# Patient Record
Sex: MALE | Race: BLACK OR AFRICAN AMERICAN | Employment: OTHER
[De-identification: names, ages, dates, MRNs, and addresses within clinical notes are randomized per-mention and may not be internally consistent; named-entity substitution may affect disease eponyms.]

---

## 2024-10-03 ENCOUNTER — APPOINTMENT (OUTPATIENT)
Facility: HOSPITAL | Age: 84
End: 2024-10-03
Attending: INTERNAL MEDICINE
Payer: OTHER GOVERNMENT

## 2024-10-03 ENCOUNTER — HOSPITAL ENCOUNTER (INPATIENT)
Facility: HOSPITAL | Age: 84
LOS: 23 days | Discharge: NURSING, REHAB OR LONG-TERM CARE | End: 2024-10-26
Attending: INTERNAL MEDICINE | Admitting: INTERNAL MEDICINE
Payer: OTHER GOVERNMENT

## 2024-10-03 DIAGNOSIS — R60.0 EDEMA OF RIGHT UPPER ARM: ICD-10-CM

## 2024-10-03 DIAGNOSIS — R06.02 SHORTNESS OF BREATH: Primary | ICD-10-CM

## 2024-10-03 PROBLEM — E11.9 DM (DIABETES MELLITUS), TYPE 2 (HCC): Status: ACTIVE | Noted: 2024-10-03

## 2024-10-03 PROBLEM — R04.2 HEMOPTYSIS: Status: ACTIVE | Noted: 2024-10-03

## 2024-10-03 PROBLEM — J18.9 COMMUNITY ACQUIRED PNEUMONIA: Status: ACTIVE | Noted: 2024-10-03

## 2024-10-03 PROBLEM — Z74.09 DECREASED MOBILITY AND ENDURANCE: Status: ACTIVE | Noted: 2024-10-03

## 2024-10-03 PROBLEM — N17.9 AKI (ACUTE KIDNEY INJURY) (HCC): Status: ACTIVE | Noted: 2024-10-03

## 2024-10-03 PROBLEM — J96.01 ACUTE HYPOXIC RESPIRATORY FAILURE: Status: ACTIVE | Noted: 2024-10-03

## 2024-10-03 PROBLEM — I82.413 ACUTE DEEP VEIN THROMBOSIS (DVT) OF FEMORAL VEIN OF BOTH LOWER EXTREMITIES (HCC): Status: ACTIVE | Noted: 2024-10-03

## 2024-10-03 LAB
GLUCOSE BLD STRIP.AUTO-MCNC: 335 MG/DL (ref 65–100)
GLUCOSE BLD STRIP.AUTO-MCNC: 366 MG/DL (ref 65–100)
MRSA DNA SPEC QL NAA+PROBE: NOT DETECTED
PERFORMED BY:: ABNORMAL
PERFORMED BY:: ABNORMAL

## 2024-10-03 PROCEDURE — 0202U NFCT DS 22 TRGT SARS-COV-2: CPT

## 2024-10-03 PROCEDURE — 2000000000 HC ICU R&B

## 2024-10-03 PROCEDURE — 94761 N-INVAS EAR/PLS OXIMETRY MLT: CPT

## 2024-10-03 PROCEDURE — 5A0945A ASSISTANCE WITH RESPIRATORY VENTILATION, 24-96 CONSECUTIVE HOURS, HIGH NASAL FLOW/VELOCITY: ICD-10-PCS | Performed by: INTERNAL MEDICINE

## 2024-10-03 PROCEDURE — 6370000000 HC RX 637 (ALT 250 FOR IP): Performed by: INTERNAL MEDICINE

## 2024-10-03 PROCEDURE — 2700000000 HC OXYGEN THERAPY PER DAY

## 2024-10-03 PROCEDURE — 2580000003 HC RX 258: Performed by: INTERNAL MEDICINE

## 2024-10-03 PROCEDURE — 6360000002 HC RX W HCPCS: Performed by: INTERNAL MEDICINE

## 2024-10-03 PROCEDURE — 82962 GLUCOSE BLOOD TEST: CPT

## 2024-10-03 PROCEDURE — 71045 X-RAY EXAM CHEST 1 VIEW: CPT

## 2024-10-03 PROCEDURE — 71250 CT THORAX DX C-: CPT

## 2024-10-03 PROCEDURE — 87641 MR-STAPH DNA AMP PROBE: CPT

## 2024-10-03 RX ORDER — SODIUM CHLORIDE 0.9 % (FLUSH) 0.9 %
5-40 SYRINGE (ML) INJECTION PRN
Status: DISCONTINUED | OUTPATIENT
Start: 2024-10-03 | End: 2024-10-26 | Stop reason: HOSPADM

## 2024-10-03 RX ORDER — DEXTROSE MONOHYDRATE 100 MG/ML
INJECTION, SOLUTION INTRAVENOUS CONTINUOUS PRN
Status: DISCONTINUED | OUTPATIENT
Start: 2024-10-03 | End: 2024-10-26 | Stop reason: HOSPADM

## 2024-10-03 RX ORDER — BISACODYL 10 MG
10 SUPPOSITORY, RECTAL RECTAL DAILY PRN
Status: DISCONTINUED | OUTPATIENT
Start: 2024-10-03 | End: 2024-10-26 | Stop reason: HOSPADM

## 2024-10-03 RX ORDER — CARVEDILOL 12.5 MG/1
12.5 TABLET ORAL 2 TIMES DAILY WITH MEALS
Status: DISCONTINUED | OUTPATIENT
Start: 2024-10-03 | End: 2024-10-04

## 2024-10-03 RX ORDER — GAUZE BANDAGE 2" X 2"
100 BANDAGE TOPICAL DAILY
Status: DISCONTINUED | OUTPATIENT
Start: 2024-10-03 | End: 2024-10-26 | Stop reason: HOSPADM

## 2024-10-03 RX ORDER — GLUCAGON 1 MG/ML
1 KIT INJECTION PRN
Status: DISCONTINUED | OUTPATIENT
Start: 2024-10-03 | End: 2024-10-26 | Stop reason: HOSPADM

## 2024-10-03 RX ORDER — INSULIN LISPRO 100 [IU]/ML
0-4 INJECTION, SOLUTION INTRAVENOUS; SUBCUTANEOUS NIGHTLY
Status: DISCONTINUED | OUTPATIENT
Start: 2024-10-03 | End: 2024-10-15

## 2024-10-03 RX ORDER — POTASSIUM CHLORIDE 29.8 MG/ML
20 INJECTION INTRAVENOUS PRN
Status: DISCONTINUED | OUTPATIENT
Start: 2024-10-03 | End: 2024-10-03

## 2024-10-03 RX ORDER — MULTIVITAMIN WITH IRON
1 TABLET ORAL DAILY
Status: DISCONTINUED | OUTPATIENT
Start: 2024-10-04 | End: 2024-10-26 | Stop reason: HOSPADM

## 2024-10-03 RX ORDER — LIDOCAINE HYDROCHLORIDE 20 MG/ML
JELLY TOPICAL PRN
Status: DISCONTINUED | OUTPATIENT
Start: 2024-10-03 | End: 2024-10-26 | Stop reason: HOSPADM

## 2024-10-03 RX ORDER — SENNA AND DOCUSATE SODIUM 50; 8.6 MG/1; MG/1
1 TABLET, FILM COATED ORAL 2 TIMES DAILY
Status: DISCONTINUED | OUTPATIENT
Start: 2024-10-03 | End: 2024-10-15

## 2024-10-03 RX ORDER — TORSEMIDE 10 MG/1
20 TABLET ORAL DAILY
Status: DISCONTINUED | OUTPATIENT
Start: 2024-10-03 | End: 2024-10-05

## 2024-10-03 RX ORDER — TAMSULOSIN HYDROCHLORIDE 0.4 MG/1
0.4 CAPSULE ORAL DAILY
Status: DISCONTINUED | OUTPATIENT
Start: 2024-10-04 | End: 2024-10-26 | Stop reason: HOSPADM

## 2024-10-03 RX ORDER — AZITHROMYCIN 500 MG/1
250 TABLET, FILM COATED ORAL ONCE
Status: COMPLETED | OUTPATIENT
Start: 2024-10-04 | End: 2024-10-04

## 2024-10-03 RX ORDER — INSULIN LISPRO 100 [IU]/ML
0-4 INJECTION, SOLUTION INTRAVENOUS; SUBCUTANEOUS
Status: DISCONTINUED | OUTPATIENT
Start: 2024-10-03 | End: 2024-10-15

## 2024-10-03 RX ORDER — ONDANSETRON 2 MG/ML
4 INJECTION INTRAMUSCULAR; INTRAVENOUS EVERY 6 HOURS PRN
Status: DISCONTINUED | OUTPATIENT
Start: 2024-10-03 | End: 2024-10-26 | Stop reason: HOSPADM

## 2024-10-03 RX ORDER — ACETAMINOPHEN 325 MG/1
650 TABLET ORAL EVERY 6 HOURS PRN
Status: DISCONTINUED | OUTPATIENT
Start: 2024-10-03 | End: 2024-10-26 | Stop reason: HOSPADM

## 2024-10-03 RX ORDER — ACETAMINOPHEN 650 MG/1
650 SUPPOSITORY RECTAL EVERY 6 HOURS PRN
Status: DISCONTINUED | OUTPATIENT
Start: 2024-10-03 | End: 2024-10-26 | Stop reason: HOSPADM

## 2024-10-03 RX ORDER — FINASTERIDE 5 MG/1
5 TABLET, FILM COATED ORAL DAILY
Status: DISCONTINUED | OUTPATIENT
Start: 2024-10-04 | End: 2024-10-26 | Stop reason: HOSPADM

## 2024-10-03 RX ORDER — ALLOPURINOL 100 MG/1
100 TABLET ORAL DAILY
Status: DISCONTINUED | OUTPATIENT
Start: 2024-10-04 | End: 2024-10-26 | Stop reason: HOSPADM

## 2024-10-03 RX ORDER — SODIUM BICARBONATE 650 MG/1
650 TABLET ORAL 2 TIMES DAILY
Status: DISCONTINUED | OUTPATIENT
Start: 2024-10-03 | End: 2024-10-26 | Stop reason: HOSPADM

## 2024-10-03 RX ORDER — SODIUM CHLORIDE 0.9 % (FLUSH) 0.9 %
5-40 SYRINGE (ML) INJECTION EVERY 12 HOURS SCHEDULED
Status: DISCONTINUED | OUTPATIENT
Start: 2024-10-03 | End: 2024-10-26 | Stop reason: HOSPADM

## 2024-10-03 RX ORDER — SODIUM CHLORIDE 9 MG/ML
INJECTION, SOLUTION INTRAVENOUS PRN
Status: DISCONTINUED | OUTPATIENT
Start: 2024-10-03 | End: 2024-10-26 | Stop reason: HOSPADM

## 2024-10-03 RX ORDER — ONDANSETRON 4 MG/1
4 TABLET, ORALLY DISINTEGRATING ORAL EVERY 8 HOURS PRN
Status: DISCONTINUED | OUTPATIENT
Start: 2024-10-03 | End: 2024-10-26 | Stop reason: HOSPADM

## 2024-10-03 RX ORDER — MAGNESIUM SULFATE IN WATER 40 MG/ML
2000 INJECTION, SOLUTION INTRAVENOUS PRN
Status: DISCONTINUED | OUTPATIENT
Start: 2024-10-03 | End: 2024-10-03

## 2024-10-03 RX ORDER — POTASSIUM CHLORIDE 7.45 MG/ML
10 INJECTION INTRAVENOUS PRN
Status: DISCONTINUED | OUTPATIENT
Start: 2024-10-03 | End: 2024-10-03

## 2024-10-03 RX ADMIN — INSULIN LISPRO 4 UNITS: 100 INJECTION, SOLUTION INTRAVENOUS; SUBCUTANEOUS at 20:51

## 2024-10-03 RX ADMIN — THIAMINE HCL TAB 100 MG 100 MG: 100 TAB at 20:50

## 2024-10-03 RX ADMIN — SENNOSIDES AND DOCUSATE SODIUM 1 TABLET: 50; 8.6 TABLET ORAL at 20:50

## 2024-10-03 RX ADMIN — SODIUM CHLORIDE, PRESERVATIVE FREE 10 ML: 5 INJECTION INTRAVENOUS at 20:52

## 2024-10-03 RX ADMIN — SODIUM BICARBONATE 650 MG: 650 TABLET ORAL at 20:50

## 2024-10-03 RX ADMIN — ONDANSETRON 4 MG: 2 INJECTION INTRAMUSCULAR; INTRAVENOUS at 22:38

## 2024-10-03 RX ADMIN — CARVEDILOL 12.5 MG: 12.5 TABLET, FILM COATED ORAL at 20:50

## 2024-10-03 NOTE — H&P
CRITICAL CARE NOTE      Name: Michael Fung   : 1940   MRN: [id number]   Date: 10/3/2024      REASON FOR ICU ADMISSION: Acute hypoxemic respiratory failure    BRIEF PATIENT SUMMARY AND HOSPITAL COURSE   Michael Fung is a 83-year-old male presented to outside hospital on  for cough and shortness of breath found to be hypoxic and have x-ray concerning for community-acquired pneumonia in the left mid and lower lung fields.  Initiated on community-acquired pneumonia therapy with ceftriaxone and azithromycin.  Patient also noted to have elevated creatinine of 4.5 which is his baseline patient is diuretic dependent.  Became oliguric during the course of the hospitalization despite Lasix 80 mg.  Lower extremity ultrasound obtained for elevated D-dimer.  Showed bilateral DVTs in the right popliteal and 2 in the femoral and popliteal on the left.  Had been initiated on a heparin drip 10/ evening patient on supratherapeutic heparin developed hemoptysis.  Substantial enough to stop heparin drip but unable to quantify.  Patient on mid flow oxygen with low sats.  Transferred to our ICU on BiPAP transitioned to high flow nasal cannula 50 L, 40% with sats of 97%.  Had a low prop VQ scan on 10/1 for renal failure with potential need for dialysis creatinine increased to 6 and oliguric.  Bladder scan negative no Muñoz catheter or straight cath inserted, worsening hypoxemia from [facility].  Most of history obtained from patient's daughter and in conversation with transferring facility much of chart did not make it over with transfer.    COMPREHENSIVE ASSESSMENT & PLAN       1. Neuro-slightly slow to respond but able to indicate who his family was.  2. CV-history of hypertension currently hemodynamically stable.  3. Pulm-hypoxic respiratory failure patient had been on ceftriaxone and azithromycin.  Chest x-ray here reveals no consolidation.  Will check urine Legionella, chlamydia.  DC

## 2024-10-04 ENCOUNTER — APPOINTMENT (OUTPATIENT)
Facility: HOSPITAL | Age: 84
End: 2024-10-04
Attending: INTERNAL MEDICINE
Payer: OTHER GOVERNMENT

## 2024-10-04 PROBLEM — K56.7 ILEUS (HCC): Status: ACTIVE | Noted: 2024-10-04

## 2024-10-04 PROBLEM — K92.2 ACUTE UPPER GI BLEED: Status: ACTIVE | Noted: 2024-10-04

## 2024-10-04 LAB
ALBUMIN SERPL-MCNC: 1.6 G/DL (ref 3.5–5)
ALBUMIN/GLOB SERPL: 0.3 (ref 1.1–2.2)
ALP SERPL-CCNC: 55 U/L (ref 45–117)
ALT SERPL-CCNC: 14 U/L (ref 12–78)
ANION GAP SERPL CALC-SCNC: 14 MMOL/L (ref 2–12)
AST SERPL W P-5'-P-CCNC: 19 U/L (ref 15–37)
B PERT DNA SPEC QL NAA+PROBE: NOT DETECTED
BASOPHILS # BLD: 0 K/UL (ref 0–0.1)
BASOPHILS NFR BLD: 0 % (ref 0–1)
BILIRUB SERPL-MCNC: 0.2 MG/DL (ref 0.2–1)
BORDETELLA PARAPERTUSSIS BY PCR: NOT DETECTED
BUN SERPL-MCNC: 128 MG/DL (ref 6–20)
BUN/CREAT SERPL: 16 (ref 12–20)
C PNEUM DNA SPEC QL NAA+PROBE: NOT DETECTED
CA-I BLD-MCNC: 8.6 MG/DL (ref 8.5–10.1)
CHLORIDE SERPL-SCNC: 102 MMOL/L (ref 97–108)
CO2 SERPL-SCNC: 16 MMOL/L (ref 21–32)
CREAT SERPL-MCNC: 7.96 MG/DL (ref 0.7–1.3)
DIFFERENTIAL METHOD BLD: ABNORMAL
EKG ATRIAL RATE: 83 BPM
EKG DIAGNOSIS: NORMAL
EKG P AXIS: 56 DEGREES
EKG P-R INTERVAL: 270 MS
EKG Q-T INTERVAL: 370 MS
EKG QRS DURATION: 84 MS
EKG QTC CALCULATION (BAZETT): 418 MS
EKG R AXIS: -23 DEGREES
EKG T AXIS: 32 DEGREES
EKG VENTRICULAR RATE: 77 BPM
EOSINOPHIL # BLD: 0.1 K/UL (ref 0–0.4)
EOSINOPHIL NFR BLD: 1 % (ref 0–7)
ERYTHROCYTE [DISTWIDTH] IN BLOOD BY AUTOMATED COUNT: 13.5 % (ref 11.5–14.5)
EST. AVERAGE GLUCOSE BLD GHB EST-MCNC: 111 MG/DL
FLUAV SUBTYP SPEC NAA+PROBE: NOT DETECTED
FLUBV RNA SPEC QL NAA+PROBE: NOT DETECTED
GLOBULIN SER CALC-MCNC: 4.8 G/DL (ref 2–4)
GLUCOSE BLD STRIP.AUTO-MCNC: 163 MG/DL (ref 65–100)
GLUCOSE BLD STRIP.AUTO-MCNC: 169 MG/DL (ref 65–100)
GLUCOSE BLD STRIP.AUTO-MCNC: 200 MG/DL (ref 65–100)
GLUCOSE BLD STRIP.AUTO-MCNC: 226 MG/DL (ref 65–100)
GLUCOSE BLD STRIP.AUTO-MCNC: 230 MG/DL (ref 65–100)
GLUCOSE SERPL-MCNC: 236 MG/DL (ref 65–100)
HADV DNA SPEC QL NAA+PROBE: NOT DETECTED
HBA1C MFR BLD: 5.5 % (ref 4–5.6)
HCOV 229E RNA SPEC QL NAA+PROBE: NOT DETECTED
HCOV HKU1 RNA SPEC QL NAA+PROBE: NOT DETECTED
HCOV NL63 RNA SPEC QL NAA+PROBE: NOT DETECTED
HCOV OC43 RNA SPEC QL NAA+PROBE: NOT DETECTED
HCT VFR BLD AUTO: 20.8 % (ref 36.6–50.3)
HGB BLD-MCNC: 7.2 G/DL (ref 12.1–17)
HMPV RNA SPEC QL NAA+PROBE: NOT DETECTED
HPIV1 RNA SPEC QL NAA+PROBE: NOT DETECTED
HPIV2 RNA SPEC QL NAA+PROBE: NOT DETECTED
HPIV3 RNA SPEC QL NAA+PROBE: NOT DETECTED
HPIV4 RNA SPEC QL NAA+PROBE: NOT DETECTED
IMM GRANULOCYTES # BLD AUTO: 0 K/UL
IMM GRANULOCYTES NFR BLD AUTO: 0 %
LYMPHOCYTES # BLD: 0.4 K/UL (ref 0.8–3.5)
LYMPHOCYTES NFR BLD: 4 % (ref 12–49)
M PNEUMO DNA SPEC QL NAA+PROBE: NOT DETECTED
MCH RBC QN AUTO: 30.8 PG (ref 26–34)
MCHC RBC AUTO-ENTMCNC: 34.6 G/DL (ref 30–36.5)
MCV RBC AUTO: 88.9 FL (ref 80–99)
MONOCYTES # BLD: 0.8 K/UL (ref 0–1)
MONOCYTES NFR BLD: 7 % (ref 5–13)
NEUTS BAND NFR BLD MANUAL: 2 % (ref 0–6)
NEUTS SEG # BLD: 9.8 K/UL (ref 1.8–8)
NEUTS SEG NFR BLD: 86 % (ref 32–75)
NRBC # BLD: 0 K/UL (ref 0–0.01)
NRBC BLD-RTO: 0 PER 100 WBC
PERFORMED BY:: ABNORMAL
PLATELET # BLD AUTO: 367 K/UL (ref 150–400)
PMV BLD AUTO: 10.4 FL (ref 8.9–12.9)
POTASSIUM SERPL-SCNC: 4.5 MMOL/L (ref 3.5–5.1)
PROT SERPL-MCNC: 6.4 G/DL (ref 6.4–8.2)
RBC # BLD AUTO: 2.34 M/UL (ref 4.1–5.7)
RBC MORPH BLD: ABNORMAL
RSV RNA SPEC QL NAA+PROBE: NOT DETECTED
RV+EV RNA SPEC QL NAA+PROBE: NOT DETECTED
SARS-COV-2 RNA RESP QL NAA+PROBE: NOT DETECTED
SARS-COV-2 RNA RESP QL NAA+PROBE: NOT DETECTED
SODIUM SERPL-SCNC: 132 MMOL/L (ref 136–145)
SPECIMEN SOURCE: NORMAL
WBC # BLD AUTO: 11.1 K/UL (ref 4.1–11.1)

## 2024-10-04 PROCEDURE — 94761 N-INVAS EAR/PLS OXIMETRY MLT: CPT

## 2024-10-04 PROCEDURE — C1752 CATH,HEMODIALYSIS,SHORT-TERM: HCPCS

## 2024-10-04 PROCEDURE — 2580000003 HC RX 258: Performed by: INTERNAL MEDICINE

## 2024-10-04 PROCEDURE — 86923 COMPATIBILITY TEST ELECTRIC: CPT

## 2024-10-04 PROCEDURE — 6370000000 HC RX 637 (ALT 250 FOR IP): Performed by: INTERNAL MEDICINE

## 2024-10-04 PROCEDURE — 87340 HEPATITIS B SURFACE AG IA: CPT

## 2024-10-04 PROCEDURE — 87635 SARS-COV-2 COVID-19 AMP PRB: CPT

## 2024-10-04 PROCEDURE — 82962 GLUCOSE BLOOD TEST: CPT

## 2024-10-04 PROCEDURE — 6360000002 HC RX W HCPCS: Performed by: INTERNAL MEDICINE

## 2024-10-04 PROCEDURE — 2709999900 HC NON-CHARGEABLE SUPPLY

## 2024-10-04 PROCEDURE — 5A1D70Z PERFORMANCE OF URINARY FILTRATION, INTERMITTENT, LESS THAN 6 HOURS PER DAY: ICD-10-PCS | Performed by: INTERNAL MEDICINE

## 2024-10-04 PROCEDURE — 71045 X-RAY EXAM CHEST 1 VIEW: CPT

## 2024-10-04 PROCEDURE — 86704 HEP B CORE ANTIBODY TOTAL: CPT

## 2024-10-04 PROCEDURE — 93005 ELECTROCARDIOGRAM TRACING: CPT | Performed by: INTERNAL MEDICINE

## 2024-10-04 PROCEDURE — 86803 HEPATITIS C AB TEST: CPT

## 2024-10-04 PROCEDURE — C1880 VENA CAVA FILTER: HCPCS

## 2024-10-04 PROCEDURE — 6360000004 HC RX CONTRAST MEDICATION: Performed by: STUDENT IN AN ORGANIZED HEALTH CARE EDUCATION/TRAINING PROGRAM

## 2024-10-04 PROCEDURE — 80053 COMPREHEN METABOLIC PANEL: CPT

## 2024-10-04 PROCEDURE — 2000000000 HC ICU R&B

## 2024-10-04 PROCEDURE — 2700000000 HC OXYGEN THERAPY PER DAY

## 2024-10-04 PROCEDURE — 86900 BLOOD TYPING SEROLOGIC ABO: CPT

## 2024-10-04 PROCEDURE — 36415 COLL VENOUS BLD VENIPUNCTURE: CPT

## 2024-10-04 PROCEDURE — C1894 INTRO/SHEATH, NON-LASER: HCPCS

## 2024-10-04 PROCEDURE — 86850 RBC ANTIBODY SCREEN: CPT

## 2024-10-04 PROCEDURE — C1769 GUIDE WIRE: HCPCS

## 2024-10-04 PROCEDURE — 90935 HEMODIALYSIS ONE EVALUATION: CPT

## 2024-10-04 PROCEDURE — 83036 HEMOGLOBIN GLYCOSYLATED A1C: CPT

## 2024-10-04 PROCEDURE — 76937 US GUIDE VASCULAR ACCESS: CPT

## 2024-10-04 PROCEDURE — 85025 COMPLETE CBC W/AUTO DIFF WBC: CPT

## 2024-10-04 PROCEDURE — 99254 IP/OBS CNSLTJ NEW/EST MOD 60: CPT | Performed by: UROLOGY

## 2024-10-04 PROCEDURE — 51798 US URINE CAPACITY MEASURE: CPT

## 2024-10-04 PROCEDURE — 86901 BLOOD TYPING SEROLOGIC RH(D): CPT

## 2024-10-04 RX ORDER — PEDIATRIC MULTIVIT 61/D3/VIT K 1500-800
1 CAPSULE ORAL DAILY
COMMUNITY

## 2024-10-04 RX ORDER — GAUZE BANDAGE 2" X 2"
100 BANDAGE TOPICAL DAILY
COMMUNITY

## 2024-10-04 RX ORDER — GLIPIZIDE 5 MG/1
0.5 TABLET, FILM COATED, EXTENDED RELEASE ORAL DAILY
COMMUNITY

## 2024-10-04 RX ORDER — ALLOPURINOL 100 MG/1
100 TABLET ORAL DAILY
COMMUNITY

## 2024-10-04 RX ORDER — HEPARIN SODIUM 1000 [USP'U]/ML
2500 INJECTION, SOLUTION INTRAVENOUS; SUBCUTANEOUS PRN
Status: DISCONTINUED | OUTPATIENT
Start: 2024-10-04 | End: 2024-10-19

## 2024-10-04 RX ORDER — TAMSULOSIN HYDROCHLORIDE 0.4 MG/1
0.8 CAPSULE ORAL DAILY
COMMUNITY

## 2024-10-04 RX ORDER — CARVEDILOL 12.5 MG/1
0.5 TABLET ORAL 2 TIMES DAILY WITH MEALS
COMMUNITY

## 2024-10-04 RX ORDER — SODIUM BICARBONATE 650 MG/1
2 TABLET ORAL 2 TIMES DAILY
COMMUNITY

## 2024-10-04 RX ORDER — NIFEDIPINE 90 MG/1
90 TABLET, FILM COATED, EXTENDED RELEASE ORAL
COMMUNITY

## 2024-10-04 RX ORDER — TORSEMIDE 5 MG/1
10 TABLET ORAL DAILY PRN
COMMUNITY

## 2024-10-04 RX ORDER — FINASTERIDE 5 MG/1
5 TABLET, FILM COATED ORAL DAILY
COMMUNITY

## 2024-10-04 RX ORDER — IOPAMIDOL 755 MG/ML
20 INJECTION, SOLUTION INTRAVASCULAR
Status: COMPLETED | OUTPATIENT
Start: 2024-10-04 | End: 2024-10-04

## 2024-10-04 RX ADMIN — SODIUM CHLORIDE, PRESERVATIVE FREE 80 MG: 5 INJECTION INTRAVENOUS at 11:18

## 2024-10-04 RX ADMIN — PANTOPRAZOLE SODIUM 8 MG/HR: 40 INJECTION, POWDER, FOR SOLUTION INTRAVENOUS at 20:44

## 2024-10-04 RX ADMIN — IOPAMIDOL 20 ML: 755 INJECTION, SOLUTION INTRAVENOUS at 15:11

## 2024-10-04 RX ADMIN — CARVEDILOL 12.5 MG: 12.5 TABLET, FILM COATED ORAL at 08:57

## 2024-10-04 RX ADMIN — DESMOPRESSIN ACETATE 23 MCG: 4 INJECTION, SOLUTION INTRAVENOUS; SUBCUTANEOUS at 12:44

## 2024-10-04 RX ADMIN — FINASTERIDE 5 MG: 5 TABLET, FILM COATED ORAL at 08:56

## 2024-10-04 RX ADMIN — SODIUM BICARBONATE 650 MG: 650 TABLET ORAL at 21:47

## 2024-10-04 RX ADMIN — HEPARIN SODIUM 2500 UNITS: 1000 INJECTION INTRAVENOUS; SUBCUTANEOUS at 17:07

## 2024-10-04 RX ADMIN — CEFTRIAXONE SODIUM 1000 MG: 1 INJECTION, POWDER, FOR SOLUTION INTRAMUSCULAR; INTRAVENOUS at 08:57

## 2024-10-04 RX ADMIN — TORSEMIDE 20 MG: 10 TABLET ORAL at 08:57

## 2024-10-04 RX ADMIN — SODIUM BICARBONATE 650 MG: 650 TABLET ORAL at 08:56

## 2024-10-04 RX ADMIN — INSULIN LISPRO 1 UNITS: 100 INJECTION, SOLUTION INTRAVENOUS; SUBCUTANEOUS at 08:56

## 2024-10-04 RX ADMIN — SODIUM CHLORIDE, PRESERVATIVE FREE 10 ML: 5 INJECTION INTRAVENOUS at 08:57

## 2024-10-04 RX ADMIN — AZITHROMYCIN DIHYDRATE 250 MG: 500 TABLET ORAL at 18:49

## 2024-10-04 RX ADMIN — TAMSULOSIN HYDROCHLORIDE 0.4 MG: 0.4 CAPSULE ORAL at 08:56

## 2024-10-04 RX ADMIN — THIAMINE HCL TAB 100 MG 100 MG: 100 TAB at 08:56

## 2024-10-04 RX ADMIN — SODIUM CHLORIDE, PRESERVATIVE FREE 10 ML: 5 INJECTION INTRAVENOUS at 21:47

## 2024-10-04 RX ADMIN — ALLOPURINOL 100 MG: 100 TABLET ORAL at 08:56

## 2024-10-04 RX ADMIN — THERA TABS 1 TABLET: TAB at 08:56

## 2024-10-04 RX ADMIN — PANTOPRAZOLE SODIUM 8 MG/HR: 40 INJECTION, POWDER, FOR SOLUTION INTRAVENOUS at 11:22

## 2024-10-04 RX ADMIN — SENNOSIDES AND DOCUSATE SODIUM 1 TABLET: 50; 8.6 TABLET ORAL at 21:47

## 2024-10-04 RX ADMIN — INSULIN LISPRO 1 UNITS: 100 INJECTION, SOLUTION INTRAVENOUS; SUBCUTANEOUS at 12:49

## 2024-10-04 NOTE — OR NURSING
Patient to IR for IVC filter & temp  Patient identified, telephone consent obtained from spouse  Site prepped, procedure performed without complication  Patient tolerated procedure  Double lumen temporary HD catheter placed, using Right IJV access  V/s stable throughout  Patient transported back to ICU with ICU RN  Report given to primary RNAnnabel

## 2024-10-04 NOTE — PROGRESS NOTES
Primary RN notified of critical result ARACELIS/IFA positive from [facility] lab. Positive result to be faxed to ICU.

## 2024-10-04 NOTE — CONSULTS
UROLOGY CONSULT    Sukumar Jacinto MD  [phone] Office    Patient: Michael Fung MRN: [id number]  SSN: [id number]    YOB: 1940  Age: 83 y.o.  Sex: male          Date of Encounter:  October 4, 2024  ADMITTED: 10/3/2024  for Acute hypoxic respiratory failure [J96.01]  Chief Complaint:  SOB  Reason for consult: retention         History of Present Illness:  Patient is a 83 y.o. male admitted 10/3/2024 to the hospital for Acute hypoxic respiratory failure [J96.01].       Mr. Fung was hospitalized in [place] prior to his transfer here yesterday. He presented there on 9/30/24 with fever and chest pain, admitted to the medical service with pneumonia with hypoxia .      Patient with Urologic PMH significant for prostate surgery (not due to cancer, sounds like a TURP ) and enlargement on finasteride and tamsulosin baseline, but no retention.  Routinely sees Dr. Martin, [facility] in [place].     He denies urinary difficulty.  He thinks his urine flow was okay prior to the hospital.    CATHY on CKD with plans for dialysis if family is in agreement.  He has tried to avoid this in the past per his wife.  They will await arrival of daughter to discuss.  Creatinine baseline reportedly 4.5, now 7.96.    CT does not show bladder distension/hydronephrosis.  He has voided ~50 cc since admission with bladder scan volumes ~100 cc.    Unable to place a pham at bedside with subsequent blood per the urethra.  Started on anticoagulation, found to have bilateral DVTs in the right popliteal and the left femoral and popliteal .      Past Medical History:  No Known Allergies   Current Facility-Administered Medications   Medication Dose Route Frequency    cefTRIAXone (ROCEPHIN) 1,000 mg in sterile water 10 mL IV syringe  1,000 mg IntraVENous Q24H    sodium chloride flush 0.9 % injection 5-40 mL  5-40 mL IntraVENous 2 times per day    sodium chloride flush 0.9 % injection 5-40 mL  5-40 mL IntraVENous PRN    0.9 %  (diabetes mellitus), type 2 (HCC)    Decreased mobility and endurance        Assessment/Plan:  CATHY ON CKD:  Proceeding to dialysis per nephrology    URINARY RETENTION: - more oliguria.  Not retaining significant volumes.  Probable urethral stricture or false passage.  Given anticoagulation, I think the risk of urethral dilation outweighs the benefit at this time.  Observe for bladder distention    DVT:  On anticoagulation    DM: Hyperglycemia on insulin    PNEUMONIA: on ceftriaxone     ANEMIA:  suspect CKD.  No significant urinary bleeding    [x]       High complexity decision making was performed in this patient at high risk for decompensation with multiple organ involvement.      I personally had a face to face encounter with the patient and performed the history, physical, assessment and plan.   Sukumar Jacinto MD      Signed By: LAWANDA Olvera - OSMIN  - October 4, 2024    Please note that portions of this note was potentially completed with Dragon dictation, the computer voice recognition software.  Quite often unanticipated grammatical, syntax, homophones, and other interpretive errors are inadvertently transcribed by the computer software.  Please disregard these errors.  Please excuse any errors that have escaped final proofreading.  Thank you.

## 2024-10-04 NOTE — CARE COORDINATION
10/04/24 1321   Service Assessment   Patient Orientation Unable to Assess   Cognition Other (see comment)  (Pt sleeping peacfully.)   History Provided By Spouse;Child/Family   Primary Caregiver Spouse   Accompanied By/Relationship Spouse, daugthers   Support Systems Spouse/Significant Other;Children;Family Members   Patient's Healthcare Decision Maker is: Legal Next of Kin   PCP Verified by CM Yes   Last Visit to PCP Within last 3 months   Prior Functional Level Independent in ADLs/IADLs   Current Functional Level Independent in ADLs/IADLs   Can patient return to prior living arrangement Unknown at present   Ability to make needs known: Fair   Family able to assist with home care needs: Yes   Would you like for me to discuss the discharge plan with any other family members/significant others, and if so, who? Yes  (Spouse/ daughters)     Advance Care Planning     General Advance Care Planning (ACP) Conversation    Date of Conversation: 10/4/2024  Conducted with: Patient with Decision Making Capacity and Legal next of kin  Other persons present: Spouse    Daughter      Healthcare Decision Maker:   Primary Decision Maker: Melly Fung - Spouse - [phone]     Today we documented Decision Maker(s) consistent with Legal Next of Kin hierarchy.    Length of Voluntary ACP Conversation in minutes:  <16 minutes (Non-Billable)    CARINE Olivera      CM met w/ family at bedside to discuss dc planning, pt resting comfortably. Pt lives w/ spouse, charted above. Family reports pt has been indep in ADLs prior to admission, using a walker/cane as needed, no other DME. No hh/rehab hx. Pt is seen at [facility] in [place], uses [facility] in [facility] for Rx. Cm team to follow for dispo.

## 2024-10-04 NOTE — CONSULTS
[facility] Renal Consult Note      NAME:  Michael Fung   :   1940   MRN:  [id number]     Requesting Physician Alex Maya MD   Reason for Consult:  Acute on chronic renal failure     PCP:  No primary care provider on file.     Date/Time:  10/4/2024 12:01 PM          Subjective:     CHIEF COMPLAINT: Pneumonia    HISTORY OF PRESENT ILLNESS:     Mr. Fung is a 83 y.o.   male who is admitted to the medical service with pneumonia with hypoxia.  We are asked to evaluate for worsening kidney function.    Patient history of chronic kidney disease looks like was stage V [facility].  Per patient's family, they had talked him about dialysis but he was not necessarily interested in chronic dialysis.  Nonetheless, admitted with pneumonia to another hospital and subsequently transferred to here.  Also shown to have bilateral DVTs in the right popliteal and the left femoral and popliteal.  Serum creatinine started to rise.  Decreased urine output and worsening hypoxia caused transferred to this hospital.  On presentation his serum creatinine is 7.9.  There have been no hypotensive.  He has a fairly large NG tube output but minimal urine output.  Wife and 2 daughters at the bedside  No past medical history on file.     No past surgical history on file.    Social History     Tobacco Use    Smoking status: Never     Passive exposure: Never    Smokeless tobacco: Never   Substance Use Topics    Alcohol use: Defer        No family history on file.     No Known Allergies     Prior to Admission medications    Not on File         Current Facility-Administered Medications:     cefTRIAXone (ROCEPHIN) 1,000 mg in sterile water 10 mL IV syringe, 1,000 mg, IntraVENous, Q24H, Willard Harden MD, 1,000 mg at 10/04/24 0857    pantoprazole (PROTONIX) 40 mg in sodium chloride 0.9 % 50 mL (mini-bag) infusion, 8 mg/hr, IntraVENous, Continuous, Willard Harden MD,  (PROSCAR) tablet 5 mg, 5 mg, Oral, Daily, Willard Harden MD, 5 mg at 10/04/24 0856    tamsulosin (FLOMAX) capsule 0.4 mg, 0.4 mg, Oral, Daily, Willard Harden MD, 0.4 mg at 10/04/24 0856    azithromycin (ZITHROMAX) tablet 250 mg, 250 mg, Oral, Once, Willard Harden MD    thiamine mononitrate tablet 100 mg, 100 mg, Oral, Daily, Willard Harden MD, 100 mg at 10/04/24 0856    sodium bicarbonate tablet 650 mg, 650 mg, Oral, BID, Willard Harden MD, 650 mg at 10/04/24 0856    multivitamin 1 tablet, 1 tablet, Oral, Daily, Willard Harden MD, 1 tablet at 10/04/24 0856    lidocaine (XYLOCAINE) 2 % uro-jet, , Topical, PRN, Willard Harden MD      Review of Systems:  Review of systems not obtained due to patient factors.  Patient is lethargic but arousable.  Does not have any specific complaints.  However, mental status may be somewhat questionable.       Objective:      VITALS:    Vital signs reviewed; most recent are:    Vitals:    10/04/24 1143   BP:    Pulse: 74   Resp:    Temp:    SpO2:      SpO2 Readings from Last 6 Encounters:   10/04/24 97%          Intake/Output Summary (Last 24 hours) at 10/4/2024 1201  Last data filed at 10/4/2024 1100  Gross per 24 hour   Intake 210 ml   Output 1800 ml   Net -1590 ml            Exam:   Physical Exam:General appearance: appears stated age, fatigued, no distress, slowed mentation, and lethargic on high flow nasal oxygen  Head: Normocephalic, without obvious abnormality, atraumatic  Neck: no adenopathy, no JVD, and supple, symmetrical, trachea midline  Lungs:  Poor inspiratory effort, basilar crackles, no wheeze  Heart: regular rate and rhythm and no S3 or S4  Abdomen: soft, non-tender; bowel sounds normal; no masses,  no organomegaly male pure wick  Extremities:  Trace pretibial edema  Neurologic: Lethargic but arousable.  No motor defects noted     LABS:  Recent Labs     10/04/24  0350   *   K 4.5      CO2 16*   *   CREATININE 7.96*   CALCIUM 8.6

## 2024-10-04 NOTE — OR NURSING
Patient to IR for IVC filter & temp  Patient identified, telephone consent obtained from spouse  Site prepped, procedure performed without complication  Patient tolerated procedure  V/s stable throughout  Patient transported back to ICU with ICU RN  Report given to primary RNAnnabel

## 2024-10-04 NOTE — CONSULTS
RENAL    D/W Dr. Harden, patient will need dialysis based on current clinical course. She will arrange for dialysis access. Will proceed with HD after cath placed.  Full note to follow.    MD Lior

## 2024-10-04 NOTE — PROGRESS NOTES
CRITICAL CARE NOTE      Name: Michael Fung   : 1940   MRN: [id number]   Date: 10/4/2024      REASON FOR ICU ADMISSION: Acute hypoxemic respiratory failure    BRIEF PATIENT SUMMARY AND HOSPITAL COURSE   Michael Fung is a 83-year-old male presented to outside hospital on  for cough and shortness of breath found to be hypoxic and have x-ray concerning for community-acquired pneumonia in the left mid and lower lung fields.  Initiated on community-acquired pneumonia therapy with ceftriaxone and azithromycin.  Patient also noted to have elevated creatinine of 4.5 which is his baseline patient is diuretic dependent.  Became oliguric during the course of the hospitalization despite Lasix 80 mg.  Lower extremity ultrasound obtained for elevated D-dimer.  Showed bilateral DVTs in the right popliteal and 2 in the femoral and popliteal on the left.  Had been initiated on a heparin drip 10/ evening patient on supratherapeutic heparin developed hemoptysis.  Substantial enough to stop heparin drip but unable to quantify.  Patient on mid flow oxygen with low sats.  Transferred to our ICU on BiPAP transitioned to high flow nasal cannula 50 L, 40% with sats of 97%.  Had a low prop VQ scan on 10/1 for renal failure with potential need for dialysis creatinine increased to 6 and oliguric.  Bladder scan negative no Muñoz catheter or straight cath inserted, worsening hypoxemia from [facility].  Most of history obtained from patient's daughter and in conversation with transferring facility much of chart did not make it over with transfer.     10/4 overnight CT showed substantial left lower lobe consolidation, CT of abdomen with small amount of urine in the bladder bladder.  However stomach grossly distended SBO versus ileus.  NG tube placed with over 800 mL of coffee-ground material.  Patient initiated on Protonix drip.  Creatinine continues to worsen patient's baseline creatinine   Coronavirus NL63 by PCR Not detected     Coronavirus OC43 by PCR Not detected     SARS-CoV-2, PCR Not detected     Human Metapneumovirus by PCR Not detected     Rhinovirus Enterovirus PCR Not detected     Influenza A by PCR Not detected     Influenza B PCR Not detected     Parainfluenza 1 PCR Not detected     Parainfluenza 2 PCR Not detected     Parainfluenza 3 PCR Not detected     Parainfluenza 4 PCR Not detected     Respiratory Syncytial Virus by PCR Not detected     Bordetella parapertussis by PCR Not detected     Bordetella pertussis by PCR Not detected     Chlamydophila Pneumonia PCR Not detected     Mycoplasma pneumo by PCR Not detected             CRITICAL CARE DOCUMENTATION  I had a face to face encounter with the patient, reviewed and interpreted patient data including clinical events, labs, images, vital signs, I/O's, and examined patient.  I have discussed the case and the plan and management of the patient's care with the consulting services, the bedside nurses and the respiratory therapist.      NOTE OF PERSONAL INVOLVEMENT IN CARE   This patient has a high probability of imminent, clinically significant deterioration, which requires the highest level of preparedness to intervene urgently. I participated in the decision-making and personally managed or directed the management of the following life and organ supporting interventions that required my frequent assessment to treat or prevent imminent deterioration.    I personally spent 75 minutes of critical care time.  This is time spent at this critically ill patient's bedside actively involved in patient care as well as the coordination of care.  This does not include any procedural time which has been billed separately.    Willard Harden MD   Critical Care Medicine  [facility]

## 2024-10-05 PROBLEM — N36.8 ACUTE URETHRAL OBSTRUCTION: Status: ACTIVE | Noted: 2024-10-05

## 2024-10-05 PROBLEM — R33.9 URINARY RETENTION: Status: ACTIVE | Noted: 2024-10-05

## 2024-10-05 LAB
ANION GAP SERPL CALC-SCNC: 16 MMOL/L (ref 2–12)
BASOPHILS # BLD: 0 K/UL (ref 0–0.1)
BASOPHILS NFR BLD: 0 % (ref 0–1)
BUN SERPL-MCNC: 76 MG/DL (ref 6–20)
BUN/CREAT SERPL: 14 (ref 12–20)
CA-I BLD-MCNC: 8.4 MG/DL (ref 8.5–10.1)
CHLORIDE SERPL-SCNC: 97 MMOL/L (ref 97–108)
CO2 SERPL-SCNC: 19 MMOL/L (ref 21–32)
CREAT SERPL-MCNC: 5.58 MG/DL (ref 0.7–1.3)
DIFFERENTIAL METHOD BLD: ABNORMAL
EOSINOPHIL # BLD: 0.1 K/UL (ref 0–0.4)
EOSINOPHIL NFR BLD: 1 % (ref 0–7)
ERYTHROCYTE [DISTWIDTH] IN BLOOD BY AUTOMATED COUNT: 14 % (ref 11.5–14.5)
GLUCOSE BLD STRIP.AUTO-MCNC: 118 MG/DL (ref 65–100)
GLUCOSE BLD STRIP.AUTO-MCNC: 126 MG/DL (ref 65–100)
GLUCOSE BLD STRIP.AUTO-MCNC: 232 MG/DL (ref 65–100)
GLUCOSE BLD STRIP.AUTO-MCNC: 250 MG/DL (ref 65–100)
GLUCOSE SERPL-MCNC: 213 MG/DL (ref 65–100)
HBV SURFACE AG SER QL: <0.1 INDEX
HBV SURFACE AG SER QL: NEGATIVE
HCT VFR BLD AUTO: 19.7 % (ref 36.6–50.3)
HCV AB SER IA-ACNC: 0.14 INDEX
HCV AB SERPL QL IA: NONREACTIVE
HGB BLD-MCNC: 7.2 G/DL (ref 12.1–17)
IMM GRANULOCYTES # BLD AUTO: 0.1 K/UL (ref 0–0.04)
IMM GRANULOCYTES NFR BLD AUTO: 1 % (ref 0–0.5)
LYMPHOCYTES # BLD: 0.7 K/UL (ref 0.8–3.5)
LYMPHOCYTES NFR BLD: 7 % (ref 12–49)
MCH RBC QN AUTO: 31.9 PG (ref 26–34)
MCHC RBC AUTO-ENTMCNC: 36.5 G/DL (ref 30–36.5)
MCV RBC AUTO: 87.2 FL (ref 80–99)
MONOCYTES # BLD: 0.7 K/UL (ref 0–1)
MONOCYTES NFR BLD: 7 % (ref 5–13)
NEUTS SEG # BLD: 8 K/UL (ref 1.8–8)
NEUTS SEG NFR BLD: 84 % (ref 32–75)
NRBC # BLD: 0 K/UL (ref 0–0.01)
NRBC BLD-RTO: 0 PER 100 WBC
PERFORMED BY:: ABNORMAL
PLATELET # BLD AUTO: 421 K/UL (ref 150–400)
PMV BLD AUTO: 11.2 FL (ref 8.9–12.9)
POTASSIUM SERPL-SCNC: ABNORMAL MMOL/L (ref 3.5–5.1)
RBC # BLD AUTO: 2.26 M/UL (ref 4.1–5.7)
SODIUM SERPL-SCNC: 132 MMOL/L (ref 136–145)
WBC # BLD AUTO: 9.6 K/UL (ref 4.1–11.1)

## 2024-10-05 PROCEDURE — 80048 BASIC METABOLIC PNL TOTAL CA: CPT

## 2024-10-05 PROCEDURE — 2580000003 HC RX 258: Performed by: INTERNAL MEDICINE

## 2024-10-05 PROCEDURE — 99232 SBSQ HOSP IP/OBS MODERATE 35: CPT | Performed by: UROLOGY

## 2024-10-05 PROCEDURE — 51798 US URINE CAPACITY MEASURE: CPT

## 2024-10-05 PROCEDURE — 6360000002 HC RX W HCPCS: Performed by: INTERNAL MEDICINE

## 2024-10-05 PROCEDURE — 2709999900 HC NON-CHARGEABLE SUPPLY

## 2024-10-05 PROCEDURE — 85025 COMPLETE CBC W/AUTO DIFF WBC: CPT

## 2024-10-05 PROCEDURE — 1100000000 HC RM PRIVATE

## 2024-10-05 PROCEDURE — 82962 GLUCOSE BLOOD TEST: CPT

## 2024-10-05 PROCEDURE — 90935 HEMODIALYSIS ONE EVALUATION: CPT

## 2024-10-05 PROCEDURE — 6370000000 HC RX 637 (ALT 250 FOR IP): Performed by: INTERNAL MEDICINE

## 2024-10-05 RX ORDER — PANTOPRAZOLE SODIUM 40 MG/10ML
40 INJECTION, POWDER, LYOPHILIZED, FOR SOLUTION INTRAVENOUS 2 TIMES DAILY
Status: DISPENSED | OUTPATIENT
Start: 2024-10-05 | End: 2024-10-08

## 2024-10-05 RX ADMIN — SODIUM BICARBONATE 650 MG: 650 TABLET ORAL at 09:30

## 2024-10-05 RX ADMIN — PANTOPRAZOLE SODIUM 40 MG: 40 INJECTION, POWDER, FOR SOLUTION INTRAVENOUS at 21:27

## 2024-10-05 RX ADMIN — CEFTRIAXONE SODIUM 1000 MG: 1 INJECTION, POWDER, FOR SOLUTION INTRAMUSCULAR; INTRAVENOUS at 07:56

## 2024-10-05 RX ADMIN — HEPARIN SODIUM 2500 UNITS: 1000 INJECTION INTRAVENOUS; SUBCUTANEOUS at 16:16

## 2024-10-05 RX ADMIN — SENNOSIDES AND DOCUSATE SODIUM 1 TABLET: 50; 8.6 TABLET ORAL at 09:30

## 2024-10-05 RX ADMIN — THERA TABS 1 TABLET: TAB at 09:30

## 2024-10-05 RX ADMIN — PANTOPRAZOLE SODIUM 40 MG: 40 INJECTION, POWDER, FOR SOLUTION INTRAVENOUS at 09:30

## 2024-10-05 RX ADMIN — THIAMINE HCL TAB 100 MG 100 MG: 100 TAB at 09:30

## 2024-10-05 RX ADMIN — ALLOPURINOL 100 MG: 100 TABLET ORAL at 09:30

## 2024-10-05 RX ADMIN — FINASTERIDE 5 MG: 5 TABLET, FILM COATED ORAL at 09:30

## 2024-10-05 RX ADMIN — SODIUM CHLORIDE, PRESERVATIVE FREE 10 ML: 5 INJECTION INTRAVENOUS at 08:02

## 2024-10-05 RX ADMIN — INSULIN LISPRO 2 UNITS: 100 INJECTION, SOLUTION INTRAVENOUS; SUBCUTANEOUS at 07:56

## 2024-10-05 RX ADMIN — INSULIN LISPRO 1 UNITS: 100 INJECTION, SOLUTION INTRAVENOUS; SUBCUTANEOUS at 11:23

## 2024-10-05 RX ADMIN — PANTOPRAZOLE SODIUM 8 MG/HR: 40 INJECTION, POWDER, FOR SOLUTION INTRAVENOUS at 03:23

## 2024-10-05 RX ADMIN — TAMSULOSIN HYDROCHLORIDE 0.4 MG: 0.4 CAPSULE ORAL at 09:30

## 2024-10-05 RX ADMIN — SODIUM CHLORIDE, PRESERVATIVE FREE 10 ML: 5 INJECTION INTRAVENOUS at 21:27

## 2024-10-05 NOTE — PLAN OF CARE
Problem: Discharge Planning  Goal: Discharge to home or other facility with appropriate resources  10/5/2024 1843 by Lyn Luu RN  Outcome: Progressing  10/5/2024 0651 by Coleman Zhang RN  Outcome: Progressing     Problem: Skin/Tissue Integrity  Goal: Absence of new skin breakdown  Description: 1.  Monitor for areas of redness and/or skin breakdown  2.  Assess vascular access sites hourly  3.  Every 4-6 hours minimum:  Change oxygen saturation probe site  4.  Every 4-6 hours:  If on nasal continuous positive airway pressure, respiratory therapy assess nares and determine need for appliance change or resting period.  10/5/2024 1843 by Lyn Luu RN  Outcome: Progressing  10/5/2024 0651 by Coleman Zhang RN  Outcome: Progressing     Problem: Safety - Adult  Goal: Free from fall injury  10/5/2024 1843 by Lyn Luu RN  Outcome: Progressing  10/5/2024 0651 by Coleman Zhang RN  Outcome: Progressing     Problem: ABCDS Injury Assessment  Goal: Absence of physical injury  10/5/2024 1843 by Lyn Luu RN  Outcome: Progressing  10/5/2024 0651 by Coleman Zhang RN  Outcome: Progressing     Problem: Chronic Conditions and Co-morbidities  Goal: Patient's chronic conditions and co-morbidity symptoms are monitored and maintained or improved  10/5/2024 1843 by Lyn Luu RN  Outcome: Progressing  10/5/2024 0651 by Coleman Zhang RN  Outcome: Progressing     Problem: Respiratory - Adult  Goal: Achieves optimal ventilation and oxygenation  10/5/2024 1843 by Lyn Luu RN  Outcome: Progressing  Flowsheets (Taken 10/5/2024 1230 by Magda Dickinson RN)  Achieves optimal ventilation and oxygenation: Assess for changes in respiratory status  10/5/2024 0651 by Coleman Zhang RN  Outcome: Progressing     Problem: Cardiovascular - Adult  Goal: Maintains optimal cardiac output and hemodynamic stability  10/5/2024 1843 by Lyn Luu RN  Outcome: Progressing  Flowsheets (Taken 10/5/2024 1230 by Magda Dickinson  alignment of affected body part  10/5/2024 1843 by Lyn Luu RN  Outcome: Progressing  10/5/2024 0651 by Coleman Zhang RN  Outcome: Progressing  Goal: Return ADL status to a safe level of function  10/5/2024 1843 by Lyn Luu RN  Outcome: Progressing  10/5/2024 0651 by Coleman Zhang RN  Outcome: Progressing     Problem: Infection - Adult  Goal: Absence of infection at discharge  10/5/2024 1843 by Lyn Luu RN  Outcome: Progressing  10/5/2024 0651 by Coleman Zhang RN  Outcome: Progressing  Flowsheets (Taken 10/4/2024 2000)  Absence of infection at discharge:   Assess and monitor for signs and symptoms of infection   Monitor lab/diagnostic results  Goal: Absence of infection during hospitalization  10/5/2024 1843 by Lyn Luu RN  Outcome: Progressing  10/5/2024 0651 by Coleman Zhang RN  Outcome: Progressing  Flowsheets (Taken 10/4/2024 2000)  Absence of infection during hospitalization:   Assess and monitor for signs and symptoms of infection   Monitor lab/diagnostic results   Monitor all insertion sites i.e., indwelling lines, tubes and drains   Administer medications as ordered  Goal: Absence of fever/infection during anticipated neutropenic period  10/5/2024 1843 by Lyn Luu RN  Outcome: Progressing  10/5/2024 0651 by Coleman Zhang RN  Outcome: Progressing  Flowsheets (Taken 10/4/2024 2000)  Absence of fever/infection during anticipated neutropenic period: Monitor white blood cell count     Problem: Metabolic/Fluid and Electrolytes - Adult  Goal: Electrolytes maintained within normal limits  10/5/2024 1843 by Lyn Luu RN  Outcome: Progressing  10/5/2024 0651 by Coleman Zhang RN  Outcome: Progressing  Flowsheets (Taken 10/4/2024 2000)  Electrolytes maintained within normal limits: Monitor labs and assess patient for signs and symptoms of electrolyte imbalances  Goal: Hemodynamic stability and optimal renal function maintained  Outcome: Progressing  Goal: Glucose maintained

## 2024-10-05 NOTE — PROGRESS NOTES
[facility]  Ariel Demarco MD  [phone]            Renal Progress Note    NAME:  Michael Fung   :   1940   MRN:   [id number]     Date/Time:  10/5/2024 4:40 PM            DISCUSSION / PLAN :      CATHY on CKD5-anuric  CKD5  Mild volume overload  Multifocal left sided pneumonia  Ileus versus proximal small bowel obstruction  Anemia    Plan:   S/p 2nd HD today-500 ml UF  Next hemodialysis on Monday-1 Lit UF  Follow-up renal function and urine output  Check iron panel  Check PTH and vitamin D  Check bladder scan every shift for retention  Urology eval noted  Will start Procrit if iron stores adequate  Antibiotics per primary team         ___________________________________________________  Subjective:         10/5-seen on hemodialysis in dialysis unit.  Feels okay denies any complaints.  Has cough.  Denies shortness of breath.  Blood pressure stable    Medications reviewed:  Current Facility-Administered Medications   Medication Dose Route Frequency    pantoprazole (PROTONIX) injection 40 mg  40 mg IntraVENous BID    cefTRIAXone (ROCEPHIN) 1,000 mg in sterile water 10 mL IV syringe  1,000 mg IntraVENous Q24H    heparin (porcine) injection 2,500 Units  2,500 Units IntraCATHeter PRN    sodium chloride flush 0.9 % injection 5-40 mL  5-40 mL IntraVENous 2 times per day    sodium chloride flush 0.9 % injection 5-40 mL  5-40 mL IntraVENous PRN    0.9 % sodium chloride infusion   IntraVENous PRN    ondansetron (ZOFRAN-ODT) disintegrating tablet 4 mg  4 mg Oral Q8H PRN    Or    ondansetron (ZOFRAN) injection 4 mg  4 mg IntraVENous Q6H PRN    acetaminophen (TYLENOL) tablet 650 mg  650 mg Oral Q6H PRN    Or    acetaminophen (TYLENOL) suppository 650 mg  650 mg Rectal Q6H PRN    bisacodyl (DULCOLAX) suppository 10 mg  10 mg Rectal Daily PRN    sennosides-docusate sodium (SENOKOT-S) 8.6-50 MG tablet 1 tablet  1 tablet Oral BID    glucose chewable tablet 16 g  4 tablet Oral PRN    dextrose bolus 10% 125 mL

## 2024-10-05 NOTE — PROGRESS NOTES
Attempted to see pt for PT eval; however, pt was off the floor in HD.  Will continue to follow and evaluate at a later time.

## 2024-10-05 NOTE — CONSULTS
Hospitalist Consult Note    NAME: Michael Fung   :  1940   MRN:  [id number]        Hospital course:  BRIEF PATIENT SUMMARY AND HOSPITAL COURSE   Michael Fung is a 83-year-old male presented to outside hospital on  for cough and shortness of breath found to be hypoxic and have x-ray concerning for community-acquired pneumonia in the left mid and lower lung fields.  Initiated on community-acquired pneumonia therapy with ceftriaxone and azithromycin.  Patient also noted to have elevated creatinine of 4.5 which is his baseline patient is diuretic dependent.  Became oliguric during the course of the hospitalization despite Lasix 80 mg.  Lower extremity ultrasound obtained for elevated D-dimer.  Showed bilateral DVTs in the right popliteal and 2 in the femoral and popliteal on the left.  Had been initiated on a heparin drip 10/ evening patient on supratherapeutic heparin developed hemoptysis.  Substantial enough to stop heparin drip but unable to quantify.  Patient on mid flow oxygen with low sats.  Transferred to our ICU on BiPAP transitioned to high flow nasal cannula 50 L, 40% with sats of 97%.  Had a low prop VQ scan on 10/1 for renal failure with potential need for dialysis creatinine increased to 6 and oliguric.  Bladder scan negative no Muñoz catheter or straight cath inserted, worsening hypoxemia from [facility].  Most of history obtained from patient's daughter and in conversation with transferring facility much of chart did not make it over with transfer.  Patient was subsequently admitted to medical ICU, evaluated by nephrology, initiated on dialysis, evaluated by urology for urinary retention, recommended supportive care, following which patient's clinical status improved, serum creatinine improved, subsequent to which patient was transferred out of the ICU and accepted to the hospitalist service    Subjective:     Chief Complaint / Reason for Physician Visit  consistent with NSR    Toxic drug monitoring    Flomax, monitor for hypotension    Discussed case with   ICU attending    MDM Discussion: Patient with numerous medical comorbidities, each with increased risk for mortality and morbidity if left untreated. Patient requires medications with high risk of toxicity and need  for intensive monitoring. I have reviewed patient's presenting subjective and objective findings, as well as all laboratory studies, imaging studies, and vital signs to date as well as treatment rendered and patient's response to those treatments. In addition, prior medical, surgical and relevant social and family histories were reviewed.     This is dictation was done by dragon, computer voice recognition software. Quite often unanticipated grammatical, syntax, homophones and other interpretive errors or inadvertently transcribed by the computer software. Please excuse errors that have escaped final proofreading. Thank you.      Reviewed most current lab test results and cultures  YES  Reviewed most current radiology test results   YES  Review and summation of old records today    NO  Reviewed patient's current orders and MAR    YES  PMH/SH reviewed - no change compared to H&P          Assessment / Plan:  Acute respiratory failure with hypoxia  Volume overload  Acute on chronic kidney disease stage III requiring initiation of dialysis  Of note patient currently remains hemodynamically stable, antibiotics discontinued, tolerating dialysis  Continue dialysis as per nephrology recommendations  Attempt to wean oxygen  Continue to monitor patient's respiratory status  Nephrology consult appreciated, continue to follow recommendations  Obtain pulmonology consult further evaluation    Concern for ileus  Small bowel obstruction  Of note NG tube currently remains in place, patient remains asymptomatic  Continue serial abdominal examinations  Obtain abdominal x-ray  General Surgery consult for further

## 2024-10-05 NOTE — PLAN OF CARE
Problem: Chronic Conditions and Co-morbidities  Goal: Patient's chronic conditions and co-morbidity symptoms are monitored and maintained or improved  Outcome: Progressing     Problem: Metabolic/Fluid and Electrolytes - Adult  Goal: Glucose maintained within prescribed range  Outcome: Progressing  Flowsheets (Taken 10/4/2024 2000)  Glucose maintained within prescribed range:   Monitor blood glucose as ordered   Assess for signs and symptoms of hyperglycemia and hypoglycemia     Problem: Metabolic/Fluid and Electrolytes - Adult  Goal: Electrolytes maintained within normal limits  Outcome: Progressing  Flowsheets (Taken 10/4/2024 2000)  Electrolytes maintained within normal limits: Monitor labs and assess patient for signs and symptoms of electrolyte imbalances     Problem: Infection - Adult  Goal: Absence of fever/infection during anticipated neutropenic period  Outcome: Progressing  Flowsheets (Taken 10/4/2024 2000)  Absence of fever/infection during anticipated neutropenic period: Monitor white blood cell count     Problem: Infection - Adult  Goal: Absence of infection during hospitalization  Outcome: Progressing  Flowsheets (Taken 10/4/2024 2000)  Absence of infection during hospitalization:   Assess and monitor for signs and symptoms of infection   Monitor lab/diagnostic results   Monitor all insertion sites i.e., indwelling lines, tubes and drains   Administer medications as ordered     Problem: Infection - Adult  Goal: Absence of infection at discharge  Outcome: Progressing  Flowsheets (Taken 10/4/2024 2000)  Absence of infection at discharge:   Assess and monitor for signs and symptoms of infection   Monitor lab/diagnostic results     Problem: Musculoskeletal - Adult  Goal: Return ADL status to a safe level of function  Outcome: Progressing     Problem: Musculoskeletal - Adult  Goal: Return ADL status to a safe level of function  Outcome: Progressing     Problem: Musculoskeletal - Adult  Goal: Return

## 2024-10-05 NOTE — PROGRESS NOTES
UROLOGY Progress Note         [phone]      Daily Progress Note: 10/5/2024    Subjective:   The patient is seen for UROLOGIC follow up for retention with inability to place a pham catheter.    Documented bladder scans show 285 cc on 10/4/24 and 325 cc this morning.  Patient has not voided.  He may been a little incontinent but was not aware of that.  He has no urge to go to the bathroom  He is not uncomfortable.  No urinary complaints.    He went to dialysis this afternoon    Problem List:  Patient Active Problem List   Diagnosis    Acute hypoxic respiratory failure    CATHY (acute kidney injury) ([facility])    Hemoptysis    Community acquired pneumonia    Acute deep vein thrombosis (DVT) of femoral vein of both lower extremities ([facility])    DM (diabetes mellitus), type 2 ([facility])    Decreased mobility and endurance    Acute upper GI bleed    Ileus ([facility])       Medications reviewed  Current Facility-Administered Medications   Medication Dose Route Frequency    cefTRIAXone (ROCEPHIN) 1,000 mg in sterile water 10 mL IV syringe  1,000 mg IntraVENous Q24H    pantoprazole (PROTONIX) 40 mg in sodium chloride 0.9 % 50 mL (mini-bag) infusion  8 mg/hr IntraVENous Continuous    heparin (porcine) injection 2,500 Units  2,500 Units IntraCATHeter PRN    sodium chloride flush 0.9 % injection 5-40 mL  5-40 mL IntraVENous 2 times per day    sodium chloride flush 0.9 % injection 5-40 mL  5-40 mL IntraVENous PRN    0.9 % sodium chloride infusion   IntraVENous PRN    ondansetron (ZOFRAN-ODT) disintegrating tablet 4 mg  4 mg Oral Q8H PRN    Or    ondansetron (ZOFRAN) injection 4 mg  4 mg IntraVENous Q6H PRN    acetaminophen (TYLENOL) tablet 650 mg  650 mg Oral Q6H PRN    Or    acetaminophen (TYLENOL) suppository 650 mg  650 mg Rectal Q6H PRN    bisacodyl (DULCOLAX) suppository 10 mg  10 mg Rectal Daily PRN    sennosides-docusate sodium (SENOKOT-S) 8.6-50 MG tablet 1 tablet  1 tablet Oral BID    torsemide (DEMADEX) tablet 20 mg  20 mg  Results:  Recent Results (from the past 24 hour(s))   POCT Glucose    Collection Time: 10/04/24 11:46 AM   Result Value Ref Range    POC Glucose 226 (H) 65 - 100 mg/dL    Performed by: Emma Godfrey    POCT Glucose    Collection Time: 10/04/24  4:51 PM   Result Value Ref Range    POC Glucose 169 (H) 65 - 100 mg/dL    Performed by: Emma Godfrey    POCT Glucose    Collection Time: 10/04/24  7:48 PM   Result Value Ref Range    POC Glucose 163 (H) 65 - 100 mg/dL    Performed by: Kavita Larry (Float [place])    POCT Glucose    Collection Time: 10/04/24  9:46 PM   Result Value Ref Range    POC Glucose 200 (H) 65 - 100 mg/dL    Performed by: Kavita Larry (Float Pool)    CBC with Auto Differential    Collection Time: 10/05/24  4:38 AM   Result Value Ref Range    WBC 9.6 4.1 - 11.1 K/uL    RBC 2.26 (L) 4.10 - 5.70 M/uL    Hemoglobin 7.2 (L) 12.1 - 17.0 g/dL    Hematocrit 19.7 (L) 36.6 - 50.3 %    MCV 87.2 80.0 - 99.0 FL    MCH 31.9 26.0 - 34.0 PG    MCHC 36.5 30.0 - 36.5 g/dL    RDW 14.0 11.5 - 14.5 %    Platelets 421 (H) 150 - 400 K/uL    MPV 11.2 8.9 - 12.9 FL    Nucleated RBCs 0.0 0.0  WBC    nRBC 0.00 0.00 - 0.01 K/uL    Neutrophils % 84 (H) 32 - 75 %    Lymphocytes % 7 (L) 12 - 49 %    Monocytes % 7 5 - 13 %    Eosinophils % 1 0 - 7 %    Basophils % 0 0 - 1 %    Immature Granulocytes % 1 (H) 0 - 0.5 %    Neutrophils Absolute 8.0 1.8 - 8.0 K/UL    Lymphocytes Absolute 0.7 (L) 0.8 - 3.5 K/UL    Monocytes Absolute 0.7 0.0 - 1.0 K/UL    Eosinophils Absolute 0.1 0.0 - 0.4 K/UL    Basophils Absolute 0.0 0.0 - 0.1 K/UL    Immature Granulocytes Absolute 0.1 (H) 0.00 - 0.04 K/UL    Differential Type AUTOMATED     Basic Metabolic Panel    Collection Time: 10/05/24  4:38 AM   Result Value Ref Range    Sodium 132 (L) 136 - 145 mmol/L    Potassium Hemolyzed, Recollection Recommended 3.5 - 5.1 mmol/L    Chloride 97 97 - 108 mmol/L    CO2 19 (L) 21 - 32 mmol/L    Anion Gap 16 (H) 2 - 12 mmol/L    Glucose 213 (H) 65 - 100

## 2024-10-06 ENCOUNTER — APPOINTMENT (OUTPATIENT)
Facility: HOSPITAL | Age: 84
End: 2024-10-06
Attending: INTERNAL MEDICINE
Payer: OTHER GOVERNMENT

## 2024-10-06 LAB
25(OH)D3 SERPL-MCNC: 39.8 NG/ML (ref 30–100)
ALBUMIN SERPL-MCNC: 1.6 G/DL (ref 3.5–5)
ANION GAP SERPL CALC-SCNC: 17 MMOL/L (ref 2–12)
BASOPHILS # BLD: 0 K/UL (ref 0–0.1)
BASOPHILS # BLD: 0 K/UL (ref 0–0.1)
BASOPHILS NFR BLD: 0 % (ref 0–1)
BASOPHILS NFR BLD: 0 % (ref 0–1)
BUN SERPL-MCNC: 43 MG/DL (ref 6–20)
BUN/CREAT SERPL: 10 (ref 12–20)
CA-I BLD-MCNC: 7.9 MG/DL (ref 8.5–10.1)
CA-I BLD-MCNC: 8.3 MG/DL (ref 8.5–10.1)
CHLORIDE SERPL-SCNC: 100 MMOL/L (ref 97–108)
CO2 SERPL-SCNC: 20 MMOL/L (ref 21–32)
CREAT SERPL-MCNC: 4.14 MG/DL (ref 0.7–1.3)
DIFFERENTIAL METHOD BLD: ABNORMAL
DIFFERENTIAL METHOD BLD: ABNORMAL
EOSINOPHIL # BLD: 0.2 K/UL (ref 0–0.4)
EOSINOPHIL # BLD: 0.2 K/UL (ref 0–0.4)
EOSINOPHIL NFR BLD: 2 % (ref 0–7)
EOSINOPHIL NFR BLD: 2 % (ref 0–7)
ERYTHROCYTE [DISTWIDTH] IN BLOOD BY AUTOMATED COUNT: 13.9 % (ref 11.5–14.5)
ERYTHROCYTE [DISTWIDTH] IN BLOOD BY AUTOMATED COUNT: 14 % (ref 11.5–14.5)
GLUCOSE BLD STRIP.AUTO-MCNC: 170 MG/DL (ref 65–100)
GLUCOSE BLD STRIP.AUTO-MCNC: 170 MG/DL (ref 65–100)
GLUCOSE BLD STRIP.AUTO-MCNC: 172 MG/DL (ref 65–100)
GLUCOSE BLD STRIP.AUTO-MCNC: 180 MG/DL (ref 65–100)
GLUCOSE SERPL-MCNC: 156 MG/DL (ref 65–100)
HBV CORE AB SERPL QL IA: NEGATIVE
HBV CORE AB SERPL QL IA: NEGATIVE
HCT VFR BLD AUTO: 19.7 % (ref 36.6–50.3)
HCT VFR BLD AUTO: 22.2 % (ref 36.6–50.3)
HGB BLD-MCNC: 6.7 G/DL (ref 12.1–17)
HGB BLD-MCNC: 7.7 G/DL (ref 12.1–17)
IMM GRANULOCYTES # BLD AUTO: 0.1 K/UL (ref 0–0.04)
IMM GRANULOCYTES # BLD AUTO: 0.1 K/UL (ref 0–0.04)
IMM GRANULOCYTES NFR BLD AUTO: 1 % (ref 0–0.5)
IMM GRANULOCYTES NFR BLD AUTO: 1 % (ref 0–0.5)
IRON SATN MFR SERPL: 16 % (ref 20–50)
IRON SERPL-MCNC: 24 UG/DL (ref 35–150)
LYMPHOCYTES # BLD: 1 K/UL (ref 0.8–3.5)
LYMPHOCYTES # BLD: 1.2 K/UL (ref 0.8–3.5)
LYMPHOCYTES NFR BLD: 11 % (ref 12–49)
LYMPHOCYTES NFR BLD: 12 % (ref 12–49)
MCH RBC QN AUTO: 30.7 PG (ref 26–34)
MCH RBC QN AUTO: 30.7 PG (ref 26–34)
MCHC RBC AUTO-ENTMCNC: 34 G/DL (ref 30–36.5)
MCHC RBC AUTO-ENTMCNC: 34.7 G/DL (ref 30–36.5)
MCV RBC AUTO: 88.4 FL (ref 80–99)
MCV RBC AUTO: 90.4 FL (ref 80–99)
MONOCYTES # BLD: 0.9 K/UL (ref 0–1)
MONOCYTES # BLD: 1.1 K/UL (ref 0–1)
MONOCYTES NFR BLD: 10 % (ref 5–13)
MONOCYTES NFR BLD: 11 % (ref 5–13)
NEUTS SEG # BLD: 6.7 K/UL (ref 1.8–8)
NEUTS SEG # BLD: 6.7 K/UL (ref 1.8–8)
NEUTS SEG NFR BLD: 74 % (ref 32–75)
NEUTS SEG NFR BLD: 76 % (ref 32–75)
NRBC # BLD: 0 K/UL (ref 0–0.01)
NRBC # BLD: 0 K/UL (ref 0–0.01)
NRBC BLD-RTO: 0 PER 100 WBC
NRBC BLD-RTO: 0 PER 100 WBC
PERFORMED BY:: ABNORMAL
PHOSPHATE SERPL-MCNC: 4.4 MG/DL (ref 2.6–4.7)
PLATELET # BLD AUTO: 348 K/UL (ref 150–400)
PLATELET # BLD AUTO: 366 K/UL (ref 150–400)
PMV BLD AUTO: 9.4 FL (ref 8.9–12.9)
PMV BLD AUTO: 9.8 FL (ref 8.9–12.9)
POTASSIUM SERPL-SCNC: 3.3 MMOL/L (ref 3.5–5.1)
PTH-INTACT SERPL-MCNC: 383.4 PG/ML (ref 18.4–88)
RBC # BLD AUTO: 2.18 M/UL (ref 4.1–5.7)
RBC # BLD AUTO: 2.51 M/UL (ref 4.1–5.7)
RBC MORPH BLD: ABNORMAL
SODIUM SERPL-SCNC: 137 MMOL/L (ref 136–145)
TIBC SERPL-MCNC: 146 UG/DL (ref 250–450)
WBC # BLD AUTO: 8.9 K/UL (ref 4.1–11.1)
WBC # BLD AUTO: 9.3 K/UL (ref 4.1–11.1)

## 2024-10-06 PROCEDURE — 51798 US URINE CAPACITY MEASURE: CPT

## 2024-10-06 PROCEDURE — 83970 ASSAY OF PARATHORMONE: CPT

## 2024-10-06 PROCEDURE — 2580000003 HC RX 258: Performed by: INTERNAL MEDICINE

## 2024-10-06 PROCEDURE — P9016 RBC LEUKOCYTES REDUCED: HCPCS

## 2024-10-06 PROCEDURE — 71045 X-RAY EXAM CHEST 1 VIEW: CPT

## 2024-10-06 PROCEDURE — 99232 SBSQ HOSP IP/OBS MODERATE 35: CPT | Performed by: NURSE PRACTITIONER

## 2024-10-06 PROCEDURE — 6360000002 HC RX W HCPCS: Performed by: INTERNAL MEDICINE

## 2024-10-06 PROCEDURE — 83540 ASSAY OF IRON: CPT

## 2024-10-06 PROCEDURE — 80069 RENAL FUNCTION PANEL: CPT

## 2024-10-06 PROCEDURE — 36415 COLL VENOUS BLD VENIPUNCTURE: CPT

## 2024-10-06 PROCEDURE — 1100000000 HC RM PRIVATE

## 2024-10-06 PROCEDURE — 82962 GLUCOSE BLOOD TEST: CPT

## 2024-10-06 PROCEDURE — 36430 TRANSFUSION BLD/BLD COMPNT: CPT

## 2024-10-06 PROCEDURE — 85025 COMPLETE CBC W/AUTO DIFF WBC: CPT

## 2024-10-06 PROCEDURE — 97161 PT EVAL LOW COMPLEX 20 MIN: CPT

## 2024-10-06 PROCEDURE — 82306 VITAMIN D 25 HYDROXY: CPT

## 2024-10-06 PROCEDURE — 74019 RADEX ABDOMEN 2 VIEWS: CPT

## 2024-10-06 PROCEDURE — 97530 THERAPEUTIC ACTIVITIES: CPT

## 2024-10-06 RX ORDER — SODIUM CHLORIDE 9 MG/ML
INJECTION, SOLUTION INTRAVENOUS PRN
Status: DISCONTINUED | OUTPATIENT
Start: 2024-10-06 | End: 2024-10-14

## 2024-10-06 RX ADMIN — SODIUM CHLORIDE, PRESERVATIVE FREE 10 ML: 5 INJECTION INTRAVENOUS at 10:26

## 2024-10-06 RX ADMIN — PANTOPRAZOLE SODIUM 40 MG: 40 INJECTION, POWDER, FOR SOLUTION INTRAVENOUS at 22:04

## 2024-10-06 RX ADMIN — DEXTROSE AND SODIUM CHLORIDE: 5; 450 INJECTION, SOLUTION INTRAVENOUS at 18:55

## 2024-10-06 RX ADMIN — PANTOPRAZOLE SODIUM 40 MG: 40 INJECTION, POWDER, FOR SOLUTION INTRAVENOUS at 10:25

## 2024-10-06 RX ADMIN — CEFTRIAXONE SODIUM 1000 MG: 1 INJECTION, POWDER, FOR SOLUTION INTRAMUSCULAR; INTRAVENOUS at 10:25

## 2024-10-06 NOTE — PROGRESS NOTES
4 Eyes Skin Assessment     NAME:  Michael Fung  YOB: 1940  MEDICAL RECORD NUMBER:  [id number]    The patient is being assessed for  Transfer to New Unit    I agree that at least one RN has performed a thorough Head to Toe Skin Assessment on the patient. ALL assessment sites listed below have been assessed.      Areas assessed by both nurses:    Head, Face, Ears, Shoulders, Back, Chest, Arms, Elbows, Hands, Sacrum. Buttock, Coccyx, Ischium, Legs. Feet and Heels, Under Medical Devices , and Other          Does the Patient have a Wound? No noted wound(s)       Fabrizio Prevention initiated by RN: No  Wound Care Orders initiated by RN: No    Pressure Injury (Stage 3,4, Unstageable, DTI, NWPT, and Complex wounds) if present, place Wound referral order by RN under : No    New Ostomies, if present place, Ostomy referral order under : No     Nurse 1 eSignature: Electronically signed by Magda Dickinson RN on 10/5/24 at 8:18 PM EDT    **SHARE this note so that the co-signing nurse can place an eSignature**    Nurse 2 eSignature: Electronically signed by Lyn Luu RN on 10/5/24 at 8:19 PM EDT

## 2024-10-06 NOTE — PROGRESS NOTES
Hospitalist Consult Note    NAME: Michael Fung   :  1940   MRN:  [id number]        Hospital course:  BRIEF PATIENT SUMMARY AND HOSPITAL COURSE   Michael Fung is a 83-year-old male presented to outside hospital on  for cough and shortness of breath found to be hypoxic and have x-ray concerning for community-acquired pneumonia in the left mid and lower lung fields.  Initiated on community-acquired pneumonia therapy with ceftriaxone and azithromycin.  Patient also noted to have elevated creatinine of 4.5 which is his baseline patient is diuretic dependent.  Became oliguric during the course of the hospitalization despite Lasix 80 mg.  Lower extremity ultrasound obtained for elevated D-dimer.  Showed bilateral DVTs in the right popliteal and 2 in the femoral and popliteal on the left.  Had been initiated on a heparin drip 10/ evening patient on supratherapeutic heparin developed hemoptysis.  Substantial enough to stop heparin drip but unable to quantify.  Patient on mid flow oxygen with low sats.  Transferred to our ICU on BiPAP transitioned to high flow nasal cannula 50 L, 40% with sats of 97%.  Had a low prop VQ scan on 10/1 for renal failure with potential need for dialysis creatinine increased to 6 and oliguric.  Bladder scan negative no Muñoz catheter or straight cath inserted, worsening hypoxemia from U [facility].  Most of history obtained from patient's daughter and in conversation with transferring facility much of chart did not make it over with transfer.  Patient was subsequently admitted to medical ICU, evaluated by nephrology, initiated on dialysis, evaluated by urology for urinary retention, recommended supportive care, following which patient's clinical status improved, serum creatinine improved, subsequent to which patient was transferred out of the ICU and accepted to the hospitalist service    Subjective:     Chief Complaint / Reason for Physician Visit  Dyspnea  Patient seen and evaluated at bedside, overnight events reviewed, patient currently feels significantly better.  Discussed with RN events overnight.     Review of Systems:  Symptom Y/N Comments  Symptom Y/N Comments   Fever/Chills N   Chest Pain N    Poor Appetite Y   Edema N    Cough Y   Abdominal Pain N    Sputum Y   Joint Pain N    SOB/BETANCOURT Y   Pruritis/Rash N    Nausea/vomit N   Tolerating PT/OT NA    Diarrhea N   Tolerating Diet Y    Constipation N   Other       Could NOT obtain due to:      Objective:     VITALS:   Last 24hrs VS reviewed since prior progress note. Most recent are:  [unfilled]    Intake/Output Summary (Last 24 hours) at 10/6/2024 1251  Last data filed at 10/5/2024 1615  Gross per 24 hour   Intake 602.5 ml   Output 1100 ml   Net -497.5 ml        PHYSICAL EXAM:  General: Patient appears comfortable    EENT:  EOMI. Anicteric sclerae. MMM  Resp:  Decreased air entry bilaterally in bilateral lower lung zones with appreciable bilateral bibasillar crackles  CV:  Regular  rhythm, s1/s2 no m/r/g No edema  GI:  Soft, Non distended, Non tender.  +Bowel sounds  Neurologic:  Alert and oriented X 3, normal speech,   Psych:   Good insight. Not anxious nor agitated  Skin:  No rashes.  No jaundice    Procedures: see electronic medical records for all procedures/Xrays and details which were not copied into this note but were reviewed prior to creation of Plan.      LABS:  I reviewed today's most current labs and imaging studies.  Pertinent labs include:  Recent Labs     10/04/24  0350 10/05/24  0438 10/06/24  0735   WBC 11.1 9.6 8.9   HGB 7.2* 7.2* 6.7*   HCT 20.8* 19.7* 19.7*    421* 366     Recent Labs     10/04/24  0350 10/05/24  0438 10/06/24  0735   * 132* 137   K 4.5 Hemolyzed, Recollection Recommended 3.3*    97 100   CO2 16* 19* 20*   * 76* 43*   PHOS  --   --  4.4   ALT 14  --   --        Signed: Poncho Quarles MD    Medical Decision Making:    Labs reviewed by

## 2024-10-06 NOTE — CONSULTS
Gastroenterology Consult Note        Patient: Michael Fung MRN: [id number]  SSN: [id number]    YOB: 1940  Age: 83 y.o.  Sex: male      Subjective:      Michael Fung is a 83 y.o. male who is being seen for possible GI bleeding  Also history from medical records  Patient was transferred here from outside hospital due to shortness of breath in ICU initially, patient has been followed by nephrology pulmonologist for acute renal failure respiratory failure, patient had NG tube placement for ileus has been followed by the surgeon, recommended restart diet.  Patient on medical floor, appear to be stabilizing in terms of respiratory wise, GI consultation placed due to the GI bleeding, hemoglobin dropped to 6.7 from baseline 7.5.  No documented melena, Valentine no red blood per rectum.    No past medical history on file.  Past Surgical History:   Procedure Laterality Date    IR IVC FILTER PLACEMENT W IMAGING  10/4/2024    IR IVC FILTER PLACEMENT W IMAGING 10/4/2024 Saad Mancini MD [facility] IR    IR NONTUNNELED VASCULAR CATHETER  10/4/2024    IR NONTUNNELED VASCULAR CATHETER 10/4/2024 Saad Mancini MD [facility] IR      No family history on file.  Social History     Tobacco Use    Smoking status: Never     Passive exposure: Never    Smokeless tobacco: Never   Substance Use Topics    Alcohol use: Defer      Current Facility-Administered Medications   Medication Dose Route Frequency Provider Last Rate Last Admin    potassium bicarb-citric acid (EFFER-K) effervescent tablet 40 mEq  40 mEq Oral Once Poncho Quarles MD        0.9 % sodium chloride infusion   IntraVENous PRN Poncho Quarles MD        dextrose 5 % and 0.45 % NaCl 1,000 mL infusion   IntraVENous Continuous Ariel Demarco MD        pantoprazole (PROTONIX) injection 40 mg  40 mg IntraVENous BID Willard Harden MD   40 mg at 10/06/24 1025    cefTRIAXone (ROCEPHIN) 1,000 mg in sterile water  intervention, due to the comorbidity, hypoxia,      Signed By: Emiliano Graham MD     October 6, 2024         Thank you for allowing me to participate in this patients care    DISCLAIMER:  Please note that this report was generated to utilize Dragon Dictation system, the software is designed to speed over the accuracy.  Every effort has been done to attempt to correct this mistakes upon review, but there still might be some inadvertent errors in grammar and spelling.  Please utilize caution while reading the report due to this  inherent and potential for grammatical mistakes.    This physician  works as a inpatient consult gastroenterologist only, any result not available at time of inpatient discharge and/or clinical follow-up should be managed by the primary care physician or patient's primary gastroenterologist.  It is responsibility of hospitalitis/admitting physician to forward the discharge summary to patient's primary care physician and the primary gastroenterologist regarding further follow-up plan after patient be discharged.    note to patient:  The 21 st century Cures Act make the medical notes like this available to patient in the interest of transparency, however please be advised this is a medical document.  It is intended  as peer to peer communication. It is written in the medical language and may contain abbreviation or verbiage that are unfamiliar. It may appear blunt or direct.  Medical documents are intended to carry relevant information, facts as evident.  And the clinic opinions of the practitioner.  This note maybe transcribed  using a voice dictation system, voice-recognition errors may occur, this should not be taken to alter the contents or meaning for this note.      Cc Referring Physician

## 2024-10-06 NOTE — PROGRESS NOTES
UROLOGY Progress Note         [phone]      Daily Progress Note: 10/6/2024    Subjective:   The patient is seen for UROLOGIC follow up for retention with inability to place a pham catheter.    Documented bladder scans show 285 cc to 325 cc this morning.  He has had several episodes of incontinence.    He has no urinary complaints.    On dialysis.     Nursing to perform bladder scans q 6 hours.    Problem List:  Patient Active Problem List   Diagnosis    Acute hypoxic respiratory failure    CATHY (acute kidney injury) ([facility])    Hemoptysis    Community acquired pneumonia    Acute deep vein thrombosis (DVT) of femoral vein of both lower extremities ([facility])    DM (diabetes mellitus), type 2 ([facility])    Decreased mobility and endurance    Acute upper GI bleed    Ileus ([facility])    Acute urethral obstruction    Urinary retention       Medications reviewed  Current Facility-Administered Medications   Medication Dose Route Frequency    pantoprazole (PROTONIX) injection 40 mg  40 mg IntraVENous BID    cefTRIAXone (ROCEPHIN) 1,000 mg in sterile water 10 mL IV syringe  1,000 mg IntraVENous Q24H    heparin (porcine) injection 2,500 Units  2,500 Units IntraCATHeter PRN    sodium chloride flush 0.9 % injection 5-40 mL  5-40 mL IntraVENous 2 times per day    sodium chloride flush 0.9 % injection 5-40 mL  5-40 mL IntraVENous PRN    0.9 % sodium chloride infusion   IntraVENous PRN    ondansetron (ZOFRAN-ODT) disintegrating tablet 4 mg  4 mg Oral Q8H PRN    Or    ondansetron (ZOFRAN) injection 4 mg  4 mg IntraVENous Q6H PRN    acetaminophen (TYLENOL) tablet 650 mg  650 mg Oral Q6H PRN    Or    acetaminophen (TYLENOL) suppository 650 mg  650 mg Rectal Q6H PRN    bisacodyl (DULCOLAX) suppository 10 mg  10 mg Rectal Daily PRN    sennosides-docusate sodium (SENOKOT-S) 8.6-50 MG tablet 1 tablet  1 tablet Oral BID    glucose chewable tablet 16 g  4 tablet Oral PRN    dextrose bolus 10% 125 mL  125 mL IntraVENous PRN    Or    dextrose  bolus 10% 250 mL  250 mL IntraVENous PRN    glucagon injection 1 mg  1 mg SubCUTAneous PRN    dextrose 10 % infusion   IntraVENous Continuous PRN    insulin lispro (HUMALOG,ADMELOG) injection vial 0-4 Units  0-4 Units SubCUTAneous TID WC    insulin lispro (HUMALOG,ADMELOG) injection vial 0-4 Units  0-4 Units SubCUTAneous Nightly    allopurinol (ZYLOPRIM) tablet 100 mg  100 mg Oral Daily    finasteride (PROSCAR) tablet 5 mg  5 mg Oral Daily    tamsulosin (FLOMAX) capsule 0.4 mg  0.4 mg Oral Daily    thiamine mononitrate tablet 100 mg  100 mg Oral Daily    sodium bicarbonate tablet 650 mg  650 mg Oral BID    multivitamin 1 tablet  1 tablet Oral Daily    lidocaine (XYLOCAINE) 2 % uro-jet   Topical PRN       Review of Systems:   Review of Systems   Constitutional:  Negative for chills and fever.   Genitourinary:         No  discomfort         Objective:   Physical Exam  Vitals and nursing note reviewed.   Constitutional:       General: He is not in acute distress.     Appearance: He is ill-appearing.   Pulmonary:      Effort: Pulmonary effort is normal.   Abdominal:      Comments: NGT   Genitourinary:     Penis: Normal.       Testes: Normal.   Skin:     General: Skin is warm and dry.   Neurological:      Mental Status: He is oriented to person, place, and time.          Vitals:    10/06/24 0833   BP: (!) 147/58   Pulse:    Resp: 16   Temp: 98.2 °F (36.8 °C)   SpO2:        In: 748.5 [I.V.:2.5]  Out: 1100     No results found for requested labs within last 30 days.         Data Review:       Recent Days:  Recent Labs     10/04/24  0350 10/05/24  0438   WBC 11.1 9.6   HGB 7.2* 7.2*   HCT 20.8* 19.7*    421*     Recent Labs     10/04/24  0350 10/05/24  0438   * 132*   K 4.5 Hemolyzed, Recollection Recommended    97   CO2 16* 19*   * 76*   ALT 14  --        24 Hour Results:  Recent Results (from the past 24 hour(s))   POCT Glucose    Collection Time: 10/05/24 10:34 AM   Result Value Ref Range

## 2024-10-06 NOTE — PROGRESS NOTES
Per Chart review: Illeus vs proximal SBO. Rn reports Xray of abdomen for further assessment on this date. PO trials pending outcomes. ST to follow on 10/7/24. Defer to Attending physician for diet initiation until swallow assessment completed for diet recommendations.

## 2024-10-06 NOTE — CONSULTS
Hospitalist Consult Note    NAME: Michael Fung   :  1940   MRN:  [id number]        Hospital course:  BRIEF PATIENT SUMMARY AND HOSPITAL COURSE   Michael Fung is a 83-year-old male presented to outside hospital on  for cough and shortness of breath found to be hypoxic and have x-ray concerning for community-acquired pneumonia in the left mid and lower lung fields.  Initiated on community-acquired pneumonia therapy with ceftriaxone and azithromycin.  Patient also noted to have elevated creatinine of 4.5 which is his baseline patient is diuretic dependent.  Became oliguric during the course of the hospitalization despite Lasix 80 mg.  Lower extremity ultrasound obtained for elevated D-dimer.  Showed bilateral DVTs in the right popliteal and 2 in the femoral and popliteal on the left.  Had been initiated on a heparin drip 10/ evening patient on supratherapeutic heparin developed hemoptysis.  Substantial enough to stop heparin drip but unable to quantify.  Patient on mid flow oxygen with low sats.  Transferred to our ICU on BiPAP transitioned to high flow nasal cannula 50 L, 40% with sats of 97%.  Had a low prop VQ scan on 10/1 for renal failure with potential need for dialysis creatinine increased to 6 and oliguric.  Bladder scan negative no Muñoz catheter or straight cath inserted, worsening hypoxemia from [facility].  Most of history obtained from patient's daughter and in conversation with transferring facility much of chart did not make it over with transfer.  Patient was subsequently admitted to medical ICU, evaluated by nephrology, initiated on dialysis, evaluated by urology for urinary retention, recommended supportive care, following which patient's clinical status improved, serum creatinine improved, subsequent to which patient was transferred out of the ICU and accepted to the hospitalist service    Subjective:     Chief Complaint / Reason for Physician Visit

## 2024-10-06 NOTE — PROGRESS NOTES
disintegrating tablet 4 mg  4 mg Oral Q8H PRN    Or    ondansetron (ZOFRAN) injection 4 mg  4 mg IntraVENous Q6H PRN    acetaminophen (TYLENOL) tablet 650 mg  650 mg Oral Q6H PRN    Or    acetaminophen (TYLENOL) suppository 650 mg  650 mg Rectal Q6H PRN    bisacodyl (DULCOLAX) suppository 10 mg  10 mg Rectal Daily PRN    sennosides-docusate sodium (SENOKOT-S) 8.6-50 MG tablet 1 tablet  1 tablet Oral BID    glucose chewable tablet 16 g  4 tablet Oral PRN    dextrose bolus 10% 125 mL  125 mL IntraVENous PRN    Or    dextrose bolus 10% 250 mL  250 mL IntraVENous PRN    glucagon injection 1 mg  1 mg SubCUTAneous PRN    dextrose 10 % infusion   IntraVENous Continuous PRN    insulin lispro (HUMALOG,ADMELOG) injection vial 0-4 Units  0-4 Units SubCUTAneous TID WC    insulin lispro (HUMALOG,ADMELOG) injection vial 0-4 Units  0-4 Units SubCUTAneous Nightly    allopurinol (ZYLOPRIM) tablet 100 mg  100 mg Oral Daily    finasteride (PROSCAR) tablet 5 mg  5 mg Oral Daily    tamsulosin (FLOMAX) capsule 0.4 mg  0.4 mg Oral Daily    thiamine mononitrate tablet 100 mg  100 mg Oral Daily    sodium bicarbonate tablet 650 mg  650 mg Oral BID    multivitamin 1 tablet  1 tablet Oral Daily    lidocaine (XYLOCAINE) 2 % uro-jet   Topical PRN        Objective:   Vitals:  BP (!) 147/58   Pulse 89   Temp 98.2 °F (36.8 °C) (Oral)   Resp 16   Ht 1.676 m (5' 6\")   Wt 77.9 kg (171 lb 11.8 oz)   SpO2 100%   BMI 27.72 kg/m²   Temp (24hrs), Av.2 °F (36.8 °C), Min:97.7 °F (36.5 °C), Max:98.8 °F (37.1 °C)           Last 24hr Input/Output:    Intake/Output Summary (Last 24 hours) at 10/6/2024 1145  Last data filed at 10/5/2024 1615  Gross per 24 hour   Intake 602.5 ml   Output 1100 ml   Net -497.5 ml        Physical Exam:  General:Alert, awake, No distress,   HEENT: Eyes Conjunctiva without pallor ,erythema.  The sclerae without icterus. .   Neck:Supple,no JVD-Rt IJ TEMPORARY HD catheter  Lungs : Clears to auscultation Bilaterally, Normal  respiratory effort  CVS: RRR, S1 S2 normal, No rub  Abdomen: Soft, Non tender, Not distended, bowel sounds present  : No pham  Extremities:  trace Edema  Skin: No rash , not jaundiced  Neurologic: non focal, AAO x 3, nl speech  Psych: normal affect       [] Telemetry Reviewed     [] NSR [] PAC/PVCs   [] Afib  [] Paced   [] NSVT   [] Pham [] NGT  [] Intubated on vent          LABS:  Recent Labs     10/06/24  0735 10/05/24  0438 10/04/24  0350    132* 132*   K 3.3* Hemolyzed, Recollection Recommended 4.5    97 102   CO2 20* 19* 16*   BUN 43* 76* 128*   CREATININE 4.14* 5.58* 7.96*   CALCIUM 8.3* 8.4* 8.6   PHOS 4.4  --   --      Recent Labs     10/06/24  0735 10/05/24  0438 10/04/24  0350   WBC 8.9 9.6 11.1   HGB 6.7* 7.2* 7.2*   HCT 19.7* 19.7* 20.8*    421* 367     No results for input(s): \"KU\", \"CLU\" in the last 720 hours.    Invalid input(s): \"LENNY\", \"CREAU\", \"PROU\"        Total time spent with patient:  [] 15   [] 25   [x] 35   []  __ minutes    [] Critical Care Provided    Care Plan discussed with:    [] Patient   [] Family    [] Care Manager   [] Consultant/Specialist :      []   >50% of visit spent in counseling and coordination of care   (Discussed [] CODE status,  [] Care Plan, [] D/C Planning)    ___________________________________________________    Attending Physician: YUE RICH MD    [facility]

## 2024-10-06 NOTE — PLAN OF CARE
PHYSICAL THERAPY EVALUATION  Patient: Michael Fung (83 y.o. male)  Date: 10/6/2024  Primary Diagnosis: Acute hypoxic respiratory failure [J96.01]       Precautions: Fall Risk                      Recommendations for nursing mobility: Frequent repositioning to prevent skin breakdown, Use of bed/chair alarm for safety, and Assist x1    In place during session: Nasal Cannula 1.5L and Nasogastric Tube    ASSESSMENT  Pt is a 83 y.o. male admitted on 10/3/2024 for resp failure, transferred to our ICU from [place] in [place]  found to be hypoxic and have x-ray concerning for community-acquired pneumonia in the left mid and lower lung fields.  Per ICU MD note: Initiated on community-acquired pneumonia therapy with ceftriaxone and azithromycin.  Patient also noted to have elevated creatinine of 4.5 which is his baseline patient is diuretic dependent.  Became oliguric during the course of the hospitalization despite Lasix 80 mg.  Lower extremity ultrasound obtained for elevated D-dimer.  Showed bilateral DVTs in the right popliteal and 2 in the femoral and popliteal on the left.  Had been initiated on a heparin drip 10/ evening patient on supratherapeutic heparin developed hemoptysis.  Substantial enough to stop heparin drip but unable to quantify.  Patient on mid flow oxygen with low sats.  Transferred to our ICU on BiPAP transitioned to high flow nasal cannula 50 L, 40% with sats of 97%.  Had a low prop VQ scan on 10/1 for renal failure with potential need for dialysis creatinine increased to 6 and oliguric.     Pt semi supine in bed upon PT arrival, agreeable to evaluation. Pt A&O to self/birthday.  Pt's daughter and spouse present at bedside.  NG tube present.    Based on the objective data described below, the patient currently presents with impaired functional mobility, decreased ROM, impaired strength, decreased activity tolerance, poor safety awareness, impaired cognition,

## 2024-10-06 NOTE — CONSULTS
General surgery history and Physical    Patient: Michael Fung  MRN: [id number]    YOB: 1940  Age: 83 y.o.  Sex: male     Chief Complaint:  No chief complaint on file.      History of Present Illness: Michael Fung is a 83 y.o. very pleasant gentleman currently hospitalized with multiple medical problem including renal failure, respiratory distress, GI bleed, and bowel obstruction.  I was consulted for bowel obstruction possible ileus.  Patient examined at bedside.  Patient accompanied by his son.  Patient was comfortable.  Patient already has NG tube in place for the last 2 days.    Patient denies any abdominal pain.  Denies nausea.  Claims patient some gas.    Denies any fever chills    Social History:  Social Connections: Not on file       Past Medical History:  No past medical history on file.  Surgical History:  Past Surgical History:   Procedure Laterality Date    IR IVC FILTER PLACEMENT W IMAGING  10/4/2024    IR IVC FILTER PLACEMENT W IMAGING 10/4/2024 Saad Mancini MD [facility] RAD ANGIO IR    IR NONTUNNELED VASCULAR CATHETER  10/4/2024    IR NONTUNNELED VASCULAR CATHETER 10/4/2024 Saad Mancini MD [facility] RAD ANGIO IR       Allergies:  No Known Allergies    Current Meds:  Current Facility-Administered Medications   Medication Dose Route Frequency Provider Last Rate Last Admin    potassium bicarb-citric acid (EFFER-K) effervescent tablet 40 mEq  40 mEq Oral Once Poncho Quarles MD        0.9 % sodium chloride infusion   IntraVENous PRN Poncho Quarles MD        dextrose 5 % and 0.45 % NaCl 1,000 mL infusion   IntraVENous Continuous Ariel Demarco MD 30 mL/hr at 10/06/24 1855 New Bag at 10/06/24 1855    pantoprazole (PROTONIX) injection 40 mg  40 mg IntraVENous BID Willard Harden MD   40 mg at 10/06/24 1025    cefTRIAXone (ROCEPHIN) 1,000 mg in sterile water 10 mL IV syringe  1,000 mg IntraVENous Q24H Poncho Quarles MD   1,000 mg at 10/06/24 1025  Range    POC Glucose 170 (H) 65 - 100 mg/dL    Performed by: DAVE CASTANEDA    POCT Glucose    Collection Time: 10/06/24 11:33 AM   Result Value Ref Range    POC Glucose 180 (H) 65 - 100 mg/dL    Performed by: DAVE CASTANEDA    POCT Glucose    Collection Time: 10/06/24  4:34 PM   Result Value Ref Range    POC Glucose 172 (H) 65 - 100 mg/dL    Performed by: Yoanna He    CBC with Auto Differential    Collection Time: 10/06/24  6:05 PM   Result Value Ref Range    WBC 9.3 4.1 - 11.1 K/uL    RBC 2.51 (L) 4.10 - 5.70 M/uL    Hemoglobin 7.7 (L) 12.1 - 17.0 g/dL    Hematocrit 22.2 (L) 36.6 - 50.3 %    MCV 88.4 80.0 - 99.0 FL    MCH 30.7 26.0 - 34.0 PG    MCHC 34.7 30.0 - 36.5 g/dL    RDW 13.9 11.5 - 14.5 %    Platelets 348 150 - 400 K/uL    MPV 9.4 8.9 - 12.9 FL    Nucleated RBCs 0.0 0.0  WBC    nRBC 0.00 0.00 - 0.01 K/uL    Neutrophils % 74 32 - 75 %    Lymphocytes % 12 12 - 49 %    Monocytes % 11 5 - 13 %    Eosinophils % 2 0 - 7 %    Basophils % 0 0 - 1 %    Immature Granulocytes % 1 (H) 0 - 0.5 %    Neutrophils Absolute 6.7 1.8 - 8.0 K/UL    Lymphocytes Absolute 1.2 0.8 - 3.5 K/UL    Monocytes Absolute 1.1 (H) 0.0 - 1.0 K/UL    Eosinophils Absolute 0.2 0.0 - 0.4 K/UL    Basophils Absolute 0.0 0.0 - 0.1 K/UL    Immature Granulocytes Absolute 0.1 (H) 0.00 - 0.04 K/UL    Differential Type AUTOMATED           Images:  X-ray reviewed.  CT scan reviewed.          Assessments:  Patient Active Problem List   Diagnosis    Acute hypoxic respiratory failure    CATHY (acute kidney injury) (HCC)    Hemoptysis    Community acquired pneumonia    Acute deep vein thrombosis (DVT) of femoral vein of both lower extremities (HCC)    DM (diabetes mellitus), type 2 (HCC)    Decreased mobility and endurance    Acute upper GI bleed    Ileus (HCC)    Acute urethral obstruction    Urinary retention       Plans: Patient had a CT scan done 3 days ago and KUB done today as well.  Imagings reviewed.    It appears patient had ileus 3 days

## 2024-10-07 LAB
25(OH)D3 SERPL-MCNC: 42.5 NG/ML (ref 30–100)
ABO + RH BLD: NORMAL
ALBUMIN SERPL-MCNC: 1.5 G/DL (ref 3.5–5)
ALBUMIN SERPL-MCNC: 1.6 G/DL (ref 3.5–5)
ALBUMIN/GLOB SERPL: 0.3 (ref 1.1–2.2)
ALP SERPL-CCNC: 47 U/L (ref 45–117)
ALT SERPL-CCNC: 13 U/L (ref 12–78)
ANION GAP SERPL CALC-SCNC: 14 MMOL/L (ref 2–12)
ANION GAP SERPL CALC-SCNC: 15 MMOL/L (ref 2–12)
AST SERPL W P-5'-P-CCNC: 14 U/L (ref 15–37)
BACTERIA SPEC CULT: NORMAL
BASOPHILS # BLD: 0 K/UL (ref 0–0.1)
BASOPHILS NFR BLD: 0 % (ref 0–1)
BILIRUB SERPL-MCNC: 0.5 MG/DL (ref 0.2–1)
BLD PROD TYP BPU: NORMAL
BLOOD BANK BLOOD PRODUCT EXPIRATION DATE: NORMAL
BLOOD BANK DISPENSE STATUS: NORMAL
BLOOD BANK ISBT PRODUCT BLOOD TYPE: 5100
BLOOD BANK PRODUCT CODE: NORMAL
BLOOD BANK UNIT TYPE AND RH: NORMAL
BLOOD GROUP ANTIBODIES SERPL: NEGATIVE
BPU ID: NORMAL
BUN SERPL-MCNC: 55 MG/DL (ref 6–20)
BUN SERPL-MCNC: 57 MG/DL (ref 6–20)
BUN/CREAT SERPL: 10 (ref 12–20)
BUN/CREAT SERPL: 9 (ref 12–20)
CA-I BLD-MCNC: 8.4 MG/DL (ref 8.5–10.1)
CA-I BLD-MCNC: 8.4 MG/DL (ref 8.5–10.1)
CHLORIDE SERPL-SCNC: 102 MMOL/L (ref 97–108)
CHLORIDE SERPL-SCNC: 102 MMOL/L (ref 97–108)
CO2 SERPL-SCNC: 23 MMOL/L (ref 21–32)
CO2 SERPL-SCNC: 23 MMOL/L (ref 21–32)
CREAT SERPL-MCNC: 5.84 MG/DL (ref 0.7–1.3)
CREAT SERPL-MCNC: 5.87 MG/DL (ref 0.7–1.3)
CROSSMATCH RESULT: NORMAL
DIFFERENTIAL METHOD BLD: ABNORMAL
EOSINOPHIL # BLD: 0.1 K/UL (ref 0–0.4)
EOSINOPHIL NFR BLD: 1 % (ref 0–7)
ERYTHROCYTE [DISTWIDTH] IN BLOOD BY AUTOMATED COUNT: 14.5 % (ref 11.5–14.5)
GLOBULIN SER CALC-MCNC: 4.7 G/DL (ref 2–4)
GLUCOSE BLD STRIP.AUTO-MCNC: 121 MG/DL (ref 65–100)
GLUCOSE BLD STRIP.AUTO-MCNC: 201 MG/DL (ref 65–100)
GLUCOSE BLD STRIP.AUTO-MCNC: 242 MG/DL (ref 65–100)
GLUCOSE SERPL-MCNC: 176 MG/DL (ref 65–100)
GLUCOSE SERPL-MCNC: 182 MG/DL (ref 65–100)
HCT VFR BLD AUTO: 21.4 % (ref 36.6–50.3)
HGB BLD-MCNC: 7.3 G/DL (ref 12.1–17)
IMM GRANULOCYTES # BLD AUTO: 0 K/UL
IMM GRANULOCYTES NFR BLD AUTO: 0 %
LYMPHOCYTES # BLD: 1.3 K/UL (ref 0.8–3.5)
LYMPHOCYTES NFR BLD: 14 % (ref 12–49)
Lab: NORMAL
MAGNESIUM SERPL-MCNC: 2.7 MG/DL (ref 1.6–2.4)
MCH RBC QN AUTO: 30.5 PG (ref 26–34)
MCHC RBC AUTO-ENTMCNC: 34.1 G/DL (ref 30–36.5)
MCV RBC AUTO: 89.5 FL (ref 80–99)
METAMYELOCYTES NFR BLD MANUAL: 2 %
MONOCYTES # BLD: 0.5 K/UL (ref 0–1)
MONOCYTES NFR BLD: 5 % (ref 5–13)
NEUTS BAND NFR BLD MANUAL: 3 % (ref 0–6)
NEUTS SEG # BLD: 7.4 K/UL (ref 1.8–8)
NEUTS SEG NFR BLD: 75 % (ref 32–75)
NRBC # BLD: 0 K/UL (ref 0–0.01)
NRBC BLD-RTO: 0 PER 100 WBC
PERFORMED BY:: ABNORMAL
PHOSPHATE SERPL-MCNC: 5.1 MG/DL (ref 2.6–4.7)
PLATELET # BLD AUTO: 366 K/UL (ref 150–400)
PMV BLD AUTO: 9.3 FL (ref 8.9–12.9)
POTASSIUM SERPL-SCNC: 3.1 MMOL/L (ref 3.5–5.1)
POTASSIUM SERPL-SCNC: 3.1 MMOL/L (ref 3.5–5.1)
PROT SERPL-MCNC: 6.2 G/DL (ref 6.4–8.2)
RBC # BLD AUTO: 2.39 M/UL (ref 4.1–5.7)
RBC MORPH BLD: ABNORMAL
SODIUM SERPL-SCNC: 139 MMOL/L (ref 136–145)
SODIUM SERPL-SCNC: 140 MMOL/L (ref 136–145)
SPECIMEN EXP DATE BLD: NORMAL
TRANSFUSION STATUS PATIENT QL: NORMAL
UNIT DIVISION: 0
UNIT ISSUE DATE/TIME: NORMAL
WBC # BLD AUTO: 9.5 K/UL (ref 4.1–11.1)

## 2024-10-07 PROCEDURE — 99232 SBSQ HOSP IP/OBS MODERATE 35: CPT | Performed by: UROLOGY

## 2024-10-07 PROCEDURE — 51798 US URINE CAPACITY MEASURE: CPT

## 2024-10-07 PROCEDURE — 85025 COMPLETE CBC W/AUTO DIFF WBC: CPT

## 2024-10-07 PROCEDURE — 99232 SBSQ HOSP IP/OBS MODERATE 35: CPT | Performed by: SURGERY

## 2024-10-07 PROCEDURE — 94762 N-INVAS EAR/PLS OXIMTRY CONT: CPT

## 2024-10-07 PROCEDURE — 36415 COLL VENOUS BLD VENIPUNCTURE: CPT

## 2024-10-07 PROCEDURE — 6370000000 HC RX 637 (ALT 250 FOR IP): Performed by: INTERNAL MEDICINE

## 2024-10-07 PROCEDURE — 97166 OT EVAL MOD COMPLEX 45 MIN: CPT

## 2024-10-07 PROCEDURE — 90935 HEMODIALYSIS ONE EVALUATION: CPT

## 2024-10-07 PROCEDURE — 97530 THERAPEUTIC ACTIVITIES: CPT

## 2024-10-07 PROCEDURE — 83735 ASSAY OF MAGNESIUM: CPT

## 2024-10-07 PROCEDURE — 6360000002 HC RX W HCPCS: Performed by: INTERNAL MEDICINE

## 2024-10-07 PROCEDURE — 80069 RENAL FUNCTION PANEL: CPT

## 2024-10-07 PROCEDURE — 80053 COMPREHEN METABOLIC PANEL: CPT

## 2024-10-07 PROCEDURE — 2709999900 HC NON-CHARGEABLE SUPPLY

## 2024-10-07 PROCEDURE — 82306 VITAMIN D 25 HYDROXY: CPT

## 2024-10-07 PROCEDURE — 1100000000 HC RM PRIVATE

## 2024-10-07 PROCEDURE — 82962 GLUCOSE BLOOD TEST: CPT

## 2024-10-07 PROCEDURE — 2580000003 HC RX 258: Performed by: INTERNAL MEDICINE

## 2024-10-07 RX ADMIN — THERA TABS 1 TABLET: TAB at 11:39

## 2024-10-07 RX ADMIN — CEFTRIAXONE SODIUM 1000 MG: 1 INJECTION, POWDER, FOR SOLUTION INTRAMUSCULAR; INTRAVENOUS at 11:40

## 2024-10-07 RX ADMIN — HEPARIN SODIUM 2500 UNITS: 1000 INJECTION INTRAVENOUS; SUBCUTANEOUS at 10:45

## 2024-10-07 RX ADMIN — SODIUM CHLORIDE, PRESERVATIVE FREE 10 ML: 5 INJECTION INTRAVENOUS at 21:58

## 2024-10-07 RX ADMIN — TAMSULOSIN HYDROCHLORIDE 0.4 MG: 0.4 CAPSULE ORAL at 11:39

## 2024-10-07 RX ADMIN — PANTOPRAZOLE SODIUM 40 MG: 40 INJECTION, POWDER, FOR SOLUTION INTRAVENOUS at 21:57

## 2024-10-07 RX ADMIN — INSULIN LISPRO 1 UNITS: 100 INJECTION, SOLUTION INTRAVENOUS; SUBCUTANEOUS at 17:34

## 2024-10-07 RX ADMIN — ALLOPURINOL 100 MG: 100 TABLET ORAL at 11:39

## 2024-10-07 RX ADMIN — SODIUM BICARBONATE 650 MG: 650 TABLET ORAL at 21:57

## 2024-10-07 RX ADMIN — FINASTERIDE 5 MG: 5 TABLET, FILM COATED ORAL at 11:39

## 2024-10-07 RX ADMIN — SODIUM CHLORIDE, PRESERVATIVE FREE 10 ML: 5 INJECTION INTRAVENOUS at 11:40

## 2024-10-07 RX ADMIN — POTASSIUM BICARBONATE 40 MEQ: 782 TABLET, EFFERVESCENT ORAL at 11:40

## 2024-10-07 RX ADMIN — THIAMINE HCL TAB 100 MG 100 MG: 100 TAB at 11:39

## 2024-10-07 ASSESSMENT — PAIN SCALES - GENERAL
PAINLEVEL_OUTOF10: 0

## 2024-10-07 NOTE — PROGRESS NOTES
OCCUPATIONAL THERAPY EVALUATION  Patient: Michael Fung (83 y.o. male)  Date: 10/7/2024  Primary Diagnosis: Acute hypoxic respiratory failure [J96.01]       Precautions: Fall Risk                Recommendations for nursing mobility: Frequent repositioning to prevent skin breakdown, Use of bed/chair alarm for safety, and Assist x2    In place during session:Peripheral IV, Nasal Cannula 2L, External Catheter, and EKG/telemetry   ASSESSMENT  Pt is a 83 y.o. male presenting to [facility] for respiratory failure, transferred to ICU from [facility] in [facility], found to be hypoxic and had x-ray concerning for community-acquired pneumonia in the left mid and lower lungs admitted 10/3/2024. Per ICU MD note: Initiated on community-acquired pneumonia therapy with ceftriaxone and azithromycin.  Patient also noted to have elevated creatinine of 4.5 which is his baseline patient is diuretic dependent.  Became oliguric during the course of the hospitalization despite Lasix 80 mg.  Lower extremity ultrasound obtained for elevated D-dimer.  Showed bilateral DVTs in the right popliteal and 2 in the femoral and popliteal on the left.  Had been initiated on a heparin drip 10/ evening patient on supratherapeutic heparin developed hemoptysis.  Substantial enough to stop heparin drip but unable to quantify.  Patient on mid flow oxygen with low sats.  Transferred to our ICU on BiPAP transitioned to high flow nasal cannula 50 L, 40% with sats of 97%.  Had a low prop VQ scan on 10/1 for renal failure with potential need for dialysis creatinine increased to 6 and oliguric. Pt received semi-supine in bed upon arrival, AXO x2, disoriented to time and situation and agreeable to OT evaluation.     Based on current observations, pt presents with decreased  functional mobility, independence in ADLs, high-level IADLs, ROM, strength, body mechanics, activity tolerance, endurance, safety awareness, cognition, command  of Tool:  Represents clinically-significant functional categories (i.e. Activities of daily living).  Percentage of Impairment CH    0%   CI    1-19% CJ    20-39% CK    40-59% CL    60-79% CM    80-99% CN     100%   AMPAC  Score 6-24 24 23 20-22 15-19 10-14 7-9 6     Occupational Therapy Evaluation Charge Determination   History Examination Decision-Making   MEDIUM Complexity : Expanded review of history including physical, cognitive and psychocial history  MEDIUM Complexity: 3-5 Performance deficits relating to physical, cognitive, or psychosocial skills that result in activity limitations and/or participation restrictions MEDIUM Complexity: Patient may present with comorbidities that affect occupational performance. Minimal to moderate modifications of tasks or assist (eg. physical or verbal) with assist is necessary to enable pt to complete eval      Based on the above components, the patient evaluation is determined to be of the following complexity level: Medium    Pain Ratin/10 FACES  Pain Intervention(s):   rest and repositioning    Activity Tolerance:   Poor and requires frequent rest breaks    After treatment patient left in no apparent distress:    Patient left in no apparent distress in bed, Call bell within reach, Bed/ chair alarm activated, Caregiver / family present, Side rails x3, Heels elevated for pressure relief, and 1:1 present in room , bed locked and in lowest position    COMMUNICATION/EDUCATION:   The patient’s plan of care was discussed with: Physical therapy assistant, Registered nurse, and Case management    Patient Education  Education Given To: Patient;Family  Education Provided: Role of Therapy;Plan of Care;Mobility Training  Education Method: Verbal  Barriers to Learning: Cognition  Education Outcome: Continued education needed;Unable to verbalize    The supervising occupational therapist and treating occupational therapist assistant have met to review this patient’s progress and

## 2024-10-07 NOTE — PROGRESS NOTES
UROLOGY Progress Note         [phone]      Daily Progress Note: 10/7/2024    Subjective:   The patient is seen for UROLOGIC follow up for retention with inability to place a pham catheter.    Documented bladder scans show 199 cc post void.  He is voiding with documented output of 200 cc and 2x.  He is incontinent at times.   He is getting dialysis and followed by Nephrology.    He has been confused overnight.  He has pulled out his NGT.    Problem List:  Patient Active Problem List   Diagnosis    Acute hypoxic respiratory failure    CATHY (acute kidney injury) ([facility])    Hemoptysis    Community acquired pneumonia    Acute deep vein thrombosis (DVT) of femoral vein of both lower extremities ([facility])    DM (diabetes mellitus), type 2 ([facility])    Decreased mobility and endurance    Acute upper GI bleed    Ileus ([facility])    Acute urethral obstruction    Urinary retention       Medications reviewed  Current Facility-Administered Medications   Medication Dose Route Frequency    potassium bicarb-citric acid (EFFER-K) effervescent tablet 40 mEq  40 mEq Oral Once    potassium bicarb-citric acid (EFFER-K) effervescent tablet 40 mEq  40 mEq Oral Once    0.9 % sodium chloride infusion   IntraVENous PRN    dextrose 5 % and 0.45 % NaCl 1,000 mL infusion   IntraVENous Continuous    pantoprazole (PROTONIX) injection 40 mg  40 mg IntraVENous BID    cefTRIAXone (ROCEPHIN) 1,000 mg in sterile water 10 mL IV syringe  1,000 mg IntraVENous Q24H    heparin (porcine) injection 2,500 Units  2,500 Units IntraCATHeter PRN    sodium chloride flush 0.9 % injection 5-40 mL  5-40 mL IntraVENous 2 times per day    sodium chloride flush 0.9 % injection 5-40 mL  5-40 mL IntraVENous PRN    0.9 % sodium chloride infusion   IntraVENous PRN    ondansetron (ZOFRAN-ODT) disintegrating tablet 4 mg  4 mg Oral Q8H PRN    Or    ondansetron (ZOFRAN) injection 4 mg  4 mg IntraVENous Q6H PRN    acetaminophen (TYLENOL) tablet 650 mg  650 mg Oral Q6H PRN    Or     Panel w/ Reflex to MG    Collection Time: 10/07/24  6:03 AM   Result Value Ref Range    Sodium 140 136 - 145 mmol/L    Potassium 3.1 (L) 3.5 - 5.1 mmol/L    Chloride 102 97 - 108 mmol/L    CO2 23 21 - 32 mmol/L    Anion Gap 15 (H) 2 - 12 mmol/L    Glucose 176 (H) 65 - 100 mg/dL    BUN 57 (H) 6 - 20 mg/dL    Creatinine 5.87 (H) 0.70 - 1.30 mg/dL    BUN/Creatinine Ratio 10 (L) 12 - 20      Est, Glom Filt Rate 9 (L) >60 ml/min/1.73m2    Calcium 8.4 (L) 8.5 - 10.1 mg/dL    Total Bilirubin 0.5 0.2 - 1.0 mg/dL    AST 14 (L) 15 - 37 U/L    ALT 13 12 - 78 U/L    Alk Phosphatase 47 45 - 117 U/L    Total Protein 6.2 (L) 6.4 - 8.2 g/dL    Albumin 1.5 (L) 3.5 - 5.0 g/dL    Globulin 4.7 (H) 2.0 - 4.0 g/dL    Albumin/Globulin Ratio 0.3 (L) 1.1 - 2.2     Magnesium    Collection Time: 10/07/24  6:03 AM   Result Value Ref Range    Magnesium 2.7 (H) 1.6 - 2.4 mg/dL       Assessment     Patient Active Problem List   Diagnosis    Acute hypoxic respiratory failure    CATHY (acute kidney injury) ([facility])    Hemoptysis    Community acquired pneumonia    Acute deep vein thrombosis (DVT) of femoral vein of both lower extremities ([facility])    DM (diabetes mellitus), type 2 ([facility])    Decreased mobility and endurance    Acute upper GI bleed    Ileus ([facility])    Acute urethral obstruction    Urinary retention       Plan:    CATHY ON CKD:  dialysis.  He is followed by Nephrology. He is voiding with mild-moderate residual volumes.     OLIGURIA: He has some episodic incontinence and some measured volumes.  Stable.  Observe for bladder distention, routine bladder scans ordered.  Can use external catheter.    INABILITY TO PLACE BARTHOLOMEW: Probable urethral stricture or false passage.  Incomplete emptying but voiding.   Can use external catheter.     DVT:  On anticoagulation which increases the risk of bleeding with urethral dilation.     DM: Hyperglycemia on insulin, improved today.     BPH: on finasteride/tamsulosin, continue.  Has hx of resection.    NO ACUTE

## 2024-10-07 NOTE — PROGRESS NOTES
Hospitalist Consult Note    NAME: Michael Fung   :  1940   MRN:  [id number]        Hospital course:  BRIEF PATIENT SUMMARY AND HOSPITAL COURSE   Michael Fung is a 83-year-old male presented to outside hospital on  for cough and shortness of breath found to be hypoxic and have x-ray concerning for community-acquired pneumonia in the left mid and lower lung fields.  Initiated on community-acquired pneumonia therapy with ceftriaxone and azithromycin.  Patient also noted to have elevated creatinine of 4.5 which is his baseline patient is diuretic dependent.  Became oliguric during the course of the hospitalization despite Lasix 80 mg.  Lower extremity ultrasound obtained for elevated D-dimer.  Showed bilateral DVTs in the right popliteal and 2 in the femoral and popliteal on the left.  Had been initiated on a heparin drip 10/ evening patient on supratherapeutic heparin developed hemoptysis.  Substantial enough to stop heparin drip but unable to quantify.  Patient on mid flow oxygen with low sats.  Transferred to our ICU on BiPAP transitioned to high flow nasal cannula 50 L, 40% with sats of 97%.  Had a low prop VQ scan on 10/1 for renal failure with potential need for dialysis creatinine increased to 6 and oliguric.  Bladder scan negative no Muñoz catheter or straight cath inserted, worsening hypoxemia from [facility].  Most of history obtained from patient's daughter and in conversation with transferring facility much of chart did not make it over with transfer.  Patient was subsequently admitted to medical ICU, evaluated by nephrology, initiated on dialysis, evaluated by urology for urinary retention, recommended supportive care, following which patient's clinical status improved, serum creatinine improved, subsequent to which patient was transferred out of the ICU and accepted to the hospitalist service    Subjective:     Chief Complaint / Reason for Physician Visit

## 2024-10-07 NOTE — PROGRESS NOTES
PROGRESS NOTE      Chief Complaints:  No new complaints.  HPI and  Objective:    Patient pulled NG tube out.  Patient claims he is passing gas.  Review of Systems:  Rest of review of system reviewed personally and they are negative.    EXAM:  BP (!) 156/67   Pulse 75   Temp 98.7 °F (37.1 °C) (Axillary)   Resp 18   Ht 1.676 m (5' 6\")   Wt 73.2 kg (161 lb 4.8 oz)   SpO2 95%   BMI 26.03 kg/m²     Patient is awake   Head and neck atraumatic, normocephalic.  ENT: No hoarse voice, gaze appropriate.  Cardiac system regular rate rhythm.  Pulmonary no audible wheeze, no cyanosis.  Chest wall excursion normal with respiration cycle  Abdomen is soft not particularly distended.  Neurologically nonfocal.  Hematology system: No bruising noted.  Musculoskeletal system: No joint deformity noted.  Genitourinary system: No hematuria noted.  Skin is warm and moist.  Psychosocial: Cooperative.  Vascular examination as previously noted no changes.    Current Facility-Administered Medications   Medication Dose Route Frequency Provider Last Rate Last Admin    potassium bicarb-citric acid (EFFER-K) effervescent tablet 40 mEq  40 mEq Oral Once Poncho Quarles MD        0.9 % sodium chloride infusion   IntraVENous PRN Poncho Quarles MD        dextrose 5 % and 0.45 % NaCl 1,000 mL infusion   IntraVENous Continuous Ariel Demarco MD 30 mL/hr at 10/06/24 1855 New Bag at 10/06/24 1855    pantoprazole (PROTONIX) injection 40 mg  40 mg IntraVENous BID Willard Harden MD   40 mg at 10/06/24 2204    cefTRIAXone (ROCEPHIN) 1,000 mg in sterile water 10 mL IV syringe  1,000 mg IntraVENous Q24H Poncho Quarles MD   1,000 mg at 10/06/24 1025    heparin (porcine) injection 2,500 Units  2,500 Units IntraCATHeter PRN Carlota Tinajero MD   2,500 Units at 10/05/24 1616    sodium chloride flush 0.9 % injection 5-40 mL  5-40 mL IntraVENous 2 times per day Willard Harden MD   10 mL at 10/06/24 1026    sodium  g/dL    Albumin 1.5 (L) 3.5 - 5.0 g/dL    Globulin 4.7 (H) 2.0 - 4.0 g/dL    Albumin/Globulin Ratio 0.3 (L) 1.1 - 2.2     Magnesium    Collection Time: 10/07/24  6:03 AM   Result Value Ref Range    Magnesium 2.7 (H) 1.6 - 2.4 mg/dL       ASSESSMENT:   Patient is 83 y.o. with diagnosis of : Principal Problem:    Acute hypoxic respiratory failure  Active Problems:    CATHY (acute kidney injury) (HCC)    Hemoptysis    Community acquired pneumonia    Acute deep vein thrombosis (DVT) of femoral vein of both lower extremities (HCC)    DM (diabetes mellitus), type 2 (HCC)    Decreased mobility and endurance    Acute upper GI bleed    Ileus (HCC)    Acute urethral obstruction    Urinary retention  Resolved Problems:    * No resolved hospital problems. *      PLAN:                 Abdomen is pretty soft and no distention noted.    Advance diet.  Resolved ileus  Will follow.

## 2024-10-07 NOTE — PROGRESS NOTES
Consult received, chart reviewed and spoke with nsg. Nsg reports pt is tolerating current reg/thin diet. Will plan to follow-up and complete bedside sw evaluation as indicated.

## 2024-10-07 NOTE — CARE COORDINATION
CM reviewed chart. CM met with patient and family at bedside. Obtain [facility] form consent, MD informed signature is needed to [facility] to setup placement, VA form is on chart. CM will continue to follow.

## 2024-10-07 NOTE — PROGRESS NOTES
OT eval order received and acknowledged. OT eval attempted at 8:30 however pt in dialysis. Will continue to follow patient and attempt OT eval at a later time. Thank you.

## 2024-10-07 NOTE — PLAN OF CARE
Problem: Discharge Planning  Goal: Discharge to home or other facility with appropriate resources  Outcome: Progressing     Problem: Skin/Tissue Integrity  Goal: Absence of new skin breakdown  Description: 1.  Monitor for areas of redness and/or skin breakdown  2.  Assess vascular access sites hourly  3.  Every 4-6 hours minimum:  Change oxygen saturation probe site  4.  Every 4-6 hours:  If on nasal continuous positive airway pressure, respiratory therapy assess nares and determine need for appliance change or resting period.  Outcome: Progressing     Problem: Safety - Adult  Goal: Free from fall injury  Outcome: Progressing     Problem: ABCDS Injury Assessment  Goal: Absence of physical injury  Outcome: Progressing     Problem: Chronic Conditions and Co-morbidities  Goal: Patient's chronic conditions and co-morbidity symptoms are monitored and maintained or improved  Outcome: Progressing     Problem: Respiratory - Adult  Goal: Achieves optimal ventilation and oxygenation  Outcome: Progressing     Problem: Cardiovascular - Adult  Goal: Maintains optimal cardiac output and hemodynamic stability  Outcome: Progressing     Problem: Neurosensory - Adult  Goal: Achieves stable or improved neurological status  Outcome: Progressing

## 2024-10-07 NOTE — PLAN OF CARE
Problem: Discharge Planning  Goal: Discharge to home or other facility with appropriate resources  10/7/2024 0004 by Zahida Ramírez RN  Outcome: Progressing  10/6/2024 1053 by Shanita Lora RN  Outcome: Progressing     Problem: Skin/Tissue Integrity  Goal: Absence of new skin breakdown  Description: 1.  Monitor for areas of redness and/or skin breakdown  2.  Assess vascular access sites hourly  3.  Every 4-6 hours minimum:  Change oxygen saturation probe site  4.  Every 4-6 hours:  If on nasal continuous positive airway pressure, respiratory therapy assess nares and determine need for appliance change or resting period.  10/7/2024 0004 by Zahida Ramírez RN  Outcome: Progressing  10/6/2024 1053 by Shanita Lora RN  Outcome: Progressing     Problem: Safety - Adult  Goal: Free from fall injury  10/7/2024 0004 by Zahida Ramírez RN  Outcome: Progressing  10/6/2024 1053 by Shanita Lora RN  Outcome: Progressing     Problem: ABCDS Injury Assessment  Goal: Absence of physical injury  Outcome: Progressing     Problem: Chronic Conditions and Co-morbidities  Goal: Patient's chronic conditions and co-morbidity symptoms are monitored and maintained or improved  Outcome: Progressing     Problem: Respiratory - Adult  Goal: Achieves optimal ventilation and oxygenation  Outcome: Progressing     Problem: Cardiovascular - Adult  Goal: Maintains optimal cardiac output and hemodynamic stability  Outcome: Progressing  Goal: Absence of cardiac dysrhythmias or at baseline  Outcome: Progressing     Problem: Neurosensory - Adult  Goal: Achieves stable or improved neurological status  Outcome: Progressing  Goal: Absence of seizures  Outcome: Progressing  Goal: Remains free of injury related to seizures activity  Outcome: Progressing  Goal: Achieves maximal functionality and self care  Outcome: Progressing     Problem: Musculoskeletal - Adult  Goal: Return mobility to safest level of function  Outcome: Progressing  Goal: Maintain

## 2024-10-07 NOTE — PROGRESS NOTES
1920: Patient received from day shift RN. Bedside shift report done. Noted confusion on patient. Patient's wife is at the bedside.    2016: RN went back to patient's room to check vitals. Wife still at the bedside.    2035: Pt's wife left the patient's room. RN placed virtual  order and placed VSC machine 06 in the room.    2100: RN saw patient's NG tube was on the bed, PIV catheter is also pulled out and was on the floor. No call or alarm received from VSC. RN called VSC coordinator and confirmed if the VSC machine is active VSC coordinator confirmed it is working.     2200: RN requested for a bedside sitter. Order was placed by nocturnal PA. Charge nurse called supervisor for a sitter but as per supervisor, no staff is available to sit.    2300: New NG placed by RN, portable xray done, confirmed placement. Patient was put on mitts.    0020: Patient pulled his NG tube again, he was able to remove his mitts. RN reached out to PA to place soft wrist restraints order. RN had multiple attempts contacting the patient's emergency contacts (wife and grand daughter) but was unsuccessful. Voicemail left on patient's wife's phone regarding placing patient on soft wrist restraints to prevent him from pulling his remaining lines.     0100: RN and charge RN prepared for another NG tube insertion but patient refused and was starting to get agitated. Had a few more attempts to convince the patient of the insertion but was unsuccessful. PA informed. No further orders received. Patient monitored.

## 2024-10-07 NOTE — PROGRESS NOTES
(acute kidney injury) (HCC)    Hemoptysis    Community acquired pneumonia    Acute deep vein thrombosis (DVT) of femoral vein of both lower extremities (HCC)    DM (diabetes mellitus), type 2 (HCC)    Decreased mobility and endurance    Acute upper GI bleed    Ileus (HCC)    Acute urethral obstruction    Urinary retention  Resolved Problems:    * No resolved hospital problems. *  Resolved ileus  hemoglobin hematocrit, dropped dramatically, no overt GI bleeding  Multiple medical issues, respiratory failure, hypoxia, acidosis, high risk for peptic ulcer disease,  Patient was on heparin, intermittent,     Plan:   Continue current medical treatment current antibiotic  Protonix po  Monitor hemoglobin, transfusion if needed,  Stool test, iron studies request  No urgent endoscopy needed,  Sign off          Signed By: Emiliano Graham MD     October 7, 2024          Thank you for allowing me to participate in this patients care    DISCLAIMER:  Please note that this report was generated to utilize Dragon Dictation system, the software is designed to speed over the accuracy.  Every effort has been done to attempt to correct this mistakes upon review, but there still might be some inadvertent errors in grammar and spelling.  Please utilize caution while reading the report due to this  inherent and potential for grammatical mistakes.    This physician  works as a inpatient consult gastroenterologist only, any result not available at time of inpatient discharge and/or clinical follow-up should be managed by the primary care physician or patient's primary gastroenterologist.  It is responsibility of hospitalitis/admitting physician to forward the discharge summary to patient's primary care physician and the primary gastroenterologist regarding further follow-up plan after patient be discharged.    Note to patient:  The 21 st century Cures Act make the medical notes like this available to patient in the interest of transparency,

## 2024-10-08 ENCOUNTER — APPOINTMENT (OUTPATIENT)
Facility: HOSPITAL | Age: 84
End: 2024-10-08
Attending: INTERNAL MEDICINE
Payer: OTHER GOVERNMENT

## 2024-10-08 ENCOUNTER — APPOINTMENT (OUTPATIENT)
Facility: HOSPITAL | Age: 84
End: 2024-10-08
Payer: OTHER GOVERNMENT

## 2024-10-08 LAB
ALBUMIN SERPL-MCNC: 1.6 G/DL (ref 3.5–5)
ANION GAP SERPL CALC-SCNC: 8 MMOL/L (ref 2–12)
BASOPHILS # BLD: 0 K/UL (ref 0–0.1)
BASOPHILS NFR BLD: 1 % (ref 0–1)
BUN SERPL-MCNC: 31 MG/DL (ref 6–20)
BUN/CREAT SERPL: 6 (ref 12–20)
CA-I BLD-MCNC: 8.1 MG/DL (ref 8.5–10.1)
CHLORIDE SERPL-SCNC: 99 MMOL/L (ref 97–108)
CO2 SERPL-SCNC: 24 MMOL/L (ref 21–32)
COLLECT DATE STL: ABNORMAL
CREAT SERPL-MCNC: 5.12 MG/DL (ref 0.7–1.3)
DIFFERENTIAL METHOD BLD: ABNORMAL
ECHO AO ASC DIAM: 3 CM
ECHO AO ASCENDING AORTA INDEX: 1.65 CM/M2
ECHO AV MEAN GRADIENT: 6 MMHG
ECHO AV MEAN VELOCITY: 1.1 M/S
ECHO AV PEAK GRADIENT: 12 MMHG
ECHO AV PEAK VELOCITY: 1.7 M/S
ECHO AV VELOCITY RATIO: 0.76
ECHO AV VTI: 29 CM
ECHO BSA: 1.9 M2
ECHO EST RA PRESSURE: 3 MMHG
ECHO IVC EXP: 1.2 CM
ECHO LA AREA 4C: 17.3 CM2
ECHO LA DIAMETER INDEX: 1.76 CM/M2
ECHO LA DIAMETER: 3.2 CM
ECHO LA MAJOR AXIS: 5.7 CM
ECHO LA VOL MOD A4C: 41 ML (ref 18–58)
ECHO LA VOLUME INDEX MOD A4C: 23 ML/M2 (ref 16–34)
ECHO LV E' LATERAL VELOCITY: 7.6 CM/S
ECHO LV E' SEPTAL VELOCITY: 5.9 CM/S
ECHO LV EDV A4C: 59 ML
ECHO LV EDV INDEX A4C: 32 ML/M2
ECHO LV EJECTION FRACTION A4C: 63 %
ECHO LV EJECTION FRACTION BIPLANE: 63 % (ref 55–100)
ECHO LV ESV A4C: 22 ML
ECHO LV ESV INDEX A4C: 12 ML/M2
ECHO LV FRACTIONAL SHORTENING: 38 % (ref 28–44)
ECHO LV INTERNAL DIMENSION DIASTOLE INDEX: 2.75 CM/M2
ECHO LV INTERNAL DIMENSION DIASTOLIC: 5 CM (ref 4.2–5.9)
ECHO LV INTERNAL DIMENSION SYSTOLIC INDEX: 1.7 CM/M2
ECHO LV INTERNAL DIMENSION SYSTOLIC: 3.1 CM
ECHO LV IVSD: 1.3 CM (ref 0.6–1)
ECHO LV MASS 2D: 247.6 G (ref 88–224)
ECHO LV MASS INDEX 2D: 136 G/M2 (ref 49–115)
ECHO LV POSTERIOR WALL DIASTOLIC: 1.2 CM (ref 0.6–1)
ECHO LV RELATIVE WALL THICKNESS RATIO: 0.48
ECHO LVOT AV VTI INDEX: 0.8
ECHO LVOT MEAN GRADIENT: 4 MMHG
ECHO LVOT PEAK GRADIENT: 7 MMHG
ECHO LVOT PEAK VELOCITY: 1.3 M/S
ECHO LVOT VTI: 23.1 CM
ECHO MV A VELOCITY: 0.93 M/S
ECHO MV E DECELERATION TIME (DT): 134 MS
ECHO MV E VELOCITY: 0.68 M/S
ECHO MV E/A RATIO: 0.73
ECHO MV E/E' LATERAL: 8.95
ECHO MV E/E' RATIO (AVERAGED): 10.24
ECHO MV E/E' SEPTAL: 11.53
ECHO MV LVOT VTI INDEX: 1.07
ECHO MV MAX VELOCITY: 1 M/S
ECHO MV MEAN GRADIENT: 2 MMHG
ECHO MV MEAN VELOCITY: 0.7 M/S
ECHO MV PEAK GRADIENT: 4 MMHG
ECHO MV REGURGITANT PEAK GRADIENT: 8 MMHG
ECHO MV REGURGITANT PEAK VELOCITY: 1.4 M/S
ECHO MV VTI: 24.8 CM
ECHO PV ACCELERATION TIME (AT): 79 MS
ECHO PV MAX VELOCITY: 1 M/S
ECHO PV PEAK GRADIENT: 4 MMHG
ECHO RA AREA 4C: 12.5 CM2
ECHO RA END SYSTOLIC VOLUME APICAL 4 CHAMBER INDEX BSA: 19 ML/M2
ECHO RA VOLUME: 34 ML
ECHO RIGHT VENTRICULAR SYSTOLIC PRESSURE (RVSP): 22 MMHG
ECHO RV BASAL DIMENSION: 3.8 CM
ECHO RV FREE WALL PEAK S': 11.5 CM/S
ECHO RV TAPSE: 2.7 CM (ref 1.7–?)
ECHO TV REGURGITANT MAX VELOCITY: 2.18 M/S
ECHO TV REGURGITANT PEAK GRADIENT: 19 MMHG
EOSINOPHIL # BLD: 0.2 K/UL (ref 0–0.4)
EOSINOPHIL NFR BLD: 2 % (ref 0–7)
ERYTHROCYTE [DISTWIDTH] IN BLOOD BY AUTOMATED COUNT: 14.5 % (ref 11.5–14.5)
GLUCOSE BLD STRIP.AUTO-MCNC: 169 MG/DL (ref 65–100)
GLUCOSE BLD STRIP.AUTO-MCNC: 207 MG/DL (ref 65–100)
GLUCOSE BLD STRIP.AUTO-MCNC: 216 MG/DL (ref 65–100)
GLUCOSE BLD STRIP.AUTO-MCNC: 242 MG/DL (ref 65–100)
GLUCOSE SERPL-MCNC: 202 MG/DL (ref 65–100)
HCT VFR BLD AUTO: 23.6 % (ref 36.6–50.3)
HEMOCCULT SP1 STL QL: POSITIVE
HGB BLD-MCNC: 7.9 G/DL (ref 12.1–17)
IMM GRANULOCYTES # BLD AUTO: 0.5 K/UL (ref 0–0.04)
IMM GRANULOCYTES NFR BLD AUTO: 5 % (ref 0–0.5)
LYMPHOCYTES # BLD: 1.1 K/UL (ref 0.8–3.5)
LYMPHOCYTES NFR BLD: 13 % (ref 12–49)
MCH RBC QN AUTO: 30.7 PG (ref 26–34)
MCHC RBC AUTO-ENTMCNC: 33.5 G/DL (ref 30–36.5)
MCV RBC AUTO: 91.8 FL (ref 80–99)
MONOCYTES # BLD: 0.8 K/UL (ref 0–1)
MONOCYTES NFR BLD: 9 % (ref 5–13)
NEUTS SEG # BLD: 6.1 K/UL (ref 1.8–8)
NEUTS SEG NFR BLD: 70 % (ref 32–75)
NRBC # BLD: 0 K/UL (ref 0–0.01)
NRBC BLD-RTO: 0 PER 100 WBC
PERFORMED BY:: ABNORMAL
PHOSPHATE SERPL-MCNC: 3.4 MG/DL (ref 2.6–4.7)
PLATELET # BLD AUTO: 335 K/UL (ref 150–400)
PMV BLD AUTO: 9.8 FL (ref 8.9–12.9)
POTASSIUM SERPL-SCNC: 3.2 MMOL/L (ref 3.5–5.1)
PROCALCITONIN SERPL-MCNC: 5.47 NG/ML
RBC # BLD AUTO: 2.57 M/UL (ref 4.1–5.7)
SODIUM SERPL-SCNC: 131 MMOL/L (ref 136–145)
WBC # BLD AUTO: 8.7 K/UL (ref 4.1–11.1)

## 2024-10-08 PROCEDURE — 51798 US URINE CAPACITY MEASURE: CPT

## 2024-10-08 PROCEDURE — 2580000003 HC RX 258: Performed by: INTERNAL MEDICINE

## 2024-10-08 PROCEDURE — 1100000000 HC RM PRIVATE

## 2024-10-08 PROCEDURE — 85025 COMPLETE CBC W/AUTO DIFF WBC: CPT

## 2024-10-08 PROCEDURE — 84443 ASSAY THYROID STIM HORMONE: CPT

## 2024-10-08 PROCEDURE — 82272 OCCULT BLD FECES 1-3 TESTS: CPT

## 2024-10-08 PROCEDURE — 84145 PROCALCITONIN (PCT): CPT

## 2024-10-08 PROCEDURE — 93306 TTE W/DOPPLER COMPLETE: CPT

## 2024-10-08 PROCEDURE — 97530 THERAPEUTIC ACTIVITIES: CPT

## 2024-10-08 PROCEDURE — 92610 EVALUATE SWALLOWING FUNCTION: CPT

## 2024-10-08 PROCEDURE — 82962 GLUCOSE BLOOD TEST: CPT

## 2024-10-08 PROCEDURE — 92526 ORAL FUNCTION THERAPY: CPT

## 2024-10-08 PROCEDURE — 36415 COLL VENOUS BLD VENIPUNCTURE: CPT

## 2024-10-08 PROCEDURE — 71045 X-RAY EXAM CHEST 1 VIEW: CPT

## 2024-10-08 PROCEDURE — 80069 RENAL FUNCTION PANEL: CPT

## 2024-10-08 PROCEDURE — 6360000002 HC RX W HCPCS: Performed by: INTERNAL MEDICINE

## 2024-10-08 PROCEDURE — 6370000000 HC RX 637 (ALT 250 FOR IP): Performed by: INTERNAL MEDICINE

## 2024-10-08 PROCEDURE — 93005 ELECTROCARDIOGRAM TRACING: CPT

## 2024-10-08 PROCEDURE — 84439 ASSAY OF FREE THYROXINE: CPT

## 2024-10-08 PROCEDURE — 99232 SBSQ HOSP IP/OBS MODERATE 35: CPT | Performed by: SURGERY

## 2024-10-08 RX ADMIN — ACETAMINOPHEN 650 MG: 325 TABLET ORAL at 03:44

## 2024-10-08 RX ADMIN — CEFTRIAXONE SODIUM 1000 MG: 1 INJECTION, POWDER, FOR SOLUTION INTRAMUSCULAR; INTRAVENOUS at 09:23

## 2024-10-08 RX ADMIN — INSULIN LISPRO 1 UNITS: 100 INJECTION, SOLUTION INTRAVENOUS; SUBCUTANEOUS at 18:02

## 2024-10-08 RX ADMIN — THERA TABS 1 TABLET: TAB at 09:21

## 2024-10-08 RX ADMIN — ALLOPURINOL 100 MG: 100 TABLET ORAL at 09:21

## 2024-10-08 RX ADMIN — FINASTERIDE 5 MG: 5 TABLET, FILM COATED ORAL at 09:21

## 2024-10-08 RX ADMIN — SODIUM CHLORIDE, PRESERVATIVE FREE 10 ML: 5 INJECTION INTRAVENOUS at 09:31

## 2024-10-08 RX ADMIN — SODIUM BICARBONATE 650 MG: 650 TABLET ORAL at 09:21

## 2024-10-08 RX ADMIN — TAMSULOSIN HYDROCHLORIDE 0.4 MG: 0.4 CAPSULE ORAL at 09:20

## 2024-10-08 RX ADMIN — INSULIN LISPRO 1 UNITS: 100 INJECTION, SOLUTION INTRAVENOUS; SUBCUTANEOUS at 09:21

## 2024-10-08 RX ADMIN — SODIUM BICARBONATE 650 MG: 650 TABLET ORAL at 20:36

## 2024-10-08 RX ADMIN — SODIUM CHLORIDE, PRESERVATIVE FREE 10 ML: 5 INJECTION INTRAVENOUS at 20:36

## 2024-10-08 RX ADMIN — DEXTROSE AND SODIUM CHLORIDE: 5; 450 INJECTION, SOLUTION INTRAVENOUS at 03:43

## 2024-10-08 RX ADMIN — ACETAMINOPHEN 650 MG: 325 TABLET ORAL at 17:50

## 2024-10-08 RX ADMIN — THIAMINE HCL TAB 100 MG 100 MG: 100 TAB at 09:21

## 2024-10-08 RX ADMIN — INSULIN LISPRO 1 UNITS: 100 INJECTION, SOLUTION INTRAVENOUS; SUBCUTANEOUS at 12:14

## 2024-10-08 ASSESSMENT — PAIN SCALES - GENERAL: PAINLEVEL_OUTOF10: 10

## 2024-10-08 NOTE — CONSULTS
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 Quarles    CHIEF COMPLAINT:  Shortness of breath     HISTORY OF PRESENT ILLNESS:  Michael Fung is a 83 y.o. year-old male with past medical history significant for CKD stage V, HTN, NIDDM, HLD, HTN who was evaluated today due to concern for heart block. Patient initially presented to [facility] on 09/30/2024 with complaints of cough and and shortness of breath. Noted to be hypoxic, xray concerning for CAP. Transferred here for higher level of care.       Records from hospital admission course thus far reviewed.      Telemetry reviewed.      INPATIENT MEDICATIONS:  Home medications reviewed.    Current Facility-Administered Medications:     potassium bicarb-citric acid (EFFER-K) effervescent tablet 40 mEq, 40 mEq, Oral, Once, Poncho Quarles MD    0.9 % sodium chloride infusion, , IntraVENous, PRN, Poncho Quarles MD    dextrose 5 % and 0.45 % NaCl 1,000 mL infusion, , IntraVENous, Continuous, Ariel Demarco MD, Last Rate: 30 mL/hr at 10/08/24 0343, New Bag at 10/08/24 0343    heparin (porcine) injection 2,500 Units, 2,500 Units, IntraCATHeter, PRN, Carlota Tinajero MD, 2,500 Units at 10/07/24 1045    sodium chloride flush 0.9 % injection 5-40 mL, 5-40 mL, IntraVENous, 2 times per day, Willard Harden MD, 10 mL at 10/08/24 0931    sodium chloride flush 0.9 % injection 5-40 mL, 5-40 mL, IntraVENous, PRN, Willard Harden MD    0.9 % sodium chloride infusion, , IntraVENous, PRN, Willard Harden MD    ondansetron (ZOFRAN-ODT) disintegrating tablet 4 mg, 4 mg, Oral, Q8H PRN **OR** ondansetron (ZOFRAN) injection 4 mg, 4 mg, IntraVENous, Q6H PRN, Willard Harden MD, 4 mg at 10/03/24 0065    acetaminophen (TYLENOL) tablet 650 mg, 650 mg, Oral, Q6H PRN, 650 mg at 10/08/24 0344 **OR** acetaminophen (TYLENOL) suppository 650 mg, 650 mg, Rectal, Q6H PRN, Fina,  Alert  Cardiovascular:  RRR  Respiratory:  Lungs are diminished  Abdomen:  Soft, nontender  Extremities:  + lower extremity edema  Skin:  Dry, warm  Psych:  Normal affect      Vitals:    10/08/24 0840   BP: (!) 149/55   Pulse: 81   Resp: 18   Temp: 99 °F (37.2 °C)   SpO2: 97%       Recent labs results and imaging reviewed.     Discussed case with Dr. Cantu and our impression and recommendations are as follows:  Bradycardia,   Continue telemetry   EKG today with NSR, 80s   Obtain echo to assess cardiac structure and function   Keep electrolytes WNL, K+ 4-5, Mg >2   Check TSH, T4   Will need outpatient follow-up for holter/MCOT   Shortness of breath,   Echo ordered as above  HD for volume removal  Need strict I&Os, daily weights   Hypertension, BP above goal   DVT, IVC filter now in place   ESRD on HD, nephrology following     Thank you for involving us in the care of this patient.  Please do not hesitate to call me or Dr. Cantu if additional questions arise.    ALICIA ROMAN, APRN - NP  10/8/2024

## 2024-10-08 NOTE — PROGRESS NOTES
[facility]ID[facility]     Renal Daily Progress Note:     Subjective: Patient underwent dialysis today.  This would be his third dialysis.  Family at the bedside.  Seen around 4 PM.  Has been confused and intermittently agitated.  Now has a sitter at the bedside.  Appetite fair, ate some breakfast and a little bit of lunch.  No nausea or vomiting.  No chest or abdominal pain.  Still making urine.  Muñoz not placed    10/8 more awake today.  Ate breakfast without nausea or vomiting.  Urine output not quantified.  However, today serum creatinine is only 5.12.  Will see if tomorrow's creatinine is less than that which was seen on Saturday.  Not short of breath,      Review of Systems  Pertinent items are noted in HPI.    Objective:     BP (!) 149/55   Pulse 81   Temp 99 °F (37.2 °C) (Oral)   Resp 18   Ht 1.676 m (5' 6\")   Wt 72.4 kg (159 lb 9.8 oz)   SpO2 97%   BMI 25.76 kg/m²   Temp (24hrs), Av.4 °F (37.4 °C), Min:98.5 °F (36.9 °C), Max:100.7 °F (38.2 °C)      No intake or output data in the 24 hours ending 10/08/24 1341    Current Facility-Administered Medications   Medication Dose Route Frequency    potassium bicarb-citric acid (EFFER-K) effervescent tablet 40 mEq  40 mEq Oral Once    0.9 % sodium chloride infusion   IntraVENous PRN    dextrose 5 % and 0.45 % NaCl 1,000 mL infusion   IntraVENous Continuous    heparin (porcine) injection 2,500 Units  2,500 Units IntraCATHeter PRN    sodium chloride flush 0.9 % injection 5-40 mL  5-40 mL IntraVENous 2 times per day    sodium chloride flush 0.9 % injection 5-40 mL  5-40 mL IntraVENous PRN    0.9 % sodium chloride infusion   IntraVENous PRN    ondansetron (ZOFRAN-ODT) disintegrating tablet 4 mg  4 mg Oral Q8H PRN    Or    ondansetron (ZOFRAN) injection 4 mg  4 mg IntraVENous Q6H PRN    acetaminophen (TYLENOL) tablet 650 mg  650 mg Oral Q6H PRN    Or    acetaminophen (TYLENOL) suppository 650 mg  650 mg Rectal Q6H PRN    bisacodyl (DULCOLAX) suppository 10 mg  10

## 2024-10-08 NOTE — CONSULTS
Consult  Pulmonary, Critical Care    Name: Michael Fung MRN: [id number]   : 1940 Hospital: [facility]   Date: 10/8/2024  Admission date: 10/3/2024 Hospital Day: 6       Subjective/Interval History:   Seen in the dialysis unit he is confused does not know why he is in the hospital or that he is in the hospital.  Was admitted at [facility] with cough severe hypoxia found to have bilateral DVT of the legs was placed on heparin subsequently developed severe hemoptysis heparin was discontinued and IVC filter placed.  He was transferred to our facility had CT scan which shows distended esophagus and stomach possible ileus with scattered infiltrates throughout the left lung no documentation of vomiting but his history is not reliable at all.  He has had a VQ scan that was low probability but does show matching defect due to renal insufficiency he did not have a CTA of the chest he initially required high flow nasal oxygen 45% FiO2 reportedly has a meter oxygen saturation 95% today although he is wearing oxygen while receiving dialysis    Patient Active Problem List   Diagnosis    Acute hypoxic respiratory failure    CATHY (acute kidney injury) (HCC)    Hemoptysis    Community acquired pneumonia    Acute deep vein thrombosis (DVT) of femoral vein of both lower extremities (HCC)    DM (diabetes mellitus), type 2 (HCC)    Decreased mobility and endurance    Acute upper GI bleed    Ileus (HCC)    Acute urethral obstruction    Incomplete bladder emptying        IMPRESSION:   Acute hypoxic respiratory failure likely due to a combination of aspiration pneumonia probable pulmonary emboli and pulmonary edema/fluid overload from renal failure  Pulmonary edema secondary to fluid retention from renal failure  Aspiration pneumonia has diffuse left-sided infiltrates very likely had aspiration  Gastro paresis esophageal dysmotility had distended esophagus and stomach on CT scanning  Acute bilateral  Roxanne Issa RT(R), RDMS, RVT, RDCS   Electronically Signed by Marquis Arvizu MD 10/2/2024 5:04 PM    US lower extremity veins bilateral    Result Date: 10/2/2024  Evidence for acute nonocclusive DVT within the right popliteal vein and left femoral and popliteal veins. Performing Technologist:  Roxanne Issa RT(R), RDMS, RVT, RDCS   Electronically Signed by Tony Jones MD 10/2/2024 3:59 PM    NM LUNG VENT/PERFUSION (VQ)    Result Date: 10/1/2024  Low probability for pulmonary embolus. Electronically Signed by Christos Herman MD 10/1/2024 1:45 PM         Discussion hypoxia markedly improved very likely had pulmonary emboli from his bilateral DVT despite low probability VQ scan it did show matching defects so I would put it as indeterminate.  He also had diffuse pulmonary infiltrates on initial CT of the chest probable aspiration due to his abnormal esophagus and stomach.  These may represent presbyesophagus and gastroparesis with resultant aspiration.  He will need a barium swallow to rule out reflux once oral intake is allowed.  Once hemoglobin is stabilized would suggest anticoagulation due to his DVT  10/8 alert awake on room air high risk for aspiration speech has seen the patient, no hemoptysis patient had DVT of both lower extremities  Time of care 40 minutes    Sivakumar Henning MD

## 2024-10-08 NOTE — PROGRESS NOTES
[facility]     Renal Daily Progress Note:     Subjective: Patient underwent dialysis today.  This would be his third dialysis.  Family at the bedside.  Seen around 4 PM.  Has been confused and intermittently agitated.  Now has a sitter at the bedside.  Appetite fair, ate some breakfast and a little bit of lunch.  No nausea or vomiting.  No chest or abdominal pain.  Still making urine.  Muñoz not placed      Review of Systems  Pertinent items are noted in HPI.    Objective:     BP (!) 161/68   Pulse 85   Temp 98.5 °F (36.9 °C) (Oral)   Resp 18   Ht 1.676 m (5' 6\")   Wt 73.2 kg (161 lb 4.8 oz)   SpO2 99%   BMI 26.03 kg/m²   Temp (24hrs), Av.6 °F (37 °C), Min:98.5 °F (36.9 °C), Max:98.7 °F (37.1 °C)        Intake/Output Summary (Last 24 hours) at 10/7/2024 2130  Last data filed at 10/7/2024 1045  Gross per 24 hour   Intake 602.5 ml   Output 2399 ml   Net -1796.5 ml     Current Facility-Administered Medications   Medication Dose Route Frequency    potassium bicarb-citric acid (EFFER-K) effervescent tablet 40 mEq  40 mEq Oral Once    0.9 % sodium chloride infusion   IntraVENous PRN    dextrose 5 % and 0.45 % NaCl 1,000 mL infusion   IntraVENous Continuous    pantoprazole (PROTONIX) injection 40 mg  40 mg IntraVENous BID    cefTRIAXone (ROCEPHIN) 1,000 mg in sterile water 10 mL IV syringe  1,000 mg IntraVENous Q24H    heparin (porcine) injection 2,500 Units  2,500 Units IntraCATHeter PRN    sodium chloride flush 0.9 % injection 5-40 mL  5-40 mL IntraVENous 2 times per day    sodium chloride flush 0.9 % injection 5-40 mL  5-40 mL IntraVENous PRN    0.9 % sodium chloride infusion   IntraVENous PRN    ondansetron (ZOFRAN-ODT) disintegrating tablet 4 mg  4 mg Oral Q8H PRN    Or    ondansetron (ZOFRAN) injection 4 mg  4 mg IntraVENous Q6H PRN    acetaminophen (TYLENOL) tablet 650 mg  650 mg Oral Q6H PRN    Or    acetaminophen (TYLENOL) suppository 650 mg  650 mg Rectal Q6H PRN    bisacodyl (DULCOLAX)

## 2024-10-08 NOTE — PROGRESS NOTES
Speech LAnguage Pathology Dysphagia EVALUATION/DISCHARGE    Patient: Michael Fung (83 y.o. male)  Date: 10/8/2024  Primary Diagnosis: Acute hypoxic respiratory failure [J96.01]       Precautions: Aspiration Fall Risk                DIET RECOMMENDATIONS: Easy to chew and thin liquids, meds as tolerated,    SWALLOW SAFETY PRECAUTIONS: Rec slow rate of intake, small bites/sips, 6 small meals, double swallow, liquid wash, remain upright 1 hour after PO and do not eat 3 hours before bedtime.       ASSESSMENT :  Based on the objective data described below, the patient presents with largely wfl oropharyngeal swallow fxn and concerns for GI dysfunction.  NGT for suction has been removed and patient tolerating regular diet per nsg and family.   Family reports intermittent cough that is not associated with eating. 1:1 at bedside denies coughing w/ breakfast.   Patient is pleasantly confused and follows basic commands. Patient being followed by GI and general surgery s/t concerns for SBO that is reportedly resolved.   Previous CT findings during this admission w/ distended esophagus and stomach. Concerns for ?aspiration pna per MD.  Oral phase c/b slow but adequate mastication. Pharyngeal phase c/b mild swallow delay and HLE is wfl upon palpation.   No overt s/s of pen/asp observed.  If continue w/ concerns for aspiration rec MBS to r/o silent aspiration. However given GI findings on CT and concerns for gastroparesis and esophageal dysmotility it may be more beneficial for esophogram (regular barium swallow).   Patient will be discharged from skilled speech-language pathology services at this time.       PLAN :  Recommendations and Planned Interventions:  DIET RECOMMENDATIONS: Easy to chew and thin liquids, meds as tolerated,    SWALLOW SAFETY PRECAUTIONS: Rec slow rate of intake, small bites/sips, 6 small meals, double swallow, liquid wash, remain upright 1 hour after PO and do not eat 3 hours before bedtime.

## 2024-10-08 NOTE — PLAN OF CARE
PHYSICAL THERAPY TREATMENT     Patient: Michael Fung (83 y.o. male)  Date: 10/8/2024  Diagnosis: Acute hypoxic respiratory failure [J96.01] Acute hypoxic respiratory failure      Precautions: Fall Risk                      Recommendations for nursing mobility: Out of bed to chair for meals, Encourage HEP in prep for ADLs/mobility; see handout for details, Frequent repositioning to prevent skin breakdown, Use of bed/chair alarm for safety, LE elevation for management of edema, and Assist x2    In place during session: Peripheral IV and EKG/telemetry   Chart, physical therapy assessment, plan of care and goals were reviewed.  ASSESSMENT  Patient continues with skilled PT services and is progressing towards goals. Pt received semi supine upon PT arrival, agreeable to session. Pt A&O x 2. (See below for objective details and assist levels).  Pt. Continues to require extensive assist for bed mobility and Tfs. Pt. Needed more assist than previous session. Pt. Tolerated sitting EOB approx 15 minutes for sitting balance and LE exercises. Pt. Had difficulty maintaining upright posture on side of the bed. Pt. Performed some exercises for tightness in neck while sitting EOB. Pt. Needed constant vcs for proper hand placement for standing and with RW. Pt.had difficulty weight shifting to take a few side steps to HOB. Pt. Requires frequent rest breaks during session due to fatigue. No Recliner available today will check next session.    Overall pt tolerated session fair today with Mobility.  Will continue to benefit from skilled PT services, and will continue to progress as tolerated. Current PT DC recommendation High intensity and comprehensive skilled physical therapy in a multidisciplinary setting as patient is working towards tolerating up to 3 hours of therapy/day x 5-7 days/week once medically appropriate.     Start of Session End of Session   SPO2 (%) 96 96   Heart Rate (BPM) 89 87     GOALS:    Problem:

## 2024-10-08 NOTE — PROGRESS NOTES
PROGRESS NOTE      Chief Complaints:  No new complaints.  HPI and  Objective:    Patient awake alert.  Patient tolerating regular diet.  Review of Systems:  Rest of review of system reviewed personally and they are negative.    EXAM:  BP (!) 149/55   Pulse 81   Temp 99 °F (37.2 °C) (Oral)   Resp 18   Ht 1.676 m (5' 6\")   Wt 72.4 kg (159 lb 9.8 oz)   SpO2 97%   BMI 25.76 kg/m²     Patient is awake   Head and neck atraumatic, normocephalic.  ENT: No hoarse voice, gaze appropriate.  Cardiac system regular rate rhythm.  Pulmonary no audible wheeze, no cyanosis.  Chest wall excursion normal with respiration cycle  Abdomen is soft not particularly distended.  Neurologically nonfocal.  Hematology system: No bruising noted.  Musculoskeletal system: No joint deformity noted.  Genitourinary system: No hematuria noted.  Skin is warm and moist.  Psychosocial: Cooperative.  Vascular examination as previously noted no changes.    Current Facility-Administered Medications   Medication Dose Route Frequency Provider Last Rate Last Admin    potassium bicarb-citric acid (EFFER-K) effervescent tablet 40 mEq  40 mEq Oral Once Poncho Quarles MD        0.9 % sodium chloride infusion   IntraVENous PRN Poncho Quarles MD        dextrose 5 % and 0.45 % NaCl 1,000 mL infusion   IntraVENous Continuous Ariel Demarco MD 30 mL/hr at 10/08/24 0343 New Bag at 10/08/24 0343    cefTRIAXone (ROCEPHIN) 1,000 mg in sterile water 10 mL IV syringe  1,000 mg IntraVENous Q24H Poncho Quarles MD   1,000 mg at 10/07/24 1140    heparin (porcine) injection 2,500 Units  2,500 Units IntraCATHeter PRN Carlota Tinajero MD   2,500 Units at 10/07/24 1045    sodium chloride flush 0.9 % injection 5-40 mL  5-40 mL IntraVENous 2 times per day Willard Harden MD   10 mL at 10/07/24 2158    sodium chloride flush 0.9 % injection 5-40 mL  5-40 mL IntraVENous PRN Willard Harden MD        0.9 % sodium chloride infusion

## 2024-10-08 NOTE — PROGRESS NOTES
Hospitalist Consult Note    NAME: Michael Fung   :  1940   MRN:  [id number]        Hospital course:  BRIEF PATIENT SUMMARY AND HOSPITAL COURSE   Michael Fung is a 83-year-old male presented to outside hospital on  for cough and shortness of breath found to be hypoxic and have x-ray concerning for community-acquired pneumonia in the left mid and lower lung fields.  Initiated on community-acquired pneumonia therapy with ceftriaxone and azithromycin.  Patient also noted to have elevated creatinine of 4.5 which is his baseline patient is diuretic dependent.  Became oliguric during the course of the hospitalization despite Lasix 80 mg.  Lower extremity ultrasound obtained for elevated D-dimer.  Showed bilateral DVTs in the right popliteal and 2 in the femoral and popliteal on the left.  Had been initiated on a heparin drip 10/ evening patient on supratherapeutic heparin developed hemoptysis.  Substantial enough to stop heparin drip but unable to quantify.  Patient on mid flow oxygen with low sats.  Transferred to our ICU on BiPAP transitioned to high flow nasal cannula 50 L, 40% with sats of 97%.  Had a low prop VQ scan on 10/1 for renal failure with potential need for dialysis creatinine increased to 6 and oliguric.  Bladder scan negative no Muñoz catheter or straight cath inserted, worsening hypoxemia from [facility].  Most of history obtained from patient's daughter and in conversation with transferring facility much of chart did not make it over with transfer.  Patient was subsequently admitted to medical ICU, evaluated by nephrology, initiated on dialysis, evaluated by urology for urinary retention, recommended supportive care, following which patient's clinical status improved, serum creatinine improved, subsequent to which patient was transferred out of the ICU and accepted to the hospitalist service    Subjective:     Chief Complaint / Reason for Physician Visit

## 2024-10-08 NOTE — CARE COORDINATION
CM reviewed chart. CM obtained consent and signatures for [facility] form. Form and clinicals [facility] to [facility], for placement assistances. CM will continue to follow.

## 2024-10-09 LAB
ALBUMIN SERPL-MCNC: 1.6 G/DL (ref 3.5–5)
ALBUMIN/GLOB SERPL: 0.3 (ref 1.1–2.2)
ALP SERPL-CCNC: 56 U/L (ref 45–117)
ALT SERPL-CCNC: 13 U/L (ref 12–78)
ANION GAP SERPL CALC-SCNC: 9 MMOL/L (ref 2–12)
AST SERPL W P-5'-P-CCNC: 17 U/L (ref 15–37)
BASOPHILS # BLD: 0 K/UL (ref 0–0.1)
BASOPHILS NFR BLD: 0 % (ref 0–1)
BILIRUB SERPL-MCNC: 0.4 MG/DL (ref 0.2–1)
BUN SERPL-MCNC: 40 MG/DL (ref 6–20)
BUN/CREAT SERPL: 7 (ref 12–20)
CA-I BLD-MCNC: 8.2 MG/DL (ref 8.5–10.1)
CHLORIDE SERPL-SCNC: 98 MMOL/L (ref 97–108)
CO2 SERPL-SCNC: 25 MMOL/L (ref 21–32)
CREAT SERPL-MCNC: 5.93 MG/DL (ref 0.7–1.3)
DIFFERENTIAL METHOD BLD: ABNORMAL
EOSINOPHIL # BLD: 0.1 K/UL (ref 0–0.4)
EOSINOPHIL NFR BLD: 1 % (ref 0–7)
ERYTHROCYTE [DISTWIDTH] IN BLOOD BY AUTOMATED COUNT: 13.8 % (ref 11.5–14.5)
GLOBULIN SER CALC-MCNC: 4.8 G/DL (ref 2–4)
GLUCOSE BLD STRIP.AUTO-MCNC: 166 MG/DL (ref 65–100)
GLUCOSE BLD STRIP.AUTO-MCNC: 232 MG/DL (ref 65–100)
GLUCOSE BLD STRIP.AUTO-MCNC: 254 MG/DL (ref 65–100)
GLUCOSE BLD STRIP.AUTO-MCNC: 257 MG/DL (ref 65–100)
GLUCOSE SERPL-MCNC: 155 MG/DL (ref 65–100)
HCT VFR BLD AUTO: 23.2 % (ref 36.6–50.3)
HGB BLD-MCNC: 7.8 G/DL (ref 12.1–17)
IMM GRANULOCYTES # BLD AUTO: 0 K/UL
IMM GRANULOCYTES NFR BLD AUTO: 0 %
LYMPHOCYTES # BLD: 1.1 K/UL (ref 0.8–3.5)
LYMPHOCYTES NFR BLD: 11 % (ref 12–49)
MAGNESIUM SERPL-MCNC: 2 MG/DL (ref 1.6–2.4)
MCH RBC QN AUTO: 30.1 PG (ref 26–34)
MCHC RBC AUTO-ENTMCNC: 33.6 G/DL (ref 30–36.5)
MCV RBC AUTO: 89.6 FL (ref 80–99)
METAMYELOCYTES NFR BLD MANUAL: 1 %
MONOCYTES # BLD: 0.8 K/UL (ref 0–1)
MONOCYTES NFR BLD: 8 % (ref 5–13)
NEUTS SEG # BLD: 7.7 K/UL (ref 1.8–8)
NEUTS SEG NFR BLD: 79 % (ref 32–75)
NRBC # BLD: 0 K/UL (ref 0–0.01)
NRBC BLD-RTO: 0 PER 100 WBC
PERFORMED BY:: ABNORMAL
PLATELET # BLD AUTO: 448 K/UL (ref 150–400)
PMV BLD AUTO: 9.2 FL (ref 8.9–12.9)
POTASSIUM SERPL-SCNC: 3 MMOL/L (ref 3.5–5.1)
PROT SERPL-MCNC: 6.4 G/DL (ref 6.4–8.2)
RBC # BLD AUTO: 2.59 M/UL (ref 4.1–5.7)
RBC MORPH BLD: ABNORMAL
SODIUM SERPL-SCNC: 132 MMOL/L (ref 136–145)
T4 FREE SERPL-MCNC: 1.4 NG/DL (ref 0.8–1.5)
TSH SERPL DL<=0.05 MIU/L-ACNC: 1.33 UIU/ML (ref 0.36–3.74)
WBC # BLD AUTO: 9.8 K/UL (ref 4.1–11.1)

## 2024-10-09 PROCEDURE — 80053 COMPREHEN METABOLIC PANEL: CPT

## 2024-10-09 PROCEDURE — 97530 THERAPEUTIC ACTIVITIES: CPT

## 2024-10-09 PROCEDURE — 99232 SBSQ HOSP IP/OBS MODERATE 35: CPT | Performed by: SURGERY

## 2024-10-09 PROCEDURE — 36415 COLL VENOUS BLD VENIPUNCTURE: CPT

## 2024-10-09 PROCEDURE — 1100000000 HC RM PRIVATE

## 2024-10-09 PROCEDURE — 83735 ASSAY OF MAGNESIUM: CPT

## 2024-10-09 PROCEDURE — 97535 SELF CARE MNGMENT TRAINING: CPT

## 2024-10-09 PROCEDURE — 82962 GLUCOSE BLOOD TEST: CPT

## 2024-10-09 PROCEDURE — 85025 COMPLETE CBC W/AUTO DIFF WBC: CPT

## 2024-10-09 PROCEDURE — 6370000000 HC RX 637 (ALT 250 FOR IP): Performed by: INTERNAL MEDICINE

## 2024-10-09 PROCEDURE — 2580000003 HC RX 258: Performed by: INTERNAL MEDICINE

## 2024-10-09 RX ORDER — POTASSIUM CHLORIDE 1500 MG/1
40 TABLET, EXTENDED RELEASE ORAL ONCE
Status: DISCONTINUED | OUTPATIENT
Start: 2024-10-09 | End: 2024-10-09

## 2024-10-09 RX ADMIN — ACETAMINOPHEN 650 MG: 325 TABLET ORAL at 11:56

## 2024-10-09 RX ADMIN — SODIUM CHLORIDE, PRESERVATIVE FREE 10 ML: 5 INJECTION INTRAVENOUS at 21:34

## 2024-10-09 RX ADMIN — THIAMINE HCL TAB 100 MG 100 MG: 100 TAB at 09:51

## 2024-10-09 RX ADMIN — INSULIN LISPRO 2 UNITS: 100 INJECTION, SOLUTION INTRAVENOUS; SUBCUTANEOUS at 17:45

## 2024-10-09 RX ADMIN — TAMSULOSIN HYDROCHLORIDE 0.4 MG: 0.4 CAPSULE ORAL at 09:50

## 2024-10-09 RX ADMIN — SODIUM BICARBONATE 650 MG: 650 TABLET ORAL at 09:51

## 2024-10-09 RX ADMIN — THERA TABS 1 TABLET: TAB at 09:51

## 2024-10-09 RX ADMIN — ALLOPURINOL 100 MG: 100 TABLET ORAL at 09:51

## 2024-10-09 RX ADMIN — FINASTERIDE 5 MG: 5 TABLET, FILM COATED ORAL at 09:51

## 2024-10-09 RX ADMIN — SODIUM CHLORIDE, PRESERVATIVE FREE 10 ML: 5 INJECTION INTRAVENOUS at 09:52

## 2024-10-09 RX ADMIN — INSULIN LISPRO 2 UNITS: 100 INJECTION, SOLUTION INTRAVENOUS; SUBCUTANEOUS at 11:55

## 2024-10-09 RX ADMIN — SODIUM BICARBONATE 650 MG: 650 TABLET ORAL at 21:34

## 2024-10-09 ASSESSMENT — PAIN SCALES - GENERAL
PAINLEVEL_OUTOF10: 6
PAINLEVEL_OUTOF10: 0

## 2024-10-09 NOTE — PROGRESS NOTES
Hospitalist Consult Note    NAME: Michael Fung   :  1940   MRN:  [id number]        Hospital course:  BRIEF PATIENT SUMMARY AND HOSPITAL COURSE   Michael Fung is a 83-year-old male presented to outside hospital on  for cough and shortness of breath found to be hypoxic and have x-ray concerning for community-acquired pneumonia in the left mid and lower lung fields.  Initiated on community-acquired pneumonia therapy with ceftriaxone and azithromycin.  Patient also noted to have elevated creatinine of 4.5 which is his baseline patient is diuretic dependent.  Became oliguric during the course of the hospitalization despite Lasix 80 mg.  Lower extremity ultrasound obtained for elevated D-dimer.  Showed bilateral DVTs in the right popliteal and 2 in the femoral and popliteal on the left.  Had been initiated on a heparin drip 10/ evening patient on supratherapeutic heparin developed hemoptysis.  Substantial enough to stop heparin drip but unable to quantify.  Patient on mid flow oxygen with low sats.  Transferred to our ICU on BiPAP transitioned to high flow nasal cannula 50 L, 40% with sats of 97%.  Had a low prop VQ scan on 10/1 for renal failure with potential need for dialysis creatinine increased to 6 and oliguric.  Bladder scan negative no Muñoz catheter or straight cath inserted, worsening hypoxemia from [facility].  Most of history obtained from patient's daughter and in conversation with transferring facility much of chart did not make it over with transfer.  Patient was subsequently admitted to medical ICU, evaluated by nephrology, initiated on dialysis, evaluated by urology for urinary retention, recommended supportive care, following which patient's clinical status improved, serum creatinine improved, subsequent to which patient was transferred out of the ICU and accepted to the hospitalist service    Subjective:     Chief Complaint / Reason for Physician Visit

## 2024-10-09 NOTE — PLAN OF CARE
Problem: Safety - Adult  Goal: Free from fall injury  Outcome: Progressing  Note: Sitter is at the bedside for safety, the bed is in the lowest position and wheels are locked, call bell is within reach, bathroom light is on during evening hours, gripper socks are on and patient has been instructed to call out for assistance if needed.     As of now, patient is free from falls and will continue to be monitored.

## 2024-10-09 NOTE — PLAN OF CARE
PHYSICAL THERAPY TREATMENT     Patient: Michael Fung (83 y.o. male)  Date: 10/9/2024  Diagnosis: Acute hypoxic respiratory failure [J96.01] Acute hypoxic respiratory failure      Precautions: Fall Risk                      Recommendations for nursing mobility: Encourage HEP in prep for ADLs/mobility; see handout for details, Frequent repositioning to prevent skin breakdown, Use of bed/chair alarm for safety, Philomena Stedy for bed to chair transfers , and Assist x2    In place during session: Peripheral IV, External Catheter, and EKG/telemetry   Chart, physical therapy assessment, plan of care and goals were reviewed.  ASSESSMENT  Patient continues with skilled PT services and is progressing towards goals. Pt sitting up in the bed upon PT arrival, agreeable to session. Pt A&O x 3, more oriented today but still confused regarding situation. (See below for objective details and assist levels).     Overall pt tolerated session fair today.  Patient cont to req mod-max A x2 for bed mobility and STS transfers. Patient demos post lean at EOB req CGA, which worsens when completing tasks. Patient demos R knee and ankle pain limiting ROM and increased pain with WB. Patient stood with max A x2 twice at EOB with initial plan of transferring to bedside chair however during steps patient having increased weakness and unable to further take any steps req to sit back at EOB. Patient limited now due to pain and overall generalized weakness. Will cont to progress OOB to chair transfer next session, possibly using lift devices. Discharge recommendations currently high intensity however unsure if patient will tolerate 3 hrs of therapy at this time so discussed with supervising PT Catalina Somers. Will continue to benefit from skilled PT services, and will continue to progress as tolerated. Current PT DC recommendation Moderate intensity short-term skilled physical therapy up to 5x/week once medically appropriate.     Start of

## 2024-10-09 NOTE — CARE COORDINATION
CM reviewed chart. CM spoke to [facility] liaison about DCP and faxed updated clinicals. CM will continue to follow.

## 2024-10-09 NOTE — PROGRESS NOTES
CARDIOLOGY PROGRESS NOTE      Patient Name: Michael Fung  Age: 83 y.o.  Gender:male  :1940  MRN: [id number]    Patient seen and examined. This is a patient with a history of  CKD stage V, HTN, NIDDM, HLD, HTN who presented with shortness of breath now being followed for bradycardia. Sitting upright in bed, remains confused. Denies chest pain. Family at bedside.  No other complaints reported.    Telemetry reviewed, there were no events noted in the past 24 hours.    Pertinent review of systems items noted above, all other systems are negative. Current medications reviewed.    Physical Examination    No Known Allergies  Vitals:    10/09/24 0802   BP: (!) 158/62   Pulse: 80   Resp: 18   Temp: 98.8 °F (37.1 °C)   SpO2: 97%     Vital signs are stable  No apparent distress.  Heart has a RRR.  No murmur  Lungs are diminished  Abdomen is soft, nontender, normal bowel sounds.  Extremities have + edema  Skin is dry and warm.  Normal affect    Labs reviewed:  Recent Results (from the past 12 hour(s))   CBC with Auto Differential    Collection Time: 10/09/24  6:43 AM   Result Value Ref Range    WBC 9.8 4.1 - 11.1 K/uL    RBC 2.59 (L) 4.10 - 5.70 M/uL    Hemoglobin 7.8 (L) 12.1 - 17.0 g/dL    Hematocrit 23.2 (L) 36.6 - 50.3 %    MCV 89.6 80.0 - 99.0 FL    MCH 30.1 26.0 - 34.0 PG    MCHC 33.6 30.0 - 36.5 g/dL    RDW 13.8 11.5 - 14.5 %    Platelets 448 (H) 150 - 400 K/uL    MPV 9.2 8.9 - 12.9 FL    Nucleated RBCs 0.0 0.0  WBC    nRBC 0.00 0.00 - 0.01 K/uL    Neutrophils % 79 (H) 32 - 75 %    Lymphocytes % 11 (L) 12 - 49 %    Monocytes % 8 5 - 13 %    Eosinophils % 1 0 - 7 %    Basophils % 0 0 - 1 %    Metamyelocytes 1 (H) 0 %    Immature Granulocytes % 0 %    Neutrophils Absolute 7.7 1.8 - 8.0 K/UL    Lymphocytes Absolute 1.1 0.8 - 3.5 K/UL    Monocytes Absolute 0.8 0.0 - 1.0 K/UL    Eosinophils Absolute 0.1 0.0 - 0.4 K/UL    Basophils Absolute 0.0 0.0 - 0.1 K/UL    Immature Granulocytes Absolute 0.0 K/UL

## 2024-10-09 NOTE — PROGRESS NOTES
[facility]     Renal Daily Progress Note:     Subjective: Patient underwent dialysis today.  This would be his third dialysis.  Family at the bedside.  Seen around 4 PM.  Has been confused and intermittently agitated.  Now has a sitter at the bedside.  Appetite fair, ate some breakfast and a little bit of lunch.  No nausea or vomiting.  No chest or abdominal pain.  Still making urine.  Muñoz not placed    10/8 more awake today.  Ate breakfast without nausea or vomiting.  Urine output not quantified.  However, today serum creatinine is only 5.12.  Will see if tomorrow's creatinine is less than that which was seen on Saturday.  Not short of breath,    10 awake and comfortable.  Seems calm.  Still with sitter is in the room.  Creatinine is trending up.  Will perform dialysis tomorrow.      Review of Systems  Pertinent items are noted in HPI.    Objective:     BP (!) 147/60   Pulse 91   Temp 99.7 °F (37.6 °C) (Axillary)   Resp 18   Ht 1.676 m (5' 6\")   Wt 72.4 kg (159 lb 9.8 oz)   SpO2 98%   BMI 25.76 kg/m²   Temp (24hrs), Av.2 °F (37.3 °C), Min:98.8 °F (37.1 °C), Max:99.7 °F (37.6 °C)        Intake/Output Summary (Last 24 hours) at 10/9/2024 1751  Last data filed at 10/8/2024 2000  Gross per 24 hour   Intake 593 ml   Output --   Net 593 ml       Current Facility-Administered Medications   Medication Dose Route Frequency    potassium bicarb-citric acid (EFFER-K) effervescent tablet 40 mEq  40 mEq Oral Once    0.9 % sodium chloride infusion   IntraVENous PRN    dextrose 5 % and 0.45 % NaCl 1,000 mL infusion   IntraVENous Continuous    heparin (porcine) injection 2,500 Units  2,500 Units IntraCATHeter PRN    sodium chloride flush 0.9 % injection 5-40 mL  5-40 mL IntraVENous 2 times per day    sodium chloride flush 0.9 % injection 5-40 mL  5-40 mL IntraVENous PRN    0.9 % sodium chloride infusion   IntraVENous PRN    ondansetron (ZOFRAN-ODT) disintegrating tablet 4 mg  4 mg Oral Q8H PRN    Or     loops in the mid abdomen. Findings may be related to a ileus versus proximal, partial obstruction. The patient may benefit from NG tube decompression. 4. There is a left inguinal hernia containing a nonobstructed loop of distal colon. Electronically signed by BRANDI JERNIGAN    XR CHEST PORTABLE    Result Date: 10/3/2024  Small to moderate left pleural effusion with associated atelectasis. Electronically signed by RAISA PEÑA    CT CHEST WO CONTRAST    Result Date: 10/3/2024  1.  Left upper lobe, lingula, and left lower lobe infiltrates, suspicious for multifocal pneumonia. 2.  Prominent distention of the esophagus and stomach which are nonspecific. Gastric outlet obstruction cannot be excluded. 3.  Coronary artery calcifications. Electronically Signed by Fernandez Caicedo MD 10/3/2024 8:28 AM    XR CHEST 1 VIEW    Result Date: 10/2/2024  1. Hazy airspace disease within the left mid and lower lung, which may reflect underlying pneumonia. Electronically Signed by Conor Noyola DO 10/2/2024 11:18 PM    US upper extremity veins bilateral    Result Date: 10/2/2024  No evidence for DVT within the bilateral upper extremities. Performing Technologist:  Roxanne Issa RT(R), RDMS, RVT, RDCS   Electronically Signed by Marquis Arvizu MD 10/2/2024 5:04 PM    US lower extremity veins bilateral    Result Date: 10/2/2024  Evidence for acute nonocclusive DVT within the right popliteal vein and left femoral and popliteal veins. Performing Technologist:  Roxanne Issa RT(R), RDMS, RVT, RDCS   Electronically Signed by Tony Jones MD 10/2/2024 3:59 PM    NM LUNG VENT/PERFUSION (VQ)    Result Date: 10/1/2024  Low probability for pulmonary embolus. Electronically Signed by Christos Herman MD 10/1/2024 1:45 PM     Assessment:   Renal Specific Problems  Acute on chronic renal failure stage V  Volume overload  DVT right popliteal vein and left femoral/popliteal vein  Left multifocal pneumonia  Status post placement of IVC filter  Small bowel

## 2024-10-09 NOTE — CONSULTS
Consult  Pulmonary, Critical Care    Name: Michael Fung MRN: [id number]   : 1940 Hospital: [facility]   Date: 10/9/2024  Admission date: 10/3/2024 Hospital Day: 7       Subjective/Interval History:   Seen in the dialysis unit he is confused does not know why he is in the hospital or that he is in the hospital.  Was admitted at [facility] with cough severe hypoxia found to have bilateral DVT of the legs was placed on heparin subsequently developed severe hemoptysis heparin was discontinued and IVC filter placed.  He was transferred to our facility had CT scan which shows distended esophagus and stomach possible ileus with scattered infiltrates throughout the left lung no documentation of vomiting but his history is not reliable at all.  He has had a VQ scan that was low probability but does show matching defect due to renal insufficiency he did not have a CTA of the chest he initially required high flow nasal oxygen 45% FiO2 reportedly has a meter oxygen saturation 95% today although he is wearing oxygen while receiving dialysis    Patient Active Problem List   Diagnosis    Acute hypoxic respiratory failure    CATHY (acute kidney injury) (HCC)    Hemoptysis    Community acquired pneumonia    Acute deep vein thrombosis (DVT) of femoral vein of both lower extremities (HCC)    DM (diabetes mellitus), type 2 (HCC)    Decreased mobility and endurance    Acute upper GI bleed    Ileus (HCC)    Acute urethral obstruction    Incomplete bladder emptying        IMPRESSION:   Acute hypoxic respiratory failure likely due to a combination of aspiration pneumonia probable pulmonary emboli and pulmonary edema/fluid overload from renal failure now on room air  Pulmonary edema secondary to fluid retention from renal failure  Aspiration pneumonia has diffuse left-sided infiltrates very likely had aspiration  Gastro paresis esophageal dysmotility had distended esophagus and stomach on CT

## 2024-10-09 NOTE — PROGRESS NOTES
OCCUPATIONAL THERAPY TREATMENT  Patient: Michael Fung (83 y.o. male)  Date: 10/9/2024  Primary Diagnosis: Acute hypoxic respiratory failure [J96.01]       Precautions: Fall Risk                Recommendations for nursing mobility: Encourage HEP in prep for ADLs/mobility; see handout for details, Frequent repositioning to prevent skin breakdown, Use of bed/chair alarm for safety, Philomena Stedy for bed to chair transfers , and Assist x2    In place during session: Peripheral IV, External Catheter, and EKG/telemetry   Chart, occupational therapy assessment, plan of care, and goals were reviewed.  ASSESSMENT  Patient continues with skilled OT services and is slowly progressing towards goals. Pt presented semi supine upon MALLOY arrival, agreeable to session. Pt A&O x 3. Orientation provided w/ poor carry over noted. Pt attempted to don L slipper sock while sitting up in bed but was unable to reach.  Pt didn't attempt R sock secondary pain in R knee and ankle.  Pt required intermittent assistance to maintain sitting balance EOB during facial  hygiene and B LE therex.  Pt worked on sit to stands and attempted to transfer to chair in prep for commode transfers.  Pt required mod - max Ax2 for bed mobility.  Pt required max A x2 to stand.  Pt c/o R knee and ankle pain while standing.  Pt with poor standing tolerance and balance.  Pt unable to take steps toward bedside chair and returned to bed. Pt completed UE therex, see grid below for details, to maintain/ increase strength and endurance to aid in adl performance. Pt required increase time and repetition to follow simple one step commands. (See below for objective details and assist levels).     Overall pt tolerated session fair today with all activities. Pt limited by R LE pain, weakness, and decrease cognition.  Pt continues to benefit from skilled OT to increase strength, endurance and independence with basic self care and functional transfers.  Current OT

## 2024-10-10 ENCOUNTER — APPOINTMENT (OUTPATIENT)
Facility: HOSPITAL | Age: 84
End: 2024-10-10
Attending: INTERNAL MEDICINE
Payer: OTHER GOVERNMENT

## 2024-10-10 LAB
ALBUMIN SERPL-MCNC: 1.4 G/DL (ref 3.5–5)
ALBUMIN/GLOB SERPL: 0.3 (ref 1.1–2.2)
ALP SERPL-CCNC: 61 U/L (ref 45–117)
ALT SERPL-CCNC: 13 U/L (ref 12–78)
ANION GAP SERPL CALC-SCNC: 9 MMOL/L (ref 2–12)
AST SERPL W P-5'-P-CCNC: 15 U/L (ref 15–37)
BASOPHILS # BLD: 0 K/UL (ref 0–0.1)
BASOPHILS NFR BLD: 0 % (ref 0–1)
BILIRUB SERPL-MCNC: 0.2 MG/DL (ref 0.2–1)
BUN SERPL-MCNC: 48 MG/DL (ref 6–20)
BUN/CREAT SERPL: 7 (ref 12–20)
CA-I BLD-MCNC: 7.6 MG/DL (ref 8.5–10.1)
CHLORIDE SERPL-SCNC: 98 MMOL/L (ref 97–108)
CO2 SERPL-SCNC: 24 MMOL/L (ref 21–32)
CREAT SERPL-MCNC: 6.76 MG/DL (ref 0.7–1.3)
DIFFERENTIAL METHOD BLD: ABNORMAL
EKG ATRIAL RATE: 81 BPM
EKG DIAGNOSIS: NORMAL
EKG P AXIS: 19 DEGREES
EKG P-R INTERVAL: 186 MS
EKG Q-T INTERVAL: 418 MS
EKG QRS DURATION: 98 MS
EKG QTC CALCULATION (BAZETT): 485 MS
EKG R AXIS: -29 DEGREES
EKG T AXIS: -2 DEGREES
EKG VENTRICULAR RATE: 81 BPM
EOSINOPHIL # BLD: 0.2 K/UL (ref 0–0.4)
EOSINOPHIL NFR BLD: 2 % (ref 0–7)
ERYTHROCYTE [DISTWIDTH] IN BLOOD BY AUTOMATED COUNT: 13.5 % (ref 11.5–14.5)
GLOBULIN SER CALC-MCNC: 4.5 G/DL (ref 2–4)
GLUCOSE BLD STRIP.AUTO-MCNC: 137 MG/DL (ref 65–100)
GLUCOSE BLD STRIP.AUTO-MCNC: 204 MG/DL (ref 65–100)
GLUCOSE BLD STRIP.AUTO-MCNC: 237 MG/DL (ref 65–100)
GLUCOSE BLD STRIP.AUTO-MCNC: 288 MG/DL (ref 65–100)
GLUCOSE BLD STRIP.AUTO-MCNC: 316 MG/DL (ref 65–100)
GLUCOSE SERPL-MCNC: 189 MG/DL (ref 65–100)
HCT VFR BLD AUTO: 20.3 % (ref 36.6–50.3)
HGB BLD-MCNC: 7 G/DL (ref 12.1–17)
IMM GRANULOCYTES # BLD AUTO: 0 K/UL
IMM GRANULOCYTES NFR BLD AUTO: 0 %
LYMPHOCYTES # BLD: 1.1 K/UL (ref 0.8–3.5)
LYMPHOCYTES NFR BLD: 13 % (ref 12–49)
MAGNESIUM SERPL-MCNC: 1.8 MG/DL (ref 1.6–2.4)
MCH RBC QN AUTO: 30.3 PG (ref 26–34)
MCHC RBC AUTO-ENTMCNC: 34.5 G/DL (ref 30–36.5)
MCV RBC AUTO: 87.9 FL (ref 80–99)
METAMYELOCYTES NFR BLD MANUAL: 2 %
MONOCYTES # BLD: 0.7 K/UL (ref 0–1)
MONOCYTES NFR BLD: 8 % (ref 5–13)
NEUTS BAND NFR BLD MANUAL: 1 % (ref 0–6)
NEUTS SEG # BLD: 6.3 K/UL (ref 1.8–8)
NEUTS SEG NFR BLD: 74 % (ref 32–75)
NRBC # BLD: 0 K/UL (ref 0–0.01)
NRBC BLD-RTO: 0 PER 100 WBC
PERFORMED BY:: ABNORMAL
PLATELET # BLD AUTO: 431 K/UL (ref 150–400)
PMV BLD AUTO: 8.8 FL (ref 8.9–12.9)
POTASSIUM SERPL-SCNC: 3.1 MMOL/L (ref 3.5–5.1)
PROT SERPL-MCNC: 5.9 G/DL (ref 6.4–8.2)
RBC # BLD AUTO: 2.31 M/UL (ref 4.1–5.7)
RBC MORPH BLD: ABNORMAL
SODIUM SERPL-SCNC: 131 MMOL/L (ref 136–145)
WBC # BLD AUTO: 8.4 K/UL (ref 4.1–11.1)

## 2024-10-10 PROCEDURE — 82962 GLUCOSE BLOOD TEST: CPT

## 2024-10-10 PROCEDURE — 36415 COLL VENOUS BLD VENIPUNCTURE: CPT

## 2024-10-10 PROCEDURE — 97530 THERAPEUTIC ACTIVITIES: CPT

## 2024-10-10 PROCEDURE — 6370000000 HC RX 637 (ALT 250 FOR IP): Performed by: STUDENT IN AN ORGANIZED HEALTH CARE EDUCATION/TRAINING PROGRAM

## 2024-10-10 PROCEDURE — 90935 HEMODIALYSIS ONE EVALUATION: CPT

## 2024-10-10 PROCEDURE — 2580000003 HC RX 258: Performed by: INTERNAL MEDICINE

## 2024-10-10 PROCEDURE — 51798 US URINE CAPACITY MEASURE: CPT

## 2024-10-10 PROCEDURE — 6360000002 HC RX W HCPCS: Performed by: INTERNAL MEDICINE

## 2024-10-10 PROCEDURE — 80053 COMPREHEN METABOLIC PANEL: CPT

## 2024-10-10 PROCEDURE — 85025 COMPLETE CBC W/AUTO DIFF WBC: CPT

## 2024-10-10 PROCEDURE — 6370000000 HC RX 637 (ALT 250 FOR IP): Performed by: INTERNAL MEDICINE

## 2024-10-10 PROCEDURE — 2709999900 HC NON-CHARGEABLE SUPPLY

## 2024-10-10 PROCEDURE — 1100000000 HC RM PRIVATE

## 2024-10-10 PROCEDURE — 99232 SBSQ HOSP IP/OBS MODERATE 35: CPT | Performed by: SURGERY

## 2024-10-10 PROCEDURE — 83735 ASSAY OF MAGNESIUM: CPT

## 2024-10-10 PROCEDURE — 71045 X-RAY EXAM CHEST 1 VIEW: CPT

## 2024-10-10 PROCEDURE — 2580000003 HC RX 258: Performed by: SURGERY

## 2024-10-10 RX ORDER — POTASSIUM CHLORIDE 1500 MG/1
40 TABLET, EXTENDED RELEASE ORAL ONCE
Status: COMPLETED | OUTPATIENT
Start: 2024-10-10 | End: 2024-10-10

## 2024-10-10 RX ADMIN — SODIUM BICARBONATE 650 MG: 650 TABLET ORAL at 21:45

## 2024-10-10 RX ADMIN — SODIUM CHLORIDE, PRESERVATIVE FREE 10 ML: 5 INJECTION INTRAVENOUS at 21:46

## 2024-10-10 RX ADMIN — TAMSULOSIN HYDROCHLORIDE 0.4 MG: 0.4 CAPSULE ORAL at 12:42

## 2024-10-10 RX ADMIN — THIAMINE HCL TAB 100 MG 100 MG: 100 TAB at 12:42

## 2024-10-10 RX ADMIN — INSULIN LISPRO 4 UNITS: 100 INJECTION, SOLUTION INTRAVENOUS; SUBCUTANEOUS at 21:54

## 2024-10-10 RX ADMIN — DEXTROSE AND SODIUM CHLORIDE: 5; 450 INJECTION, SOLUTION INTRAVENOUS at 23:44

## 2024-10-10 RX ADMIN — FINASTERIDE 5 MG: 5 TABLET, FILM COATED ORAL at 12:42

## 2024-10-10 RX ADMIN — PIPERACILLIN AND TAZOBACTAM 3375 MG: 3; .375 INJECTION, POWDER, FOR SOLUTION INTRAVENOUS; PARENTERAL at 12:51

## 2024-10-10 RX ADMIN — SODIUM BICARBONATE 650 MG: 650 TABLET ORAL at 12:42

## 2024-10-10 RX ADMIN — POTASSIUM CHLORIDE 40 MEQ: 1500 TABLET, EXTENDED RELEASE ORAL at 21:45

## 2024-10-10 RX ADMIN — ACETAMINOPHEN 650 MG: 325 TABLET ORAL at 21:45

## 2024-10-10 RX ADMIN — PIPERACILLIN AND TAZOBACTAM 3375 MG: 3; .375 INJECTION, POWDER, FOR SOLUTION INTRAVENOUS; PARENTERAL at 23:48

## 2024-10-10 RX ADMIN — THERA TABS 1 TABLET: TAB at 12:42

## 2024-10-10 RX ADMIN — HEPARIN SODIUM 2500 UNITS: 1000 INJECTION INTRAVENOUS; SUBCUTANEOUS at 12:03

## 2024-10-10 RX ADMIN — INSULIN LISPRO 2 UNITS: 100 INJECTION, SOLUTION INTRAVENOUS; SUBCUTANEOUS at 17:27

## 2024-10-10 RX ADMIN — ALLOPURINOL 100 MG: 100 TABLET ORAL at 12:42

## 2024-10-10 RX ADMIN — SODIUM CHLORIDE, PRESERVATIVE FREE 10 ML: 5 INJECTION INTRAVENOUS at 12:43

## 2024-10-10 ASSESSMENT — PAIN SCALES - GENERAL
PAINLEVEL_OUTOF10: 0

## 2024-10-10 NOTE — PROGRESS NOTES
CARDIOLOGY PROGRESS NOTE      Patient Name: Michael Fung  Age: 83 y.o.  Gender:male  :1940  MRN: [id number]    Patient seen and examined. This is a patient with a history of  CKD stage V, HTN, NIDDM, HLD, HTN who presented with shortness of breath now being followed for bradycardia. Sitting upright in bed, remains confused. Denies chest pain. Family at bedside.  No other complaints reported.    Telemetry reviewed, there were no events noted in the past 24 hours.    Pertinent review of systems items noted above, all other systems are negative. Current medications reviewed.    Physical Examination    No Known Allergies  Vitals:    10/10/24 1150   BP: (!) 147/65   Pulse: 84   Resp: 18   Temp:    SpO2:      Vital signs are stable  No apparent distress.  Heart has a RRR.  No murmur  Lungs are diminished  Abdomen is soft, nontender, normal bowel sounds.  Extremities have + edema  Skin is dry and warm.  Normal affect    Labs reviewed:  Recent Results (from the past 12 hour(s))   CBC with Auto Differential    Collection Time: 10/10/24  7:32 AM   Result Value Ref Range    WBC 8.4 4.1 - 11.1 K/uL    RBC 2.31 (L) 4.10 - 5.70 M/uL    Hemoglobin 7.0 (L) 12.1 - 17.0 g/dL    Hematocrit 20.3 (L) 36.6 - 50.3 %    MCV 87.9 80.0 - 99.0 FL    MCH 30.3 26.0 - 34.0 PG    MCHC 34.5 30.0 - 36.5 g/dL    RDW 13.5 11.5 - 14.5 %    Platelets 431 (H) 150 - 400 K/uL    MPV 8.8 (L) 8.9 - 12.9 FL    Nucleated RBCs 0.0 0.0  WBC    nRBC 0.00 0.00 - 0.01 K/uL    Neutrophils % 74 32 - 75 %    Band Neutrophils 1 0 - 6 %    Lymphocytes % 13 12 - 49 %    Monocytes % 8 5 - 13 %    Eosinophils % 2 0 - 7 %    Basophils % 0 0 - 1 %    Metamyelocytes 2 (H) 0 %    Immature Granulocytes % 0 %    Neutrophils Absolute 6.3 1.8 - 8.0 K/UL    Lymphocytes Absolute 1.1 0.8 - 3.5 K/UL    Monocytes Absolute 0.7 0.0 - 1.0 K/UL    Eosinophils Absolute 0.2 0.0 - 0.4 K/UL    Basophils Absolute 0.0 0.0 - 0.1 K/UL    Immature Granulocytes Absolute 0.0  K/UL    Differential Type Manual      RBC Comment Normocytic, Normochromic     Comprehensive Metabolic Panel w/ Reflex to MG    Collection Time: 10/10/24  7:32 AM   Result Value Ref Range    Sodium 131 (L) 136 - 145 mmol/L    Potassium 3.1 (L) 3.5 - 5.1 mmol/L    Chloride 98 97 - 108 mmol/L    CO2 24 21 - 32 mmol/L    Anion Gap 9 2 - 12 mmol/L    Glucose 189 (H) 65 - 100 mg/dL    BUN 48 (H) 6 - 20 mg/dL    Creatinine 6.76 (H) 0.70 - 1.30 mg/dL    BUN/Creatinine Ratio 7 (L) 12 - 20      Est, Glom Filt Rate 7 (L) >60 ml/min/1.73m2    Calcium 7.6 (L) 8.5 - 10.1 mg/dL    Total Bilirubin 0.2 0.2 - 1.0 mg/dL    AST 15 15 - 37 U/L    ALT 13 12 - 78 U/L    Alk Phosphatase 61 45 - 117 U/L    Total Protein 5.9 (L) 6.4 - 8.2 g/dL    Albumin 1.4 (L) 3.5 - 5.0 g/dL    Globulin 4.5 (H) 2.0 - 4.0 g/dL    Albumin/Globulin Ratio 0.3 (L) 1.1 - 2.2     Magnesium    Collection Time: 10/10/24  7:32 AM   Result Value Ref Range    Magnesium 1.8 1.6 - 2.4 mg/dL   POCT Glucose    Collection Time: 10/10/24  7:43 AM   Result Value Ref Range    POC Glucose 204 (H) 65 - 100 mg/dL    Performed by: Tabatha Elena    POCT Glucose    Collection Time: 10/10/24 12:41 PM   Result Value Ref Range    POC Glucose 137 (H) 65 - 100 mg/dL    Performed by: Tabatha Elena       Case discussed with Dr. Jorgensen and our impression and recommendations are as follows:  Bradycardia, currently SR   Continue telemetry   EKG today with NSR, 80s   Echo with EF 60-65%, LVH, NWM   Keep electrolytes WNL, K+ 4-5, Mg >2   TSH, T4 WNL   Will need outpatient follow-up for holter/MCOT   Shortness of breath,   Echo as above  HD for volume removal  Need strict I&Os, daily weights   Hypertension, BP stable  DVT, IVC filter now in place   ESRD on HD, nephrology following     Will follow peripherally.     Please do not hesitate to call if additional questions arise.    ALICIA ROMAN, LAWANDA - NP  10/10/2024

## 2024-10-10 NOTE — PROGRESS NOTES
OCCUPATIONAL THERAPY TREATMENT  Patient: Michael Fung (83 y.o. male)  Date: 10/10/2024  Primary Diagnosis: Acute hypoxic respiratory failure [J96.01]       Precautions: Fall Risk                Recommendations for nursing mobility: Encourage HEP in prep for ADLs/mobility; see handout for details, Frequent repositioning to prevent skin breakdown, Use of bed/chair alarm for safety, LE elevation for management of edema, AD and gt belt for bed to chair , Philomena Stedy for bed to chair transfers , and Assist x2    In place during session: Peripheral IV, External Catheter, and EKG/telemetry   Chart, occupational therapy assessment, plan of care, and goals were reviewed.  ASSESSMENT  Patient continues with skilled OT services and is progressing towards goals. Pt presented semi supine upon OT arrival, agreeable to session. Pt A&O x 3. Orientation provided w/ poor carry over noted. Pt worked on bed mobility, sitting balance and sit to stands in prep for BSC transfers and clothing management.  Pt continues to require Ax2 for all bed mobility.  Pt's sitting balance is improving, requiring intermittent assistance x1 for balance.  Pt attempted two stands, however, pt limited by continued pain in R knee and ankle.  Pt unable to follow directions or demonstration to stand without weightbearing through R LE.  Pt sat EOB and participated in B UE therex. Pt was returned semi supine in bed with all needs met. (See below for objective details and assist levels).     Overall pt tolerated session fair today with sitting balance and UE therx.  Pt continues to be limited with functional mobility/transfers due to pain.  Pt continues to benefit from skilled OT to increase strength, endurance and independence with basic self care and functional transfers/mobility.  Current OT recommendations for discharge Moderate intensity short-term skilled occupational therapy up to 5x/week. Will continue to benefit from skilled OT services, and will

## 2024-10-10 NOTE — PROGRESS NOTES
PROGRESS NOTE      Chief Complaints:  Patient examined this morning.  HPI and  Objective:    No new issues overnight.  Patient tolerating diet.  Had a bowel movement.  Patient looks comfortable.  Temperature however is 100.2.  Review of Systems:  Rest of review of system reviewed personally and they are negative.    EXAM:  BP (!) 162/63   Pulse 87   Temp 100.2 °F (37.9 °C) (Oral)   Resp 16   Ht 1.676 m (5' 6\")   Wt 74 kg (163 lb 2.3 oz)   SpO2 97%   BMI 26.33 kg/m²     Patient is awake   Head and neck atraumatic, normocephalic.  ENT: No hoarse voice, gaze appropriate.  Cardiac system regular rate rhythm.  Pulmonary no audible wheeze, no cyanosis.  Chest wall excursion normal with respiration cycle  Abdomen is soft not particularly distended.  Neurologically nonfocal.  Hematology system: No bruising noted.  Musculoskeletal system: No joint deformity noted.  Genitourinary system: No hematuria noted.  Skin is warm and moist.  Psychosocial: Cooperative.  Vascular examination as previously noted no changes.    Current Facility-Administered Medications   Medication Dose Route Frequency Provider Last Rate Last Admin    potassium bicarb-citric acid (EFFER-K) effervescent tablet 40 mEq  40 mEq Oral Once Poncho Quarles MD        0.9 % sodium chloride infusion   IntraVENous PRN Poncho Quarles MD        dextrose 5 % and 0.45 % NaCl 1,000 mL infusion   IntraVENous Continuous Ariel Demarco MD 30 mL/hr at 10/08/24 0343 New Bag at 10/08/24 0343    heparin (porcine) injection 2,500 Units  2,500 Units IntraCATHeter PRN Carlota Tinajero MD   2,500 Units at 10/07/24 1045    sodium chloride flush 0.9 % injection 5-40 mL  5-40 mL IntraVENous 2 times per day Willard Harden MD   10 mL at 10/09/24 2134    sodium chloride flush 0.9 % injection 5-40 mL  5-40 mL IntraVENous PRN Willard Harden MD        0.9 % sodium chloride infusion   IntraVENous PRN Willard Harden MD        ondansetron (ZOFRAN-ODT)  disintegrating tablet 4 mg  4 mg Oral Q8H PRN Willard Harden MD        Or    ondansetron (ZOFRAN) injection 4 mg  4 mg IntraVENous Q6H PRN Willard Harden MD   4 mg at 10/03/24 2238    acetaminophen (TYLENOL) tablet 650 mg  650 mg Oral Q6H PRN Willard Harden MD   650 mg at 10/09/24 1156    Or    acetaminophen (TYLENOL) suppository 650 mg  650 mg Rectal Q6H PRN Willard Harden MD        bisacodyl (DULCOLAX) suppository 10 mg  10 mg Rectal Daily PRN Willard Harden MD        sennosides-docusate sodium (SENOKOT-S) 8.6-50 MG tablet 1 tablet  1 tablet Oral BID Willard Harden MD   1 tablet at 10/05/24 0930    glucose chewable tablet 16 g  4 tablet Oral PRN Willard Harden MD        dextrose bolus 10% 125 mL  125 mL IntraVENous PRN Willard Harden MD        Or    dextrose bolus 10% 250 mL  250 mL IntraVENous PRN Willard Harden MD        glucagon injection 1 mg  1 mg SubCUTAneous PRN Willard Harden MD        dextrose 10 % infusion   IntraVENous Continuous PRN Willard Harden MD        insulin lispro (HUMALOG,ADMELOG) injection vial 0-4 Units  0-4 Units SubCUTAneous TID WC Willard Harden MD   2 Units at 10/09/24 1745    insulin lispro (HUMALOG,ADMELOG) injection vial 0-4 Units  0-4 Units SubCUTAneous Nightly Willard Harden MD   4 Units at 10/03/24 2051    allopurinol (ZYLOPRIM) tablet 100 mg  100 mg Oral Daily Willard Harden MD   100 mg at 10/09/24 0951    finasteride (PROSCAR) tablet 5 mg  5 mg Oral Daily Willard Harden MD   5 mg at 10/09/24 0951    tamsulosin (FLOMAX) capsule 0.4 mg  0.4 mg Oral Daily Willard Harden MD   0.4 mg at 10/09/24 0950    thiamine mononitrate tablet 100 mg  100 mg Oral Daily Willard Harden MD   100 mg at 10/09/24 0951    sodium bicarbonate tablet 650 mg  650 mg Oral BID Willard Harden MD   650 mg at 10/09/24 2134    multivitamin 1 tablet  1 tablet Oral Daily Willard Harden MD   1 tablet at 10/09/24 0951    lidocaine (XYLOCAINE) 2 % uro-jet

## 2024-10-10 NOTE — PROGRESS NOTES
[facility] KIDNEY     Renal Daily Progress Note:     Subjective: Patient underwent dialysis today.  This would be his third dialysis.  Family at the bedside.  Seen around 4 PM.  Has been confused and intermittently agitated.  Now has a sitter at the bedside.  Appetite fair, ate some breakfast and a little bit of lunch.  No nausea or vomiting.  No chest or abdominal pain.  Still making urine.  Muñoz not placed    10/8 more awake today.  Ate breakfast without nausea or vomiting.  Urine output not quantified.  However, today serum creatinine is only 5.12.  Will see if tomorrow's creatinine is less than that which was seen on Saturday.  Not short of breath,    10/9 awake and comfortable.  Seems calm.  Still with sitter is in the room.  Creatinine is trending up.  Will perform dialysis tomorrow.    10/10 seems calm and appropriate.  Wife at bedside.  Still has a sitter.  Had dialysis today without issue.      Review of Systems  Pertinent items are noted in HPI.    Objective:     BP (!) 154/63   Pulse 99   Temp 98.7 °F (37.1 °C) (Oral)   Resp 16   Ht 1.676 m (5' 6\")   Wt 74 kg (163 lb 2.3 oz)   SpO2 97%   BMI 26.33 kg/m²   Temp (24hrs), Av.5 °F (37.5 °C), Min:98.7 °F (37.1 °C), Max:100.2 °F (37.9 °C)        Intake/Output Summary (Last 24 hours) at 10/10/2024 1726  Last data filed at 10/10/2024 1150  Gross per 24 hour   Intake 3100 ml   Output 1950 ml   Net 1150 ml       Current Facility-Administered Medications   Medication Dose Route Frequency    piperacillin-tazobactam (ZOSYN) 3,375 mg in sodium chloride 0.9 % 50 mL IVPB (mini-bag)  3,375 mg IntraVENous Q12H    potassium bicarb-citric acid (EFFER-K) effervescent tablet 40 mEq  40 mEq Oral Once    0.9 % sodium chloride infusion   IntraVENous PRN    dextrose 5 % and 0.45 % NaCl 1,000 mL infusion   IntraVENous Continuous    heparin (porcine) injection 2,500 Units  2,500 Units IntraCATHeter PRN    sodium chloride flush 0.9 % injection 5-40 mL  5-40 mL

## 2024-10-10 NOTE — CONSULTS
Consult  Pulmonary, Critical Care    Name: Michael Fung MRN: [id number]   : 1940 Hospital: [facility]   Date: 10/10/2024  Admission date: 10/3/2024 Hospital Day: 8       Subjective/Interval History:   Seen in the dialysis unit he is confused does not know why he is in the hospital or that he is in the hospital.  Was admitted at [facility] with cough severe hypoxia found to have bilateral DVT of the legs was placed on heparin subsequently developed severe hemoptysis heparin was discontinued and IVC filter placed.  He was transferred to our facility had CT scan which shows distended esophagus and stomach possible ileus with scattered infiltrates throughout the left lung no documentation of vomiting but his history is not reliable at all.  He has had a VQ scan that was low probability but does show matching defect due to renal insufficiency he did not have a CTA of the chest he initially required high flow nasal oxygen 45% FiO2 reportedly has a meter oxygen saturation 95% today although he is wearing oxygen while receiving dialysis    Patient Active Problem List   Diagnosis    Acute hypoxic respiratory failure    CATHY (acute kidney injury) (HCC)    Hemoptysis    Community acquired pneumonia    Acute deep vein thrombosis (DVT) of femoral vein of both lower extremities (HCC)    DM (diabetes mellitus), type 2 (HCC)    Decreased mobility and endurance    Acute upper GI bleed    Ileus (HCC)    Acute urethral obstruction    Incomplete bladder emptying        IMPRESSION:   Acute hypoxic respiratory failure likely due to a combination of aspiration pneumonia probable pulmonary emboli and pulmonary edema/fluid overload from renal failure now on room air  Pulmonary edema secondary to fluid retention from renal failure  Aspiration pneumonia has diffuse left-sided infiltrates very likely had aspiration  Gastro paresis esophageal dysmotility had distended esophagus and stomach on CT  [[id number]] Collected: 10/03/24 1855    Order Status: Completed Specimen: Swab Updated: 10/03/24 2037     MRSA by PCR Not Detected       Culture, C Auris Screen [[id number]] Collected: 10/03/24 1855    Order Status: Completed Specimen: Swab Updated: 10/07/24 1526     Special Requests No Special Requests        Culture No Candida auris present       Respiratory Panel, Molecular, with COVID-19 (Restricted: peds pts or suitable admitted adults) [[id number]] Collected: 10/03/24 1855    Order Status: Completed Specimen: Blood Serum Updated: 10/04/24 1354     Adenovirus by PCR Not detected     Coronavirus 229E by PCR Not detected     Coronavirus HKU1 by PCR Not detected     Coronavirus NL63 by PCR Not detected     Coronavirus OC43 by PCR Not detected     SARS-CoV-2, PCR Not detected     Human Metapneumovirus by PCR Not detected     Rhinovirus Enterovirus PCR Not detected     Influenza A by PCR Not detected     Influenza B PCR Not detected     Parainfluenza 1 PCR Not detected     Parainfluenza 2 PCR Not detected     Parainfluenza 3 PCR Not detected     Parainfluenza 4 PCR Not detected     Respiratory Syncytial Virus by PCR Not detected     Bordetella parapertussis by PCR Not detected     Bordetella pertussis by PCR Not detected     Chlamydophila Pneumonia PCR Not detected     Mycoplasma pneumo by PCR Not detected               ARTERIAL BLOOD GAS    Pulse OX:  SpO2 Readings from Last 6 Encounters:   10/10/24 97%       Imaging:  XR CHEST PORTABLE    Result Date: 10/6/2024  The enteric tube terminates in the stomach. Stable mild pulmonary edema. Electronically signed by Alexander Lazo    XR ABDOMEN (2 VIEWS)    Result Date: 10/6/2024  Nonobstructive bowel gas pattern. The NG tube tip is in the stomach. Electronically signed by BRANDI CRAIG GUIDED IVC FILTER PLACEMENT    Result Date: 10/4/2024  1.  Placement of infrarenal IVC filter without complications. Electronically signed by PRIETO CRAIG NONTUNNELED

## 2024-10-10 NOTE — PROGRESS NOTES
PROGRESS NOTE      Chief Complaints:  Patient examined this morning.  HPI and  Objective:    Patient's family at bedside.  Patient looks comfortable.  Tolerating regular diet.  Denies nausea.  Had a bowel movement.  Review of Systems:  Rest of review of system reviewed personally and they are negative.    EXAM:  BP (!) 143/66   Pulse 88   Temp 99.4 °F (37.4 °C) (Oral)   Resp 17   Ht 1.676 m (5' 6\")   Wt 72.4 kg (159 lb 9.8 oz)   SpO2 98%   BMI 25.76 kg/m²     Patient is awake   Head and neck atraumatic, normocephalic.  ENT: No hoarse voice, gaze appropriate.  Cardiac system regular rate rhythm.  Pulmonary no audible wheeze, no cyanosis.  Chest wall excursion normal with respiration cycle  Abdomen is soft not particularly distended.  Neurologically nonfocal.  Hematology system: No bruising noted.  Musculoskeletal system: No joint deformity noted.  Genitourinary system: No hematuria noted.  Skin is warm and moist.  Psychosocial: Cooperative.  Vascular examination as previously noted no changes.    Current Facility-Administered Medications   Medication Dose Route Frequency Provider Last Rate Last Admin    potassium bicarb-citric acid (EFFER-K) effervescent tablet 40 mEq  40 mEq Oral Once Poncho Quarles MD        0.9 % sodium chloride infusion   IntraVENous PRN Poncho Quarles MD        dextrose 5 % and 0.45 % NaCl 1,000 mL infusion   IntraVENous Continuous Ariel Demarco MD 30 mL/hr at 10/08/24 0343 New Bag at 10/08/24 0343    heparin (porcine) injection 2,500 Units  2,500 Units IntraCATHeter PRN Carlota Tinajero MD   2,500 Units at 10/07/24 1045    sodium chloride flush 0.9 % injection 5-40 mL  5-40 mL IntraVENous 2 times per day Willard Harden MD   10 mL at 10/09/24 2134    sodium chloride flush 0.9 % injection 5-40 mL  5-40 mL IntraVENous PRN Willard Harden MD        0.9 % sodium chloride infusion   IntraVENous PRN Willard Harden MD        ondansetron (ZOFRAN-ODT)  Topical Willard Cevallos MD               Recent Results (from the past 24 hour(s))   CBC with Auto Differential    Collection Time: 10/09/24  6:43 AM   Result Value Ref Range    WBC 9.8 4.1 - 11.1 K/uL    RBC 2.59 (L) 4.10 - 5.70 M/uL    Hemoglobin 7.8 (L) 12.1 - 17.0 g/dL    Hematocrit 23.2 (L) 36.6 - 50.3 %    MCV 89.6 80.0 - 99.0 FL    MCH 30.1 26.0 - 34.0 PG    MCHC 33.6 30.0 - 36.5 g/dL    RDW 13.8 11.5 - 14.5 %    Platelets 448 (H) 150 - 400 K/uL    MPV 9.2 8.9 - 12.9 FL    Nucleated RBCs 0.0 0.0  WBC    nRBC 0.00 0.00 - 0.01 K/uL    Neutrophils % 79 (H) 32 - 75 %    Lymphocytes % 11 (L) 12 - 49 %    Monocytes % 8 5 - 13 %    Eosinophils % 1 0 - 7 %    Basophils % 0 0 - 1 %    Metamyelocytes 1 (H) 0 %    Immature Granulocytes % 0 %    Neutrophils Absolute 7.7 1.8 - 8.0 K/UL    Lymphocytes Absolute 1.1 0.8 - 3.5 K/UL    Monocytes Absolute 0.8 0.0 - 1.0 K/UL    Eosinophils Absolute 0.1 0.0 - 0.4 K/UL    Basophils Absolute 0.0 0.0 - 0.1 K/UL    Immature Granulocytes Absolute 0.0 K/UL    Differential Type Manual      RBC Comment Normocytic, Normochromic     Comprehensive Metabolic Panel w/ Reflex to MG    Collection Time: 10/09/24  6:43 AM   Result Value Ref Range    Sodium 132 (L) 136 - 145 mmol/L    Potassium 3.0 (L) 3.5 - 5.1 mmol/L    Chloride 98 97 - 108 mmol/L    CO2 25 21 - 32 mmol/L    Anion Gap 9 2 - 12 mmol/L    Glucose 155 (H) 65 - 100 mg/dL    BUN 40 (H) 6 - 20 mg/dL    Creatinine 5.93 (H) 0.70 - 1.30 mg/dL    BUN/Creatinine Ratio 7 (L) 12 - 20      Est, Glom Filt Rate 9 (L) >60 ml/min/1.73m2    Calcium 8.2 (L) 8.5 - 10.1 mg/dL    Total Bilirubin 0.4 0.2 - 1.0 mg/dL    AST 17 15 - 37 U/L    ALT 13 12 - 78 U/L    Alk Phosphatase 56 45 - 117 U/L    Total Protein 6.4 6.4 - 8.2 g/dL    Albumin 1.6 (L) 3.5 - 5.0 g/dL    Globulin 4.8 (H) 2.0 - 4.0 g/dL    Albumin/Globulin Ratio 0.3 (L) 1.1 - 2.2     Magnesium    Collection Time: 10/09/24  6:43 AM   Result Value Ref Range    Magnesium 2.0 1.6 -

## 2024-10-10 NOTE — CARE COORDINATION
CM reviewed chart. [facility] assisting with DCP when medicallly ready. CM will continue to follow.

## 2024-10-10 NOTE — PLAN OF CARE
Problem: Discharge Planning  Goal: Discharge to home or other facility with appropriate resources  Outcome: Progressing  Flowsheets (Taken 10/9/2024 2100)  Discharge to home or other facility with appropriate resources:   Identify barriers to discharge with patient and caregiver   Identify discharge learning needs (meds, wound care, etc)     Problem: Skin/Tissue Integrity  Goal: Absence of new skin breakdown  Description: 1.  Monitor for areas of redness and/or skin breakdown  2.  Assess vascular access sites hourly  3.  Every 4-6 hours minimum:  Change oxygen saturation probe site  4.  Every 4-6 hours:  If on nasal continuous positive airway pressure, respiratory therapy assess nares and determine need for appliance change or resting period.  Outcome: Progressing     Problem: Safety - Adult  Goal: Free from fall injury  Outcome: Progressing     Problem: ABCDS Injury Assessment  Goal: Absence of physical injury  Outcome: Progressing     Problem: Chronic Conditions and Co-morbidities  Goal: Patient's chronic conditions and co-morbidity symptoms are monitored and maintained or improved  Outcome: Progressing  Flowsheets (Taken 10/9/2024 2100)  Care Plan - Patient's Chronic Conditions and Co-Morbidity Symptoms are Monitored and Maintained or Improved: Monitor and assess patient's chronic conditions and comorbid symptoms for stability, deterioration, or improvement     Problem: Respiratory - Adult  Goal: Achieves optimal ventilation and oxygenation  Outcome: Progressing  Flowsheets (Taken 10/9/2024 2100)  Achieves optimal ventilation and oxygenation:   Assess for changes in respiratory status   Position to facilitate oxygenation and minimize respiratory effort     Problem: Cardiovascular - Adult  Goal: Maintains optimal cardiac output and hemodynamic stability  Outcome: Progressing  Flowsheets (Taken 10/9/2024 2100)  Maintains optimal cardiac output and hemodynamic stability:   Monitor blood pressure and heart rate

## 2024-10-10 NOTE — PLAN OF CARE
PRIOR TO ADMISSION: Patient was modified independent using a walker/cane  for functional mobility.    HOME SUPPORT PRIOR TO ADMISSION: The patient lived with spouse but did not require assistance.    Physical Therapy Goals  Initiated 10/6/2024  Pt stated goal: to get better    I with LE HEP x7 days  Mod I with bed mob x7 days  SBA with all transfers x7 days  Amb 25-75ft with RW and CGAx1 x7 days      Outcome: Progressing          PLAN :  Patient continues to benefit from skilled intervention to address functional impairments. Continue treatment per established plan of care to address goals.    Recommendation for discharge: (in order for the patient to meet his/her long term goals)  Moderate intensity short-term skilled physical therapy up to 5x/week    Potential barriers for safe discharge: pt has poor safety awareness, pt has impaired cognition, pt is a high fall risk, pt is not safe to be alone, and concern for pt safely navigating or managing the home environment.    IF patient discharges home will need the following DME:continuing to assess with progress     SUBJECTIVE:   Patient stated “My knee and ankle hurt.”    OBJECTIVE DATA SUMMARY:   Critical Behavior:  Orientation  Orientation Level: Oriented to person;Oriented to situation;Disoriented to time;Oriented to place  Cognition  Overall Cognitive Status: Exceptions  Following Commands: Follows one step commands with increased time;Follows one step commands with repetition  Attention Span: Impaired;Attends with cues to redirect  Memory: Impaired  Safety Judgement: Decreased awareness of need for assistance;Decreased awareness of need for safety  Problem Solving: Decreased awareness of errors;Assistance required to identify errors made;Assistance required to correct errors made;Assistance required to generate solutions;Assistance required to implement solutions  Insights: Decreased awareness of deficits  Initiation: Requires cues for all  Sequencing: Requires

## 2024-10-10 NOTE — PROGRESS NOTES
Hospitalist Consult Note    NAME: Michael Fung   :  1940   MRN:  [id number]        Hospital course:  BRIEF PATIENT SUMMARY AND HOSPITAL COURSE   Michael Fung is a 83-year-old male presented to outside hospital on  for cough and shortness of breath found to be hypoxic and have x-ray concerning for community-acquired pneumonia in the left mid and lower lung fields.  Initiated on community-acquired pneumonia therapy with ceftriaxone and azithromycin.  Patient also noted to have elevated creatinine of 4.5 which is his baseline patient is diuretic dependent.  Became oliguric during the course of the hospitalization despite Lasix 80 mg.  Lower extremity ultrasound obtained for elevated D-dimer.  Showed bilateral DVTs in the right popliteal and 2 in the femoral and popliteal on the left.  Had been initiated on a heparin drip 10/ evening patient on supratherapeutic heparin developed hemoptysis.  Substantial enough to stop heparin drip but unable to quantify.  Patient on mid flow oxygen with low sats.  Transferred to our ICU on BiPAP transitioned to high flow nasal cannula 50 L, 40% with sats of 97%.  Had a low prop VQ scan on 10/1 for renal failure with potential need for dialysis creatinine increased to 6 and oliguric.  Bladder scan negative no Muñoz catheter or straight cath inserted, worsening hypoxemia from [facility].  Most of history obtained from patient's daughter and in conversation with transferring facility much of chart did not make it over with transfer.  Patient was subsequently admitted to medical ICU, evaluated by nephrology, initiated on dialysis, evaluated by urology for urinary retention, recommended supportive care, following which patient's clinical status improved, serum creatinine improved, subsequent to which patient was transferred out of the ICU and accepted to the hospitalist service    Subjective:     Chief Complaint / Reason for Physician Visit

## 2024-10-11 LAB
ALBUMIN SERPL-MCNC: 1.5 G/DL (ref 3.5–5)
ALBUMIN/GLOB SERPL: 0.3 (ref 1.1–2.2)
ALP SERPL-CCNC: 53 U/L (ref 45–117)
ALT SERPL-CCNC: 15 U/L (ref 12–78)
ANION GAP SERPL CALC-SCNC: 8 MMOL/L (ref 2–12)
AST SERPL W P-5'-P-CCNC: 18 U/L (ref 15–37)
BASOPHILS # BLD: 0 K/UL (ref 0–0.1)
BASOPHILS NFR BLD: 0 % (ref 0–1)
BILIRUB SERPL-MCNC: 0.4 MG/DL (ref 0.2–1)
BUN SERPL-MCNC: 31 MG/DL (ref 6–20)
BUN/CREAT SERPL: 6 (ref 12–20)
CA-I BLD-MCNC: 8.2 MG/DL (ref 8.5–10.1)
CHLORIDE SERPL-SCNC: 101 MMOL/L (ref 97–108)
CO2 SERPL-SCNC: 24 MMOL/L (ref 21–32)
CREAT SERPL-MCNC: 4.9 MG/DL (ref 0.7–1.3)
DIFFERENTIAL METHOD BLD: ABNORMAL
EOSINOPHIL # BLD: 0.2 K/UL (ref 0–0.4)
EOSINOPHIL NFR BLD: 2 % (ref 0–7)
ERYTHROCYTE [DISTWIDTH] IN BLOOD BY AUTOMATED COUNT: 13.7 % (ref 11.5–14.5)
GLOBULIN SER CALC-MCNC: 4.7 G/DL (ref 2–4)
GLUCOSE BLD STRIP.AUTO-MCNC: 149 MG/DL (ref 65–100)
GLUCOSE BLD STRIP.AUTO-MCNC: 160 MG/DL (ref 65–100)
GLUCOSE BLD STRIP.AUTO-MCNC: 179 MG/DL (ref 65–100)
GLUCOSE BLD STRIP.AUTO-MCNC: 206 MG/DL (ref 65–100)
GLUCOSE BLD STRIP.AUTO-MCNC: 255 MG/DL (ref 65–100)
GLUCOSE BLD STRIP.AUTO-MCNC: 281 MG/DL (ref 65–100)
GLUCOSE SERPL-MCNC: 173 MG/DL (ref 65–100)
HCT VFR BLD AUTO: 21.5 % (ref 36.6–50.3)
HGB BLD-MCNC: 7.4 G/DL (ref 12.1–17)
IMM GRANULOCYTES # BLD AUTO: 0.2 K/UL (ref 0–0.04)
IMM GRANULOCYTES NFR BLD AUTO: 2 % (ref 0–0.5)
LYMPHOCYTES # BLD: 1.1 K/UL (ref 0.8–3.5)
LYMPHOCYTES NFR BLD: 12 % (ref 12–49)
MCH RBC QN AUTO: 30.6 PG (ref 26–34)
MCHC RBC AUTO-ENTMCNC: 34.4 G/DL (ref 30–36.5)
MCV RBC AUTO: 88.8 FL (ref 80–99)
MONOCYTES # BLD: 0.9 K/UL (ref 0–1)
MONOCYTES NFR BLD: 10 % (ref 5–13)
NEUTS SEG # BLD: 6.7 K/UL (ref 1.8–8)
NEUTS SEG NFR BLD: 74 % (ref 32–75)
NRBC # BLD: 0 K/UL (ref 0–0.01)
NRBC BLD-RTO: 0 PER 100 WBC
PERFORMED BY:: ABNORMAL
PLATELET # BLD AUTO: 420 K/UL (ref 150–400)
PMV BLD AUTO: 9 FL (ref 8.9–12.9)
POTASSIUM SERPL-SCNC: 3.7 MMOL/L (ref 3.5–5.1)
PROT SERPL-MCNC: 6.2 G/DL (ref 6.4–8.2)
RBC # BLD AUTO: 2.42 M/UL (ref 4.1–5.7)
SODIUM SERPL-SCNC: 133 MMOL/L (ref 136–145)
WBC # BLD AUTO: 9.1 K/UL (ref 4.1–11.1)

## 2024-10-11 PROCEDURE — 36415 COLL VENOUS BLD VENIPUNCTURE: CPT

## 2024-10-11 PROCEDURE — 6370000000 HC RX 637 (ALT 250 FOR IP): Performed by: INTERNAL MEDICINE

## 2024-10-11 PROCEDURE — 97535 SELF CARE MNGMENT TRAINING: CPT

## 2024-10-11 PROCEDURE — 2580000003 HC RX 258: Performed by: INTERNAL MEDICINE

## 2024-10-11 PROCEDURE — 85025 COMPLETE CBC W/AUTO DIFF WBC: CPT

## 2024-10-11 PROCEDURE — 51798 US URINE CAPACITY MEASURE: CPT

## 2024-10-11 PROCEDURE — 97530 THERAPEUTIC ACTIVITIES: CPT

## 2024-10-11 PROCEDURE — 99232 SBSQ HOSP IP/OBS MODERATE 35: CPT | Performed by: SURGERY

## 2024-10-11 PROCEDURE — 80053 COMPREHEN METABOLIC PANEL: CPT

## 2024-10-11 PROCEDURE — 6360000002 HC RX W HCPCS: Performed by: INTERNAL MEDICINE

## 2024-10-11 PROCEDURE — 82962 GLUCOSE BLOOD TEST: CPT

## 2024-10-11 PROCEDURE — 1100000000 HC RM PRIVATE

## 2024-10-11 RX ORDER — SODIUM CHLORIDE 9 MG/ML
INJECTION, SOLUTION INTRAVENOUS CONTINUOUS
Status: DISCONTINUED | OUTPATIENT
Start: 2024-10-11 | End: 2024-10-15

## 2024-10-11 RX ADMIN — ALLOPURINOL 100 MG: 100 TABLET ORAL at 09:18

## 2024-10-11 RX ADMIN — SODIUM CHLORIDE: 9 INJECTION, SOLUTION INTRAVENOUS at 16:09

## 2024-10-11 RX ADMIN — SODIUM CHLORIDE, PRESERVATIVE FREE 10 ML: 5 INJECTION INTRAVENOUS at 20:33

## 2024-10-11 RX ADMIN — PIPERACILLIN AND TAZOBACTAM 3375 MG: 3; .375 INJECTION, POWDER, FOR SOLUTION INTRAVENOUS; PARENTERAL at 23:13

## 2024-10-11 RX ADMIN — PIPERACILLIN AND TAZOBACTAM 3375 MG: 3; .375 INJECTION, POWDER, FOR SOLUTION INTRAVENOUS; PARENTERAL at 11:45

## 2024-10-11 RX ADMIN — ACETAMINOPHEN 650 MG: 325 TABLET ORAL at 09:17

## 2024-10-11 RX ADMIN — TAMSULOSIN HYDROCHLORIDE 0.4 MG: 0.4 CAPSULE ORAL at 09:17

## 2024-10-11 RX ADMIN — SODIUM BICARBONATE 650 MG: 650 TABLET ORAL at 20:32

## 2024-10-11 RX ADMIN — SODIUM BICARBONATE 650 MG: 650 TABLET ORAL at 09:18

## 2024-10-11 RX ADMIN — INSULIN LISPRO 2 UNITS: 100 INJECTION, SOLUTION INTRAVENOUS; SUBCUTANEOUS at 11:45

## 2024-10-11 RX ADMIN — THERA TABS 1 TABLET: TAB at 09:18

## 2024-10-11 RX ADMIN — FINASTERIDE 5 MG: 5 TABLET, FILM COATED ORAL at 09:18

## 2024-10-11 RX ADMIN — THIAMINE HCL TAB 100 MG 100 MG: 100 TAB at 09:18

## 2024-10-11 RX ADMIN — SODIUM CHLORIDE, PRESERVATIVE FREE 10 ML: 5 INJECTION INTRAVENOUS at 09:22

## 2024-10-11 RX ADMIN — INSULIN LISPRO 1 UNITS: 100 INJECTION, SOLUTION INTRAVENOUS; SUBCUTANEOUS at 18:22

## 2024-10-11 ASSESSMENT — PAIN DESCRIPTION - LOCATION: LOCATION: LEG;KNEE

## 2024-10-11 ASSESSMENT — PAIN SCALES - GENERAL
PAINLEVEL_OUTOF10: 0

## 2024-10-11 ASSESSMENT — PAIN DESCRIPTION - ORIENTATION: ORIENTATION: RIGHT

## 2024-10-11 ASSESSMENT — PAIN DESCRIPTION - DESCRIPTORS: DESCRIPTORS: DISCOMFORT

## 2024-10-11 NOTE — PLAN OF CARE
OCCUPATIONAL THERAPY TREATMENT  Patient: Michael Fung (83 y.o. male)  Date: 10/11/2024  Primary Diagnosis: Acute hypoxic respiratory failure [J96.01]       Precautions: Fall Risk                Recommendations for nursing mobility: Out of bed to chair for meals, Encourage HEP in prep for ADLs/mobility; see handout for details, Frequent repositioning to prevent skin breakdown, Use of BSC for toileting , AD and gt belt for bed to chair , Philomena Stedy for bed to chair transfers , and Assist x2    In place during session: Peripheral IV, External Catheter, and EKG/telemetry   Chart, occupational therapy assessment, plan of care, and goals were reviewed.  ASSESSMENT  Patient continues with skilled OT services and is progressing towards goals. Pt presented semi supine upon MALLOY arrival, agreeable to session. Pt A&O x 2. Orientation provided w/ poor carry over noted. Pt with improvement in bed mobility, scooting, sit to stands and functional  mobility.  Pt sat EOB to complete UB dressing, pt attempted donning/adjusting slipper socks, worked on sit to stand and transferred to recliner.  Pt needed to have a BM as soon as he stood, bed pan placed in bedside chair and pt sat to complete BM.  Pt dependent for hygiene. Activity completed in prep for BSC transfer (no time to get BSC, where bed pan was within reach). Pt left in bedside chair with all known needs met. . (See below for objective details and assist levels).     Overall pt tolerated session fair today with improvement noted with functional mobility/transfers.  Pt continues to benefit from skilled OT to increase strength, endurance, and independence with basic self care and functional mobility/transfers.  Current OT recommendations for discharge Moderate intensity short-term skilled occupational therapy up to 5x/week. Will continue to benefit from skilled OT services, and will continue to progress as tolerated.      Start of Session   SPO2 (%) 97   Heart Rate (BPM)

## 2024-10-11 NOTE — PLAN OF CARE
PHYSICAL THERAPY TREATMENT     Patient: Michael Fung (83 y.o. male)  Date: 10/11/2024  Diagnosis: Acute hypoxic respiratory failure [J96.01] Acute hypoxic respiratory failure      Precautions: Fall Risk                      Recommendations for nursing mobility: Out of bed to chair for meals, Encourage HEP in prep for ADLs/mobility; see handout for details, Frequent repositioning to prevent skin breakdown, Use of bed/chair alarm for safety, AD and gt belt for bed to chair , Philomena Stedy for bed to chair transfers , and Assist x2    In place during session: Peripheral IV, External Catheter, and EKG/telemetry   Chart, physical therapy assessment, plan of care and goals were reviewed.  ASSESSMENT  Patient continues with skilled PT services and is progressing towards goals. Pt sitting up in the bed upon PT arrival, agreeable to session. Pt A&O x 2. (See below for objective details and assist levels).     Overall pt tolerated session well today. Patient sitting up in the bed upon arrival and agreeable to attempt recliner transfers today. Slightly more confused today but improved functional mobility req less assist overall. Mod Ax2 overall needed for mobility and pain was much improved in the R knee and ankle today from last session and was medicated prior to session. Patient demos shuffled gait with flexed posture. Decreased step length BLE noted overall and increased fatigue. Tolerated seated therex better today and tolerated knee/ankle ROM prior to standing. Will continue to benefit from skilled PT services, and will continue to progress as tolerated. Current PT DC recommendation Moderate intensity short-term skilled physical therapy up to 5x/week once medically appropriate.     Start of Session End of Session   SPO2 (%) 98    Heart Rate (BPM) 100      GOALS:    Problem: Physical Therapy - Adult  Goal: By Discharge: Performs mobility at highest level of function for planned discharge setting.  See evaluation  requires rest breaks    After treatment patient left in no apparent distress:   Bed locked and returned to lowest position, Patient left in no apparent distress sitting up in chair, Call bell within reach, and Bed/ chair alarm activated, and nsg updated     COMMUNICATION/COLLABORATION:   The patient’s plan of care was discussed with: Occupational therapy assistant and Registered nurse PT/OT sessions occurred together for increased safety of pt and clinician.     Patient Education  Education Given To: Patient;Family  Education Provided: Role of Therapy;Plan of Care;Home Exercise Program;Transfer Training;Equipment;Family Education;Orientation  Education Provided Comments: safety, therex  Education Method: Verbal;Demonstration  Barriers to Learning: Cognition  Education Outcome: Verbalized understanding;Continued education needed    Orin Whittaker, PTA  Minutes: 29

## 2024-10-11 NOTE — PROGRESS NOTES
Comprehensive Nutrition Assessment    Type and Reason for Visit:  Initial    Nutrition Recommendations/Plan:   Continue current diet order  Add diabetic ONS BID  RD to monitor weight, po intake, labs, GI function     Malnutrition Assessment:  Malnutrition Status:  Severe malnutrition (10/11/24 1021)    Context:  Acute Illness     Findings of the 6 clinical characteristics of malnutrition:  Energy Intake:  50% or less of estimated energy requirements for 5 or more days  Weight Loss:  Greater than 2% over 1 week     Body Fat Loss:  Unable to assess     Muscle Mass Loss:  Unable to assess    Fluid Accumulation:  Unable to assess     Strength:  Not Performed    Nutrition Assessment:    Pt assessed for LOS. Pt admitted for acute respiratory failure. Pt had an ileus, small bowel onstruction now resolved and pt tolerating 60g cho/meal bland diet. Per chart review, pt has lost 9lbs in past week. Meds: humalog, MVI, zosyn, thiamine, dextrose 5%. Labs: Na 133 L, Creat 4.90 H, Glucose 173 H.    Nutrition Related Findings:    Last BM 10/10. PO intakes 26-50% meals. Wound Type: None       Current Nutrition Intake & Therapies:    Average Meal Intake: 26-50%  Average Supplements Intake: None Ordered  ADULT DIET; Easy to Chew; 4 carb choices (60 gm/meal); GI [place] (GERD/Peptic Ulcer)    Anthropometric Measures:  Height: 167.6 cm (5' 6\")  Ideal Body Weight (IBW): 142 lbs (65 kg)       Current Body Weight: 73.5 kg (162 lb), 114.1 % IBW. Weight Source: Bed Scale  Current BMI (kg/m2): 26.2        Weight Adjustment For: No Adjustment     BMI Categories: Overweight (BMI 25.0-29.9)  Last Weight Metrics:      10/11/2024    10:05 AM 10/11/2024     5:13 AM 10/10/2024     5:40 AM 10/7/2024     9:56 PM 10/7/2024     5:59 AM 10/3/2024     4:56 PM   Weight Loss Metrics   Height 5' 6\"     5' 6\"   Weight - Scale  162 lbs 11 oz 163 lbs 2 oz 159 lbs 10 oz 161 lbs 5 oz 171 lbs 12 oz   BMI (Calculated)  26.3 kg/m2 26.4 kg/m2 25.8 kg/m2 26.1 kg/m2

## 2024-10-11 NOTE — PROGRESS NOTES
[facility] KIDNEY     Renal Daily Progress Note:     Subjective: Patient underwent dialysis today.  This would be his third dialysis.  Family at the bedside.  Seen around 4 PM.  Has been confused and intermittently agitated.  Now has a sitter at the bedside.  Appetite fair, ate some breakfast and a little bit of lunch.  No nausea or vomiting.  No chest or abdominal pain.  Still making urine.  Muñoz not placed    10/8 more awake today.  Ate breakfast without nausea or vomiting.  Urine output not quantified.  However, today serum creatinine is only 5.12.  Will see if tomorrow's creatinine is less than that which was seen on Saturday.  Not short of breath,    10/9 awake and comfortable.  Seems calm.  Still with sitter is in the room.  Creatinine is trending up.  Will perform dialysis tomorrow.    10/10 seems calm and appropriate.  Wife at bedside.  Still has a sitter.  Had dialysis today without issue.    10/11/24 For HD in AM. Overall improvement.      Review of Systems  Pertinent items are noted in HPI.    Objective:     BP (!) 146/65   Pulse 85   Temp 99.1 °F (37.3 °C) (Oral)   Resp 17   Ht 1.676 m (5' 6\")   Wt 73.8 kg (162 lb 11.2 oz)   SpO2 95%   BMI 26.26 kg/m²   Temp (24hrs), Av.5 °F (37.5 °C), Min:98.7 °F (37.1 °C), Max:100.8 °F (38.2 °C)        Intake/Output Summary (Last 24 hours) at 10/11/2024 1123  Last data filed at 10/11/2024 0514  Gross per 24 hour   Intake 3540 ml   Output 1850 ml   Net 1690 ml       Current Facility-Administered Medications   Medication Dose Route Frequency    piperacillin-tazobactam (ZOSYN) 3,375 mg in sodium chloride 0.9 % 50 mL IVPB (mini-bag)  3,375 mg IntraVENous Q12H    potassium bicarb-citric acid (EFFER-K) effervescent tablet 40 mEq  40 mEq Oral Once    0.9 % sodium chloride infusion   IntraVENous PRN    dextrose 5 % and 0.45 % NaCl 1,000 mL infusion   IntraVENous Continuous    heparin (porcine) injection 2,500 Units  2,500 Units IntraCATHeter PRN    sodium  Review:     LABS:  Recent Labs     10/11/24  0836 10/10/24  0732 10/09/24  0643 10/08/24  1200 10/07/24  0603 10/06/24  0735   * 131* 132* 131* 140  139 137   K 3.7 3.1* 3.0* 3.2* 3.1*  3.1* 3.3*    98 98 99 102  102 100   CO2 24 24 25 24 23  23 20*   BUN 31* 48* 40* 31* 57*  55* 43*   CREATININE 4.90* 6.76* 5.93* 5.12* 5.87*  5.84* 4.14*   CALCIUM 8.2* 7.6* 8.2* 8.1* 8.4*  8.4* 7.9*  8.3*   PHOS  --   --   --  3.4 5.1* 4.4   MG  --  1.8 2.0  --  2.7*  --      Recent Labs     10/11/24  0836 10/10/24  0732 10/09/24  0643   WBC 9.1 8.4 9.8   HGB 7.4* 7.0* 7.8*   HCT 21.5* 20.3* 23.2*   * 431* 448*     No results for input(s): \"KU\", \"CLU\" in the last 720 hours.    Invalid input(s): \"LENNY\", \"CREAU\", \"PROU\"  Bladder scan volume 178 cc today 10-  Radiology    Chest x-ray 10-8-2024     FINDINGS: Right IJ catheter overlies the right atrium. NG tube has been removed.  Heart size is normal. Lungs demonstrate persistent mild atelectasis medially in  the left lower lobe. Mild left-sided pleural thickening is noted and unchanged.  Right lung is clear. No pulmonary edema.     IMPRESSION:  1. Stable atelectasis medially in the left lower lobe.  XR CHEST PORTABLE  Result Date: 10/6/2024  The enteric tube terminates in the stomach. Stable mild pulmonary edema. Electronically signed by Alexander Lazo    XR ABDOMEN (2 VIEWS)    Result Date: 10/6/2024  Nonobstructive bowel gas pattern. The NG tube tip is in the stomach. Electronically signed by BRANDI CRAIG GUIDED IVC FILTER PLACEMENT    Result Date: 10/4/2024  1.  Placement of infrarenal IVC filter without complications. Electronically signed by PRIETO CRAIG NONTUNNELED VASCULAR CATHETER > 5 YEARS    Result Date: 10/4/2024  1.  Successful placement of right internal jugular temporary dialysis catheter. The catheter is ready for immediate use. Electronically signed by PRIETO CO-ValueALBA    XR CHEST PORTABLE    Result Date: 10/4/2024  NG

## 2024-10-11 NOTE — PROGRESS NOTES
Willard Harden MD        0.9 % sodium chloride infusion   IntraVENous PRN Willard Harden MD        ondansetron (ZOFRAN-ODT) disintegrating tablet 4 mg  4 mg Oral Q8H PRN Willard Harden MD        Or    ondansetron (ZOFRAN) injection 4 mg  4 mg IntraVENous Q6H PRN Willard Harden MD   4 mg at 10/03/24 2238    acetaminophen (TYLENOL) tablet 650 mg  650 mg Oral Q6H PRN Willard Harden MD   650 mg at 10/10/24 2145    Or    acetaminophen (TYLENOL) suppository 650 mg  650 mg Rectal Q6H PRN Willard Harden MD        bisacodyl (DULCOLAX) suppository 10 mg  10 mg Rectal Daily PRN Willard Harden MD        sennosides-docusate sodium (SENOKOT-S) 8.6-50 MG tablet 1 tablet  1 tablet Oral BID Willard Harden MD   1 tablet at 10/05/24 0930    glucose chewable tablet 16 g  4 tablet Oral PRN Willard Harden MD        dextrose bolus 10% 125 mL  125 mL IntraVENous PRN Willard Harden MD        Or    dextrose bolus 10% 250 mL  250 mL IntraVENous PRN Willard Harden MD        glucagon injection 1 mg  1 mg SubCUTAneous PRN Willard Harden MD        dextrose 10 % infusion   IntraVENous Continuous PRN Willard Harden MD        insulin lispro (HUMALOG,ADMELOG) injection vial 0-4 Units  0-4 Units SubCUTAneous TID WC Willard Harden MD   2 Units at 10/10/24 1727    insulin lispro (HUMALOG,ADMELOG) injection vial 0-4 Units  0-4 Units SubCUTAneous Nightly Willard Harden MD   4 Units at 10/10/24 2154    allopurinol (ZYLOPRIM) tablet 100 mg  100 mg Oral Daily Willard Harden MD   100 mg at 10/10/24 1242    finasteride (PROSCAR) tablet 5 mg  5 mg Oral Daily Willard Harden MD   5 mg at 10/10/24 1242    tamsulosin (FLOMAX) capsule 0.4 mg  0.4 mg Oral Daily Willard Harden MD   0.4 mg at 10/10/24 1242    thiamine mononitrate tablet 100 mg  100 mg Oral Daily Willard Harden MD   100 mg at 10/10/24 1242    sodium bicarbonate tablet 650 mg  650 mg Oral BID Willard Harden MD   650 mg at 10/10/24 9717     multivitamin 1 tablet  1 tablet Oral Daily Willard Harden MD   1 tablet at 10/10/24 1242    lidocaine (XYLOCAINE) 2 % uro-jet   Topical PRN Willard Harden MD               Recent Results (from the past 24 hour(s))   CBC with Auto Differential    Collection Time: 10/10/24  7:32 AM   Result Value Ref Range    WBC 8.4 4.1 - 11.1 K/uL    RBC 2.31 (L) 4.10 - 5.70 M/uL    Hemoglobin 7.0 (L) 12.1 - 17.0 g/dL    Hematocrit 20.3 (L) 36.6 - 50.3 %    MCV 87.9 80.0 - 99.0 FL    MCH 30.3 26.0 - 34.0 PG    MCHC 34.5 30.0 - 36.5 g/dL    RDW 13.5 11.5 - 14.5 %    Platelets 431 (H) 150 - 400 K/uL    MPV 8.8 (L) 8.9 - 12.9 FL    Nucleated RBCs 0.0 0.0  WBC    nRBC 0.00 0.00 - 0.01 K/uL    Neutrophils % 74 32 - 75 %    Band Neutrophils 1 0 - 6 %    Lymphocytes % 13 12 - 49 %    Monocytes % 8 5 - 13 %    Eosinophils % 2 0 - 7 %    Basophils % 0 0 - 1 %    Metamyelocytes 2 (H) 0 %    Immature Granulocytes % 0 %    Neutrophils Absolute 6.3 1.8 - 8.0 K/UL    Lymphocytes Absolute 1.1 0.8 - 3.5 K/UL    Monocytes Absolute 0.7 0.0 - 1.0 K/UL    Eosinophils Absolute 0.2 0.0 - 0.4 K/UL    Basophils Absolute 0.0 0.0 - 0.1 K/UL    Immature Granulocytes Absolute 0.0 K/UL    Differential Type Manual      RBC Comment Normocytic, Normochromic     Comprehensive Metabolic Panel w/ Reflex to MG    Collection Time: 10/10/24  7:32 AM   Result Value Ref Range    Sodium 131 (L) 136 - 145 mmol/L    Potassium 3.1 (L) 3.5 - 5.1 mmol/L    Chloride 98 97 - 108 mmol/L    CO2 24 21 - 32 mmol/L    Anion Gap 9 2 - 12 mmol/L    Glucose 189 (H) 65 - 100 mg/dL    BUN 48 (H) 6 - 20 mg/dL    Creatinine 6.76 (H) 0.70 - 1.30 mg/dL    BUN/Creatinine Ratio 7 (L) 12 - 20      Est, Glom Filt Rate 7 (L) >60 ml/min/1.73m2    Calcium 7.6 (L) 8.5 - 10.1 mg/dL    Total Bilirubin 0.2 0.2 - 1.0 mg/dL    AST 15 15 - 37 U/L    ALT 13 12 - 78 U/L    Alk Phosphatase 61 45 - 117 U/L    Total Protein 5.9 (L) 6.4 - 8.2 g/dL    Albumin 1.4 (L) 3.5 - 5.0 g/dL    Globulin 4.5 (H)

## 2024-10-11 NOTE — PLAN OF CARE
Problem: Discharge Planning  Goal: Discharge to home or other facility with appropriate resources  10/10/2024 2251 by Kelly Lorenzo RN  Outcome: Progressing  Flowsheets (Taken 10/10/2024 2030)  Discharge to home or other facility with appropriate resources: Identify barriers to discharge with patient and caregiver  10/10/2024 1615 by Orin Hidalgo RN  Outcome: Progressing  Flowsheets (Taken 10/10/2024 0745)  Discharge to home or other facility with appropriate resources:   Identify barriers to discharge with patient and caregiver   Arrange for needed discharge resources and transportation as appropriate   Identify discharge learning needs (meds, wound care, etc)     Problem: Skin/Tissue Integrity  Goal: Absence of new skin breakdown  Description: 1.  Monitor for areas of redness and/or skin breakdown  2.  Assess vascular access sites hourly  3.  Every 4-6 hours minimum:  Change oxygen saturation probe site  4.  Every 4-6 hours:  If on nasal continuous positive airway pressure, respiratory therapy assess nares and determine need for appliance change or resting period.  10/10/2024 2251 by Kelly Lorenzo RN  Outcome: Progressing  10/10/2024 1615 by Orin Hidalgo RN  Outcome: Progressing     Problem: Safety - Adult  Goal: Free from fall injury  10/10/2024 2251 by Kelly Lorenzo RN  Outcome: Progressing  Flowsheets (Taken 10/10/2024 2250)  Free From Fall Injury: Instruct family/caregiver on patient safety  10/10/2024 1615 by Orin Hidalgo RN  Outcome: Progressing     Problem: ABCDS Injury Assessment  Goal: Absence of physical injury  10/10/2024 2251 by Kelly Lorenzo RN  Outcome: Progressing  Flowsheets (Taken 10/10/2024 2250)  Absence of Physical Injury: Implement safety measures based on patient assessment  10/10/2024 1615 by Orin Hidalgo RN  Outcome: Progressing     Problem: Chronic Conditions and Co-morbidities  Goal: Patient's chronic conditions and co-morbidity symptoms are monitored

## 2024-10-11 NOTE — PROGRESS NOTES
CBC/BMP/Magnesium    Diagnostic data reviewed by myself   EKG/Telemetry strip consistent with NSR    Toxic drug monitoring    Flomax, monitor for hypotension    Discussed case with   ICU attending    YOKASTA Discussion: Patient with numerous medical comorbidities, each with increased risk for mortality and morbidity if left untreated. Patient requires medications with high risk of toxicity and need  for intensive monitoring. I have reviewed patient's presenting subjective and objective findings, as well as all laboratory studies, imaging studies, and vital signs to date as well as treatment rendered and patient's response to those treatments. In addition, prior medical, surgical and relevant social and family histories were reviewed.     This is dictation was done by dragon, computer voice recognition software. Quite often unanticipated grammatical, syntax, homophones and other interpretive errors or inadvertently transcribed by the computer software. Please excuse errors that have escaped final proofreading. Thank you.      Reviewed most current lab test results and cultures  YES  Reviewed most current radiology test results   YES  Review and summation of old records today    NO  Reviewed patient's current orders and MAR    YES  PMH/SH reviewed - no change compared to H&P          Assessment / Plan:  Acute respiratory failure with hypoxia  Volume overload  Acute on chronic kidney disease stage III requiring initiation of dialysis  Of note patient currently remains hemodynamically stable, antibiotics discontinued, tolerating dialysis  Continue dialysis as per nephrology recommendations  Attempt to wean oxygen  Continue to monitor patient's respiratory status  Nephrology consult appreciated, continue to follow recommendations  Pulmonology consult appreciated, continue to follow recommendations    Concern for ileus  Small bowel obstruction  Currently resolved, started on PO diet  Continue to monitor patients clinical  status  General Surgery consult appreciated, continue to follow recommendations    Hypokalemia-To be addressed with Dialysis, will follow up repeat potassium to assess for resolution    Urinary retention-of note patient evaluated by urology, continue supportive care at this time  Urology consult appreciated, continue to follow recommendations  Continue Flomax  Continue Proscar    Chronic gout-continue medications    Prophylaxis-SCDs  FEN-Cardiac diet, replete potassium and magnesium  Full code, will clarify about surrogate decision maker    Disposition-pending clinical improvement, nephrology clearance, permacath placement, OP dialysis setup, SNF placement greater than 72 hours      25.0 - 29.9 Overweight / Body mass index is 26.26 kg/m².    Code status: Full code  Prophylaxis: SCD's  Recommended Disposition: TBD     ________________________________________________________________________  Care Plan discussed with:    Comments   Patient x    Family      RN x    Care Manager x    Consultant  x                     x Multidiciplinary team rounds were held today with , nursing, pharmacist and clinical coordinator.  Patient's plan of care was discussed; medications were reviewed and discharge planning was addressed.     ________________________________________________________________________  Total NON critical care TIME:  35   Minutes          Comments   >50% of visit spent in counseling and coordination of care x    ________________________________________________________________________  Poncho Quarles MD

## 2024-10-11 NOTE — CARE COORDINATION
1415: CM met with family at [facility]. [facility]formed that [facility], need for HD chair setup. CM asked benefits to screen for LTC.       0842: CM reviewed chart. CM spoke with [facility], stated acute rehab may not be good fit at this time and patient is not eligible for SNF benefit.

## 2024-10-11 NOTE — CONSULTS
Consult  Pulmonary, Critical Care    Name: Michael Fung MRN: [id number]   : 1940 Hospital: [facility]   Date: 10/11/2024  Admission date: 10/3/2024 Hospital Day: 9       Subjective/Interval History:   Seen in the dialysis unit he is confused does not know why he is in the hospital or that he is in the hospital.  Was admitted at [facility] with cough severe hypoxia found to have bilateral DVT of the legs was placed on heparin subsequently developed severe hemoptysis heparin was discontinued and IVC filter placed.  He was transferred to our facility had CT scan which shows distended esophagus and stomach possible ileus with scattered infiltrates throughout the left lung no documentation of vomiting but his history is not reliable at all.  He has had a VQ scan that was low probability but does show matching defect due to renal insufficiency he did not have a CTA of the chest he initially required high flow nasal oxygen 45% FiO2 reportedly has a meter oxygen saturation 95% today although he is wearing oxygen while receiving dialysis    Patient Active Problem List   Diagnosis    Acute hypoxic respiratory failure    CATHY (acute kidney injury) (HCC)    Hemoptysis    Community acquired pneumonia    Acute deep vein thrombosis (DVT) of femoral vein of both lower extremities (HCC)    DM (diabetes mellitus), type 2 (HCC)    Decreased mobility and endurance    Acute upper GI bleed    Ileus (HCC)    Acute urethral obstruction    Incomplete bladder emptying        IMPRESSION:   Acute hypoxic respiratory failure likely due to a combination of aspiration pneumonia probable pulmonary emboli and pulmonary edema/fluid overload from renal failure now on room air  Pulmonary edema secondary to fluid retention from renal failure  Aspiration pneumonia has diffuse left-sided infiltrates very likely had aspiration  Gastro paresis esophageal dysmotility had distended esophagus and stomach on CT

## 2024-10-12 LAB
GLUCOSE BLD STRIP.AUTO-MCNC: 140 MG/DL (ref 65–100)
GLUCOSE BLD STRIP.AUTO-MCNC: 157 MG/DL (ref 65–100)
GLUCOSE BLD STRIP.AUTO-MCNC: 251 MG/DL (ref 65–100)
GLUCOSE BLD STRIP.AUTO-MCNC: 347 MG/DL (ref 65–100)
PERFORMED BY:: ABNORMAL

## 2024-10-12 PROCEDURE — 6370000000 HC RX 637 (ALT 250 FOR IP): Performed by: INTERNAL MEDICINE

## 2024-10-12 PROCEDURE — 2709999900 HC NON-CHARGEABLE SUPPLY

## 2024-10-12 PROCEDURE — 2580000003 HC RX 258: Performed by: INTERNAL MEDICINE

## 2024-10-12 PROCEDURE — 1100000000 HC RM PRIVATE

## 2024-10-12 PROCEDURE — 82962 GLUCOSE BLOOD TEST: CPT

## 2024-10-12 PROCEDURE — 51798 US URINE CAPACITY MEASURE: CPT

## 2024-10-12 PROCEDURE — 90935 HEMODIALYSIS ONE EVALUATION: CPT

## 2024-10-12 PROCEDURE — 6360000002 HC RX W HCPCS: Performed by: INTERNAL MEDICINE

## 2024-10-12 RX ADMIN — TAMSULOSIN HYDROCHLORIDE 0.4 MG: 0.4 CAPSULE ORAL at 12:45

## 2024-10-12 RX ADMIN — THIAMINE HCL TAB 100 MG 100 MG: 100 TAB at 12:45

## 2024-10-12 RX ADMIN — INSULIN LISPRO 4 UNITS: 100 INJECTION, SOLUTION INTRAVENOUS; SUBCUTANEOUS at 21:25

## 2024-10-12 RX ADMIN — SODIUM BICARBONATE 650 MG: 650 TABLET ORAL at 12:45

## 2024-10-12 RX ADMIN — THERA TABS 1 TABLET: TAB at 12:45

## 2024-10-12 RX ADMIN — PIPERACILLIN AND TAZOBACTAM 3375 MG: 3; .375 INJECTION, POWDER, FOR SOLUTION INTRAVENOUS; PARENTERAL at 23:28

## 2024-10-12 RX ADMIN — ALLOPURINOL 100 MG: 100 TABLET ORAL at 12:45

## 2024-10-12 RX ADMIN — SODIUM BICARBONATE 650 MG: 650 TABLET ORAL at 21:25

## 2024-10-12 RX ADMIN — SODIUM CHLORIDE, PRESERVATIVE FREE 10 ML: 5 INJECTION INTRAVENOUS at 12:44

## 2024-10-12 RX ADMIN — HEPARIN SODIUM 2500 UNITS: 1000 INJECTION INTRAVENOUS; SUBCUTANEOUS at 11:40

## 2024-10-12 RX ADMIN — SENNOSIDES AND DOCUSATE SODIUM 1 TABLET: 50; 8.6 TABLET ORAL at 12:45

## 2024-10-12 RX ADMIN — ACETAMINOPHEN 650 MG: 325 TABLET ORAL at 21:25

## 2024-10-12 RX ADMIN — INSULIN LISPRO 2 UNITS: 100 INJECTION, SOLUTION INTRAVENOUS; SUBCUTANEOUS at 18:09

## 2024-10-12 RX ADMIN — FINASTERIDE 5 MG: 5 TABLET, FILM COATED ORAL at 12:45

## 2024-10-12 RX ADMIN — PIPERACILLIN AND TAZOBACTAM 3375 MG: 3; .375 INJECTION, POWDER, FOR SOLUTION INTRAVENOUS; PARENTERAL at 12:43

## 2024-10-12 ASSESSMENT — PAIN SCALES - GENERAL
PAINLEVEL_OUTOF10: 0
PAINLEVEL_OUTOF10: 0

## 2024-10-12 NOTE — PLAN OF CARE
Problem: Skin/Tissue Integrity  Goal: Absence of new skin breakdown  Description: 1.  Monitor for areas of redness and/or skin breakdown  2.  Assess vascular access sites hourly  3.  Every 4-6 hours minimum:  Change oxygen saturation probe site  4.  Every 4-6 hours:  If on nasal continuous positive airway pressure, respiratory therapy assess nares and determine need for appliance change or resting period.  10/11/2024 1659 by Orin Hidalgo, RN  Outcome: Progressing

## 2024-10-12 NOTE — PROGRESS NOTES
Hospitalist Consult Note    NAME: Michael Fung   :  1940   MRN:  [id number]        Hospital course:  BRIEF PATIENT SUMMARY AND HOSPITAL COURSE   Michael Fung is a 83-year-old male presented to outside hospital on  for cough and shortness of breath found to be hypoxic and have x-ray concerning for community-acquired pneumonia in the left mid and lower lung fields.  Initiated on community-acquired pneumonia therapy with ceftriaxone and azithromycin.  Patient also noted to have elevated creatinine of 4.5 which is his baseline patient is diuretic dependent.  Became oliguric during the course of the hospitalization despite Lasix 80 mg.  Lower extremity ultrasound obtained for elevated D-dimer.  Showed bilateral DVTs in the right popliteal and 2 in the femoral and popliteal on the left.  Had been initiated on a heparin drip 10/ evening patient on supratherapeutic heparin developed hemoptysis.  Substantial enough to stop heparin drip but unable to quantify.  Patient on mid flow oxygen with low sats.  Transferred to our ICU on BiPAP transitioned to high flow nasal cannula 50 L, 40% with sats of 97%.  Had a low prop VQ scan on 10/1 for renal failure with potential need for dialysis creatinine increased to 6 and oliguric.  Bladder scan negative no Muñoz catheter or straight cath inserted, worsening hypoxemia from [facility].  Most of history obtained from patient's daughter and in conversation with transferring facility much of chart did not make it over with transfer.  Patient was subsequently admitted to medical ICU, evaluated by nephrology, initiated on dialysis, evaluated by urology for urinary retention, recommended supportive care, following which patient's clinical status improved, serum creatinine improved, subsequent to which patient was transferred out of the ICU and accepted to the hospitalist service    Subjective:     Chief Complaint / Reason for Physician Visit

## 2024-10-12 NOTE — PROGRESS NOTES
[facility]     Renal Daily Progress Note:     Subjective: Patient underwent dialysis today.  This would be his third dialysis.  Family at the bedside.  Seen around 4 PM.  Has been confused and intermittently agitated.  Now has a sitter at the bedside.  Appetite fair, ate some breakfast and a little bit of lunch.  No nausea or vomiting.  No chest or abdominal pain.  Still making urine.  Muñoz not placed    10/8 more awake today.  Ate breakfast without nausea or vomiting.  Urine output not quantified.  However, today serum creatinine is only 5.12.  Will see if tomorrow's creatinine is less than that which was seen on Saturday.  Not short of breath,    10/9 awake and comfortable.  Seems calm.  Still with sitter is in the room.  Creatinine is trending up.  Will perform dialysis tomorrow.    10/10 seems calm and appropriate.  Wife at bedside.  Still has a sitter.  Had dialysis today without issue.    10/11/24 For HD in AM. Overall improvement.    10/12/24 Seen during HD in the HD suite in the presence of acute HD RN Missael      Review of Systems  Pertinent items are noted in HPI.    Objective:     BP (!) 147/62   Pulse 79   Temp 98.5 °F (36.9 °C)   Resp 19   Ht 1.676 m (5' 6\")   Wt 72.8 kg (160 lb 7.9 oz)   SpO2 96%   BMI 25.90 kg/m²   Temp (24hrs), Av.6 °F (37 °C), Min:98.3 °F (36.8 °C), Max:98.9 °F (37.2 °C)        Intake/Output Summary (Last 24 hours) at 10/12/2024 1141  Last data filed at 10/12/2024 1130  Gross per 24 hour   Intake 3340 ml   Output 1850 ml   Net 1490 ml       Current Facility-Administered Medications   Medication Dose Route Frequency    0.9 % sodium chloride infusion   IntraVENous Continuous    piperacillin-tazobactam (ZOSYN) 3,375 mg in sodium chloride 0.9 % 50 mL IVPB (mini-bag)  3,375 mg IntraVENous Q12H    potassium bicarb-citric acid (EFFER-K) effervescent tablet 40 mEq  40 mEq Oral Once    0.9 % sodium chloride infusion   IntraVENous PRN    heparin (porcine) injection 2,500  Performing Technologist:  Roxanne Issa RT(R), RDMS, RVT, RDCS   Electronically Signed by Tony Jones MD 10/2/2024 3:59 PM    NM LUNG VENT/PERFUSION (VQ)    Result Date: 10/1/2024  Low probability for pulmonary embolus. Electronically Signed by Christos Herman MD 10/1/2024 1:45 PM     Assessment:   Renal Specific Problems  Acute on chronic renal failure stage V. On HD now. Tolerating the Tx well.  Volume overload  DVT right popliteal vein and left femoral/popliteal vein  Left multifocal pneumonia  Status post placement of IVC filter  Small bowel ileus-resolved      Plan:     Obtain/ Order: labs/cultures/radiology/procedures:     Therapeutic:    No significant urine output.  Serum creatinine up today.  Tolerated 3 hours of dialysis without issue.  Discussed with patient's wife and patient.  Will need chronic dialysis.  We will set him up in the unit and self he will [place].  Will need catheter change to a PermCath  Will need skilled nursing for rehab purposes.  May benefit from twice a week dialysis as a palliative measure.  Will set him up with a Tuesday Saturday schedule while here    Will discuss with the daughters next week.

## 2024-10-12 NOTE — CONSULTS
Consult  Pulmonary, Critical Care    Name: Michael Fung MRN: [id number]   : 1940 Hospital: [facility]   Date: 10/12/2024  Admission date: 10/3/2024 Hospital Day: 10       Subjective/Interval History:   Seen in the dialysis unit he is confused does not know why he is in the hospital or that he is in the hospital.  Was admitted at [facility] with cough severe hypoxia found to have bilateral DVT of the legs was placed on heparin subsequently developed severe hemoptysis heparin was discontinued and IVC filter placed.  He was transferred to our facility had CT scan which shows distended esophagus and stomach possible ileus with scattered infiltrates throughout the left lung no documentation of vomiting but his history is not reliable at all.  He has had a VQ scan that was low probability but does show matching defect due to renal insufficiency he did not have a CTA of the chest he initially required high flow nasal oxygen 45% FiO2 reportedly has a meter oxygen saturation 95% today although he is wearing oxygen while receiving dialysis    Patient Active Problem List   Diagnosis    Acute hypoxic respiratory failure    CATHY (acute kidney injury) (HCC)    Hemoptysis    Community acquired pneumonia    Acute deep vein thrombosis (DVT) of femoral vein of both lower extremities (HCC)    DM (diabetes mellitus), type 2 (HCC)    Decreased mobility and endurance    Acute upper GI bleed    Ileus (HCC)    Acute urethral obstruction    Incomplete bladder emptying        IMPRESSION:   Acute hypoxic respiratory failure likely due to a combination of aspiration pneumonia probable pulmonary emboli and pulmonary edema/fluid overload from renal failure now on room air  Pulmonary edema secondary to fluid retention from renal failure  Aspiration pneumonia has diffuse left-sided infiltrates very likely had aspiration  Gastro paresis esophageal dysmotility had distended esophagus and stomach on CT  Oral BID Willard Harden MD   650 mg at 10/11/24 2032    multivitamin 1 tablet  1 tablet Oral Daily Willard Harden MD   1 tablet at 10/11/24 0918    lidocaine (XYLOCAINE) 2 % uro-jet   Topical PRN Willard Harden MD            Patient PCP: No primary care provider on file.  PMH:  has no past medical history on file.  PSH:   has a past surgical history that includes IR NONTUNNELED VASCULAR CATHETER > 5 YEARS (10/4/2024) and IR GUIDED IVC FILTER PLACEMENT (10/4/2024).   FHX: family history is not on file.   SHX:  reports that he has never smoked. He has never been exposed to tobacco smoke. He has never used smokeless tobacco. Alcohol use questions deferred to the physician. Drug use questions deferred to the physician.     Systemic review unreliable he is very confused    Objective:     Vital Signs: Telemetry:    normal sinus rhythm Intake/Output:   BP (!) 161/67   Pulse 76   Temp 98.5 °F (36.9 °C)   Resp 18   Ht 1.676 m (5' 6\")   Wt 72.8 kg (160 lb 7.9 oz)   SpO2 96%   BMI 25.90 kg/m²     Temp (24hrs), Av.6 °F (37 °C), Min:98.3 °F (36.8 °C), Max:98.9 °F (37.2 °C)        O2 Flow Rate (L/min): 2 L/min O2 Device: None (Room air)       Wt Readings from Last 4 Encounters:   10/12/24 72.8 kg (160 lb 7.9 oz)          Intake/Output Summary (Last 24 hours) at 10/12/2024 1003  Last data filed at 10/12/2024 0619  Gross per 24 hour   Intake 480 ml   Output 250 ml   Net 230 ml       Last shift:      No intake/output data recorded.  Last 3 shifts: 10/10 1901 - 10/12 0700  In: 680 [P.O.:680]  Out: 500 [Urine:500]       Physical Exam:   General:  male; confused but is awake will follow simple commands no distress no accessory muscle use  HEENT: NCAT, normal oral mucosa  Eyes: anicteric; conjunctiva clear extraocular movements intact  Neck: no nodes, no JVD no accessory MM use.  Chest: no deformity,   Cardiac: Regular rate and rhythm  Lungs: Clear anterior and right lung with rales present left

## 2024-10-13 ENCOUNTER — APPOINTMENT (OUTPATIENT)
Facility: HOSPITAL | Age: 84
End: 2024-10-13
Attending: INTERNAL MEDICINE
Payer: OTHER GOVERNMENT

## 2024-10-13 LAB
BASOPHILS # BLD: 0 K/UL (ref 0–0.1)
BASOPHILS NFR BLD: 0 % (ref 0–1)
DIFFERENTIAL METHOD BLD: ABNORMAL
EOSINOPHIL # BLD: 0.2 K/UL (ref 0–0.4)
EOSINOPHIL NFR BLD: 2 % (ref 0–7)
ERYTHROCYTE [DISTWIDTH] IN BLOOD BY AUTOMATED COUNT: 13.8 % (ref 11.5–14.5)
GLUCOSE BLD STRIP.AUTO-MCNC: 185 MG/DL (ref 65–100)
GLUCOSE BLD STRIP.AUTO-MCNC: 265 MG/DL (ref 65–100)
GLUCOSE BLD STRIP.AUTO-MCNC: 288 MG/DL (ref 65–100)
GLUCOSE BLD STRIP.AUTO-MCNC: 364 MG/DL (ref 65–100)
HCT VFR BLD AUTO: 18.5 % (ref 36.6–50.3)
HGB BLD-MCNC: 6.2 G/DL (ref 12.1–17)
IMM GRANULOCYTES # BLD AUTO: 0.1 K/UL (ref 0–0.04)
IMM GRANULOCYTES NFR BLD AUTO: 1 % (ref 0–0.5)
LYMPHOCYTES # BLD: 1 K/UL (ref 0.8–3.5)
LYMPHOCYTES NFR BLD: 12 % (ref 12–49)
MCH RBC QN AUTO: 30 PG (ref 26–34)
MCHC RBC AUTO-ENTMCNC: 33.5 G/DL (ref 30–36.5)
MCV RBC AUTO: 89.4 FL (ref 80–99)
MONOCYTES # BLD: 0.9 K/UL (ref 0–1)
MONOCYTES NFR BLD: 10 % (ref 5–13)
NEUTS SEG # BLD: 6.3 K/UL (ref 1.8–8)
NEUTS SEG NFR BLD: 75 % (ref 32–75)
NRBC # BLD: 0 K/UL (ref 0–0.01)
NRBC BLD-RTO: 0 PER 100 WBC
PERFORMED BY:: ABNORMAL
PLATELET # BLD AUTO: 427 K/UL (ref 150–400)
PMV BLD AUTO: 8.8 FL (ref 8.9–12.9)
RBC # BLD AUTO: 2.07 M/UL (ref 4.1–5.7)
RBC MORPH BLD: ABNORMAL
RBC MORPH BLD: ABNORMAL
WBC # BLD AUTO: 8.5 K/UL (ref 4.1–11.1)

## 2024-10-13 PROCEDURE — 87040 BLOOD CULTURE FOR BACTERIA: CPT

## 2024-10-13 PROCEDURE — 86900 BLOOD TYPING SEROLOGIC ABO: CPT

## 2024-10-13 PROCEDURE — 86901 BLOOD TYPING SEROLOGIC RH(D): CPT

## 2024-10-13 PROCEDURE — 71045 X-RAY EXAM CHEST 1 VIEW: CPT

## 2024-10-13 PROCEDURE — 6360000002 HC RX W HCPCS: Performed by: INTERNAL MEDICINE

## 2024-10-13 PROCEDURE — 1100000000 HC RM PRIVATE

## 2024-10-13 PROCEDURE — 51798 US URINE CAPACITY MEASURE: CPT

## 2024-10-13 PROCEDURE — 82962 GLUCOSE BLOOD TEST: CPT

## 2024-10-13 PROCEDURE — 2580000003 HC RX 258: Performed by: INTERNAL MEDICINE

## 2024-10-13 PROCEDURE — 86923 COMPATIBILITY TEST ELECTRIC: CPT

## 2024-10-13 PROCEDURE — 36415 COLL VENOUS BLD VENIPUNCTURE: CPT

## 2024-10-13 PROCEDURE — 6370000000 HC RX 637 (ALT 250 FOR IP): Performed by: INTERNAL MEDICINE

## 2024-10-13 PROCEDURE — 86850 RBC ANTIBODY SCREEN: CPT

## 2024-10-13 PROCEDURE — 85025 COMPLETE CBC W/AUTO DIFF WBC: CPT

## 2024-10-13 RX ORDER — SODIUM CHLORIDE 9 MG/ML
INJECTION, SOLUTION INTRAVENOUS PRN
Status: DISCONTINUED | OUTPATIENT
Start: 2024-10-13 | End: 2024-10-14

## 2024-10-13 RX ADMIN — SODIUM CHLORIDE, PRESERVATIVE FREE 10 ML: 5 INJECTION INTRAVENOUS at 22:00

## 2024-10-13 RX ADMIN — SODIUM BICARBONATE 650 MG: 650 TABLET ORAL at 22:00

## 2024-10-13 RX ADMIN — INSULIN LISPRO 2 UNITS: 100 INJECTION, SOLUTION INTRAVENOUS; SUBCUTANEOUS at 12:11

## 2024-10-13 RX ADMIN — THERA TABS 1 TABLET: TAB at 09:39

## 2024-10-13 RX ADMIN — TAMSULOSIN HYDROCHLORIDE 0.4 MG: 0.4 CAPSULE ORAL at 09:39

## 2024-10-13 RX ADMIN — ACETAMINOPHEN 650 MG: 325 TABLET ORAL at 18:19

## 2024-10-13 RX ADMIN — INSULIN LISPRO 4 UNITS: 100 INJECTION, SOLUTION INTRAVENOUS; SUBCUTANEOUS at 22:00

## 2024-10-13 RX ADMIN — PIPERACILLIN AND TAZOBACTAM 3375 MG: 3; .375 INJECTION, POWDER, FOR SOLUTION INTRAVENOUS; PARENTERAL at 12:08

## 2024-10-13 RX ADMIN — FINASTERIDE 5 MG: 5 TABLET, FILM COATED ORAL at 09:39

## 2024-10-13 RX ADMIN — THIAMINE HCL TAB 100 MG 100 MG: 100 TAB at 09:39

## 2024-10-13 RX ADMIN — PIPERACILLIN AND TAZOBACTAM 3375 MG: 3; .375 INJECTION, POWDER, FOR SOLUTION INTRAVENOUS; PARENTERAL at 22:14

## 2024-10-13 RX ADMIN — INSULIN LISPRO 2 UNITS: 100 INJECTION, SOLUTION INTRAVENOUS; SUBCUTANEOUS at 18:19

## 2024-10-13 RX ADMIN — SODIUM CHLORIDE, PRESERVATIVE FREE 10 ML: 5 INJECTION INTRAVENOUS at 12:06

## 2024-10-13 RX ADMIN — ALLOPURINOL 100 MG: 100 TABLET ORAL at 09:39

## 2024-10-13 RX ADMIN — SODIUM BICARBONATE 650 MG: 650 TABLET ORAL at 09:39

## 2024-10-13 ASSESSMENT — PAIN SCALES - GENERAL
PAINLEVEL_OUTOF10: 0
PAINLEVEL_OUTOF10: 0

## 2024-10-13 NOTE — PROGRESS NOTES
Hospitalist Consult Note    NAME: Michael Fung   :  1940   MRN:  [id number]        Hospital course:  BRIEF PATIENT SUMMARY AND HOSPITAL COURSE   Michael Fung is a 83-year-old male presented to outside hospital on  for cough and shortness of breath found to be hypoxic and have x-ray concerning for community-acquired pneumonia in the left mid and lower lung fields.  Initiated on community-acquired pneumonia therapy with ceftriaxone and azithromycin.  Patient also noted to have elevated creatinine of 4.5 which is his baseline patient is diuretic dependent.  Became oliguric during the course of the hospitalization despite Lasix 80 mg.  Lower extremity ultrasound obtained for elevated D-dimer.  Showed bilateral DVTs in the right popliteal and 2 in the femoral and popliteal on the left.  Had been initiated on a heparin drip 10/ evening patient on supratherapeutic heparin developed hemoptysis.  Substantial enough to stop heparin drip but unable to quantify.  Patient on mid flow oxygen with low sats.  Transferred to our ICU on BiPAP transitioned to high flow nasal cannula 50 L, 40% with sats of 97%.  Had a low prop VQ scan on 10/1 for renal failure with potential need for dialysis creatinine increased to 6 and oliguric.  Bladder scan negative no Muñoz catheter or straight cath inserted, worsening hypoxemia from [facility].  Most of history obtained from patient's daughter and in conversation with transferring facility much of chart did not make it over with transfer.  Patient was subsequently admitted to medical ICU, evaluated by nephrology, initiated on dialysis, evaluated by urology for urinary retention, recommended supportive care, following which patient's clinical status improved, serum creatinine improved, subsequent to which patient was transferred out of the ICU and accepted to the hospitalist service    Subjective:     Chief Complaint / Reason for Physician Visit

## 2024-10-13 NOTE — PLAN OF CARE
Administer antiarrhythmia medication and electrolyte replacement as ordered     Problem: Neurosensory - Adult  Goal: Achieves stable or improved neurological status  Outcome: Progressing  Flowsheets (Taken 10/12/2024 2141)  Achieves stable or improved neurological status: Assess for and report changes in neurological status  Goal: Absence of seizures  Outcome: Progressing  Flowsheets (Taken 10/12/2024 2141)  Absence of seizures:   Monitor for seizure activity.  If seizure occurs, document type and location of movements and any associated apnea   If seizure occurs, turn head to side and suction secretions as needed   Administer anticonvulsants as ordered  Goal: Remains free of injury related to seizures activity  Outcome: Progressing  Flowsheets (Taken 10/12/2024 2141)  Remains free of injury related to seizure activity:   Maintain airway, patient safety  and administer oxygen as ordered   Monitor patient for seizure activity, document and report duration and description of seizure to Licensed Independent Practitioner   If seizure occurs, turn patient to side and suction secretions as needed   Reorient patient post seizure  Goal: Achieves maximal functionality and self care  Outcome: Progressing  Flowsheets (Taken 10/12/2024 2141)  Achieves maximal functionality and self care:   Monitor swallowing and airway patency with patient fatigue and changes in neurological status   Encourage and assist patient to increase activity and self care with guidance from physical therapy/occupational therapy   Encourage visually impaired, hearing impaired and aphasic patients to use assistive/communication devices     Problem: Musculoskeletal - Adult  Goal: Return mobility to safest level of function  Outcome: Progressing  Flowsheets (Taken 10/12/2024 2141)  Return Mobility to Safest Level of Function:   Assess patient stability and activity tolerance for standing, transferring and ambulating with or without assistive devices    replacement as ordered   Monitor response to electrolyte replacements, including repeat lab results as appropriate  Goal: Hemodynamic stability and optimal renal function maintained  Outcome: Progressing  Flowsheets (Taken 10/12/2024 2141)  Hemodynamic stability and optimal renal function maintained:   Monitor labs and assess for signs and symptoms of volume excess or deficit   Monitor intake, output and patient weight   Encourage oral intake as appropriate  Goal: Glucose maintained within prescribed range  Outcome: Progressing  Flowsheets (Taken 10/12/2024 2141)  Glucose maintained within prescribed range:   Monitor blood glucose as ordered   Assess for signs and symptoms of hyperglycemia and hypoglycemia   Administer ordered medications to maintain glucose within target range     Problem: Gastrointestinal - Adult  Goal: Minimal or absence of nausea and vomiting  Outcome: Progressing  Flowsheets (Taken 10/12/2024 2141)  Minimal or absence of nausea and vomiting:   Administer IV fluids as ordered to ensure adequate hydration   Administer ordered antiemetic medications as needed  Goal: Maintains or returns to baseline bowel function  Outcome: Progressing  Flowsheets (Taken 10/12/2024 2141)  Maintains or returns to baseline bowel function:   Assess bowel function   Encourage oral fluids to ensure adequate hydration   Administer ordered medications as needed   Administer IV fluids as ordered to ensure adequate hydration   Encourage mobilization and activity  Goal: Maintains adequate nutritional intake  Outcome: Progressing  Flowsheets (Taken 10/12/2024 2141)  Maintains adequate nutritional intake:   Monitor percentage of each meal consumed   Identify factors contributing to decreased intake, treat as appropriate   Monitor intake and output, weight and lab values  Goal: Establish and maintain optimal ostomy function  Outcome: Progressing     Problem: Genitourinary - Adult  Goal: Absence of urinary retention  Outcome:

## 2024-10-13 NOTE — CONSULTS
10/4/2024  1.  Successful placement of right internal jugular temporary dialysis catheter. The catheter is ready for immediate use. Electronically signed by PRIETO SOUZA    XR CHEST PORTABLE    Result Date: 10/4/2024  NG tube courses into the stomach. Increased pulmonary edema.. Electronically signed by Jona Givens    CT CHEST ABDOMEN PELVIS WO CONTRAST Additional Contrast? None    Result Date: 10/4/2024  1. There are bilateral lung infiltrates which may represent infection, aspiration, or less likely infarction. 2. Trace bilateral pleural effusions. 3. There is significant fluid distention of the stomach, and there are mildly dilated proximal small bowel loops in the mid abdomen. Findings may be related to a ileus versus proximal, partial obstruction. The patient may benefit from NG tube decompression. 4. There is a left inguinal hernia containing a nonobstructed loop of distal colon. Electronically signed by BRANDI URAINA    XR CHEST PORTABLE    Result Date: 10/3/2024  Small to moderate left pleural effusion with associated atelectasis. Electronically signed by RAISA PEÑA    CT CHEST WO CONTRAST    Result Date: 10/3/2024  1.  Left upper lobe, lingula, and left lower lobe infiltrates, suspicious for multifocal pneumonia. 2.  Prominent distention of the esophagus and stomach which are nonspecific. Gastric outlet obstruction cannot be excluded. 3.  Coronary artery calcifications. Electronically Signed by Fernandez Caicedo MD 10/3/2024 8:28 AM    XR CHEST 1 VIEW    Result Date: 10/2/2024  1. Hazy airspace disease within the left mid and lower lung, which may reflect underlying pneumonia. Electronically Signed by Conor Noyola DO 10/2/2024 11:18 PM    US upper extremity veins bilateral    Result Date: 10/2/2024  No evidence for DVT within the bilateral upper extremities. Performing Technologist:  Roxanne Issa RT(R), RDMS, RVT, RDCS   Electronically Signed by Marquis Arvizu MD 10/2/2024 5:04 PM    US lower extremity  veins bilateral    Result Date: 10/2/2024  Evidence for acute nonocclusive DVT within the right popliteal vein and left femoral and popliteal veins. Performing Technologist:  Roxanne Issa RT(R), RDMS, RVT, RDCS   Electronically Signed by Tony Jones MD 10/2/2024 3:59 PM    NM LUNG VENT/PERFUSION (VQ)    Result Date: 10/1/2024  Low probability for pulmonary embolus. Electronically Signed by Christos Herman MD 10/1/2024 1:45 PM         Discussion hypoxia markedly improved very likely had pulmonary emboli from his bilateral DVT despite low probability VQ scan it did show matching defects so I would put it as indeterminate.  He also had diffuse pulmonary infiltrates on initial CT of the chest probable aspiration due to his abnormal esophagus and stomach.  These may represent presbyesophagus and gastroparesis with resultant aspiration.  He will need a barium swallow to rule out reflux once oral intake is allowed.  Once hemoglobin is stabilized would suggest anticoagulation due to his DVT  10/8 alert awake on room air high risk for aspiration speech has seen the patient, no hemoptysis patient had DVT of both lower extremities  10/9 patient had IVC filter placed 10/4 he is alert awake eating breakfast discussed with the family at bedside  10/10 remain on room air getting dialysis febrile temperature 100.2 will get chest x-ray today previous chest x-ray shows atelectasis left lower lobe supplement potassium 3.1 will start patient on Zosyn high risk for aspiration  10/11 alert awake eating breakfast no shortness of breath at rest he is on room air continue with her dialysis with fluid removal echo shows EF is about 60 to 65% last chest x-ray shows left lower lobe atelectasis patient is on Zosyn  10/12 getting dialysis on room air last hemoglobin 7.4  10/13 on room air was dialyzed yesterday evening breakfast family member at bedside continue pulmonary toilet  Time of care 30 minutes    Sivakumar Henning MD

## 2024-10-13 NOTE — PROGRESS NOTES
Patient noted to be febrile and spiking fevers. Provider, Dr Quarles notified and ordered for repeat labs and blood cultures. Provider says okay to order Vancomycin if patient continues to spike fevers    Nursing supervisor Renan consulted on blood culture draw but deferred to Infection control.    RN informed Infection control [address] [place] and spoke with Luis Hicks. Response was that infection control can neither approve or disapprove physician orders.

## 2024-10-14 LAB
ANION GAP SERPL CALC-SCNC: 11 MMOL/L (ref 2–12)
BASOPHILS # BLD: 0 K/UL (ref 0–0.1)
BASOPHILS NFR BLD: 0 % (ref 0–1)
BUN SERPL-MCNC: 44 MG/DL (ref 6–20)
BUN/CREAT SERPL: 7 (ref 12–20)
CA-I BLD-MCNC: 8.4 MG/DL (ref 8.5–10.1)
CHLORIDE SERPL-SCNC: 101 MMOL/L (ref 97–108)
CO2 SERPL-SCNC: 25 MMOL/L (ref 21–32)
CREAT SERPL-MCNC: 6.26 MG/DL (ref 0.7–1.3)
DIFFERENTIAL METHOD BLD: ABNORMAL
EOSINOPHIL # BLD: 0.2 K/UL (ref 0–0.4)
EOSINOPHIL NFR BLD: 2 % (ref 0–7)
ERYTHROCYTE [DISTWIDTH] IN BLOOD BY AUTOMATED COUNT: 13.5 % (ref 11.5–14.5)
GLUCOSE BLD STRIP.AUTO-MCNC: 181 MG/DL (ref 65–100)
GLUCOSE BLD STRIP.AUTO-MCNC: 245 MG/DL (ref 65–100)
GLUCOSE BLD STRIP.AUTO-MCNC: 287 MG/DL (ref 65–100)
GLUCOSE BLD STRIP.AUTO-MCNC: 318 MG/DL (ref 65–100)
GLUCOSE SERPL-MCNC: 153 MG/DL (ref 65–100)
HCT VFR BLD AUTO: 24.6 % (ref 36.6–50.3)
HGB BLD-MCNC: 8.2 G/DL (ref 12.1–17)
IMM GRANULOCYTES # BLD AUTO: 0.1 K/UL (ref 0–0.04)
IMM GRANULOCYTES NFR BLD AUTO: 1 % (ref 0–0.5)
LYMPHOCYTES # BLD: 1.3 K/UL (ref 0.8–3.5)
LYMPHOCYTES NFR BLD: 12 % (ref 12–49)
MCH RBC QN AUTO: 29.9 PG (ref 26–34)
MCHC RBC AUTO-ENTMCNC: 33.3 G/DL (ref 30–36.5)
MCV RBC AUTO: 89.8 FL (ref 80–99)
MONOCYTES # BLD: 0.9 K/UL (ref 0–1)
MONOCYTES NFR BLD: 9 % (ref 5–13)
NEUTS SEG # BLD: 8 K/UL (ref 1.8–8)
NEUTS SEG NFR BLD: 76 % (ref 32–75)
NRBC # BLD: 0 K/UL (ref 0–0.01)
NRBC BLD-RTO: 0 PER 100 WBC
PERFORMED BY:: ABNORMAL
PLATELET # BLD AUTO: 489 K/UL (ref 150–400)
PMV BLD AUTO: 8.9 FL (ref 8.9–12.9)
POTASSIUM SERPL-SCNC: 3.4 MMOL/L (ref 3.5–5.1)
RBC # BLD AUTO: 2.74 M/UL (ref 4.1–5.7)
SODIUM SERPL-SCNC: 137 MMOL/L (ref 136–145)
WBC # BLD AUTO: 10.5 K/UL (ref 4.1–11.1)

## 2024-10-14 PROCEDURE — 82962 GLUCOSE BLOOD TEST: CPT

## 2024-10-14 PROCEDURE — 36430 TRANSFUSION BLD/BLD COMPNT: CPT

## 2024-10-14 PROCEDURE — 1100000000 HC RM PRIVATE

## 2024-10-14 PROCEDURE — 6360000002 HC RX W HCPCS: Performed by: INTERNAL MEDICINE

## 2024-10-14 PROCEDURE — 2580000003 HC RX 258: Performed by: INTERNAL MEDICINE

## 2024-10-14 PROCEDURE — 80048 BASIC METABOLIC PNL TOTAL CA: CPT

## 2024-10-14 PROCEDURE — 85025 COMPLETE CBC W/AUTO DIFF WBC: CPT

## 2024-10-14 PROCEDURE — 97110 THERAPEUTIC EXERCISES: CPT

## 2024-10-14 PROCEDURE — P9016 RBC LEUKOCYTES REDUCED: HCPCS

## 2024-10-14 PROCEDURE — 6370000000 HC RX 637 (ALT 250 FOR IP): Performed by: INTERNAL MEDICINE

## 2024-10-14 PROCEDURE — 36415 COLL VENOUS BLD VENIPUNCTURE: CPT

## 2024-10-14 PROCEDURE — 51798 US URINE CAPACITY MEASURE: CPT

## 2024-10-14 RX ADMIN — ALLOPURINOL 100 MG: 100 TABLET ORAL at 09:20

## 2024-10-14 RX ADMIN — SODIUM BICARBONATE 650 MG: 650 TABLET ORAL at 20:13

## 2024-10-14 RX ADMIN — SODIUM CHLORIDE, PRESERVATIVE FREE 10 ML: 5 INJECTION INTRAVENOUS at 09:26

## 2024-10-14 RX ADMIN — SODIUM CHLORIDE, PRESERVATIVE FREE 10 ML: 5 INJECTION INTRAVENOUS at 20:14

## 2024-10-14 RX ADMIN — THERA TABS 1 TABLET: TAB at 09:15

## 2024-10-14 RX ADMIN — SODIUM CHLORIDE: 9 INJECTION, SOLUTION INTRAVENOUS at 20:38

## 2024-10-14 RX ADMIN — PIPERACILLIN AND TAZOBACTAM 3375 MG: 3; .375 INJECTION, POWDER, FOR SOLUTION INTRAVENOUS; PARENTERAL at 12:40

## 2024-10-14 RX ADMIN — ACETAMINOPHEN 650 MG: 325 TABLET ORAL at 20:30

## 2024-10-14 RX ADMIN — TAMSULOSIN HYDROCHLORIDE 0.4 MG: 0.4 CAPSULE ORAL at 09:18

## 2024-10-14 RX ADMIN — INSULIN LISPRO 3 UNITS: 100 INJECTION, SOLUTION INTRAVENOUS; SUBCUTANEOUS at 18:57

## 2024-10-14 RX ADMIN — FINASTERIDE 5 MG: 5 TABLET, FILM COATED ORAL at 09:27

## 2024-10-14 RX ADMIN — SODIUM BICARBONATE 650 MG: 650 TABLET ORAL at 09:19

## 2024-10-14 RX ADMIN — THIAMINE HCL TAB 100 MG 100 MG: 100 TAB at 09:16

## 2024-10-14 RX ADMIN — INSULIN LISPRO 1 UNITS: 100 INJECTION, SOLUTION INTRAVENOUS; SUBCUTANEOUS at 12:40

## 2024-10-14 ASSESSMENT — PAIN SCALES - GENERAL
PAINLEVEL_OUTOF10: 0

## 2024-10-14 NOTE — PLAN OF CARE
OCCUPATIONAL THERAPY TREATMENT  Patient: Michael Fung (83 y.o. male)  Date: 10/14/2024  Primary Diagnosis: Acute hypoxic respiratory failure [J96.01]       Precautions: Fall Risk                Recommendations for nursing mobility: Encourage HEP in prep for ADLs/mobility; see handout for details, Frequent repositioning to prevent skin breakdown, Use of bed/chair alarm for safety, Assist x2, and R UE therex    In place during session: Peripheral IV, External Catheter, and EKG/telemetry   Chart, occupational therapy assessment, plan of care, and goals were reviewed.  ASSESSMENT  Patient continues with skilled OT services and is slowly progressing towards goals. Pt presented semi supine upon MALLOY arrival, agreeable to session. Pt A&O x 2. Orientation provided for situation and date w/ poor carry over noted. Pt completed UE therex, see grid below for details, to maintain/ increase strength and endurance to aid in adl performance and edema management. Pt and pt's spouse (pt's spouse in room entire session with pt's approval) educated on importance of moving joints and elevating R UE for edema management. Pt left semi supine with all known needs met. (See below for objective details and assist levels).     Overall pt tolerated session fair today with UE therex. Pt continues to benefit from skilled OT to increase independence with basic self care and functional mobility and transfers. Current OT recommendations for discharge Moderate intensity short-term skilled occupational therapy up to 5x/week. Will continue to benefit from skilled OT services, and will continue to progress as tolerated.      Start of Session   SPO2 (%) 96   Heart Rate (BPM) 82     GOALS:    Problem: Occupational Therapy - Adult  Goal: By Discharge: Performs self-care activities at highest level of function for planned discharge setting.  See evaluation for individualized goals.  Description: FUNCTIONAL STATUS PRIOR TO ADMISSION:  Per pt's wife, pt  was mod I-supervision w/ most ADLs, requiring additional time and ambulated mod I w/ no AD, using walls and furniture for support prior to admission.     HOME SUPPORT: The patient lived with spouse who provided assistance w/ ADLs as needed.    Occupational Therapy Goals:  Initiated 10/7/2024  Patient/Family stated goal: increase OOB mobility   1.  Patient will perform grooming with [place] within 7 day(s).  2.  Patient will perform upper body dressing with [place] within 7 day(s).  3.  Patient will perform lower body dressing with Modified [place] within 7 day(s).  4.  Patient will perform toilet transfers with Modified [place]  within 7 day(s).  5.  Patient will perform all aspects of toileting with [place] within 7 day(s).  6.  Patient will participate in upper extremity therapeutic exercise/activities with Supervision within 7 day(s).      Outcome: Progressing        PLAN :  Patient continues to benefit from skilled intervention to address functional impairments. Continue treatment per established plan of care to address goals.    Recommend next OT session: LB dressing and Seated grooming    Recommendation for discharge: (in order for the patient to meet his/her long term goals): Moderate intensity short-term skilled occupational therapy up to 5x/week    Potential barriers for safe discharge: pts current support system is unable to meet their requirements for physical assistance, pt has poor safety awareness, pt has impaired cognition, pt is a high fall risk, pt is not safe to be alone, and concern for pt safely navigating or managing the home environment.     IF patient discharges home will need the following DME: continuing to assess with progress     SUBJECTIVE:   Patient stated “Watch it, it hurts.” regarding R UE      OBJECTIVE DATA SUMMARY:   Cognitive/Behavioral Status:  Orientation  Orientation Level: Oriented to place;Oriented to person;Oriented to situation;Disoriented to

## 2024-10-14 NOTE — PROGRESS NOTES
BP has been trending up and is 190/71 post transfusion. Notified ROBERTO CARLOS Benton via secure message. Provider instructs to recheck in 1 hour as per protocol and report if BP still elevated. Lungs still with FC to BLL, clear but diminished in RML and BUL. Unchanged from previous assessment.

## 2024-10-14 NOTE — PROGRESS NOTES
[facility]     Renal Daily Progress Note:     Subjective: Patient underwent dialysis today.  This would be his third dialysis.  Family at the bedside.  Seen around 4 PM.  Has been confused and intermittently agitated.  Now has a sitter at the bedside.  Appetite fair, ate some breakfast and a little bit of lunch.  No nausea or vomiting.  No chest or abdominal pain.  Still making urine.  Muñoz not placed    10/8 more awake today.  Ate breakfast without nausea or vomiting.  Urine output not quantified.  However, today serum creatinine is only 5.12.  Will see if tomorrow's creatinine is less than that which was seen on Saturday.  Not short of breath,    10/9 awake and comfortable.  Seems calm.  Still with sitter is in the room.  Creatinine is trending up.  Will perform dialysis tomorrow.    10/10 seems calm and appropriate.  Wife at bedside.  Still has a sitter.  Had dialysis today without issue.    10/11/24 For HD in AM. Overall improvement.    10/12/24 Seen during HD in the HD suite in the presence of acute HD QUINTIN Canas      10/14/2024    No major complaints were offered this afternoon.        Review of Systems  Pertinent items are noted in HPI.    Objective:     BP (!) 176/73   Pulse 94   Temp 99 °F (37.2 °C) (Oral)   Resp 18   Ht 1.676 m (5' 6\")   Wt 71.2 kg (156 lb 15.5 oz)   SpO2 95%   BMI 25.34 kg/m²   Temp (24hrs), Av.6 °F (37 °C), Min:97.7 °F (36.5 °C), Max:100.5 °F (38.1 °C)        Intake/Output Summary (Last 24 hours) at 10/14/2024 1542  Last data filed at 10/14/2024 0926  Gross per 24 hour   Intake 469.27 ml   Output 345 ml   Net 124.27 ml       Current Facility-Administered Medications   Medication Dose Route Frequency    0.9 % sodium chloride infusion   IntraVENous PRN    0.9 % sodium chloride infusion   IntraVENous Continuous    piperacillin-tazobactam (ZOSYN) 3,375 mg in sodium chloride 0.9 % 50 mL IVPB (mini-bag)  3,375 mg IntraVENous Q12H    potassium bicarb-citric acid (EFFER-K)

## 2024-10-14 NOTE — PROGRESS NOTES
Hospitalist Progress Note               Daily Progress Note: 10/14/2024      Hospital Day: 12     Chief complaint: No chief complaint on file.       Brief HPI/ Hospital course to date:  Michael Fung is a 83-year-old male presented to outside hospital on 9/30 for cough and shortness of breath found to be hypoxic and have x-ray concerning for community-acquired pneumonia in the left mid and lower lung fields. Initiated on community-acquired pneumonia therapy with ceftriaxone and azithromycin. Patient also noted to have elevated creatinine of 4.5 which is his baseline patient is diuretic dependent. Became oliguric during the course of the hospitalization despite Lasix 80 mg. Lower extremity ultrasound obtained for elevated D-dimer. Showed bilateral DVTs in the right popliteal and 2 in the femoral and popliteal on the left. Had been initiated on a heparin drip 10/ evening patient on supratherapeutic heparin developed hemoptysis. Substantial enough to stop heparin drip but unable to quantify. Patient on mid flow oxygen with low sats. Transferred to our ICU on BiPAP transitioned to high flow nasal cannula 50 L, 40% with sats of 97%. Had a low prop VQ scan on 10/1 for renal failure with potential need for dialysis creatinine increased to 6 and oliguric. Bladder scan negative no Muñoz catheter or straight cath inserted, worsening hypoxemia from [facility]. Most of history obtained from patient's daughter and in conversation with transferring facility much of chart did not make it over with transfer. Patient was subsequently admitted to medical ICU, evaluated by nephrology, initiated on dialysis, evaluated by urology for urinary retention, recommended supportive care, following which patient's clinical status improved, serum creatinine improved, subsequent to which patient was transferred out of the ICU and accepted to the hospitalist service. Patient subsequently weaned to R.A. on 10/13.      --------  Patient is seen today for follow-up. No active complaint, on R.A.. not on pham. Pending hemodialysis chair.         All ROS negative otherwise mentioned above.      BP (!) 175/71   Pulse 81   Temp 98.4 °F (36.9 °C) (Axillary)   Resp 18   Ht 1.676 m (5' 6\")   Wt 71.2 kg (156 lb 15.5 oz)   SpO2 94%   BMI 25.34 kg/m²  O2 Flow Rate (L/min): 2 L/min O2 Device: None (Room air)    Temp (24hrs), Av.5 °F (36.9 °C), Min:97.7 °F (36.5 °C), Max:100.5 °F (38.1 °C)    10/14 0701 - 10/14 1900  In: 10 [I.V.:10]  Out: -    10/12 1901 - 10/14 0700  In: 784.9 [I.V.:330.7]  Out: 395 [Urine:395]    Physical Exam:  General:  Awake and alert   Lungs:   Clear to auscultation bilaterally.   Heart:  Regular rate and rhythm, S1, S2 normal, no murmur. No click, rub or gallop.   Abdomen:   Soft, non-tender. Bowel sounds normal. No masses,  No organomegaly.   Extremities: no  edema. Extremities normal, atraumatic, no cyanosis.    Pulses: 2+ and symmetric all extremities.   Skin: Skin color, texture, turgor normal. No rashes or lesions   Neurologic: CNII-XII intact.  No gross focal deficits       Assessment and Plan:  Acute respiratory failure with hypoxia- resolved  Aspiration pneumonia   On IV zosyn, total 7 days  Pulmonary on board     Volume overload- resolved  Acute on chronic kidney disease stage V requiring initiation of dialysis  Continue dialysis as per nephrology recommendations  Nephrology consult appreciated, continue to follow recommendations    Acute b/l lower extremity DVT  not safe for anticoagulation given anemia  s/p IVC filter on 10/4     Concern for ileus- resolved  Small bowel obstruction- resolved  General Surgery on board      Hypokalemia-improved  - continue to monitor, replace as needed    Anemia of chr disease  Iron deficiency anemia  - PO iron supplement     Hyperglycemia  - HbA1c 5.5, no diabetes   - Sliding scale insulin  - Monitor for hypoglycemia due to insulin with serial glucose checks  -

## 2024-10-14 NOTE — PROGRESS NOTES
Orders received to transfuse 1 unit PRBCs. Although patient is currently oriented x4, he has been oriented and disoriented at times. Upon chart review, noted that spouse signed last consent for blood transfusion on 10/6/24. Contacted spouse Coco Fung via telephone for consent for blood transfusion. Questions and concerns assessed and addressed with spouse. No questions or concerns at this time. Telephone consent obtained, Pastora Lorenzo RN witnessed.

## 2024-10-14 NOTE — PLAN OF CARE
Problem: Discharge Planning  Goal: Discharge to home or other facility with appropriate resources  Outcome: Progressing     Problem: Skin/Tissue Integrity  Goal: Absence of new skin breakdown  Description: 1.  Monitor for areas of redness and/or skin breakdown  2.  Assess vascular access sites hourly  3.  Every 4-6 hours minimum:  Change oxygen saturation probe site  4.  Every 4-6 hours:  If on nasal continuous positive airway pressure, respiratory therapy assess nares and determine need for appliance change or resting period.  Outcome: Progressing     Problem: Safety - Adult  Goal: Free from fall injury  Outcome: Progressing     Problem: ABCDS Injury Assessment  Goal: Absence of physical injury  Outcome: Progressing     Problem: Chronic Conditions and Co-morbidities  Goal: Patient's chronic conditions and co-morbidity symptoms are monitored and maintained or improved  Outcome: Progressing     Problem: Respiratory - Adult  Goal: Achieves optimal ventilation and oxygenation  Outcome: Progressing     Problem: Cardiovascular - Adult  Goal: Maintains optimal cardiac output and hemodynamic stability  Outcome: Progressing  Goal: Absence of cardiac dysrhythmias or at baseline  Outcome: Progressing     Problem: Neurosensory - Adult  Goal: Achieves stable or improved neurological status  Outcome: Progressing  Goal: Absence of seizures  Outcome: Progressing  Goal: Remains free of injury related to seizures activity  Outcome: Progressing  Goal: Achieves maximal functionality and self care  Outcome: Progressing     Problem: Musculoskeletal - Adult  Goal: Return mobility to safest level of function  Outcome: Progressing  Goal: Maintain proper alignment of affected body part  Outcome: Progressing  Goal: Return ADL status to a safe level of function  Outcome: Progressing     Problem: Infection - Adult  Goal: Absence of infection at discharge  Outcome: Progressing  Goal: Absence of infection during hospitalization  Outcome:  Progressing  Goal: Absence of fever/infection during anticipated neutropenic period  Outcome: Progressing     Problem: Metabolic/Fluid and Electrolytes - Adult  Goal: Electrolytes maintained within normal limits  Outcome: Progressing  Goal: Hemodynamic stability and optimal renal function maintained  Outcome: Progressing  Goal: Glucose maintained within prescribed range  Outcome: Progressing     Problem: Gastrointestinal - Adult  Goal: Minimal or absence of nausea and vomiting  Outcome: Progressing  Goal: Maintains or returns to baseline bowel function  Outcome: Progressing  Goal: Maintains adequate nutritional intake  Outcome: Progressing  Goal: Establish and maintain optimal ostomy function  Outcome: Progressing     Problem: Genitourinary - Adult  Goal: Absence of urinary retention  Outcome: Progressing  Goal: Urinary catheter remains patent  Outcome: Progressing     Problem: Safety - Medical Restraint  Goal: Remains free of injury from restraints (Restraint for Interference with Medical Device)  Description: INTERVENTIONS:  1. Determine that other, less restrictive measures have been tried or would not be effective before applying the restraint  2. Evaluate the patient's condition at the time of restraint application  3. Inform patient/family regarding the reason for restraint  4. Q2H: Monitor safety, psychosocial status, comfort, nutrition and hydration  Outcome: Progressing     Problem: Pain  Goal: Verbalizes/displays adequate comfort level or baseline comfort level  Outcome: Progressing

## 2024-10-14 NOTE — CONSULTS
Consult  Pulmonary, Critical Care    Name: Michael Fung MRN: [id number]   : 1940 Hospital: [facility]   Date: 10/14/2024  Admission date: 10/3/2024 Hospital Day: 12       Subjective/Interval History:   Seen in the dialysis unit he is confused does not know why he is in the hospital or that he is in the hospital.  Was admitted at [facility] with cough severe hypoxia found to have bilateral DVT of the legs was placed on heparin subsequently developed severe hemoptysis heparin was discontinued and IVC filter placed.  He was transferred to our facility had CT scan which shows distended esophagus and stomach possible ileus with scattered infiltrates throughout the left lung no documentation of vomiting but his history is not reliable at all.  He has had a VQ scan that was low probability but does show matching defect due to renal insufficiency he did not have a CTA of the chest he initially required high flow nasal oxygen 45% FiO2 reportedly has a meter oxygen saturation 95% today although he is wearing oxygen while receiving dialysis    Patient Active Problem List   Diagnosis    Acute hypoxic respiratory failure    CATHY (acute kidney injury) (HCC)    Hemoptysis    Community acquired pneumonia    Acute deep vein thrombosis (DVT) of femoral vein of both lower extremities (HCC)    DM (diabetes mellitus), type 2 (HCC)    Decreased mobility and endurance    Acute upper GI bleed    Ileus (HCC)    Acute urethral obstruction    Incomplete bladder emptying        IMPRESSION:   Acute hypoxic respiratory failure likely due to a combination of aspiration pneumonia probable pulmonary emboli and pulmonary edema/fluid overload from renal failure now on room air  Pulmonary edema secondary to fluid retention from renal failure  Aspiration pneumonia has diffuse left-sided infiltrates very likely had aspiration  Gastro paresis esophageal dysmotility had distended esophagus and stomach on CT  no deformity,   Cardiac: Regular rate and rhythm  Lungs: Clear anterior and right lung with rales present left side  Abd: Very mildly overweight soft positive bowel sounds no grimacing to palpation  Ext: Mild edema; no joint swelling; No clubbing  : clear urine  Neuro: Awakens easily is confused does not remember being in the hospital sitter states he removed his NG tube at night refused to have it placed back as dislodge some IVs he is now in soft wrist restraints he does say yes and no does move his extremities to command  Psych-no agitation but is confused does not know where he  Skin: warm, dry, no cyanosis;   Pulses: Brachial and radial pulses intact  Capillary: Normal capillary refill    Labs:    Recent Labs     10/13/24  1850 10/14/24  0600   WBC 8.5 10.5   HGB 6.2* 8.2*   * 489*     Recent Labs     10/14/24  0600      K 3.4*      CO2 25   GLUCOSE 153*   BUN 44*   CREATININE 6.26*   CALCIUM 8.4*       Results       Procedure Component Value Units Date/Time    Culture, Blood 1 [[id number]] Collected: 10/13/24 1850    Order Status: Sent Specimen: Blood Updated: 10/13/24 1900    COVID-19, Rapid [[id number]] Collected: 10/04/24 0835    Order Status: Completed Specimen: Nasopharyngeal Updated: 10/04/24 1021     Source Nasopharyngeal        SARS-CoV-2, PCR Not Detected        Comment:   Not Detected results do not preclude SARS-CoV-2 infection and should not be used as the sole basis for patient management decisions. Results must be combined with clinical observations, patient history, and epidemiological information.      Testing was performed using renetta Etelvina SARS-CoV-2 assay. This test is a multiplex Real-Time Reverse Transcriptase Polymerase Chain Reaction (RT-PCR) based in vitro diagnostic test intended for the qualitative detection of nucleic acids from SARS-CoV-2 in nasopharyngeal and nasal swab specimens,for use under the FDA's Emergency Use Authorization (EUA) only.     Fact sheet for

## 2024-10-14 NOTE — CARE COORDINATION
CM reviewed chart. Patient has tentative HD chair acceptance at [facility]. [facility]. CM called to [facility] with [facility] at ext# [phone], he is out of the office until Wednesday. RACQUEL will continue to follow.

## 2024-10-15 LAB
ABO + RH BLD: NORMAL
ALBUMIN SERPL-MCNC: 1.4 G/DL (ref 3.5–5)
ANION GAP SERPL CALC-SCNC: 13 MMOL/L (ref 2–12)
BASOPHILS # BLD: 0 K/UL (ref 0–0.1)
BASOPHILS NFR BLD: 0 % (ref 0–1)
BLD PROD TYP BPU: NORMAL
BLOOD BANK BLOOD PRODUCT EXPIRATION DATE: NORMAL
BLOOD BANK DISPENSE STATUS: NORMAL
BLOOD BANK ISBT PRODUCT BLOOD TYPE: 5100
BLOOD BANK PRODUCT CODE: NORMAL
BLOOD BANK UNIT TYPE AND RH: NORMAL
BLOOD GROUP ANTIBODIES SERPL: NEGATIVE
BPU ID: NORMAL
BUN SERPL-MCNC: 52 MG/DL (ref 6–20)
BUN/CREAT SERPL: 7 (ref 12–20)
CA-I BLD-MCNC: 8.4 MG/DL (ref 8.5–10.1)
CHLORIDE SERPL-SCNC: 103 MMOL/L (ref 97–108)
CO2 SERPL-SCNC: 22 MMOL/L (ref 21–32)
CREAT SERPL-MCNC: 7.24 MG/DL (ref 0.7–1.3)
CROSSMATCH RESULT: NORMAL
DIFFERENTIAL METHOD BLD: ABNORMAL
EOSINOPHIL # BLD: 0.2 K/UL (ref 0–0.4)
EOSINOPHIL NFR BLD: 2 % (ref 0–7)
ERYTHROCYTE [DISTWIDTH] IN BLOOD BY AUTOMATED COUNT: 13.5 % (ref 11.5–14.5)
GLUCOSE BLD STRIP.AUTO-MCNC: 145 MG/DL (ref 65–100)
GLUCOSE BLD STRIP.AUTO-MCNC: 216 MG/DL (ref 65–100)
GLUCOSE BLD STRIP.AUTO-MCNC: 320 MG/DL (ref 65–100)
GLUCOSE BLD STRIP.AUTO-MCNC: 367 MG/DL (ref 65–100)
GLUCOSE SERPL-MCNC: 185 MG/DL (ref 65–100)
HCT VFR BLD AUTO: 21.9 % (ref 36.6–50.3)
HGB BLD-MCNC: 7.5 G/DL (ref 12.1–17)
IMM GRANULOCYTES # BLD AUTO: 0.1 K/UL (ref 0–0.04)
IMM GRANULOCYTES NFR BLD AUTO: 1 % (ref 0–0.5)
LYMPHOCYTES # BLD: 1.3 K/UL (ref 0.8–3.5)
LYMPHOCYTES NFR BLD: 12 % (ref 12–49)
MCH RBC QN AUTO: 30.4 PG (ref 26–34)
MCHC RBC AUTO-ENTMCNC: 34.2 G/DL (ref 30–36.5)
MCV RBC AUTO: 88.7 FL (ref 80–99)
MONOCYTES # BLD: 1 K/UL (ref 0–1)
MONOCYTES NFR BLD: 9 % (ref 5–13)
NEUTS SEG # BLD: 8.3 K/UL (ref 1.8–8)
NEUTS SEG NFR BLD: 76 % (ref 32–75)
NRBC # BLD: 0 K/UL (ref 0–0.01)
NRBC BLD-RTO: 0 PER 100 WBC
PERFORMED BY:: ABNORMAL
PHOSPHATE SERPL-MCNC: 4.3 MG/DL (ref 2.6–4.7)
PLATELET # BLD AUTO: 487 K/UL (ref 150–400)
PMV BLD AUTO: 9 FL (ref 8.9–12.9)
POTASSIUM SERPL-SCNC: 3.2 MMOL/L (ref 3.5–5.1)
RBC # BLD AUTO: 2.47 M/UL (ref 4.1–5.7)
SODIUM SERPL-SCNC: 138 MMOL/L (ref 136–145)
SPECIMEN EXP DATE BLD: NORMAL
TRANSFUSION STATUS PATIENT QL: NORMAL
UNIT DIVISION: 0
UNIT ISSUE DATE/TIME: NORMAL
WBC # BLD AUTO: 10.9 K/UL (ref 4.1–11.1)

## 2024-10-15 PROCEDURE — 97110 THERAPEUTIC EXERCISES: CPT

## 2024-10-15 PROCEDURE — 2580000003 HC RX 258: Performed by: INTERNAL MEDICINE

## 2024-10-15 PROCEDURE — 97530 THERAPEUTIC ACTIVITIES: CPT

## 2024-10-15 PROCEDURE — 82962 GLUCOSE BLOOD TEST: CPT

## 2024-10-15 PROCEDURE — 80069 RENAL FUNCTION PANEL: CPT

## 2024-10-15 PROCEDURE — 6370000000 HC RX 637 (ALT 250 FOR IP): Performed by: INTERNAL MEDICINE

## 2024-10-15 PROCEDURE — 2709999900 HC NON-CHARGEABLE SUPPLY

## 2024-10-15 PROCEDURE — 90935 HEMODIALYSIS ONE EVALUATION: CPT

## 2024-10-15 PROCEDURE — 6360000002 HC RX W HCPCS: Performed by: PHYSICIAN ASSISTANT

## 2024-10-15 PROCEDURE — 85025 COMPLETE CBC W/AUTO DIFF WBC: CPT

## 2024-10-15 PROCEDURE — 36415 COLL VENOUS BLD VENIPUNCTURE: CPT

## 2024-10-15 PROCEDURE — 36556 INSERT NON-TUNNEL CV CATH: CPT

## 2024-10-15 PROCEDURE — 6360000002 HC RX W HCPCS: Performed by: INTERNAL MEDICINE

## 2024-10-15 PROCEDURE — 1100000000 HC RM PRIVATE

## 2024-10-15 RX ORDER — INSULIN LISPRO 100 [IU]/ML
0-8 INJECTION, SOLUTION INTRAVENOUS; SUBCUTANEOUS
Status: DISCONTINUED | OUTPATIENT
Start: 2024-10-15 | End: 2024-10-17

## 2024-10-15 RX ORDER — HYDRALAZINE HYDROCHLORIDE 20 MG/ML
10 INJECTION INTRAMUSCULAR; INTRAVENOUS EVERY 6 HOURS PRN
Status: DISCONTINUED | OUTPATIENT
Start: 2024-10-15 | End: 2024-10-26 | Stop reason: HOSPADM

## 2024-10-15 RX ORDER — INSULIN GLARGINE 100 [IU]/ML
10 INJECTION, SOLUTION SUBCUTANEOUS NIGHTLY
Status: DISCONTINUED | OUTPATIENT
Start: 2024-10-15 | End: 2024-10-16

## 2024-10-15 RX ORDER — INSULIN LISPRO 100 [IU]/ML
5 INJECTION, SOLUTION INTRAVENOUS; SUBCUTANEOUS ONCE
Status: DISCONTINUED | OUTPATIENT
Start: 2024-10-15 | End: 2024-10-16

## 2024-10-15 RX ORDER — LACTOBACILLUS RHAMNOSUS GG 10B CELL
1 CAPSULE ORAL
Status: DISCONTINUED | OUTPATIENT
Start: 2024-10-16 | End: 2024-10-26 | Stop reason: HOSPADM

## 2024-10-15 RX ADMIN — ACETAMINOPHEN 650 MG: 325 TABLET ORAL at 05:53

## 2024-10-15 RX ADMIN — PIPERACILLIN AND TAZOBACTAM 3375 MG: 3; .375 INJECTION, POWDER, LYOPHILIZED, FOR SOLUTION INTRAVENOUS at 23:24

## 2024-10-15 RX ADMIN — HEPARIN SODIUM 2500 UNITS: 1000 INJECTION INTRAVENOUS; SUBCUTANEOUS at 10:30

## 2024-10-15 RX ADMIN — THERA TABS 1 TABLET: TAB at 11:30

## 2024-10-15 RX ADMIN — HYDRALAZINE HYDROCHLORIDE 10 MG: 20 INJECTION, SOLUTION INTRAMUSCULAR; INTRAVENOUS at 05:53

## 2024-10-15 RX ADMIN — PIPERACILLIN AND TAZOBACTAM 3375 MG: 3; .375 INJECTION, POWDER, FOR SOLUTION INTRAVENOUS; PARENTERAL at 00:11

## 2024-10-15 RX ADMIN — TAMSULOSIN HYDROCHLORIDE 0.4 MG: 0.4 CAPSULE ORAL at 11:29

## 2024-10-15 RX ADMIN — EPOETIN ALFA-EPBX 10000 UNITS: 10000 INJECTION, SOLUTION INTRAVENOUS; SUBCUTANEOUS at 09:34

## 2024-10-15 RX ADMIN — ALLOPURINOL 100 MG: 100 TABLET ORAL at 11:30

## 2024-10-15 RX ADMIN — INSULIN LISPRO 4 UNITS: 100 INJECTION, SOLUTION INTRAVENOUS; SUBCUTANEOUS at 20:41

## 2024-10-15 RX ADMIN — SODIUM BICARBONATE 650 MG: 650 TABLET ORAL at 11:29

## 2024-10-15 RX ADMIN — PIPERACILLIN AND TAZOBACTAM 3375 MG: 3; .375 INJECTION, POWDER, FOR SOLUTION INTRAVENOUS; PARENTERAL at 11:42

## 2024-10-15 RX ADMIN — SODIUM CHLORIDE, PRESERVATIVE FREE 10 ML: 5 INJECTION INTRAVENOUS at 11:31

## 2024-10-15 RX ADMIN — INSULIN GLARGINE 10 UNITS: 100 INJECTION, SOLUTION SUBCUTANEOUS at 20:41

## 2024-10-15 RX ADMIN — THIAMINE HCL TAB 100 MG 100 MG: 100 TAB at 11:30

## 2024-10-15 RX ADMIN — DICLOFENAC SODIUM 2 G: 10 GEL TOPICAL at 20:41

## 2024-10-15 RX ADMIN — SODIUM CHLORIDE, PRESERVATIVE FREE 10 ML: 5 INJECTION INTRAVENOUS at 20:41

## 2024-10-15 RX ADMIN — FINASTERIDE 5 MG: 5 TABLET, FILM COATED ORAL at 11:30

## 2024-10-15 RX ADMIN — ACETAMINOPHEN 650 MG: 325 TABLET ORAL at 20:40

## 2024-10-15 RX ADMIN — SODIUM BICARBONATE 650 MG: 650 TABLET ORAL at 20:40

## 2024-10-15 RX ADMIN — INSULIN LISPRO 4 UNITS: 100 INJECTION, SOLUTION INTRAVENOUS; SUBCUTANEOUS at 18:07

## 2024-10-15 ASSESSMENT — PAIN SCALES - GENERAL
PAINLEVEL_OUTOF10: 0
PAINLEVEL_OUTOF10: 3
PAINLEVEL_OUTOF10: 0

## 2024-10-15 ASSESSMENT — PAIN - FUNCTIONAL ASSESSMENT: PAIN_FUNCTIONAL_ASSESSMENT: ACTIVITIES ARE NOT PREVENTED

## 2024-10-15 ASSESSMENT — PAIN DESCRIPTION - LOCATION: LOCATION: FOOT

## 2024-10-15 ASSESSMENT — PAIN DESCRIPTION - DESCRIPTORS: DESCRIPTORS: ACHING

## 2024-10-15 ASSESSMENT — PAIN DESCRIPTION - ORIENTATION: ORIENTATION: RIGHT;LEFT

## 2024-10-15 NOTE — PROGRESS NOTES
10-  Radiology    Chest x-ray 10-8-2024     FINDINGS: Right IJ catheter overlies the right atrium. NG tube has been removed.  Heart size is normal. Lungs demonstrate persistent mild atelectasis medially in  the left lower lobe. Mild left-sided pleural thickening is noted and unchanged.  Right lung is clear. No pulmonary edema.     IMPRESSION:  1. Stable atelectasis medially in the left lower lobe.  XR CHEST PORTABLE  Result Date: 10/6/2024  The enteric tube terminates in the stomach. Stable mild pulmonary edema. Electronically signed by Alexander Lazo    XR ABDOMEN (2 VIEWS)    Result Date: 10/6/2024  Nonobstructive bowel gas pattern. The NG tube tip is in the stomach. Electronically signed by BRANDI JERNIGAN    IR GUIDED IVC FILTER PLACEMENT    Result Date: 10/4/2024  1.  Placement of infrarenal IVC filter without complications. Electronically signed by PRIETO CRAIG NONTUNNELED VASCULAR CATHETER > 5 YEARS    Result Date: 10/4/2024  1.  Successful placement of right internal jugular temporary dialysis catheter. The catheter is ready for immediate use. Electronically signed by PRIETO SOUZA    XR CHEST PORTABLE    Result Date: 10/4/2024  NG tube courses into the stomach. Increased pulmonary edema.. Electronically signed by Jona Givens    CT CHEST ABDOMEN PELVIS WO CONTRAST Additional Contrast? None    Result Date: 10/4/2024  1. There are bilateral lung infiltrates which may represent infection, aspiration, or less likely infarction. 2. Trace bilateral pleural effusions. 3. There is significant fluid distention of the stomach, and there are mildly dilated proximal small bowel loops in the mid abdomen. Findings may be related to a ileus versus proximal, partial obstruction. The patient may benefit from NG tube decompression. 4. There is a left inguinal hernia containing a nonobstructed loop of distal colon. Electronically signed by BRANDI JERNIGAN    XR CHEST PORTABLE    Result Date: 10/3/2024  Small to  from twice a week dialysis as a palliative measure.  Will set him up with a Tuesday Saturday schedule while here    Have physical therapy adjust goals to have him able to stand and pivot which will allow for easier transport to and from dialysis.  Have PermCath placed    Discussed with  and  in [place] dialysis

## 2024-10-15 NOTE — PROGRESS NOTES
Comprehensive Nutrition Assessment    Type and Reason for Visit:  Reassess    Nutrition Recommendations/Plan:   Continue current diet and supplement order, encourage PO intake  RD to monitor po intakes, weight, labs, GI function     Malnutrition Assessment:  Malnutrition Status:  Severe malnutrition (10/11/24 1021)    Context:  Acute Illness     Findings of the 6 clinical characteristics of malnutrition:  Energy Intake:  50% or less of estimated energy requirements for 5 or more days  Weight Loss:  Greater than 2% over 1 week     Body Fat Loss:  Unable to assess     Muscle Mass Loss:  Unable to assess    Fluid Accumulation:  Unable to assess     Strength:  Not Performed    Nutrition Assessment:    Pt assessed for LOS. Pt admitted for acute respiratory failure. Pt had an ileus, small bowel obstruction now resolved and pt tolerating 60g cho/meal bland diet. Per chart review, pt has lost 9lbs in past week. (10/15) Reassess: pt contiunes on GI bland 60g cho/meal diet order with diabetic ONS TID. Some weight fluctuations noted this admission r/t HD. Meds and labs reviewed.    Nutrition Related Findings:    PO intakes 26-50% meals. Last BM 10/15. Wound Type: None       Current Nutrition Intake & Therapies:    Average Meal Intake: 26-50%  Average Supplements Intake: None Ordered  ADULT DIET; Easy to Chew; 4 carb choices (60 gm/meal); GI [place] (GERD/Peptic Ulcer)  ADULT ORAL NUTRITION SUPPLEMENT; Breakfast, Dinner; Diabetic Oral Supplement    Anthropometric Measures:  Height: 167.6 cm (5' 6\")  Ideal Body Weight (IBW): 142 lbs (65 kg)       Current Body Weight: 73.5 kg (162 lb), 114.1 % IBW. Weight Source: Bed Scale  Current BMI (kg/m2): 26.2        Weight Adjustment For: No Adjustment     BMI Categories: Overweight (BMI 25.0-29.9)  Last Weight Metrics:      10/15/2024     6:00 AM 10/13/2024     6:15 AM 10/12/2024     6:00 AM 10/11/2024    10:05 AM 10/11/2024     5:13 AM 10/10/2024     5:40 AM 10/7/2024     9:56 PM    Weight Loss Metrics   Height    5' 6\"      Weight - Scale 169 lbs 12 oz 156 lbs 15 oz 160 lbs 8 oz  162 lbs 11 oz 163 lbs 2 oz 159 lbs 10 oz   BMI (Calculated) 27.5 kg/m2 25.4 kg/m2 26 kg/m2  26.3 kg/m2 26.4 kg/m2 25.8 kg/m2      Estimated Daily Nutrient Needs:  Energy Requirements Based On: Kcal/kg  Weight Used for Energy Requirements: Current  Energy (kcal/day): 1850-2220kcals/kg (25-30kcal/kg)  Weight Used for Protein Requirements: Current  Protein (g/day): 89-111g (1.2-1.5g/kg)  Method Used for Fluid Requirements: 1 ml/kcal  Fluid (ml/day):      Nutrition Diagnosis:   Unintended weight loss related to acute injury/trauma as evidenced by intake 26-50%    Nutrition Interventions:   Food and/or Nutrient Delivery: Continue Current Diet, Start Oral Nutrition Supplement  Nutrition Education/Counseling: No recommendation at this time  Coordination of Nutrition Care: Continue to monitor while inpatient       Goals:     Goals: Meet at least 75% of estimated needs, by next RD assessment       Nutrition Monitoring and Evaluation:   Behavioral-Environmental Outcomes: None Identified  Food/Nutrient Intake Outcomes: Food and Nutrient Intake  Physical Signs/Symptoms Outcomes: Biochemical Data, GI Status, Weight    Discharge Planning:    Continue current diet     Angelique Barron RD  Contact: renata

## 2024-10-15 NOTE — PLAN OF CARE
Problem: Discharge Planning  Goal: Discharge to home or other facility with appropriate resources  Outcome: Progressing  Flowsheets (Taken 10/14/2024 2003)  Discharge to home or other facility with appropriate resources:   Identify barriers to discharge with patient and caregiver   Arrange for needed discharge resources and transportation as appropriate   Identify discharge learning needs (meds, wound care, etc)   Refer to discharge planning if patient needs post-hospital services based on physician order or complex needs related to functional status, cognitive ability or social support system     Problem: Skin/Tissue Integrity  Goal: Absence of new skin breakdown  Description: 1.  Monitor for areas of redness and/or skin breakdown  2.  Assess vascular access sites hourly  3.  Every 4-6 hours minimum:  Change oxygen saturation probe site  4.  Every 4-6 hours:  If on nasal continuous positive airway pressure, respiratory therapy assess nares and determine need for appliance change or resting period.  Outcome: Progressing     Problem: Safety - Adult  Goal: Free from fall injury  Outcome: Progressing     Problem: ABCDS Injury Assessment  Goal: Absence of physical injury  Outcome: Progressing     Problem: Chronic Conditions and Co-morbidities  Goal: Patient's chronic conditions and co-morbidity symptoms are monitored and maintained or improved  Outcome: Progressing  Flowsheets (Taken 10/14/2024 2003)  Care Plan - Patient's Chronic Conditions and Co-Morbidity Symptoms are Monitored and Maintained or Improved:   Monitor and assess patient's chronic conditions and comorbid symptoms for stability, deterioration, or improvement   Collaborate with multidisciplinary team to address chronic and comorbid conditions and prevent exacerbation or deterioration   Update acute care plan with appropriate goals if chronic or comorbid symptoms are exacerbated and prevent overall improvement and discharge     Problem: Respiratory -

## 2024-10-15 NOTE — CONSULTS
Consult  Pulmonary, Critical Care    Name: Michael Fung MRN: [id number]   : 1940 Hospital: [facility]   Date: 10/15/2024  Admission date: 10/3/2024 Hospital Day: 13       Subjective/Interval History:   Seen in the dialysis unit he is confused does not know why he is in the hospital or that he is in the hospital.  Was admitted at [facility] with cough severe hypoxia found to have bilateral DVT of the legs was placed on heparin subsequently developed severe hemoptysis heparin was discontinued and IVC filter placed.  He was transferred to our facility had CT scan which shows distended esophagus and stomach possible ileus with scattered infiltrates throughout the left lung no documentation of vomiting but his history is not reliable at all.  He has had a VQ scan that was low probability but does show matching defect due to renal insufficiency he did not have a CTA of the chest he initially required high flow nasal oxygen 45% FiO2 reportedly has a meter oxygen saturation 95% today although he is wearing oxygen while receiving dialysis    Patient Active Problem List   Diagnosis    Acute hypoxic respiratory failure    CATHY (acute kidney injury) (HCC)    Hemoptysis    Community acquired pneumonia    Acute deep vein thrombosis (DVT) of femoral vein of both lower extremities (HCC)    DM (diabetes mellitus), type 2 (HCC)    Decreased mobility and endurance    Acute upper GI bleed    Ileus (HCC)    Acute urethral obstruction    Incomplete bladder emptying        IMPRESSION:   Acute hypoxic respiratory failure likely due to a combination of aspiration pneumonia probable pulmonary emboli and pulmonary edema/fluid overload from renal failure now on room air  Pulmonary edema secondary to fluid retention from renal failure  Aspiration pneumonia has diffuse left-sided infiltrates very likely had aspiration  Gastro paresis esophageal dysmotility had distended esophagus and stomach on CT  Reverse Transcriptase Polymerase Chain Reaction (RT-PCR) based in vitro diagnostic test intended for the qualitative detection of nucleic acids from SARS-CoV-2 in nasopharyngeal and nasal swab specimens,for use under the FDA's Emergency Use Authorization (EUA) only.     Fact sheet for Patients: https://www.fda.gov/media/791772/download  Fact sheet for Healthcare Providers: https://www.fda.gov/media/568355/download         MRSA by PCR [[id number]] Collected: 10/03/24 1855    Order Status: Completed Specimen: Swab Updated: 10/03/24 2037     MRSA by PCR Not Detected       Culture, C Auris Screen [[id number]] Collected: 10/03/24 1855    Order Status: Completed Specimen: Swab Updated: 10/07/24 1526     Special Requests No Special Requests        Culture No Candida auris present       Respiratory Panel, Molecular, with COVID-19 (Restricted: peds pts or suitable admitted adults) [[id number]] Collected: 10/03/24 1855    Order Status: Completed Specimen: Blood Serum Updated: 10/04/24 1354     Adenovirus by PCR Not detected     Coronavirus 229E by PCR Not detected     Coronavirus HKU1 by PCR Not detected     Coronavirus NL63 by PCR Not detected     Coronavirus OC43 by PCR Not detected     SARS-CoV-2, PCR Not detected     Human Metapneumovirus by PCR Not detected     Rhinovirus Enterovirus PCR Not detected     Influenza A by PCR Not detected     Influenza B PCR Not detected     Parainfluenza 1 PCR Not detected     Parainfluenza 2 PCR Not detected     Parainfluenza 3 PCR Not detected     Parainfluenza 4 PCR Not detected     Respiratory Syncytial Virus by PCR Not detected     Bordetella parapertussis by PCR Not detected     Bordetella pertussis by PCR Not detected     Chlamydophila Pneumonia PCR Not detected     Mycoplasma pneumo by PCR Not detected               ARTERIAL BLOOD GAS    Pulse OX:  SpO2 Readings from Last 6 Encounters:   10/15/24 97%       Imaging:  XR CHEST PORTABLE    Result Date: 10/6/2024  The enteric tube

## 2024-10-15 NOTE — PROGRESS NOTES
Hospitalist Progress Note               Daily Progress Note: 10/15/2024      Hospital Day: 13     Chief complaint: No chief complaint on file.       Brief HPI/ Hospital course to date:  Michael Fung is a 83-year-old male presented to outside hospital on 9/30 for cough and shortness of breath found to be hypoxic and have x-ray concerning for community-acquired pneumonia in the left mid and lower lung fields. Initiated on community-acquired pneumonia therapy with ceftriaxone and azithromycin. Patient also noted to have elevated creatinine of 4.5 which is his baseline patient is diuretic dependent. Became oliguric during the course of the hospitalization despite Lasix 80 mg. Lower extremity ultrasound obtained for elevated D-dimer. Showed bilateral DVTs in the right popliteal and 2 in the femoral and popliteal on the left. Had been initiated on a heparin drip 10/ evening patient on supratherapeutic heparin developed hemoptysis. Substantial enough to stop heparin drip but unable to quantify. Patient on mid flow oxygen with low sats. Transferred to our ICU on BiPAP transitioned to high flow nasal cannula 50 L, 40% with sats of 97%. Had a low prop VQ scan on 10/1 for renal failure with potential need for dialysis creatinine increased to 6 and oliguric. Bladder scan negative no Muñoz catheter or straight cath inserted, worsening hypoxemia from [facility]. Most of history obtained from patient's daughter and in conversation with transferring facility much of chart did not make it over with transfer. Patient was subsequently admitted to medical ICU, evaluated by nephrology, initiated on dialysis, evaluated by urology for urinary retention, recommended supportive care, following which patient's clinical status improved, serum creatinine improved, subsequent to which patient was transferred out of the ICU and accepted to the hospitalist service. Patient subsequently weaned to R.A. on 10/13.     sodium chloride flush 0.9 % injection 5-40 mL  5-40 mL IntraVENous PRN    0.9 % sodium chloride infusion   IntraVENous PRN    ondansetron (ZOFRAN-ODT) disintegrating tablet 4 mg  4 mg Oral Q8H PRN    Or    ondansetron (ZOFRAN) injection 4 mg  4 mg IntraVENous Q6H PRN    acetaminophen (TYLENOL) tablet 650 mg  650 mg Oral Q6H PRN    Or    acetaminophen (TYLENOL) suppository 650 mg  650 mg Rectal Q6H PRN    bisacodyl (DULCOLAX) suppository 10 mg  10 mg Rectal Daily PRN    sennosides-docusate sodium (SENOKOT-S) 8.6-50 MG tablet 1 tablet  1 tablet Oral BID    glucose chewable tablet 16 g  4 tablet Oral PRN    dextrose bolus 10% 125 mL  125 mL IntraVENous PRN    Or    dextrose bolus 10% 250 mL  250 mL IntraVENous PRN    glucagon injection 1 mg  1 mg SubCUTAneous PRN    dextrose 10 % infusion   IntraVENous Continuous PRN    insulin lispro (HUMALOG,ADMELOG) injection vial 0-4 Units  0-4 Units SubCUTAneous TID WC    insulin lispro (HUMALOG,ADMELOG) injection vial 0-4 Units  0-4 Units SubCUTAneous Nightly    allopurinol (ZYLOPRIM) tablet 100 mg  100 mg Oral Daily    finasteride (PROSCAR) tablet 5 mg  5 mg Oral Daily    tamsulosin (FLOMAX) capsule 0.4 mg  0.4 mg Oral Daily    thiamine mononitrate tablet 100 mg  100 mg Oral Daily    sodium bicarbonate tablet 650 mg  650 mg Oral BID    multivitamin 1 tablet  1 tablet Oral Daily    lidocaine (XYLOCAINE) 2 % uro-jet   Topical PRN         All relevant laboratory and imaging results reviewed in EPIC electronic medical records.            Discussion/MDM:     [] High (any 2)    A. Problems (any 1)  [] Acute/Chronic Illness/injury posing threat to life or bodily function:    [] Severe exacerbation of chronic illness:    ---------------------------------------------------------------------  B. Risk of Treatment (any 1)   [] Drugs/treatments that require intensive monitoring for toxicity include:    [] IV ABX requiring serial renal monitoring for nephrotoxicity:     [] IV Narcotic

## 2024-10-15 NOTE — PLAN OF CARE
Training: Yes  Interventions: Verbal cues;Safety awareness training  Sit to Stand: Moderate assistance;Assist X1;Additional time;Adaptive equipment  Stand to Sit: Moderate assistance;Additional time;Assist X1  Balance:               Balance  Sitting: Impaired  Sitting - Static: Fair (occasional)  Sitting - Dynamic: Fair (occasional)  Standing - Static: Constant support;Poor  Standing - Dynamic: Constant support;Poor  Wheelchair Mobility:        Ambulation/Gait Training:                                Gait  Gait Training: Yes  Overall Level of Assistance: Moderate assistance;Assist X1  Distance (ft): 2 Feet  Assistive Device: Gait belt;Walker, rolling  Interventions: Verbal cues;Visual cues;Tactile cues;Safety awareness training;Manual cues;Demonstration;Weight shifting training/pressure relief  Speed/Danielle: Slow  Gait Abnormalities: Decreased step clearance                     Therapeutic Intervention provided:   bed mobility , EOB transfers, OOB transfers, amb with AD, and LE therapeutic exercises      EXERCISE   Sets   Reps   Active Active Assist   Passive Self ROM   Comments   Ankle Pumps   [] [] [] []    Quad Sets/Glut Sets   [] [] [] []    Hamstring Sets   [] [] [] []    Short Arc Quads   [] [] [] []    Heel Slides   [] [] [] []    Straight Leg Raises   [] [] [] []    Hip abd/add   [] [] [] []    Long Arc Quads 1 10 [x] [] [] []    Marching 1 8 [x] [] [] []    Seated heel/toe raises 1 10 [x] [] [] []       Functional Measure:  [facility] AM-PAC™ “6 Clicks”         Basic Mobility Inpatient Short Form  How much difficulty does the patient currently have... Unable A Lot A Little None   1.  Turning over in bed (including adjusting bedclothes, sheets and blankets)?   [] 1   [] 2   [x] 3   [] 4   2.  Sitting down on and standing up from a chair with arms ( e.g., wheelchair, bedside commode, etc.)   [] 1   [x] 2   [] 3   [] 4   3.  Moving from lying on back to sitting on the side of the bed?   [] 1   [x] 2

## 2024-10-15 NOTE — CARE COORDINATION
CM reviewed chart. Cm met with family at the bedside. Family informed they are working on Medicaid application with . [facility] setup. [facility].

## 2024-10-16 ENCOUNTER — APPOINTMENT (OUTPATIENT)
Facility: HOSPITAL | Age: 84
End: 2024-10-16
Attending: INTERNAL MEDICINE
Payer: OTHER GOVERNMENT

## 2024-10-16 LAB
ALBUMIN SERPL-MCNC: 1.5 G/DL (ref 3.5–5)
ANION GAP SERPL CALC-SCNC: 9 MMOL/L (ref 2–12)
BASOPHILS # BLD: 0 K/UL (ref 0–0.1)
BASOPHILS NFR BLD: 0 % (ref 0–1)
BUN SERPL-MCNC: 32 MG/DL (ref 6–20)
BUN/CREAT SERPL: 7 (ref 12–20)
CA-I BLD-MCNC: 7.9 MG/DL (ref 8.5–10.1)
CHLORIDE SERPL-SCNC: 102 MMOL/L (ref 97–108)
CO2 SERPL-SCNC: 28 MMOL/L (ref 21–32)
CREAT SERPL-MCNC: 4.76 MG/DL (ref 0.7–1.3)
DIFFERENTIAL METHOD BLD: ABNORMAL
EOSINOPHIL # BLD: 0.2 K/UL (ref 0–0.4)
EOSINOPHIL NFR BLD: 2 % (ref 0–7)
ERYTHROCYTE [DISTWIDTH] IN BLOOD BY AUTOMATED COUNT: 13.6 % (ref 11.5–14.5)
GLUCOSE BLD STRIP.AUTO-MCNC: 156 MG/DL (ref 65–100)
GLUCOSE BLD STRIP.AUTO-MCNC: 241 MG/DL (ref 65–100)
GLUCOSE BLD STRIP.AUTO-MCNC: 272 MG/DL (ref 65–100)
GLUCOSE BLD STRIP.AUTO-MCNC: 284 MG/DL (ref 65–100)
GLUCOSE SERPL-MCNC: 121 MG/DL (ref 65–100)
HCT VFR BLD AUTO: 21.8 % (ref 36.6–50.3)
HGB BLD-MCNC: 7.4 G/DL (ref 12.1–17)
IMM GRANULOCYTES # BLD AUTO: 0.1 K/UL (ref 0–0.04)
IMM GRANULOCYTES NFR BLD AUTO: 1 % (ref 0–0.5)
LYMPHOCYTES # BLD: 1.2 K/UL (ref 0.8–3.5)
LYMPHOCYTES NFR BLD: 14 % (ref 12–49)
MCH RBC QN AUTO: 30 PG (ref 26–34)
MCHC RBC AUTO-ENTMCNC: 33.9 G/DL (ref 30–36.5)
MCV RBC AUTO: 88.3 FL (ref 80–99)
MONOCYTES # BLD: 1.1 K/UL (ref 0–1)
MONOCYTES NFR BLD: 12 % (ref 5–13)
NEUTS SEG # BLD: 6.3 K/UL (ref 1.8–8)
NEUTS SEG NFR BLD: 71 % (ref 32–75)
NRBC # BLD: 0 K/UL (ref 0–0.01)
NRBC BLD-RTO: 0 PER 100 WBC
PERFORMED BY:: ABNORMAL
PHOSPHATE SERPL-MCNC: 2.8 MG/DL (ref 2.6–4.7)
PLATELET # BLD AUTO: 413 K/UL (ref 150–400)
PMV BLD AUTO: 8.4 FL (ref 8.9–12.9)
POTASSIUM SERPL-SCNC: 3.5 MMOL/L (ref 3.5–5.1)
RBC # BLD AUTO: 2.47 M/UL (ref 4.1–5.7)
SODIUM SERPL-SCNC: 139 MMOL/L (ref 136–145)
WBC # BLD AUTO: 8.9 K/UL (ref 4.1–11.1)

## 2024-10-16 PROCEDURE — 02H633Z INSERTION OF INFUSION DEVICE INTO RIGHT ATRIUM, PERCUTANEOUS APPROACH: ICD-10-PCS | Performed by: INTERNAL MEDICINE

## 2024-10-16 PROCEDURE — 0JH63XZ INSERTION OF TUNNELED VASCULAR ACCESS DEVICE INTO CHEST SUBCUTANEOUS TISSUE AND FASCIA, PERCUTANEOUS APPROACH: ICD-10-PCS | Performed by: INTERNAL MEDICINE

## 2024-10-16 PROCEDURE — 82962 GLUCOSE BLOOD TEST: CPT

## 2024-10-16 PROCEDURE — 05PYX3Z REMOVAL OF INFUSION DEVICE FROM UPPER VEIN, EXTERNAL APPROACH: ICD-10-PCS | Performed by: INTERNAL MEDICINE

## 2024-10-16 PROCEDURE — 6360000002 HC RX W HCPCS: Performed by: INTERNAL MEDICINE

## 2024-10-16 PROCEDURE — 85025 COMPLETE CBC W/AUTO DIFF WBC: CPT

## 2024-10-16 PROCEDURE — B5181ZA FLUOROSCOPY OF SUPERIOR VENA CAVA USING LOW OSMOLAR CONTRAST, GUIDANCE: ICD-10-PCS | Performed by: INTERNAL MEDICINE

## 2024-10-16 PROCEDURE — 80069 RENAL FUNCTION PANEL: CPT

## 2024-10-16 PROCEDURE — 36415 COLL VENOUS BLD VENIPUNCTURE: CPT

## 2024-10-16 PROCEDURE — 2580000003 HC RX 258: Performed by: INTERNAL MEDICINE

## 2024-10-16 PROCEDURE — C1750 CATH, HEMODIALYSIS,LONG-TERM: HCPCS

## 2024-10-16 PROCEDURE — 97530 THERAPEUTIC ACTIVITIES: CPT

## 2024-10-16 PROCEDURE — 6370000000 HC RX 637 (ALT 250 FOR IP): Performed by: INTERNAL MEDICINE

## 2024-10-16 PROCEDURE — 36560 INSERT TUNNELED CV CATH: CPT

## 2024-10-16 PROCEDURE — 1100000000 HC RM PRIVATE

## 2024-10-16 PROCEDURE — 6360000002 HC RX W HCPCS

## 2024-10-16 RX ORDER — FENTANYL CITRATE 50 UG/ML
INJECTION, SOLUTION INTRAMUSCULAR; INTRAVENOUS PRN
Status: COMPLETED | OUTPATIENT
Start: 2024-10-16 | End: 2024-10-16

## 2024-10-16 RX ADMIN — INSULIN LISPRO 4 UNITS: 100 INJECTION, SOLUTION INTRAVENOUS; SUBCUTANEOUS at 17:01

## 2024-10-16 RX ADMIN — FINASTERIDE 5 MG: 5 TABLET, FILM COATED ORAL at 11:17

## 2024-10-16 RX ADMIN — SODIUM BICARBONATE 650 MG: 650 TABLET ORAL at 21:54

## 2024-10-16 RX ADMIN — SODIUM CHLORIDE, PRESERVATIVE FREE 10 ML: 5 INJECTION INTRAVENOUS at 21:54

## 2024-10-16 RX ADMIN — PIPERACILLIN AND TAZOBACTAM 3375 MG: 3; .375 INJECTION, POWDER, LYOPHILIZED, FOR SOLUTION INTRAVENOUS at 14:20

## 2024-10-16 RX ADMIN — ALLOPURINOL 100 MG: 100 TABLET ORAL at 11:17

## 2024-10-16 RX ADMIN — INSULIN LISPRO 4 UNITS: 100 INJECTION, SOLUTION INTRAVENOUS; SUBCUTANEOUS at 11:17

## 2024-10-16 RX ADMIN — SODIUM BICARBONATE 650 MG: 650 TABLET ORAL at 11:16

## 2024-10-16 RX ADMIN — SODIUM CHLORIDE: 9 INJECTION, SOLUTION INTRAVENOUS at 14:19

## 2024-10-16 RX ADMIN — INSULIN LISPRO 2 UNITS: 100 INJECTION, SOLUTION INTRAVENOUS; SUBCUTANEOUS at 21:54

## 2024-10-16 RX ADMIN — Medication 1 CAPSULE: at 11:16

## 2024-10-16 RX ADMIN — FENTANYL CITRATE 50 MCG: 50 INJECTION INTRAMUSCULAR; INTRAVENOUS at 12:31

## 2024-10-16 RX ADMIN — THERA TABS 1 TABLET: TAB at 11:16

## 2024-10-16 RX ADMIN — ACETAMINOPHEN 650 MG: 325 TABLET ORAL at 21:53

## 2024-10-16 RX ADMIN — TAMSULOSIN HYDROCHLORIDE 0.4 MG: 0.4 CAPSULE ORAL at 11:16

## 2024-10-16 RX ADMIN — THIAMINE HCL TAB 100 MG 100 MG: 100 TAB at 11:16

## 2024-10-16 RX ADMIN — SODIUM CHLORIDE, PRESERVATIVE FREE 10 ML: 5 INJECTION INTRAVENOUS at 11:18

## 2024-10-16 NOTE — OR NURSING
Patient to IR for Temp to Perm HD catheter placement  Telephone consent obtained from spouse  Patient identified, procedure verified  Site prepped, procedure performed without complication  20 cm temporary HD catheter removed intact    23 cm tunneled PermaCath longterm HD catheter placed using previous Right IJV access  Patient comfortable, no pain verbalized, v/s stable throughout  Catheter ready for immediate use  Report called to Primary RNGretchen  Patient transported back to nursing unit

## 2024-10-16 NOTE — CARE COORDINATION
CM reviewed chart. DCP TBD, [facility] setting [facility] when medicallly ready. Referral sent to the [facility] at patient's family request. CM will continue to follow.

## 2024-10-16 NOTE — PROGRESS NOTES
(porcine) injection 2,500 Units  2,500 Units IntraCATHeter PRN    sodium chloride flush 0.9 % injection 5-40 mL  5-40 mL IntraVENous 2 times per day    sodium chloride flush 0.9 % injection 5-40 mL  5-40 mL IntraVENous PRN    0.9 % sodium chloride infusion   IntraVENous PRN    ondansetron (ZOFRAN-ODT) disintegrating tablet 4 mg  4 mg Oral Q8H PRN    Or    ondansetron (ZOFRAN) injection 4 mg  4 mg IntraVENous Q6H PRN    acetaminophen (TYLENOL) tablet 650 mg  650 mg Oral Q6H PRN    Or    acetaminophen (TYLENOL) suppository 650 mg  650 mg Rectal Q6H PRN    bisacodyl (DULCOLAX) suppository 10 mg  10 mg Rectal Daily PRN    glucose chewable tablet 16 g  4 tablet Oral PRN    dextrose bolus 10% 125 mL  125 mL IntraVENous PRN    Or    dextrose bolus 10% 250 mL  250 mL IntraVENous PRN    glucagon injection 1 mg  1 mg SubCUTAneous PRN    dextrose 10 % infusion   IntraVENous Continuous PRN    allopurinol (ZYLOPRIM) tablet 100 mg  100 mg Oral Daily    finasteride (PROSCAR) tablet 5 mg  5 mg Oral Daily    tamsulosin (FLOMAX) capsule 0.4 mg  0.4 mg Oral Daily    thiamine mononitrate tablet 100 mg  100 mg Oral Daily    sodium bicarbonate tablet 650 mg  650 mg Oral BID    multivitamin 1 tablet  1 tablet Oral Daily    lidocaine (XYLOCAINE) 2 % uro-jet   Topical PRN         All relevant laboratory and imaging results reviewed in EPIC electronic medical records.            Discussion/MDM:     [] High (any 2)    A. Problems (any 1)  [] Acute/Chronic Illness/injury posing threat to life or bodily function:    [] Severe exacerbation of chronic illness:    ---------------------------------------------------------------------  B. Risk of Treatment (any 1)   [] Drugs/treatments that require intensive monitoring for toxicity include:    [] IV ABX requiring serial renal monitoring for nephrotoxicity:     [] IV Narcotic analgesia for adverse drug reaction  [] Aggressive IV diuresis requiring serial monitoring for renal impairment and  electrolyte derangements  [] Critical electrolyte abnormalities requiring IV replacement and close serial monitoring  [] SQ Insulin SS- monitoring serial FSBS for Hypoglycemic adverse drug reaction  [] Other -   [] Change in code status:    [] Decision to escalate care:    [] Major surgery/procedure with associated risk factors:    ----------------------------------------------------------------------  C. Data (any 2)  [] Discussed current management and discharge planning options with Case Management.  [] Discussed management of the case with:    [] Telemetry personally reviewed and interpreted as documented above    [] Imaging personally reviewed and interpreted, includes:    [x] Data Review (any 3)  [x] All available Consultant notes from yesterday/today were reviewed  [x] All current labs were reviewed and interpreted for clinical significance   [x] Appropriate follow-up labs were ordered  [] Collateral history obtained from:       Time spent with patient including counseling, chart review and nursing communication: 20 minutes    Sam Leon Cha, MD

## 2024-10-16 NOTE — CONSULTS
Consult  Pulmonary, Critical Care    Name: Michael Fung MRN: [id number]   : 1940 Hospital: [facility]   Date: 10/16/2024  Admission date: 10/3/2024 Hospital Day: 14       Subjective/Interval History:   Seen in the dialysis unit he is confused does not know why he is in the hospital or that he is in the hospital.  Was admitted at [facility] with cough severe hypoxia found to have bilateral DVT of the legs was placed on heparin subsequently developed severe hemoptysis heparin was discontinued and IVC filter placed.  He was transferred to our facility had CT scan which shows distended esophagus and stomach possible ileus with scattered infiltrates throughout the left lung no documentation of vomiting but his history is not reliable at all.  He has had a VQ scan that was low probability but does show matching defect due to renal insufficiency he did not have a CTA of the chest he initially required high flow nasal oxygen 45% FiO2 reportedly has room air oxygen saturation 95% today although he is wearing oxygen while receiving dialysis    Patient Active Problem List   Diagnosis    Acute hypoxic respiratory failure    CATHY (acute kidney injury) (HCC)    Hemoptysis    Community acquired pneumonia    Acute deep vein thrombosis (DVT) of femoral vein of both lower extremities (HCC)    DM (diabetes mellitus), type 2 (HCC)    Decreased mobility and endurance    Acute upper GI bleed    Ileus (HCC)    Acute urethral obstruction    Incomplete bladder emptying        IMPRESSION:   Acute hypoxic respiratory failure likely due to a combination of aspiration pneumonia probable pulmonary emboli and pulmonary edema/fluid overload from renal failure now on room air  Pulmonary edema secondary to fluid retention from renal failure clear on last chest x-ray  Aspiration pneumonia has diffuse left-sided infiltrates very likely had aspiration cleared radiographically gastro paresis esophageal dysmotility had  RT(R), RDMS, RVT, RDCS   Electronically Signed by Marquis Arvizu MD 10/2/2024 5:04 PM    US lower extremity veins bilateral    Result Date: 10/2/2024  Evidence for acute nonocclusive DVT within the right popliteal vein and left femoral and popliteal veins. Performing Technologist:  Roxanne Issa RT(R), RDMS, RVT, RDCS   Electronically Signed by Tony Jones MD 10/2/2024 3:59 PM    NM LUNG VENT/PERFUSION (VQ)    Result Date: 10/1/2024  Low probability for pulmonary embolus. Electronically Signed by Christos Herman MD 10/1/2024 1:45 PM         Discussion hypoxia markedly improved very likely had pulmonary emboli from his bilateral DVT despite low probability VQ scan it did show matching defects so I would put it as indeterminate.  He also had diffuse pulmonary infiltrates on initial CT of the chest probable aspiration due to his abnormal esophagus and stomach.  These may represent presbyesophagus and gastroparesis with resultant aspiration.  He will need a barium swallow to rule out reflux once oral intake is allowed.  Once hemoglobin is stabilized would suggest anticoagulation due to his DVT  10/8 alert awake on room air high risk for aspiration speech has seen the patient, no hemoptysis patient had DVT of both lower extremities  10/9 patient had IVC filter placed 10/4 he is alert awake eating breakfast discussed with the family at bedside  10/10 remain on room air getting dialysis febrile temperature 100.2 will get chest x-ray today previous chest x-ray shows atelectasis left lower lobe supplement potassium 3.1 will start patient on Zosyn high risk for aspiration  10/11 alert awake eating breakfast no shortness of breath at rest he is on room air continue with her dialysis with fluid removal echo shows EF is about 60 to 65% last chest x-ray shows left lower lobe atelectasis patient is on Zosyn  10/12 getting dialysis on room air last hemoglobin 7.4  10/13 on room air was dialyzed yesterday evening breakfast family

## 2024-10-16 NOTE — PROGRESS NOTES
OCCUPATIONAL THERAPY TREATMENT  Patient: Michael Fung (83 y.o. male)  Date: 10/16/2024  Primary Diagnosis: Acute hypoxic respiratory failure [J96.01]       Precautions: Fall Risk                Recommendations for nursing mobility: Out of bed to chair for meals, Encourage HEP in prep for ADLs/mobility; see handout for details, Frequent repositioning to prevent skin breakdown, AD and gt belt for bed to chair , Amb to bathroom with AD and gait belt, [place] for bed to chair transfers , Assist x1, and R UE elevation for edema management    In place during session: External Catheter  Chart, occupational therapy assessment, plan of care, and goals were reviewed.  ASSESSMENT  Patient continues with skilled OT services and is progressing towards goals. Pt presented semis supine upon MALLOY arrival, agreeable to session. Pt A&O x 4. Pt completed UE therex, see grid below for details, to maintain/ increase strength and endurance to aid in adl performance while supine, seated EOB and standing in edwin stedy. Pt sat EOB ~8  minutes with improved sitting balance (no loss of balance).  Pt's sit to stand and standing tolerance has also improved.  Pt left in recliner with all known needs met with spouse in the room. (See below for objective details and assist levels).     Overall pt tolerated session fair today with UE therex and functional transfers.  Pt continues to be limited by join pain in feet and hands. Pt with increase edema in R UE. Pt continues to benefit from skilled OT to increase strength, endurance and independence with basic self care and functional transfers/mobility. Current OT recommendations for discharge Moderate intensity short-term skilled occupational therapy up to 5x/week. Will continue to benefit from skilled OT services, and will continue to progress as tolerated.      Start of Session End of Session   SPO2 (%) 95    Heart Rate (BPM) 97      GOALS:  Problem: Occupational Therapy - Adult  Goal: By  Discharge: Performs self-care activities at highest level of function for planned discharge setting.  See evaluation for individualized goals.  Description: FUNCTIONAL STATUS PRIOR TO ADMISSION:  Per pt's wife, pt was mod I-supervision w/ most ADLs, requiring additional time and ambulated mod I w/ no AD, using walls and furniture for support prior to admission.     HOME SUPPORT: The patient lived with spouse who provided assistance w/ ADLs as needed.    Occupational Therapy Goals:  Initiated 10/7/2024  Patient/Family stated goal: increase OOB mobility   1.  Patient will perform grooming with [place] within 7 day(s).  2.  Patient will perform upper body dressing with [place] within 7 day(s).  3.  Patient will perform lower body dressing with Modified [place] within 7 day(s).  4.  Patient will perform toilet transfers with Modified [place]  within 7 day(s).  5.  Patient will perform all aspects of toileting with [place] within 7 day(s).  6.  Patient will participate in upper extremity therapeutic exercise/activities with Supervision within 7 day(s).       Outcome: Progressing        PLAN :  Patient continues to benefit from skilled intervention to address functional impairments. Continue treatment per established plan of care to address goals.    Recommend next OT session: UB dressing and LB dressing    Recommendation for discharge: (in order for the patient to meet his/her long term goals): Moderate intensity short-term skilled occupational therapy up to 5x/week    Potential barriers for safe discharge: pts current support system is unable to meet their requirements for physical assistance, pt has impaired cognition, pt is a high fall risk, pt is not safe to be alone, and concern for pt safely navigating or managing the home environment.     IF patient discharges home will need the following DME: continuing to assess with progress     SUBJECTIVE:   Patient stated “This is ok.”      OBJECTIVE

## 2024-10-16 NOTE — PROGRESS NOTES
4 Eyes Skin Assessment     NAME:  Michael Fung  YOB: 1940  MEDICAL RECORD NUMBER:  [id number]    The patient is being assessed for  Other Weekly skin assessment     I agree that at least one RN has performed a thorough Head to Toe Skin Assessment on the patient. ALL assessment sites listed below have been assessed.      Areas assessed by both nurses:    Head, Face, Ears, Shoulders, Back, Chest, Arms, Elbows, Hands, Sacrum. Buttock, Coccyx, Ischium, Legs. Feet and Heels, and Under Medical Devices         Does the Patient have a Wound? No noted wound(s)  - Redness / excoriation to scrotum        Fabrizio Prevention initiated by RN: Yes  Wound Care Orders initiated by RN: No    Pressure Injury (Stage 3,4, Unstageable, DTI, NWPT, and Complex wounds) if present, place Wound referral order by RN under : No    New Ostomies, if present place, Ostomy referral order under : No     Nurse 1 eSignature: Electronically signed by Gretchen Scott RN on 10/16/24 at 4:39 PM EDT    **SHARE this note so that the co-signing nurse can place an eSignature**    Nurse 2 eSignature: Electronically signed by Orin Hidalgo RN on 10/16/24 at 7:43 PM EDT.

## 2024-10-16 NOTE — PLAN OF CARE
Problem: Discharge Planning  Goal: Discharge to home or other facility with appropriate resources  Outcome: Progressing     Problem: Skin/Tissue Integrity  Goal: Absence of new skin breakdown  Description: 1.  Monitor for areas of redness and/or skin breakdown  2.  Assess vascular access sites hourly  3.  Every 4-6 hours minimum:  Change oxygen saturation probe site  4.  Every 4-6 hours:  If on nasal continuous positive airway pressure, respiratory therapy assess nares and determine need for appliance change or resting period.  Outcome: Progressing     Problem: Safety - Adult  Goal: Free from fall injury  Outcome: Progressing  Flowsheets (Taken 10/15/2024 2030)  Free From Fall Injury:   Instruct family/caregiver on patient safety   Based on caregiver fall risk screen, instruct family/caregiver to ask for assistance with transferring infant if caregiver noted to have fall risk factors     Problem: ABCDS Injury Assessment  Goal: Absence of physical injury  Outcome: Progressing  Flowsheets (Taken 10/15/2024 2030)  Absence of Physical Injury: Implement safety measures based on patient assessment     Problem: Chronic Conditions and Co-morbidities  Goal: Patient's chronic conditions and co-morbidity symptoms are monitored and maintained or improved  Outcome: Progressing     Problem: Pain  Goal: Verbalizes/displays adequate comfort level or baseline comfort level  Outcome: Progressing  Flowsheets (Taken 10/15/2024 2030)  Verbalizes/displays adequate comfort level or baseline comfort level:   Encourage patient to monitor pain and request assistance   Assess pain using appropriate pain scale   Administer analgesics based on type and severity of pain and evaluate response   Implement non-pharmacological measures as appropriate and evaluate response   Consider cultural and social influences on pain and pain management   Notify Licensed Independent Practitioner if interventions unsuccessful or patient reports new pain

## 2024-10-16 NOTE — PLAN OF CARE
Problem: Discharge Planning  Goal: Discharge to home or other facility with appropriate resources  Outcome: Progressing  Flowsheets (Taken 10/16/2024 1300)  Discharge to home or other facility with appropriate resources:   Identify barriers to discharge with patient and caregiver   Arrange for needed discharge resources and transportation as appropriate   Identify discharge learning needs (meds, wound care, etc)     Problem: Skin/Tissue Integrity  Goal: Absence of new skin breakdown  Description: 1.  Monitor for areas of redness and/or skin breakdown  2.  Assess vascular access sites hourly  3.  Every 4-6 hours minimum:  Change oxygen saturation probe site  4.  Every 4-6 hours:  If on nasal continuous positive airway pressure, respiratory therapy assess nares and determine need for appliance change or resting period.  Note: The pt will remain free from developing skin breakdown      Problem: Safety - Adult  Goal: Free from fall injury  Outcome: Progressing  Flowsheets (Taken 10/16/2024 1300)  Free From Fall Injury:   Instruct family/caregiver on patient safety   Based on caregiver fall risk screen, instruct family/caregiver to ask for assistance with transferring infant if caregiver noted to have fall risk factors     Problem: ABCDS Injury Assessment  Goal: Absence of physical injury  Outcome: Progressing  Flowsheets (Taken 10/16/2024 1300)  Absence of Physical Injury: Implement safety measures based on patient assessment     Problem: Chronic Conditions and Co-morbidities  Goal: Patient's chronic conditions and co-morbidity symptoms are monitored and maintained or improved  Outcome: Progressing  Flowsheets (Taken 10/16/2024 1300)  Care Plan - Patient's Chronic Conditions and Co-Morbidity Symptoms are Monitored and Maintained or Improved: Monitor and assess patient's chronic conditions and comorbid symptoms for stability, deterioration, or improvement     Problem: Pain  Goal: Verbalizes/displays adequate comfort

## 2024-10-17 ENCOUNTER — APPOINTMENT (OUTPATIENT)
Facility: HOSPITAL | Age: 84
End: 2024-10-17
Attending: INTERNAL MEDICINE
Payer: OTHER GOVERNMENT

## 2024-10-17 LAB
ALBUMIN SERPL-MCNC: 1.5 G/DL (ref 3.5–5)
ANION GAP SERPL CALC-SCNC: 9 MMOL/L (ref 2–12)
BASOPHILS # BLD: 0 K/UL (ref 0–0.1)
BASOPHILS NFR BLD: 1 % (ref 0–1)
BUN SERPL-MCNC: 48 MG/DL (ref 6–20)
BUN/CREAT SERPL: 7 (ref 12–20)
CA-I BLD-MCNC: 9 MG/DL (ref 8.5–10.1)
CHLORIDE SERPL-SCNC: 101 MMOL/L (ref 97–108)
CO2 SERPL-SCNC: 26 MMOL/L (ref 21–32)
CREAT SERPL-MCNC: 6.6 MG/DL (ref 0.7–1.3)
DIFFERENTIAL METHOD BLD: ABNORMAL
ECHO BSA: 1.9 M2
EOSINOPHIL # BLD: 0.3 K/UL (ref 0–0.4)
EOSINOPHIL NFR BLD: 3 % (ref 0–7)
ERYTHROCYTE [DISTWIDTH] IN BLOOD BY AUTOMATED COUNT: 13.5 % (ref 11.5–14.5)
GLUCOSE BLD STRIP.AUTO-MCNC: 164 MG/DL (ref 65–100)
GLUCOSE BLD STRIP.AUTO-MCNC: 236 MG/DL (ref 65–100)
GLUCOSE BLD STRIP.AUTO-MCNC: 242 MG/DL (ref 65–100)
GLUCOSE BLD STRIP.AUTO-MCNC: 282 MG/DL (ref 65–100)
GLUCOSE SERPL-MCNC: 164 MG/DL (ref 65–100)
HCT VFR BLD AUTO: 22.2 % (ref 36.6–50.3)
HGB BLD-MCNC: 7.5 G/DL (ref 12.1–17)
IMM GRANULOCYTES # BLD AUTO: 0.1 K/UL (ref 0–0.04)
IMM GRANULOCYTES NFR BLD AUTO: 1 % (ref 0–0.5)
LYMPHOCYTES # BLD: 1.1 K/UL (ref 0.8–3.5)
LYMPHOCYTES NFR BLD: 14 % (ref 12–49)
MCH RBC QN AUTO: 30.4 PG (ref 26–34)
MCHC RBC AUTO-ENTMCNC: 33.8 G/DL (ref 30–36.5)
MCV RBC AUTO: 89.9 FL (ref 80–99)
MONOCYTES # BLD: 0.8 K/UL (ref 0–1)
MONOCYTES NFR BLD: 10 % (ref 5–13)
NEUTS SEG # BLD: 6 K/UL (ref 1.8–8)
NEUTS SEG NFR BLD: 71 % (ref 32–75)
NRBC # BLD: 0 K/UL (ref 0–0.01)
NRBC BLD-RTO: 0 PER 100 WBC
PERFORMED BY:: ABNORMAL
PHOSPHATE SERPL-MCNC: 4.7 MG/DL (ref 2.6–4.7)
PLATELET # BLD AUTO: 471 K/UL (ref 150–400)
PMV BLD AUTO: 8.8 FL (ref 8.9–12.9)
POTASSIUM SERPL-SCNC: 3.4 MMOL/L (ref 3.5–5.1)
RBC # BLD AUTO: 2.47 M/UL (ref 4.1–5.7)
SODIUM SERPL-SCNC: 136 MMOL/L (ref 136–145)
WBC # BLD AUTO: 8.3 K/UL (ref 4.1–11.1)

## 2024-10-17 PROCEDURE — 6370000000 HC RX 637 (ALT 250 FOR IP): Performed by: INTERNAL MEDICINE

## 2024-10-17 PROCEDURE — 2580000003 HC RX 258: Performed by: INTERNAL MEDICINE

## 2024-10-17 PROCEDURE — 6360000002 HC RX W HCPCS: Performed by: INTERNAL MEDICINE

## 2024-10-17 PROCEDURE — 97530 THERAPEUTIC ACTIVITIES: CPT

## 2024-10-17 PROCEDURE — 36415 COLL VENOUS BLD VENIPUNCTURE: CPT

## 2024-10-17 PROCEDURE — 85025 COMPLETE CBC W/AUTO DIFF WBC: CPT

## 2024-10-17 PROCEDURE — 80069 RENAL FUNCTION PANEL: CPT

## 2024-10-17 PROCEDURE — 93971 EXTREMITY STUDY: CPT

## 2024-10-17 PROCEDURE — 93971 EXTREMITY STUDY: CPT | Performed by: SURGERY

## 2024-10-17 PROCEDURE — 1100000000 HC RM PRIVATE

## 2024-10-17 PROCEDURE — 82962 GLUCOSE BLOOD TEST: CPT

## 2024-10-17 RX ORDER — POTASSIUM CHLORIDE 1500 MG/1
40 TABLET, EXTENDED RELEASE ORAL ONCE
Status: COMPLETED | OUTPATIENT
Start: 2024-10-17 | End: 2024-10-17

## 2024-10-17 RX ORDER — METHOCARBAMOL 750 MG/1
750 TABLET, FILM COATED ORAL 3 TIMES DAILY PRN
Status: DISCONTINUED | OUTPATIENT
Start: 2024-10-17 | End: 2024-10-26 | Stop reason: HOSPADM

## 2024-10-17 RX ORDER — INSULIN LISPRO 100 [IU]/ML
0-8 INJECTION, SOLUTION INTRAVENOUS; SUBCUTANEOUS
Status: DISCONTINUED | OUTPATIENT
Start: 2024-10-17 | End: 2024-10-26 | Stop reason: HOSPADM

## 2024-10-17 RX ADMIN — FINASTERIDE 5 MG: 5 TABLET, FILM COATED ORAL at 08:45

## 2024-10-17 RX ADMIN — SODIUM BICARBONATE 650 MG: 650 TABLET ORAL at 08:45

## 2024-10-17 RX ADMIN — THERA TABS 1 TABLET: TAB at 08:45

## 2024-10-17 RX ADMIN — TAMSULOSIN HYDROCHLORIDE 0.4 MG: 0.4 CAPSULE ORAL at 08:45

## 2024-10-17 RX ADMIN — SODIUM BICARBONATE 650 MG: 650 TABLET ORAL at 20:48

## 2024-10-17 RX ADMIN — INSULIN LISPRO 2 UNITS: 100 INJECTION, SOLUTION INTRAVENOUS; SUBCUTANEOUS at 17:32

## 2024-10-17 RX ADMIN — PIPERACILLIN AND TAZOBACTAM 3375 MG: 3; .375 INJECTION, POWDER, LYOPHILIZED, FOR SOLUTION INTRAVENOUS at 01:52

## 2024-10-17 RX ADMIN — SODIUM CHLORIDE, PRESERVATIVE FREE 10 ML: 5 INJECTION INTRAVENOUS at 08:46

## 2024-10-17 RX ADMIN — DICLOFENAC SODIUM 2 G: 10 GEL TOPICAL at 08:48

## 2024-10-17 RX ADMIN — THIAMINE HCL TAB 100 MG 100 MG: 100 TAB at 08:45

## 2024-10-17 RX ADMIN — ACETAMINOPHEN 650 MG: 325 TABLET ORAL at 17:32

## 2024-10-17 RX ADMIN — INSULIN LISPRO 2 UNITS: 100 INJECTION, SOLUTION INTRAVENOUS; SUBCUTANEOUS at 12:32

## 2024-10-17 RX ADMIN — DICLOFENAC SODIUM 2 G: 10 GEL TOPICAL at 20:56

## 2024-10-17 RX ADMIN — ALLOPURINOL 100 MG: 100 TABLET ORAL at 08:45

## 2024-10-17 RX ADMIN — INSULIN LISPRO 4 UNITS: 100 INJECTION, SOLUTION INTRAVENOUS; SUBCUTANEOUS at 20:49

## 2024-10-17 RX ADMIN — Medication 1 CAPSULE: at 08:45

## 2024-10-17 RX ADMIN — METHOCARBAMOL TABLETS 750 MG: 750 TABLET, COATED ORAL at 08:45

## 2024-10-17 RX ADMIN — POTASSIUM CHLORIDE 40 MEQ: 1500 TABLET, EXTENDED RELEASE ORAL at 12:32

## 2024-10-17 RX ADMIN — SODIUM CHLORIDE, PRESERVATIVE FREE 10 ML: 5 INJECTION INTRAVENOUS at 20:54

## 2024-10-17 RX ADMIN — EPOETIN ALFA-EPBX 10000 UNITS: 10000 INJECTION, SOLUTION INTRAVENOUS; SUBCUTANEOUS at 20:49

## 2024-10-17 ASSESSMENT — PAIN SCALES - GENERAL
PAINLEVEL_OUTOF10: 7
PAINLEVEL_OUTOF10: 5
PAINLEVEL_OUTOF10: 0

## 2024-10-17 ASSESSMENT — PAIN DESCRIPTION - LOCATION: LOCATION: NECK

## 2024-10-17 ASSESSMENT — PAIN - FUNCTIONAL ASSESSMENT: PAIN_FUNCTIONAL_ASSESSMENT: ACTIVITIES ARE NOT PREVENTED

## 2024-10-17 ASSESSMENT — PAIN DESCRIPTION - ORIENTATION: ORIENTATION: RIGHT;LEFT

## 2024-10-17 NOTE — PROGRESS NOTES
10/16/24 [id number]   Mobility   Repositioned Pillow support   Patient Turned Turned on Right Side   Head of Bed Elevated  HOB 45   Heels/Feet Heel(s) elevated off bed;Foot of bed elevated   Range of Motion Right arm;Passive   Hygiene   Hygiene Incontinence care   Level of Assistance Maximum assist   Skin Care Bath wipes;Foam skin cleanser;Protective barrier

## 2024-10-17 NOTE — PROGRESS NOTES
Hospitalist Progress Note               Daily Progress Note: 10/17/2024      Hospital Day: 15     Chief complaint: No chief complaint on file.       Brief HPI/ Hospital course to date:  Michael Fung is a 83-year-old male presented to outside hospital on 9/30 for cough and shortness of breath found to be hypoxic and have x-ray concerning for community-acquired pneumonia in the left mid and lower lung fields. Initiated on community-acquired pneumonia therapy with ceftriaxone and azithromycin. Patient also noted to have elevated creatinine of 4.5 which is his baseline patient is diuretic dependent. Became oliguric during the course of the hospitalization despite Lasix 80 mg. Lower extremity ultrasound obtained for elevated D-dimer. Showed bilateral DVTs in the right popliteal and 2 in the femoral and popliteal on the left. Had been initiated on a heparin drip 10/ evening patient on supratherapeutic heparin developed hemoptysis. Substantial enough to stop heparin drip. Patient on mid flow oxygen with low sats. Transferred to our ICU on BiPAP transitioned to high flow nasal cannula 50 L, 40% with sats of 97%. Had a low prop VQ scan on 10/1. IVF filter placed on 10/4. Noted has renal failure with potential need for dialysis creatinine increased to 6 and oliguric. Bladder scan negative no Muñoz catheter or straight cath inserted, worsening hypoxemia from [facility]. Patient was subsequently admitted to medical ICU, evaluated by nephrology, initiated on dialysis, evaluated by urology for urinary retention and CATHY, eventually started on hemodialysis, following which patient's clinical status improved. Subsequently patient was transferred out of the ICU and accepted to the hospitalist service. Hospital course also complicated by ileus and SBO, which subsequently resolved. Patient also started on IV zosyn for treatment of aspiration pneumonia, subsequently weaned to R.A. on 10/13. Completed 7 days  abnormalities requiring IV replacement and close serial monitoring  [] SQ Insulin SS- monitoring serial FSBS for Hypoglycemic adverse drug reaction  [] Other -   [] Change in code status:    [] Decision to escalate care:    [] Major surgery/procedure with associated risk factors:    ----------------------------------------------------------------------  C. Data (any 2)  [] Discussed current management and discharge planning options with Case Management.  [] Discussed management of the case with:    [] Telemetry personally reviewed and interpreted as documented above    [] Imaging personally reviewed and interpreted, includes:    [x] Data Review (any 3)  [x] All available Consultant notes from yesterday/today were reviewed  [x] All current labs were reviewed and interpreted for clinical significance   [x] Appropriate follow-up labs were ordered  [] Collateral history obtained from:       Time spent with patient including counseling, chart review and nursing communication: 20 minutes    Sam Leon Cha, MD

## 2024-10-17 NOTE — CARE COORDINATION
CM reviewed chart. CM spoke to [facility] in [place] they are declining this patient at this time due to no HD Chair setup. CM followed up with [facility] ([facility] [place] [place]) in [place], [facility] [place] [place] stated they are now declining patient based on patient not being able to tolerate dialysis three times a week. [facility] [place] [place] does not do palliative dialysis. CM notified nephrologist. CM will continue to follow.

## 2024-10-17 NOTE — PROGRESS NOTES
During bedside shift report, transport came to take patient to dialysis. Family at bedside and questioned why pt was going to dialysis today when he was only supposed to be going on Tuesdays and Saturdays.     Nurse called and verified with dialysis nurse, she verified with MD. Pt is only to have dialysis on Tuesdays and Saturdays. Family made aware at bedside.

## 2024-10-17 NOTE — PLAN OF CARE
Problem: Discharge Planning  Goal: Discharge to home or other facility with appropriate resources  10/16/2024 2206 by Bre Banks RN  Outcome: Progressing  Flowsheets (Taken 10/16/2024 2130)  Discharge to home or other facility with appropriate resources:   Identify barriers to discharge with patient and caregiver   Arrange for needed discharge resources and transportation as appropriate   Identify discharge learning needs (meds, wound care, etc)   Refer to discharge planning if patient needs post-hospital services based on physician order or complex needs related to functional status, cognitive ability or social support system     Problem: Skin/Tissue Integrity  Goal: Absence of new skin breakdown  Description: 1.  Monitor for areas of redness and/or skin breakdown  2.  Assess vascular access sites hourly  3.  Every 4-6 hours minimum:  Change oxygen saturation probe site  4.  Every 4-6 hours:  If on nasal continuous positive airway pressure, respiratory therapy assess nares and determine need for appliance change or resting period.  10/16/2024 2206 by Bre Banks RN  Outcome: Progressing     Problem: Safety - Adult  Goal: Free from fall injury  10/16/2024 2206 by Bre Banks RN  Outcome: Progressing  Flowsheets (Taken 10/16/2024 2130)  Free From Fall Injury:   Instruct family/caregiver on patient safety   Based on caregiver fall risk screen, instruct family/caregiver to ask for assistance with transferring infant if caregiver noted to have fall risk factors     Problem: ABCDS Injury Assessment  Goal: Absence of physical injury  10/16/2024 2206 by Bre Banks RN  Outcome: Progressing  Flowsheets (Taken 10/16/2024 2130)  Absence of Physical Injury: Implement safety measures based on patient assessment     Problem: Chronic Conditions and Co-morbidities  Goal: Patient's chronic conditions and co-morbidity symptoms are monitored and maintained or improved  10/16/2024 2206 by

## 2024-10-17 NOTE — PROGRESS NOTES
[facility]     Renal Daily Progress Note:     Subjective: Patient underwent dialysis today.  This would be his third dialysis.  Family at the bedside.  Seen around 4 PM.  Has been confused and intermittently agitated.  Now has a sitter at the bedside.  Appetite fair, ate some breakfast and a little bit of lunch.  No nausea or vomiting.  No chest or abdominal pain.  Still making urine.  Muñoz not placed    10/8 more awake today.  Ate breakfast without nausea or vomiting.  Urine output not quantified.  However, today serum creatinine is only 5.12.  Will see if tomorrow's creatinine is less than that which was seen on Saturday.  Not short of breath,    10/9 awake and comfortable.  Seems calm.  Still with sitter is in the room.  Creatinine is trending up.  Will perform dialysis tomorrow.    10/10 seems calm and appropriate.  Wife at bedside.  Still has a sitter.  Had dialysis today without issue.    10/11/24 For HD in AM. Overall improvement.    10/12/24 Seen during HD in the HD suite in the presence of acute HD QUINTIN Canas      10/14/2024  No major complaints were offered this afternoon.    10/15 seen in room 569 postdialysis.  Wife at bedside and daughter spoken to via telephone.  Patient tolerated dialysis without issue.  Still essentially immobile, receiving some physical therapy.  Prior to admission he was able to ambulate.  Referred to [facility] for outpatient dialysis.  Long discussion with family regarding goals of care.  I did mention that given his age and comorbidities dialysis may be difficult to maintain.  Will start off with twice weekly dialysis to see how he does.  So far volume status has been maintained.  Electrolytes appear to be stable.  Will shoot for palliative dialysis to keep him comfortable and out of the hospital.  Not short of breath, no chest pain.  Starting to eat a bit more.  Still gets agitated from time to time.      10/17/2024    Muscle skeletal pain was an issue this  PORTABLE    Result Date: 10/3/2024  Small to moderate left pleural effusion with associated atelectasis. Electronically signed by RAISA PEÑA    CT CHEST WO CONTRAST    Result Date: 10/3/2024  1.  Left upper lobe, lingula, and left lower lobe infiltrates, suspicious for multifocal pneumonia. 2.  Prominent distention of the esophagus and stomach which are nonspecific. Gastric outlet obstruction cannot be excluded. 3.  Coronary artery calcifications. Electronically Signed by Fernandez Caicedo MD 10/3/2024 8:28 AM    XR CHEST 1 VIEW    Result Date: 10/2/2024  1. Hazy airspace disease within the left mid and lower lung, which may reflect underlying pneumonia. Electronically Signed by Conor Noyola DO 10/2/2024 11:18 PM    US upper extremity veins bilateral    Result Date: 10/2/2024  No evidence for DVT within the bilateral upper extremities. Performing Technologist:  Roxanne Issa RT(R), RDMS, RVT, RDCS   Electronically Signed by Marquis Arvizu MD 10/2/2024 5:04 PM    US lower extremity veins bilateral    Result Date: 10/2/2024  Evidence for acute nonocclusive DVT within the right popliteal vein and left femoral and popliteal veins. Performing Technologist:  Roxanne Issa RT(R), RDMS, RVT, RDCS   Electronically Signed by Tony Jones MD 10/2/2024 3:59 PM    NM LUNG VENT/PERFUSION (VQ)    Result Date: 10/1/2024  Low probability for pulmonary embolus. Electronically Signed by Christos Herman MD 10/1/2024 1:45 PM     Assessment:   Renal Specific Problems  Acute on chronic renal failure stage V. On HD now. Tolerating the Tx well.  Volume overload  DVT right popliteal vein and left femoral/popliteal vein  Left multifocal pneumonia  Status post placement of IVC filter  Small bowel ileus-resolved  Hypokalemia - mild- potassium supplements were already ordered    Plan:     Obtain/ Order: labs/cultures/radiology/procedures:     Therapeutic:    HD again on Saturday, October 19.  Continue day 4K bath in light of

## 2024-10-17 NOTE — CONSULTS
Consult  Pulmonary, Critical Care    Name: Michael Fung MRN: [id number]   : 1940 Hospital: [facility]   Date: 10/17/2024  Admission date: 10/3/2024 Hospital Day: 15       Subjective/Interval History:   Seen in the dialysis unit he is confused does not know why he is in the hospital or that he is in the hospital.  Was admitted at [facility] with cough severe hypoxia found to have bilateral DVT of the legs was placed on heparin subsequently developed severe hemoptysis heparin was discontinued and IVC filter placed.  He was transferred to our facility had CT scan which shows distended esophagus and stomach possible ileus with scattered infiltrates throughout the left lung no documentation of vomiting but his history is not reliable at all.  He has had a VQ scan that was low probability but does show matching defect due to renal insufficiency he did not have a CTA of the chest he initially required high flow nasal oxygen 45% FiO2 reportedly has room air oxygen saturation 95% today although he is wearing oxygen while receiving dialysis    Patient Active Problem List   Diagnosis    Acute hypoxic respiratory failure    CATHY (acute kidney injury) (HCC)    Hemoptysis    Community acquired pneumonia    Acute deep vein thrombosis (DVT) of femoral vein of both lower extremities (HCC)    DM (diabetes mellitus), type 2 (HCC)    Decreased mobility and endurance    Acute upper GI bleed    Ileus (HCC)    Acute urethral obstruction    Incomplete bladder emptying        IMPRESSION:   Acute hypoxic respiratory failure likely due to a combination of aspiration pneumonia probable pulmonary emboli and pulmonary edema/fluid overload from renal failure now on room air  Recurrent fever questionable cellulitis right arm versus DVT right arm  Pulmonary edema secondary to fluid retention from renal failure clear on last chest x-ray  Aspiration pneumonia has diffuse left-sided infiltrates very likely had  pneumonia. Electronically Signed by Conor Noyola DO 10/2/2024 11:18 PM    US upper extremity veins bilateral    Result Date: 10/2/2024  No evidence for DVT within the bilateral upper extremities. Performing Technologist:  Roxanne Issa RT(R), RDMS, RVT, RDCS   Electronically Signed by Marquis Arvizu MD 10/2/2024 5:04 PM    US lower extremity veins bilateral    Result Date: 10/2/2024  Evidence for acute nonocclusive DVT within the right popliteal vein and left femoral and popliteal veins. Performing Technologist:  Roxanne Issa RT(R), RDMS, RVT, RDCS   Electronically Signed by Tony Jones MD 10/2/2024 3:59 PM    NM LUNG VENT/PERFUSION (VQ)    Result Date: 10/1/2024  Low probability for pulmonary embolus. Electronically Signed by Christos Herman MD 10/1/2024 1:45 PM         Discussion hypoxia markedly improved very likely had pulmonary emboli from his bilateral DVT despite low probability VQ scan it did show matching defects so I would put it as indeterminate.  He also had diffuse pulmonary infiltrates on initial CT of the chest probable aspiration due to his abnormal esophagus and stomach.  These may represent presbyesophagus and gastroparesis with resultant aspiration.  He will need a barium swallow to rule out reflux once oral intake is allowed.  Once hemoglobin is stabilized would suggest anticoagulation due to his DVT  10/8 alert awake on room air high risk for aspiration speech has seen the patient, no hemoptysis patient had DVT of both lower extremities  10/9 patient had IVC filter placed 10/4 he is alert awake eating breakfast discussed with the family at bedside  10/10 remain on room air getting dialysis febrile temperature 100.2 will get chest x-ray today previous chest x-ray shows atelectasis left lower lobe supplement potassium 3.1 will start patient on Zosyn high risk for aspiration  10/11 alert awake eating breakfast no shortness of breath at rest he is on room air continue with her dialysis with

## 2024-10-17 NOTE — PLAN OF CARE
PHYSICAL THERAPY TREATMENT     Patient: Michael Fung (83 y.o. male)  Date: 10/17/2024  Diagnosis: Acute hypoxic respiratory failure [J96.01] Acute hypoxic respiratory failure      Precautions: Fall Risk                      Recommendations for nursing mobility: Out of bed to chair for meals, Encourage HEP in prep for ADLs/mobility; see handout for details, Frequent repositioning to prevent skin breakdown, Use of BSC for toileting , AD and gt belt for bed to chair , and Assist x1    In place during session: External Catheter and EKG/telemetry   Chart, physical therapy assessment, plan of care and goals were reviewed.  ASSESSMENT  Patient continues with skilled PT services and is progressing towards goals. Pt supine in bed upon PT arrival, eager and agreeable to session. (See below for objective details and assist levels).     Overall pt tolerated session fair today. Pt transferred to eob with min A. Pt sat eob and performed seated LE therex with no c/o pain or disomcofrt. Pt stood from bed with mod A and ambulated ~3 ft with RW and min A. Gait was slow and fairly steady with no lob or knee buckling noted. Pt did report his legs felt weak during transfer. Pt left sitting up in recliner chair with all needs met and family present. Will continue to benefit from skilled PT services, and will continue to progress as tolerated. Current PT DC recommendation Moderate intensity short-term skilled physical therapy up to 5x/week once medically appropriate.    GOALS:  Problem: Physical Therapy - Adult  Goal: By Discharge: Performs mobility at highest level of function for planned discharge setting.  See evaluation for individualized goals.  Description: FUNCTIONAL STATUS PRIOR TO ADMISSION: Patient was modified independent using a walker/cane  for functional mobility.    HOME SUPPORT PRIOR TO ADMISSION: The patient lived with spouse but did not require assistance.    Physical Therapy Goals  Initiated

## 2024-10-17 NOTE — PLAN OF CARE
Problem: Discharge Planning  Goal: Discharge to home or other facility with appropriate resources  10/17/2024 0953 by Roxanne Mccoy RN  Outcome: Progressing  10/16/2024 2206 by Bre Banks RN  Outcome: Progressing  Flowsheets (Taken 10/16/2024 2130)  Discharge to home or other facility with appropriate resources:   Identify barriers to discharge with patient and caregiver   Arrange for needed discharge resources and transportation as appropriate   Identify discharge learning needs (meds, wound care, etc)   Refer to discharge planning if patient needs post-hospital services based on physician order or complex needs related to functional status, cognitive ability or social support system     Problem: Skin/Tissue Integrity  Goal: Absence of new skin breakdown  Description: 1.  Monitor for areas of redness and/or skin breakdown  2.  Assess vascular access sites hourly  3.  Every 4-6 hours minimum:  Change oxygen saturation probe site  4.  Every 4-6 hours:  If on nasal continuous positive airway pressure, respiratory therapy assess nares and determine need for appliance change or resting period.  10/17/2024 0953 by Roxanne Mccoy RN  Outcome: Progressing  10/16/2024 2206 by Bre Banks RN  Outcome: Progressing     Problem: Safety - Adult  Goal: Free from fall injury  10/17/2024 0953 by Roxanne Mccoy RN  Outcome: Progressing  10/16/2024 2206 by Bre Banks RN  Outcome: Progressing  Flowsheets (Taken 10/16/2024 2130)  Free From Fall Injury:   Instruct family/caregiver on patient safety   Based on caregiver fall risk screen, instruct family/caregiver to ask for assistance with transferring infant if caregiver noted to have fall risk factors     Problem: ABCDS Injury Assessment  Goal: Absence of physical injury  10/17/2024 0953 by Roxanne Mccoy RN  Outcome: Progressing  10/16/2024 2206 by Bre Banks RN  Outcome: Progressing  Flowsheets (Taken 10/16/2024 2130)  Absence of  Physical Injury: Implement safety measures based on patient assessment     Problem: Chronic Conditions and Co-morbidities  Goal: Patient's chronic conditions and co-morbidity symptoms are monitored and maintained or improved  10/17/2024 0953 by Roxanne Mccoy RN  Outcome: Progressing  10/16/2024 2206 by Bre Banks RN  Outcome: Progressing     Problem: Respiratory - Adult  Goal: Achieves optimal ventilation and oxygenation  10/17/2024 0953 by Roxanne Mccoy RN  Outcome: Progressing  10/16/2024 2206 by Bre Banks RN  Outcome: Progressing     Problem: Cardiovascular - Adult  Goal: Maintains optimal cardiac output and hemodynamic stability  10/17/2024 0953 by Roxanne Mccoy RN  Outcome: Progressing  10/16/2024 2206 by Bre Banks RN  Outcome: Progressing  Goal: Absence of cardiac dysrhythmias or at baseline  10/17/2024 0953 by Roxanne Mccoy RN  Outcome: Progressing  10/16/2024 2206 by Bre Banks RN  Outcome: Progressing     Problem: Neurosensory - Adult  Goal: Achieves stable or improved neurological status  10/17/2024 0953 by Roxanne Mccoy RN  Outcome: Progressing  10/16/2024 2206 by Bre Banks RN  Outcome: Progressing  Goal: Absence of seizures  10/17/2024 0953 by Roxanne Mccoy RN  Outcome: Progressing  10/16/2024 2206 by Bre Banks RN  Outcome: Progressing  Goal: Remains free of injury related to seizures activity  10/17/2024 0953 by Roxanne Mccoy RN  Outcome: Progressing  10/16/2024 2206 by Bre Banks RN  Outcome: Progressing  Goal: Achieves maximal functionality and self care  10/17/2024 0953 by Roxanne Mccoy RN  Outcome: Progressing  10/16/2024 2206 by Bre Banks RN  Outcome: Progressing     Problem: Musculoskeletal - Adult  Goal: Return mobility to safest level of function  10/17/2024 0953 by Roxanne Mccoy RN  Outcome: Progressing  10/16/2024 2206 by Bre Banks RN  Outcome: Progressing  Goal: Maintain

## 2024-10-18 LAB
ALBUMIN SERPL-MCNC: 1.5 G/DL (ref 3.5–5)
ANION GAP SERPL CALC-SCNC: 10 MMOL/L (ref 2–12)
BUN SERPL-MCNC: 54 MG/DL (ref 6–20)
BUN/CREAT SERPL: 8 (ref 12–20)
CA-I BLD-MCNC: 8.8 MG/DL (ref 8.5–10.1)
CHLORIDE SERPL-SCNC: 103 MMOL/L (ref 97–108)
CO2 SERPL-SCNC: 24 MMOL/L (ref 21–32)
CREAT SERPL-MCNC: 7.13 MG/DL (ref 0.7–1.3)
GLUCOSE BLD STRIP.AUTO-MCNC: 144 MG/DL (ref 65–100)
GLUCOSE BLD STRIP.AUTO-MCNC: 226 MG/DL (ref 65–100)
GLUCOSE BLD STRIP.AUTO-MCNC: 241 MG/DL (ref 65–100)
GLUCOSE BLD STRIP.AUTO-MCNC: 277 MG/DL (ref 65–100)
GLUCOSE BLD STRIP.AUTO-MCNC: 283 MG/DL (ref 65–100)
GLUCOSE SERPL-MCNC: 131 MG/DL (ref 65–100)
MAGNESIUM SERPL-MCNC: 1.9 MG/DL (ref 1.6–2.4)
PERFORMED BY:: ABNORMAL
PHOSPHATE SERPL-MCNC: 5.5 MG/DL (ref 2.6–4.7)
POTASSIUM SERPL-SCNC: 3.9 MMOL/L (ref 3.5–5.1)
SODIUM SERPL-SCNC: 137 MMOL/L (ref 136–145)

## 2024-10-18 PROCEDURE — 2580000003 HC RX 258: Performed by: INTERNAL MEDICINE

## 2024-10-18 PROCEDURE — 1100000000 HC RM PRIVATE

## 2024-10-18 PROCEDURE — 6370000000 HC RX 637 (ALT 250 FOR IP): Performed by: INTERNAL MEDICINE

## 2024-10-18 PROCEDURE — 83735 ASSAY OF MAGNESIUM: CPT

## 2024-10-18 PROCEDURE — 36415 COLL VENOUS BLD VENIPUNCTURE: CPT

## 2024-10-18 PROCEDURE — 99232 SBSQ HOSP IP/OBS MODERATE 35: CPT | Performed by: SURGERY

## 2024-10-18 PROCEDURE — 82962 GLUCOSE BLOOD TEST: CPT

## 2024-10-18 PROCEDURE — 80069 RENAL FUNCTION PANEL: CPT

## 2024-10-18 RX ORDER — CEFUROXIME AXETIL 250 MG/1
250 TABLET ORAL EVERY 12 HOURS SCHEDULED
Status: COMPLETED | OUTPATIENT
Start: 2024-10-18 | End: 2024-10-22

## 2024-10-18 RX ORDER — CEFUROXIME AXETIL 250 MG/1
500 TABLET ORAL EVERY 12 HOURS SCHEDULED
Status: DISCONTINUED | OUTPATIENT
Start: 2024-10-18 | End: 2024-10-18

## 2024-10-18 RX ADMIN — INSULIN LISPRO 4 UNITS: 100 INJECTION, SOLUTION INTRAVENOUS; SUBCUTANEOUS at 23:49

## 2024-10-18 RX ADMIN — CEFUROXIME AXETIL 250 MG: 250 TABLET, FILM COATED ORAL at 11:15

## 2024-10-18 RX ADMIN — DICLOFENAC SODIUM 2 G: 10 GEL TOPICAL at 11:15

## 2024-10-18 RX ADMIN — THIAMINE HCL TAB 100 MG 100 MG: 100 TAB at 11:15

## 2024-10-18 RX ADMIN — SODIUM CHLORIDE, PRESERVATIVE FREE 10 ML: 5 INJECTION INTRAVENOUS at 23:49

## 2024-10-18 RX ADMIN — SODIUM BICARBONATE 650 MG: 650 TABLET ORAL at 23:44

## 2024-10-18 RX ADMIN — SODIUM BICARBONATE 650 MG: 650 TABLET ORAL at 11:08

## 2024-10-18 RX ADMIN — INSULIN LISPRO 2 UNITS: 100 INJECTION, SOLUTION INTRAVENOUS; SUBCUTANEOUS at 11:15

## 2024-10-18 RX ADMIN — Medication 1 CAPSULE: at 11:15

## 2024-10-18 RX ADMIN — THERA TABS 1 TABLET: TAB at 11:15

## 2024-10-18 RX ADMIN — ALLOPURINOL 100 MG: 100 TABLET ORAL at 11:15

## 2024-10-18 RX ADMIN — CEFUROXIME AXETIL 250 MG: 250 TABLET, FILM COATED ORAL at 23:49

## 2024-10-18 RX ADMIN — TAMSULOSIN HYDROCHLORIDE 0.4 MG: 0.4 CAPSULE ORAL at 11:15

## 2024-10-18 RX ADMIN — INSULIN LISPRO 4 UNITS: 100 INJECTION, SOLUTION INTRAVENOUS; SUBCUTANEOUS at 17:53

## 2024-10-18 RX ADMIN — DICLOFENAC SODIUM 2 G: 10 GEL TOPICAL at 23:52

## 2024-10-18 RX ADMIN — SODIUM CHLORIDE, PRESERVATIVE FREE 10 ML: 5 INJECTION INTRAVENOUS at 11:44

## 2024-10-18 RX ADMIN — FINASTERIDE 5 MG: 5 TABLET, FILM COATED ORAL at 11:15

## 2024-10-18 ASSESSMENT — PAIN DESCRIPTION - LOCATION: LOCATION: ELBOW;ARM

## 2024-10-18 ASSESSMENT — PAIN DESCRIPTION - ORIENTATION: ORIENTATION: RIGHT

## 2024-10-18 ASSESSMENT — PAIN SCALES - GENERAL: PAINLEVEL_OUTOF10: 7

## 2024-10-18 NOTE — PROGRESS NOTES
Hospitalist Progress Note               Daily Progress Note: 10/18/2024      Hospital Day: 16     Chief complaint: No chief complaint on file.       Brief HPI/ Hospital course to date:  Michael Fung is a 83-year-old male presented to outside hospital on 9/30 for cough and shortness of breath found to be hypoxic and have x-ray concerning for community-acquired pneumonia in the left mid and lower lung fields. Initiated on community-acquired pneumonia therapy with ceftriaxone and azithromycin. Patient also noted to have elevated creatinine of 4.5 which is his baseline patient is diuretic dependent. Became oliguric during the course of the hospitalization despite Lasix 80 mg. Lower extremity ultrasound obtained for elevated D-dimer. Showed bilateral DVTs in the right popliteal and 2 in the femoral and popliteal on the left. Had been initiated on a heparin drip 10/ evening patient on supratherapeutic heparin developed hemoptysis. Substantial enough to stop heparin drip. Patient on mid flow oxygen with low sats. Transferred to our ICU on BiPAP transitioned to high flow nasal cannula 50 L, 40% with sats of 97%. Had a low prop VQ scan on 10/1. IVF filter placed on 10/4. Noted has renal failure with potential need for dialysis creatinine increased to 6 and oliguric. Bladder scan negative no Muñoz catheter or straight cath inserted, worsening hypoxemia from U [facility]. Patient was subsequently admitted to medical ICU, evaluated by nephrology, initiated on dialysis, evaluated by urology for urinary retention and CATHY, eventually started on hemodialysis, following which patient's clinical status improved. Subsequently patient was transferred out of the ICU and accepted to the hospitalist service. Hospital course also complicated by ileus and SBO, which subsequently resolved. Patient also started on IV zosyn for treatment of aspiration pneumonia, subsequently weaned to R.A. on 10/13. Completed 7 days  AC & HS    hydrALAZINE (APRESOLINE) injection 10 mg  10 mg IntraVENous Q6H PRN    epoetin sandra-epbx (RETACRIT) injection 10,000 Units  10,000 Units SubCUTAneous Once per day on Tuesday Thursday Saturday    lactobacillus (CULTURELLE) capsule 1 capsule  1 capsule Oral Daily with breakfast    diclofenac sodium (VOLTAREN) 1 % gel 2 g  2 g Topical BID    heparin (porcine) injection 2,500 Units  2,500 Units IntraCATHeter PRN    sodium chloride flush 0.9 % injection 5-40 mL  5-40 mL IntraVENous 2 times per day    sodium chloride flush 0.9 % injection 5-40 mL  5-40 mL IntraVENous PRN    0.9 % sodium chloride infusion   IntraVENous PRN    ondansetron (ZOFRAN-ODT) disintegrating tablet 4 mg  4 mg Oral Q8H PRN    Or    ondansetron (ZOFRAN) injection 4 mg  4 mg IntraVENous Q6H PRN    acetaminophen (TYLENOL) tablet 650 mg  650 mg Oral Q6H PRN    Or    acetaminophen (TYLENOL) suppository 650 mg  650 mg Rectal Q6H PRN    bisacodyl (DULCOLAX) suppository 10 mg  10 mg Rectal Daily PRN    glucose chewable tablet 16 g  4 tablet Oral PRN    dextrose bolus 10% 125 mL  125 mL IntraVENous PRN    Or    dextrose bolus 10% 250 mL  250 mL IntraVENous PRN    glucagon injection 1 mg  1 mg SubCUTAneous PRN    dextrose 10 % infusion   IntraVENous Continuous PRN    allopurinol (ZYLOPRIM) tablet 100 mg  100 mg Oral Daily    finasteride (PROSCAR) tablet 5 mg  5 mg Oral Daily    tamsulosin (FLOMAX) capsule 0.4 mg  0.4 mg Oral Daily    thiamine mononitrate tablet 100 mg  100 mg Oral Daily    sodium bicarbonate tablet 650 mg  650 mg Oral BID    multivitamin 1 tablet  1 tablet Oral Daily    lidocaine (XYLOCAINE) 2 % uro-jet   Topical PRN         All relevant laboratory and imaging results reviewed in EPIC electronic medical records.            Discussion/MDM:     [] High (any 2)    A. Problems (any 1)  [] Acute/Chronic Illness/injury posing threat to life or bodily function:    [] Severe exacerbation of chronic illness:

## 2024-10-18 NOTE — CONSULTS
Consult  Pulmonary, Critical Care    Name: Michael Fung MRN: [id number]   : 1940 Hospital: [facility]   Date: 10/18/2024  Admission date: 10/3/2024 Hospital Day: 16       Subjective/Interval History:   Seen in the dialysis unit he is confused does not know why he is in the hospital or that he is in the hospital.  Was admitted at [facility] with cough severe hypoxia found to have bilateral DVT of the legs was placed on heparin subsequently developed severe hemoptysis heparin was discontinued and IVC filter placed.  He was transferred to our facility had CT scan which shows distended esophagus and stomach possible ileus with scattered infiltrates throughout the left lung no documentation of vomiting but his history is not reliable at all.  He has had a VQ scan that was low probability but does show matching defect due to renal insufficiency he did not have a CTA of the chest he initially required high flow nasal oxygen 45% FiO2 reportedly has room air oxygen saturation 95% today although he is wearing oxygen while receiving dialysis    Patient Active Problem List   Diagnosis    Acute hypoxic respiratory failure    CATHY (acute kidney injury) (HCC)    Hemoptysis    Community acquired pneumonia    Acute deep vein thrombosis (DVT) of femoral vein of both lower extremities (HCC)    DM (diabetes mellitus), type 2 (HCC)    Decreased mobility and endurance    Acute upper GI bleed    Ileus (HCC)    Acute urethral obstruction    Incomplete bladder emptying        IMPRESSION:   Acute hypoxic respiratory failure likely due to a combination of aspiration pneumonia probable pulmonary emboli and pulmonary edema/fluid overload from renal failure now on room air  Recurrent fever questionable cellulitis right arm   Pulmonary edema secondary to fluid retention from renal failure clear on last chest x-ray  Aspiration pneumonia has diffuse left-sided infiltrates very likely had aspiration cleared  PRIETO SOUZA    XR CHEST PORTABLE    Result Date: 10/4/2024  NG tube courses into the stomach. Increased pulmonary edema.. Electronically signed by Jona Givens    CT CHEST ABDOMEN PELVIS WO CONTRAST Additional Contrast? None    Result Date: 10/4/2024  1. There are bilateral lung infiltrates which may represent infection, aspiration, or less likely infarction. 2. Trace bilateral pleural effusions. 3. There is significant fluid distention of the stomach, and there are mildly dilated proximal small bowel loops in the mid abdomen. Findings may be related to a ileus versus proximal, partial obstruction. The patient may benefit from NG tube decompression. 4. There is a left inguinal hernia containing a nonobstructed loop of distal colon. Electronically signed by BRANDI JERNIGAN    XR CHEST PORTABLE    Result Date: 10/3/2024  Small to moderate left pleural effusion with associated atelectasis. Electronically signed by RAISA PEÑA    CT CHEST WO CONTRAST    Result Date: 10/3/2024  1.  Left upper lobe, lingula, and left lower lobe infiltrates, suspicious for multifocal pneumonia. 2.  Prominent distention of the esophagus and stomach which are nonspecific. Gastric outlet obstruction cannot be excluded. 3.  Coronary artery calcifications. Electronically Signed by Fernandez Caicedo MD 10/3/2024 8:28 AM    XR CHEST 1 VIEW    Result Date: 10/2/2024  1. Hazy airspace disease within the left mid and lower lung, which may reflect underlying pneumonia. Electronically Signed by Conor Noyola DO 10/2/2024 11:18 PM    US upper extremity veins bilateral    Result Date: 10/2/2024  No evidence for DVT within the bilateral upper extremities. Performing Technologist:  Roxanne Issa RT(R), RDMS, RVT, RDCS   Electronically Signed by Marquis Arvizu MD 10/2/2024 5:04 PM    US lower extremity veins bilateral    Result Date: 10/2/2024  Evidence for acute nonocclusive DVT within the right popliteal vein and left femoral and popliteal veins. Performing

## 2024-10-18 NOTE — PLAN OF CARE
Problem: Discharge Planning  Goal: Discharge to home or other facility with appropriate resources  10/17/2024 2217 by María Elena Rivas RN  Outcome: Progressing  10/17/2024 0953 by Roxanne Mccoy RN  Outcome: Progressing     Problem: Skin/Tissue Integrity  Goal: Absence of new skin breakdown  Description: 1.  Monitor for areas of redness and/or skin breakdown  2.  Assess vascular access sites hourly  3.  Every 4-6 hours minimum:  Change oxygen saturation probe site  4.  Every 4-6 hours:  If on nasal continuous positive airway pressure, respiratory therapy assess nares and determine need for appliance change or resting period.  10/17/2024 2217 by María Elena Rivas RN  Outcome: Progressing  10/17/2024 0953 by Roxanne Mccoy RN  Outcome: Progressing     Problem: Safety - Adult  Goal: Free from fall injury  10/17/2024 2217 by María Elena Rivas RN  Outcome: Progressing  10/17/2024 0953 by Roxanne Mccoy RN  Outcome: Progressing     Problem: ABCDS Injury Assessment  Goal: Absence of physical injury  10/17/2024 2217 by María Elena Rivas RN  Outcome: Progressing  10/17/2024 0953 by Roxanne Mccoy RN  Outcome: Progressing     Problem: Chronic Conditions and Co-morbidities  Goal: Patient's chronic conditions and co-morbidity symptoms are monitored and maintained or improved  10/17/2024 2217 by María Elena Rivas RN  Outcome: Progressing  10/17/2024 0953 by Roxanne Mccoy RN  Outcome: Progressing     Problem: Respiratory - Adult  Goal: Achieves optimal ventilation and oxygenation  10/17/2024 2217 by María Elena Rivas RN  Outcome: Progressing  10/17/2024 0953 by Roxanne Mccoy RN  Outcome: Progressing     Problem: Cardiovascular - Adult  Goal: Maintains optimal cardiac output and hemodynamic stability  10/17/2024 2217 by María Elena Rivsa RN  Outcome: Progressing  Flowsheets (Taken 10/17/2024 1923)  Maintains optimal cardiac output and hemodynamic stability: Monitor blood pressure and heart rate  10/17/2024 0953 by  period  10/17/2024 2217 by María Elena Rivas RN  Outcome: Progressing  10/17/2024 0953 by Roxanne Mccoy RN  Outcome: Progressing     Problem: Metabolic/Fluid and Electrolytes - Adult  Goal: Electrolytes maintained within normal limits  10/17/2024 2217 by María Elena Rivas RN  Outcome: Progressing  10/17/2024 0953 by Roxanne Mccoy RN  Outcome: Progressing  Goal: Hemodynamic stability and optimal renal function maintained  10/17/2024 2217 by María Elena Rivas RN  Outcome: Progressing  10/17/2024 0953 by Roxanne Mccoy RN  Outcome: Progressing  Goal: Glucose maintained within prescribed range  10/17/2024 2217 by María Elena Rivas RN  Outcome: Progressing  10/17/2024 0953 by Roxanne Mccoy RN  Outcome: Progressing     Problem: Gastrointestinal - Adult  Goal: Minimal or absence of nausea and vomiting  10/17/2024 2217 by María Elena Rivas RN  Outcome: Progressing  10/17/2024 0953 by Roxanne Mccoy RN  Outcome: Progressing  Goal: Maintains or returns to baseline bowel function  10/17/2024 2217 by María Elena Rivas RN  Outcome: Progressing  10/17/2024 0953 by Roxanne Mccoy RN  Outcome: Progressing  Goal: Maintains adequate nutritional intake  10/17/2024 2217 by María Elena Rivas RN  Outcome: Progressing  10/17/2024 0953 by Roxanne Mccoy RN  Outcome: Progressing  Goal: Establish and maintain optimal ostomy function  10/17/2024 2217 by María Elena Rivas RN  Outcome: Progressing  10/17/2024 0953 by Roxanne Mccoy RN  Outcome: Progressing     Problem: Genitourinary - Adult  Goal: Absence of urinary retention  10/17/2024 2217 by María Elena Rivas RN  Outcome: Progressing  10/17/2024 0953 by Roxanne Mccoy RN  Outcome: Progressing  Goal: Urinary catheter remains patent  10/17/2024 2217 by María Elena Rivas RN  Outcome: Progressing  10/17/2024 0953 by Roxanne Mccoy RN  Outcome: Progressing     Problem: Physical Therapy - Adult  Goal: By Discharge: Performs mobility at highest level of function for planned discharge

## 2024-10-18 NOTE — PLAN OF CARE
restrictive measures have been tried or would not be effective before applying the restraint  2. Evaluate the patient's condition at the time of restraint application  3. Inform patient/family regarding the reason for restraint  4. Q2H: Monitor safety, psychosocial status, comfort, nutrition and hydration  Outcome: Progressing     Problem: Pain  Goal: Verbalizes/displays adequate comfort level or baseline comfort level  Outcome: Progressing

## 2024-10-18 NOTE — PROGRESS NOTES
[facility]     Renal Daily Progress Note:     Subjective: Patient underwent dialysis today.  This would be his third dialysis.  Family at the bedside.  Seen around 4 PM.  Has been confused and intermittently agitated.  Now has a sitter at the bedside.  Appetite fair, ate some breakfast and a little bit of lunch.  No nausea or vomiting.  No chest or abdominal pain.  Still making urine.  Muñoz not placed    10/8 more awake today.  Ate breakfast without nausea or vomiting.  Urine output not quantified.  However, today serum creatinine is only 5.12.  Will see if tomorrow's creatinine is less than that which was seen on Saturday.  Not short of breath,    10/9 awake and comfortable.  Seems calm.  Still with sitter is in the room.  Creatinine is trending up.  Will perform dialysis tomorrow.    10/10 seems calm and appropriate.  Wife at bedside.  Still has a sitter.  Had dialysis today without issue.    10/11/24 For HD in AM. Overall improvement.    10/12/24 Seen during HD in the HD suite in the presence of acute HD QUINTIN Canas      10/14/2024  No major complaints were offered this afternoon.    10/15 seen in room 569 postdialysis.  Wife at bedside and daughter spoken to via telephone.  Patient tolerated dialysis without issue.  Still essentially immobile, receiving some physical therapy.  Prior to admission he was able to ambulate.  Referred to [facility] for outpatient dialysis.  Long discussion with family regarding goals of care.  I did mention that given his age and comorbidities dialysis may be difficult to maintain.  Will start off with twice weekly dialysis to see how he does.  So far volume status has been maintained.  Electrolytes appear to be stable.  Will shoot for palliative dialysis to keep him comfortable and out of the hospital.  Not short of breath, no chest pain.  Starting to eat a bit more.  Still gets agitated from time to time.      10/17/2024    Muscle skeletal pain was an issue this

## 2024-10-18 NOTE — PROGRESS NOTES
PROGRESS NOTE      Chief Complaints:  No new complaints.  HPI and  Objective:    This is general surgery follow-up.  Patient tolerating diet.  Denies abdominal pain.  Review of Systems:  Rest of review of system reviewed personally and they are negative.    EXAM:  BP (!) 169/73   Pulse 75   Temp 98.8 °F (37.1 °C) (Oral)   Resp 16   Ht 1.676 m (5' 6\")   Wt 75.5 kg (166 lb 7.2 oz)   SpO2 97%   BMI 26.87 kg/m²     Patient is awake   Head and neck atraumatic, normocephalic.  ENT: No hoarse voice, gaze appropriate.  Cardiac system regular rate rhythm.  Pulmonary no audible wheeze, no cyanosis.  Chest wall excursion normal with respiration cycle  Abdomen is soft not particularly distended.  Neurologically nonfocal.  Hematology system: No bruising noted.  Musculoskeletal system: No joint deformity noted.  Genitourinary system: No hematuria noted.  Skin is warm and moist.  Psychosocial: Cooperative.  Vascular examination as previously noted no changes.    Current Facility-Administered Medications   Medication Dose Route Frequency Provider Last Rate Last Admin    methocarbamol (ROBAXIN) tablet 750 mg  750 mg Oral TID PRN Sam Sy MD   750 mg at 10/17/24 0845    insulin lispro (HUMALOG,ADMELOG) injection vial 0-8 Units  0-8 Units SubCUTAneous 4x Daily AC & HS Sam Sy MD   4 Units at 10/17/24 2049    hydrALAZINE (APRESOLINE) injection 10 mg  10 mg IntraVENous Q6H PRN Shawn Floyd PA-C   10 mg at 10/15/24 0553    epoetin sandra-epbx (RETACRIT) injection 10,000 Units  10,000 Units SubCUTAneous Once per day on Tuesday Thursday Saturday Carlota Tinajero MD   10,000 Units at 10/17/24 2049    lactobacillus (CULTURELLE) capsule 1 capsule  1 capsule Oral Daily with breakfast Sam Sy MD   1 capsule at 10/17/24 0845    diclofenac sodium (VOLTAREN) 1 % gel 2 g  2 g Topical BID Sam yS MD   2 g at 10/17/24 2056    heparin (porcine) injection 2,500 Units  2,500 Units IntraCATHeter PRN Carlota Tinajero  Topical Willard Cevallos MD               Recent Results (from the past 24 hour(s))   POCT Glucose    Collection Time: 10/17/24  8:29 AM   Result Value Ref Range    POC Glucose 164 (H) 65 - 100 mg/dL    Performed by: Poly Queen    Vascular duplex upper extremity venous right    Collection Time: 10/17/24 12:01 PM   Result Value Ref Range    Body Surface Area 1.9 m2   POCT Glucose    Collection Time: 10/17/24 12:29 PM   Result Value Ref Range    POC Glucose 242 (H) 65 - 100 mg/dL    Performed by: Poly Queen    POCT Glucose    Collection Time: 10/17/24  3:42 PM   Result Value Ref Range    POC Glucose 236 (H) 65 - 100 mg/dL    Performed by: Poly Queen    POCT Glucose    Collection Time: 10/17/24  7:23 PM   Result Value Ref Range    POC Glucose 282 (H) 65 - 100 mg/dL    Performed by: Abilio Sales    Magnesium    Collection Time: 10/18/24  6:45 AM   Result Value Ref Range    Magnesium 1.9 1.6 - 2.4 mg/dL   Renal Function Panel    Collection Time: 10/18/24  6:45 AM   Result Value Ref Range    Sodium 137 136 - 145 mmol/L    Potassium 3.9 3.5 - 5.1 mmol/L    Chloride 103 97 - 108 mmol/L    CO2 24 21 - 32 mmol/L    Anion Gap 10 2 - 12 mmol/L    Glucose 131 (H) 65 - 100 mg/dL    BUN 54 (H) 6 - 20 mg/dL    Creatinine 7.13 (H) 0.70 - 1.30 mg/dL    BUN/Creatinine Ratio 8 (L) 12 - 20      Est, Glom Filt Rate 7 (L) >60 ml/min/1.73m2    Calcium 8.8 8.5 - 10.1 mg/dL    Phosphorus 5.5 (H) 2.6 - 4.7 mg/dL    Albumin 1.5 (L) 3.5 - 5.0 g/dL       ASSESSMENT:   Patient is 83 y.o. with diagnosis of : Principal Problem:    Acute hypoxic respiratory failure  Active Problems:    CATHY (acute kidney injury) (HCC)    Hemoptysis    Community acquired pneumonia    Acute deep vein thrombosis (DVT) of femoral vein of both lower extremities (HCC)    DM (diabetes mellitus), type 2 (HCC)    Decreased mobility and endurance    Acute upper GI bleed    Ileus (HCC)    Acute urethral obstruction    Incomplete bladder emptying  Resolved Problems:    *

## 2024-10-19 LAB
ALBUMIN SERPL-MCNC: 1.4 G/DL (ref 3.5–5)
ANION GAP SERPL CALC-SCNC: 7 MMOL/L (ref 2–12)
BACTERIA SPEC CULT: NORMAL
BASOPHILS # BLD: 0 K/UL (ref 0–0.1)
BASOPHILS NFR BLD: 0 % (ref 0–1)
BUN SERPL-MCNC: 62 MG/DL (ref 6–20)
BUN/CREAT SERPL: 9 (ref 12–20)
CA-I BLD-MCNC: 8.7 MG/DL (ref 8.5–10.1)
CHLORIDE SERPL-SCNC: 104 MMOL/L (ref 97–108)
CO2 SERPL-SCNC: 25 MMOL/L (ref 21–32)
CREAT SERPL-MCNC: 7.12 MG/DL (ref 0.7–1.3)
CRP SERPL-MCNC: 11.6 MG/DL (ref 0–0.3)
DIFFERENTIAL METHOD BLD: ABNORMAL
EOSINOPHIL # BLD: 0.5 K/UL (ref 0–0.4)
EOSINOPHIL NFR BLD: 6 % (ref 0–7)
ERYTHROCYTE [DISTWIDTH] IN BLOOD BY AUTOMATED COUNT: 13.4 % (ref 11.5–14.5)
GLUCOSE BLD STRIP.AUTO-MCNC: 139 MG/DL (ref 65–100)
GLUCOSE BLD STRIP.AUTO-MCNC: 208 MG/DL (ref 65–100)
GLUCOSE BLD STRIP.AUTO-MCNC: 278 MG/DL (ref 65–100)
GLUCOSE SERPL-MCNC: 104 MG/DL (ref 65–100)
HCT VFR BLD AUTO: 20.6 % (ref 36.6–50.3)
HGB BLD-MCNC: 6.9 G/DL (ref 12.1–17)
IMM GRANULOCYTES # BLD AUTO: 0.1 K/UL (ref 0–0.04)
IMM GRANULOCYTES NFR BLD AUTO: 1 % (ref 0–0.5)
LYMPHOCYTES # BLD: 1.3 K/UL (ref 0.8–3.5)
LYMPHOCYTES NFR BLD: 17 % (ref 12–49)
Lab: NORMAL
MCH RBC QN AUTO: 30.1 PG (ref 26–34)
MCHC RBC AUTO-ENTMCNC: 33.5 G/DL (ref 30–36.5)
MCV RBC AUTO: 90 FL (ref 80–99)
MONOCYTES # BLD: 0.6 K/UL (ref 0–1)
MONOCYTES NFR BLD: 8 % (ref 5–13)
NEUTS SEG # BLD: 5 K/UL (ref 1.8–8)
NEUTS SEG NFR BLD: 68 % (ref 32–75)
NRBC # BLD: 0 K/UL (ref 0–0.01)
NRBC BLD-RTO: 0 PER 100 WBC
PERFORMED BY:: ABNORMAL
PHOSPHATE SERPL-MCNC: 5.4 MG/DL (ref 2.6–4.7)
PLATELET # BLD AUTO: 447 K/UL (ref 150–400)
PMV BLD AUTO: 8.9 FL (ref 8.9–12.9)
POTASSIUM SERPL-SCNC: 3.7 MMOL/L (ref 3.5–5.1)
PROCALCITONIN SERPL-MCNC: 0.51 NG/ML
RBC # BLD AUTO: 2.29 M/UL (ref 4.1–5.7)
RBC MORPH BLD: ABNORMAL
SODIUM SERPL-SCNC: 136 MMOL/L (ref 136–145)
WBC # BLD AUTO: 7.5 K/UL (ref 4.1–11.1)

## 2024-10-19 PROCEDURE — 6370000000 HC RX 637 (ALT 250 FOR IP): Performed by: INTERNAL MEDICINE

## 2024-10-19 PROCEDURE — 86900 BLOOD TYPING SEROLOGIC ABO: CPT

## 2024-10-19 PROCEDURE — 82962 GLUCOSE BLOOD TEST: CPT

## 2024-10-19 PROCEDURE — 2709999900 HC NON-CHARGEABLE SUPPLY

## 2024-10-19 PROCEDURE — 2580000003 HC RX 258: Performed by: INTERNAL MEDICINE

## 2024-10-19 PROCEDURE — 86923 COMPATIBILITY TEST ELECTRIC: CPT

## 2024-10-19 PROCEDURE — 1100000000 HC RM PRIVATE

## 2024-10-19 PROCEDURE — 86850 RBC ANTIBODY SCREEN: CPT

## 2024-10-19 PROCEDURE — 80069 RENAL FUNCTION PANEL: CPT

## 2024-10-19 PROCEDURE — 36430 TRANSFUSION BLD/BLD COMPNT: CPT

## 2024-10-19 PROCEDURE — 86901 BLOOD TYPING SEROLOGIC RH(D): CPT

## 2024-10-19 PROCEDURE — P9016 RBC LEUKOCYTES REDUCED: HCPCS

## 2024-10-19 PROCEDURE — 90935 HEMODIALYSIS ONE EVALUATION: CPT

## 2024-10-19 PROCEDURE — 86140 C-REACTIVE PROTEIN: CPT

## 2024-10-19 PROCEDURE — 6360000002 HC RX W HCPCS: Performed by: INTERNAL MEDICINE

## 2024-10-19 PROCEDURE — 84145 PROCALCITONIN (PCT): CPT

## 2024-10-19 PROCEDURE — 85025 COMPLETE CBC W/AUTO DIFF WBC: CPT

## 2024-10-19 PROCEDURE — 36415 COLL VENOUS BLD VENIPUNCTURE: CPT

## 2024-10-19 RX ORDER — HEPARIN SODIUM 1000 [USP'U]/ML
3600 INJECTION, SOLUTION INTRAVENOUS; SUBCUTANEOUS PRN
Status: DISCONTINUED | OUTPATIENT
Start: 2024-10-19 | End: 2024-10-26 | Stop reason: HOSPADM

## 2024-10-19 RX ORDER — SODIUM CHLORIDE 9 MG/ML
INJECTION, SOLUTION INTRAVENOUS PRN
Status: DISCONTINUED | OUTPATIENT
Start: 2024-10-19 | End: 2024-10-26 | Stop reason: HOSPADM

## 2024-10-19 RX ORDER — INSULIN LISPRO 100 [IU]/ML
3 INJECTION, SOLUTION INTRAVENOUS; SUBCUTANEOUS
Status: DISCONTINUED | OUTPATIENT
Start: 2024-10-20 | End: 2024-10-20

## 2024-10-19 RX ORDER — HEPARIN SODIUM 1000 [USP'U]/ML
1000 INJECTION, SOLUTION INTRAVENOUS; SUBCUTANEOUS ONCE
Status: DISCONTINUED | OUTPATIENT
Start: 2024-10-19 | End: 2024-10-26 | Stop reason: HOSPADM

## 2024-10-19 RX ADMIN — Medication 1 CAPSULE: at 08:55

## 2024-10-19 RX ADMIN — THERA TABS 1 TABLET: TAB at 08:55

## 2024-10-19 RX ADMIN — FINASTERIDE 5 MG: 5 TABLET, FILM COATED ORAL at 08:56

## 2024-10-19 RX ADMIN — SODIUM BICARBONATE 650 MG: 650 TABLET ORAL at 08:56

## 2024-10-19 RX ADMIN — CEFUROXIME AXETIL 250 MG: 250 TABLET, FILM COATED ORAL at 22:45

## 2024-10-19 RX ADMIN — TAMSULOSIN HYDROCHLORIDE 0.4 MG: 0.4 CAPSULE ORAL at 08:56

## 2024-10-19 RX ADMIN — THIAMINE HCL TAB 100 MG 100 MG: 100 TAB at 08:55

## 2024-10-19 RX ADMIN — CEFUROXIME AXETIL 250 MG: 250 TABLET, FILM COATED ORAL at 08:56

## 2024-10-19 RX ADMIN — SODIUM CHLORIDE, PRESERVATIVE FREE 10 ML: 5 INJECTION INTRAVENOUS at 08:56

## 2024-10-19 RX ADMIN — ALLOPURINOL 100 MG: 100 TABLET ORAL at 08:56

## 2024-10-19 RX ADMIN — DICLOFENAC SODIUM 2 G: 10 GEL TOPICAL at 22:51

## 2024-10-19 RX ADMIN — SODIUM CHLORIDE, PRESERVATIVE FREE 10 ML: 5 INJECTION INTRAVENOUS at 22:50

## 2024-10-19 RX ADMIN — SODIUM CHLORIDE, PRESERVATIVE FREE 10 ML: 5 INJECTION INTRAVENOUS at 09:16

## 2024-10-19 RX ADMIN — SODIUM BICARBONATE 650 MG: 650 TABLET ORAL at 22:45

## 2024-10-19 RX ADMIN — EPOETIN ALFA-EPBX 10000 UNITS: 10000 INJECTION, SOLUTION INTRAVENOUS; SUBCUTANEOUS at 18:23

## 2024-10-19 RX ADMIN — DICLOFENAC SODIUM 2 G: 10 GEL TOPICAL at 08:54

## 2024-10-19 RX ADMIN — INSULIN LISPRO 4 UNITS: 100 INJECTION, SOLUTION INTRAVENOUS; SUBCUTANEOUS at 22:44

## 2024-10-19 RX ADMIN — SODIUM CHLORIDE 300 MG: 9 INJECTION, SOLUTION INTRAVENOUS at 20:00

## 2024-10-19 RX ADMIN — HEPARIN SODIUM 2500 UNITS: 1000 INJECTION INTRAVENOUS; SUBCUTANEOUS at 18:49

## 2024-10-19 ASSESSMENT — PAIN SCALES - GENERAL
PAINLEVEL_OUTOF10: 0
PAINLEVEL_OUTOF10: 3
PAINLEVEL_OUTOF10: 0

## 2024-10-19 ASSESSMENT — PAIN DESCRIPTION - DESCRIPTORS: DESCRIPTORS: ACHING

## 2024-10-19 ASSESSMENT — PAIN DESCRIPTION - ORIENTATION: ORIENTATION: RIGHT

## 2024-10-19 ASSESSMENT — PAIN DESCRIPTION - LOCATION: LOCATION: NECK

## 2024-10-19 NOTE — PROGRESS NOTES
[facility] K[facility]NEY     Renal Daily Progress Note:     Subjective: Patient underwent dialysis today.  This would be his third dialysis.  Family at the bedside.  Seen around 4 PM.  Has been confused and intermittently agitated.  Now has a sitter at the bedside.  Appetite fair, ate some breakfast and a little bit of lunch.  No nausea or vomiting.  No chest or abdominal pain.  Still making urine.  Muñoz not placed    10/8 more awake today.  Ate breakfast without nausea or vomiting.  Urine output not quantified.  However, today serum creatinine is only 5.12.  Will see if tomorrow's creatinine is less than that which was seen on Saturday.  Not short of breath,    10/9 awake and comfortable.  Seems calm.  Still with sitter is in the room.  Creatinine is trending up.  Will perform dialysis tomorrow.    10/10 seems calm and appropriate.  Wife at bedside.  Still has a sitter.  Had dialysis today without issue.    10/11/24 For HD in AM. Overall improvement.    10/12/24 Seen during HD in the HD suite in the presence of acute HD QUINTIN Canas      10/14/2024  No major complaints were offered this afternoon.    10/15 seen in room 569 postdialysis.  Wife at bedside and daughter spoken to via telephone.  Patient tolerated dialysis without issue.  Still essentially immobile, receiving some physical therapy.  Prior to admission he was able to ambulate.  Referred to [facility] for outpatient dialysis.  Long discussion with family regarding goals of care.  I did mention that given his age and comorbidities dialysis may be difficult to maintain.  Will start off with twice weekly dialysis to see how he does.  So far volume status has been maintained.  Electrolytes appear to be stable.  Will shoot for palliative dialysis to keep him comfortable and out of the hospital.  Not short of breath, no chest pain.  Starting to eat a bit more.  Still gets agitated from time to time.      10/17/2024    Muscle skeletal pain was an issue this

## 2024-10-19 NOTE — CONSULTS
Signed by Conor Noyola DO 10/2/2024 11:18 PM    US upper extremity veins bilateral    Result Date: 10/2/2024  No evidence for DVT within the bilateral upper extremities. Performing Technologist:  Roxanne Issa RT(R), RDMS, RVT, RDCS   Electronically Signed by Marquis Arvizu MD 10/2/2024 5:04 PM    US lower extremity veins bilateral    Result Date: 10/2/2024  Evidence for acute nonocclusive DVT within the right popliteal vein and left femoral and popliteal veins. Performing Technologist:  Roxanne Issa RT(R), RDMS, RVT, RDCS   Electronically Signed by Tony Jones MD 10/2/2024 3:59 PM    NM LUNG VENT/PERFUSION (VQ)    Result Date: 10/1/2024  Low probability for pulmonary embolus. Electronically Signed by Christos Herman MD 10/1/2024 1:45 PM         Discussion hypoxia markedly improved very likely had pulmonary emboli from his bilateral DVT despite low probability VQ scan it did show matching defects so I would put it as indeterminate.  He also had diffuse pulmonary infiltrates on initial CT of the chest probable aspiration due to his abnormal esophagus and stomach.  These may represent presbyesophagus and gastroparesis with resultant aspiration.  He will need a barium swallow to rule out reflux once oral intake is allowed.  Once hemoglobin is stabilized would suggest anticoagulation due to his DVT  10/8 alert awake on room air high risk for aspiration speech has seen the patient, no hemoptysis patient had DVT of both lower extremities  10/9 patient had IVC filter placed 10/4 he is alert awake eating breakfast discussed with the family at bedside  10/10 remain on room air getting dialysis febrile temperature 100.2 will get chest x-ray today previous chest x-ray shows atelectasis left lower lobe supplement potassium 3.1 will start patient on Zosyn high risk for aspiration  10/11 alert awake eating breakfast no shortness of breath at rest he is on room air continue with her dialysis with fluid removal echo shows EF is  about 60 to 65% last chest x-ray shows left lower lobe atelectasis patient is on Zosyn  10/12 getting dialysis on room air last hemoglobin 7.4  10/13 on room air was dialyzed yesterday evening breakfast family member at bedside continue pulmonary toilet  10/14 feeling better no sign of distress no shortness of breath at rest or dialysis tomorrow he is on room air, chest x-ray no acute infiltrate  10/15 getting dialysis, on room air status post IVC filter for bilateral lower extremity DVT finish antibiotic for aspiration pneumonia still at high risk but eating comfortably no sign of distress  10/16 doing well on room air wife states he is eating more last chest x-ray clear  10/17 fever to 101.3 yesterday at 8 PM.  Right arm appears to have some cellulitis from infiltrated IV.  Currently on Zosyn will check a blood culture check duplex scan of the right arm.  WBC count remains normal will repeat in a.m.  With his history of lung disease we will check a chest x-ray as well  10/18 Tmax 99.8 overnight duplex scan of the right arm showed no DVT only superficial thrombophlebitis cephalic vein.  He feels better.  Chest x-ray was not done lungs sound clear with improved temperature will not check chest x-ray will repeat inflammatory markers in a.m.  10/19 no further fever noted procalcitonin improved WBC count remains normal right arm exam improved as well.  For dialysis today with hemoglobin 6.9 will reach out to nephrology to see if transfusion warranted    Shawn Randolph MD

## 2024-10-20 LAB
ABO + RH BLD: NORMAL
ALBUMIN SERPL-MCNC: 1.5 G/DL (ref 3.5–5)
ANION GAP SERPL CALC-SCNC: 8 MMOL/L (ref 2–12)
BASOPHILS # BLD: 0 K/UL (ref 0–0.1)
BASOPHILS NFR BLD: 0 % (ref 0–1)
BLD PROD TYP BPU: NORMAL
BLOOD BANK BLOOD PRODUCT EXPIRATION DATE: NORMAL
BLOOD BANK DISPENSE STATUS: NORMAL
BLOOD BANK ISBT PRODUCT BLOOD TYPE: 5100
BLOOD BANK PRODUCT CODE: NORMAL
BLOOD BANK UNIT TYPE AND RH: NORMAL
BLOOD GROUP ANTIBODIES SERPL: NEGATIVE
BPU ID: NORMAL
BUN SERPL-MCNC: 39 MG/DL (ref 6–20)
BUN/CREAT SERPL: 9 (ref 12–20)
CA-I BLD-MCNC: 8.6 MG/DL (ref 8.5–10.1)
CHLORIDE SERPL-SCNC: 103 MMOL/L (ref 97–108)
CO2 SERPL-SCNC: 25 MMOL/L (ref 21–32)
CREAT SERPL-MCNC: 4.52 MG/DL (ref 0.7–1.3)
CROSSMATCH RESULT: NORMAL
DIFFERENTIAL METHOD BLD: ABNORMAL
EOSINOPHIL # BLD: 0.4 K/UL (ref 0–0.4)
EOSINOPHIL NFR BLD: 5 % (ref 0–7)
ERYTHROCYTE [DISTWIDTH] IN BLOOD BY AUTOMATED COUNT: 13.8 % (ref 11.5–14.5)
GLUCOSE BLD STRIP.AUTO-MCNC: 142 MG/DL (ref 65–100)
GLUCOSE BLD STRIP.AUTO-MCNC: 176 MG/DL (ref 65–100)
GLUCOSE BLD STRIP.AUTO-MCNC: 225 MG/DL (ref 65–100)
GLUCOSE BLD STRIP.AUTO-MCNC: 257 MG/DL (ref 65–100)
GLUCOSE SERPL-MCNC: 145 MG/DL (ref 65–100)
HCT VFR BLD AUTO: 24.9 % (ref 36.6–50.3)
HGB BLD-MCNC: 8.2 G/DL (ref 12.1–17)
IMM GRANULOCYTES # BLD AUTO: 0.1 K/UL (ref 0–0.04)
IMM GRANULOCYTES NFR BLD AUTO: 1 % (ref 0–0.5)
LYMPHOCYTES # BLD: 1.1 K/UL (ref 0.8–3.5)
LYMPHOCYTES NFR BLD: 14 % (ref 12–49)
MCH RBC QN AUTO: 29.5 PG (ref 26–34)
MCHC RBC AUTO-ENTMCNC: 32.9 G/DL (ref 30–36.5)
MCV RBC AUTO: 89.6 FL (ref 80–99)
MONOCYTES # BLD: 0.9 K/UL (ref 0–1)
MONOCYTES NFR BLD: 11 % (ref 5–13)
NEUTS SEG # BLD: 5.2 K/UL (ref 1.8–8)
NEUTS SEG NFR BLD: 69 % (ref 32–75)
NRBC # BLD: 0 K/UL (ref 0–0.01)
NRBC BLD-RTO: 0 PER 100 WBC
PERFORMED BY:: ABNORMAL
PHOSPHATE SERPL-MCNC: 3.7 MG/DL (ref 2.6–4.7)
PLATELET # BLD AUTO: 440 K/UL (ref 150–400)
PMV BLD AUTO: 8.9 FL (ref 8.9–12.9)
POTASSIUM SERPL-SCNC: 3.9 MMOL/L (ref 3.5–5.1)
RBC # BLD AUTO: 2.78 M/UL (ref 4.1–5.7)
SODIUM SERPL-SCNC: 136 MMOL/L (ref 136–145)
SPECIMEN EXP DATE BLD: NORMAL
TRANSFUSION STATUS PATIENT QL: NORMAL
UNIT DIVISION: 0
UNIT ISSUE DATE/TIME: NORMAL
WBC # BLD AUTO: 7.7 K/UL (ref 4.1–11.1)

## 2024-10-20 PROCEDURE — 36415 COLL VENOUS BLD VENIPUNCTURE: CPT

## 2024-10-20 PROCEDURE — 6370000000 HC RX 637 (ALT 250 FOR IP): Performed by: INTERNAL MEDICINE

## 2024-10-20 PROCEDURE — 2580000003 HC RX 258: Performed by: INTERNAL MEDICINE

## 2024-10-20 PROCEDURE — 80069 RENAL FUNCTION PANEL: CPT

## 2024-10-20 PROCEDURE — 82962 GLUCOSE BLOOD TEST: CPT

## 2024-10-20 PROCEDURE — 94761 N-INVAS EAR/PLS OXIMETRY MLT: CPT

## 2024-10-20 PROCEDURE — 85025 COMPLETE CBC W/AUTO DIFF WBC: CPT

## 2024-10-20 PROCEDURE — 1100000000 HC RM PRIVATE

## 2024-10-20 RX ORDER — INSULIN LISPRO 100 [IU]/ML
5 INJECTION, SOLUTION INTRAVENOUS; SUBCUTANEOUS
Status: DISCONTINUED | OUTPATIENT
Start: 2024-10-20 | End: 2024-10-21

## 2024-10-20 RX ADMIN — SODIUM CHLORIDE, PRESERVATIVE FREE 10 ML: 5 INJECTION INTRAVENOUS at 20:51

## 2024-10-20 RX ADMIN — DICLOFENAC SODIUM 2 G: 10 GEL TOPICAL at 20:51

## 2024-10-20 RX ADMIN — CEFUROXIME AXETIL 250 MG: 250 TABLET, FILM COATED ORAL at 08:47

## 2024-10-20 RX ADMIN — SODIUM BICARBONATE 650 MG: 650 TABLET ORAL at 20:50

## 2024-10-20 RX ADMIN — Medication 1 CAPSULE: at 08:47

## 2024-10-20 RX ADMIN — FINASTERIDE 5 MG: 5 TABLET, FILM COATED ORAL at 08:47

## 2024-10-20 RX ADMIN — INSULIN LISPRO 2 UNITS: 100 INJECTION, SOLUTION INTRAVENOUS; SUBCUTANEOUS at 12:45

## 2024-10-20 RX ADMIN — SODIUM CHLORIDE, PRESERVATIVE FREE 10 ML: 5 INJECTION INTRAVENOUS at 08:48

## 2024-10-20 RX ADMIN — ALLOPURINOL 100 MG: 100 TABLET ORAL at 08:47

## 2024-10-20 RX ADMIN — THIAMINE HCL TAB 100 MG 100 MG: 100 TAB at 08:47

## 2024-10-20 RX ADMIN — INSULIN LISPRO 5 UNITS: 100 INJECTION, SOLUTION INTRAVENOUS; SUBCUTANEOUS at 17:32

## 2024-10-20 RX ADMIN — TAMSULOSIN HYDROCHLORIDE 0.4 MG: 0.4 CAPSULE ORAL at 08:47

## 2024-10-20 RX ADMIN — INSULIN LISPRO 4 UNITS: 100 INJECTION, SOLUTION INTRAVENOUS; SUBCUTANEOUS at 20:51

## 2024-10-20 RX ADMIN — CEFUROXIME AXETIL 250 MG: 250 TABLET, FILM COATED ORAL at 20:50

## 2024-10-20 RX ADMIN — THERA TABS 1 TABLET: TAB at 08:47

## 2024-10-20 RX ADMIN — INSULIN LISPRO 3 UNITS: 100 INJECTION, SOLUTION INTRAVENOUS; SUBCUTANEOUS at 08:58

## 2024-10-20 RX ADMIN — SODIUM BICARBONATE 650 MG: 650 TABLET ORAL at 08:47

## 2024-10-20 RX ADMIN — INSULIN LISPRO 3 UNITS: 100 INJECTION, SOLUTION INTRAVENOUS; SUBCUTANEOUS at 12:45

## 2024-10-20 ASSESSMENT — PAIN SCALES - GENERAL: PAINLEVEL_OUTOF10: 0

## 2024-10-20 NOTE — PROGRESS NOTES
Hospitalist Progress Note               Daily Progress Note: 10/20/2024      Hospital Day: 18     Chief complaint: No chief complaint on file.       Brief HPI/ Hospital course to date:  Michael Fung is a 83-year-old male presented to outside hospital on 9/30 for cough and shortness of breath found to be hypoxic and have x-ray concerning for community-acquired pneumonia in the left mid and lower lung fields. Initiated on community-acquired pneumonia therapy with ceftriaxone and azithromycin. Patient also noted to have elevated creatinine of 4.5 which is his baseline patient is diuretic dependent. Became oliguric during the course of the hospitalization despite Lasix 80 mg. In addition, when he was admitted to [facility], lower extremity ultrasound obtained for elevated D-dimer. Showed bilateral DVTs in the right popliteal and 2 in the femoral and popliteal on the left. Had been initiated on a heparin drip but developed hemoptysis. Substantial enough to stop heparin drip. Patient was put on mid flow oxygen with low sats. Transferred to our ICU on BiPAP transitioned to high flow nasal cannula 50 L, 40% with sats of 97%. Had a low prop VQ scan on 10/1. IVF filter placed on 10/4. Noted has renal failure with potential need for dialysis creatinine increased to 6 and oliguric. Bladder scan negative and no Muñoz catheter or straight cath inserted. Patient was evaluated by nephrology, eventually started on hemodialysis, following which patient's clinical status improved. Subsequently patient was transferred out of the ICU and accepted to the hospitalist service. Hospital course also complicated by ileus and SBO, which subsequently resolved. Patient also started on IV zosyn for treatment of aspiration pneumonia, subsequently weaned to R.A. on 10/13. Completed 7 days IV zosyn course. Also noted to have chronic anemia, iron studies indicative of anemia of chronic disease, requiring blood transfusion once on  Saturday    epoetin sandra-epbx (RETACRIT) injection 10,000 Units  10,000 Units IntraVENous Once per day on Tuesday Thursday Saturday    heparin (porcine) injection 3,600 Units  3,600 Units IntraCATHeter PRN    insulin lispro (HUMALOG,ADMELOG) injection vial 3 Units  3 Units SubCUTAneous TID     cefUROXime (CEFTIN) tablet 250 mg  250 mg Oral 2 times per day    methocarbamol (ROBAXIN) tablet 750 mg  750 mg Oral TID PRN    insulin lispro (HUMALOG,ADMELOG) injection vial 0-8 Units  0-8 Units SubCUTAneous 4x Daily AC & HS    hydrALAZINE (APRESOLINE) injection 10 mg  10 mg IntraVENous Q6H PRN    lactobacillus (CULTURELLE) capsule 1 capsule  1 capsule Oral Daily with breakfast    diclofenac sodium (VOLTAREN) 1 % gel 2 g  2 g Topical BID    sodium chloride flush 0.9 % injection 5-40 mL  5-40 mL IntraVENous 2 times per day    sodium chloride flush 0.9 % injection 5-40 mL  5-40 mL IntraVENous PRN    0.9 % sodium chloride infusion   IntraVENous PRN    ondansetron (ZOFRAN-ODT) disintegrating tablet 4 mg  4 mg Oral Q8H PRN    Or    ondansetron (ZOFRAN) injection 4 mg  4 mg IntraVENous Q6H PRN    acetaminophen (TYLENOL) tablet 650 mg  650 mg Oral Q6H PRN    Or    acetaminophen (TYLENOL) suppository 650 mg  650 mg Rectal Q6H PRN    bisacodyl (DULCOLAX) suppository 10 mg  10 mg Rectal Daily PRN    glucose chewable tablet 16 g  4 tablet Oral PRN    dextrose bolus 10% 125 mL  125 mL IntraVENous PRN    Or    dextrose bolus 10% 250 mL  250 mL IntraVENous PRN    glucagon injection 1 mg  1 mg SubCUTAneous PRN    dextrose 10 % infusion   IntraVENous Continuous PRN    allopurinol (ZYLOPRIM) tablet 100 mg  100 mg Oral Daily    finasteride (PROSCAR) tablet 5 mg  5 mg Oral Daily    tamsulosin (FLOMAX) capsule 0.4 mg  0.4 mg Oral Daily    thiamine mononitrate tablet 100 mg  100 mg Oral Daily    sodium bicarbonate tablet 650 mg  650 mg Oral BID    multivitamin 1 tablet  1 tablet Oral Daily    lidocaine (XYLOCAINE) 2 % uro-jet   Topical PRN

## 2024-10-20 NOTE — CONSULTS
16 g  4 tablet Oral PRN Willard Harden MD        dextrose bolus 10% 125 mL  125 mL IntraVENous PRN Willard Harden MD        Or    dextrose bolus 10% 250 mL  250 mL IntraVENous PRN Willard Harden MD        glucagon injection 1 mg  1 mg SubCUTAneous PRN Willard Harden MD        dextrose 10 % infusion   IntraVENous Continuous PRN Willard Harden MD        allopurinol (ZYLOPRIM) tablet 100 mg  100 mg Oral Daily Willard Harden MD   100 mg at 10/20/24 0847    finasteride (PROSCAR) tablet 5 mg  5 mg Oral Daily Willard Harden MD   5 mg at 10/20/24 0847    tamsulosin (FLOMAX) capsule 0.4 mg  0.4 mg Oral Daily Willard Harden MD   0.4 mg at 10/20/24 0847    thiamine mononitrate tablet 100 mg  100 mg Oral Daily Willard Harden MD   100 mg at 10/20/24 0847    sodium bicarbonate tablet 650 mg  650 mg Oral BID Willard Harden MD   650 mg at 10/20/24 0847    multivitamin 1 tablet  1 tablet Oral Daily Willard Harden MD   1 tablet at 10/20/24 0847    lidocaine (XYLOCAINE) 2 % uro-jet   Topical PRN Willard Harden MD            Patient PCP: No primary care provider on file.  PMH:  has no past medical history on file.  PSH:   has a past surgical history that includes IR NONTUNNELED VASCULAR CATHETER > 5 YEARS (10/4/2024); IR GUIDED IVC FILTER PLACEMENT (10/4/2024); and IR TUNNELED CVC PLACE WO SQ PORT/PUMP > 5 YEARS (10/16/2024).   FHX: family history is not on file.   SHX:  reports that he has never smoked. He has never been exposed to tobacco smoke. He has never used smokeless tobacco. Alcohol use questions deferred to the physician. Drug use questions deferred to the physician.     Systemic review unreliable he is very confused    Objective:     Vital Signs: Telemetry:    normal sinus rhythm Intake/Output:   BP (!) 170/78   Pulse 82   Temp 98.8 °F (37.1 °C) (Oral)   Resp 16   Ht 1.676 m (5' 6\")   Wt 74.3 kg (163 lb 12.8 oz)   SpO2 97%   BMI 26.44 kg/m²     Temp (24hrs), Av.6 °F (37 °C),

## 2024-10-20 NOTE — PROGRESS NOTES
thrombophlebitis and cellulitis  DVT ruled out  PO cefuroxime    Neck pain  - MSK source due to bad sleeping position  - diclofenac topical  - heating PAD  - robaxin PRN      Concern for ileus- resolved  Small bowel obstruction- resolved  General Surgery on board      Hypokalemia-improved  - continue to monitor, replace as needed    Anemia of chronic disease  Iron deficiency anemia  - PO iron supplement     Diabetes   - HbA1c 5.5, no diabetes   - Sliding scale insulin+ 3u lispro with meals  - Monitor for hypoglycemia due to insulin with serial glucose checks  - Hypoglycemia protocol      Urinary retention  patient evaluated by urology, continue supportive care at this time  Continue Flomax  Continue Proscar     Chronic gout  continue medications       Code status: full    Social determinants of health: none      Estimated discharge date//time frame/disposition:    [] PT/OT consulted   [] PT/OT not needed   [x] PT/OT consulted and evaluated. Recommended DCP:        Barriers to discharge:       Objective:   Physical Exam:     BP (!) 184/75   Pulse 88   Temp 99.1 °F (37.3 °C) (Oral)   Resp 17   Ht 1.676 m (5' 6\")   Wt 75.5 kg (166 lb 7.2 oz)   SpO2 99%   BMI 26.87 kg/m²  O2 Flow Rate (L/min): 0 L/min O2 Device: None (Room air)    Temp (24hrs), Av.3 °F (36.8 °C), Min:97.9 °F (36.6 °C), Max:99.1 °F (37.3 °C)    No intake/output data recorded.   10/18 0701 - 10/19 1900  In: 2303.6 [P.O.:480; I.V.:23.6]  Out: 2950 [Urine:450]    Mode Rate TV Press PEEP FiO2 PIP Min. Vent               26 %                  Data Review:     Medications reviewed  Current Facility-Administered Medications   Medication Dose Route Frequency    0.9 % sodium chloride infusion   IntraVENous PRN    heparin (porcine) injection 1,000 Units  1,000 Units IntraVENous Once    [START ON 10/22/2024] iron sucrose (VENOFER) injection 100 mg  100 mg IntraVENous Once per day on     iron sucrose (VENOFER) 300 mg in sodium  sodium chloride 0.9 % 250 mL IVPB  300 mg IntraVENous Once    epoetin sandra-epbx (RETACRIT) injection 10,000 Units  10,000 Units IntraVENous Once per day on Tuesday Thursday Saturday    heparin (porcine) injection 3,600 Units  3,600 Units IntraCATHeter PRN    cefUROXime (CEFTIN) tablet 250 mg  250 mg Oral 2 times per day    methocarbamol (ROBAXIN) tablet 750 mg  750 mg Oral TID PRN    insulin lispro (HUMALOG,ADMELOG) injection vial 0-8 Units  0-8 Units SubCUTAneous 4x Daily AC & HS    hydrALAZINE (APRESOLINE) injection 10 mg  10 mg IntraVENous Q6H PRN    lactobacillus (CULTURELLE) capsule 1 capsule  1 capsule Oral Daily with breakfast    diclofenac sodium (VOLTAREN) 1 % gel 2 g  2 g Topical BID    sodium chloride flush 0.9 % injection 5-40 mL  5-40 mL IntraVENous 2 times per day    sodium chloride flush 0.9 % injection 5-40 mL  5-40 mL IntraVENous PRN    0.9 % sodium chloride infusion   IntraVENous PRN    ondansetron (ZOFRAN-ODT) disintegrating tablet 4 mg  4 mg Oral Q8H PRN    Or    ondansetron (ZOFRAN) injection 4 mg  4 mg IntraVENous Q6H PRN    acetaminophen (TYLENOL) tablet 650 mg  650 mg Oral Q6H PRN    Or    acetaminophen (TYLENOL) suppository 650 mg  650 mg Rectal Q6H PRN    bisacodyl (DULCOLAX) suppository 10 mg  10 mg Rectal Daily PRN    glucose chewable tablet 16 g  4 tablet Oral PRN    dextrose bolus 10% 125 mL  125 mL IntraVENous PRN    Or    dextrose bolus 10% 250 mL  250 mL IntraVENous PRN    glucagon injection 1 mg  1 mg SubCUTAneous PRN    dextrose 10 % infusion   IntraVENous Continuous PRN    allopurinol (ZYLOPRIM) tablet 100 mg  100 mg Oral Daily    finasteride (PROSCAR) tablet 5 mg  5 mg Oral Daily    tamsulosin (FLOMAX) capsule 0.4 mg  0.4 mg Oral Daily    thiamine mononitrate tablet 100 mg  100 mg Oral Daily    sodium bicarbonate tablet 650 mg  650 mg Oral BID    multivitamin 1 tablet  1 tablet Oral Daily    lidocaine (XYLOCAINE) 2 % uro-jet   Topical PRN         All relevant laboratory and

## 2024-10-20 NOTE — PLAN OF CARE
Problem: Discharge Planning  Goal: Discharge to home or other facility with appropriate resources  Outcome: Progressing  Flowsheets (Taken 10/19/2024 1945)  Discharge to home or other facility with appropriate resources: Identify barriers to discharge with patient and caregiver     Problem: Skin/Tissue Integrity  Goal: Absence of new skin breakdown  Description: 1.  Monitor for areas of redness and/or skin breakdown  2.  Assess vascular access sites hourly  3.  Every 4-6 hours minimum:  Change oxygen saturation probe site  4.  Every 4-6 hours:  If on nasal continuous positive airway pressure, respiratory therapy assess nares and determine need for appliance change or resting period.  Outcome: Progressing     Problem: Safety - Adult  Goal: Free from fall injury  Outcome: Progressing     Problem: ABCDS Injury Assessment  Goal: Absence of physical injury  Outcome: Progressing     Problem: Chronic Conditions and Co-morbidities  Goal: Patient's chronic conditions and co-morbidity symptoms are monitored and maintained or improved  Outcome: Progressing  Flowsheets (Taken 10/19/2024 1945)  Care Plan - Patient's Chronic Conditions and Co-Morbidity Symptoms are Monitored and Maintained or Improved: Monitor and assess patient's chronic conditions and comorbid symptoms for stability, deterioration, or improvement     Problem: Respiratory - Adult  Goal: Achieves optimal ventilation and oxygenation  Outcome: Progressing  Flowsheets (Taken 10/19/2024 1945)  Achieves optimal ventilation and oxygenation: Assess for changes in respiratory status     Problem: Cardiovascular - Adult  Goal: Maintains optimal cardiac output and hemodynamic stability  Outcome: Progressing  Goal: Absence of cardiac dysrhythmias or at baseline  Outcome: Progressing     Problem: Neurosensory - Adult  Goal: Achieves stable or improved neurological status  Outcome: Progressing  Flowsheets (Taken 10/19/2024 1945)  Achieves stable or improved neurological  Absence of fever/infection during anticipated neutropenic period  Outcome: Progressing  Flowsheets (Taken 10/19/2024 1945)  Absence of fever/infection during anticipated neutropenic period: Monitor white blood cell count     Problem: Metabolic/Fluid and Electrolytes - Adult  Goal: Electrolytes maintained within normal limits  Outcome: Progressing  Flowsheets (Taken 10/19/2024 1945)  Electrolytes maintained within normal limits: Monitor labs and assess patient for signs and symptoms of electrolyte imbalances  Goal: Hemodynamic stability and optimal renal function maintained  Outcome: Progressing  Flowsheets (Taken 10/19/2024 1945)  Hemodynamic stability and optimal renal function maintained: Monitor labs and assess for signs and symptoms of volume excess or deficit  Goal: Glucose maintained within prescribed range  Outcome: Progressing  Flowsheets (Taken 10/19/2024 1945)  Glucose maintained within prescribed range: Monitor blood glucose as ordered     Problem: Gastrointestinal - Adult  Goal: Minimal or absence of nausea and vomiting  Outcome: Progressing  Flowsheets (Taken 10/19/2024 1945)  Minimal or absence of nausea and vomiting: Administer IV fluids as ordered to ensure adequate hydration  Goal: Maintains or returns to baseline bowel function  Outcome: Progressing  Flowsheets (Taken 10/19/2024 1945)  Maintains or returns to baseline bowel function: Assess bowel function  Goal: Maintains adequate nutritional intake  Outcome: Progressing  Flowsheets (Taken 10/19/2024 1945)  Maintains adequate nutritional intake: Monitor percentage of each meal consumed  Goal: Establish and maintain optimal ostomy function  Outcome: Progressing  Flowsheets (Taken 10/19/2024 1945)  Establish and maintain optimal ostomy function: Monitor output from ostomies     Problem: Genitourinary - Adult  Goal: Absence of urinary retention  Outcome: Progressing  Flowsheets (Taken 10/19/2024 1945)  Absence of urinary retention: Assess patient’s

## 2024-10-20 NOTE — PROGRESS NOTES
[facility] KIDNEY     Renal Daily Progress Note:     Subjective: Patient underwent dialysis today.  This would be his third dialysis.  Family at the bedside.  Seen around 4 PM.  Has been confused and intermittently agitated.  Now has a sitter at the bedside.  Appetite fair, ate some breakfast and a little bit of lunch.  No nausea or vomiting.  No chest or abdominal pain.  Still making urine.  Muñoz not placed    10/8 more awake today.  Ate breakfast without nausea or vomiting.  Urine output not quantified.  However, today serum creatinine is only 5.12.  Will see if tomorrow's creatinine is less than that which was seen on Saturday.  Not short of breath,    10/9 awake and comfortable.  Seems calm.  Still with sitter is in the room.  Creatinine is trending up.  Will perform dialysis tomorrow.    10/10 seems calm and appropriate.  Wife at bedside.  Still has a sitter.  Had dialysis today without issue.    10/11/24 For HD in AM. Overall improvement.    10/12/24 Seen during HD in the HD suite in the presence of acute HD QUINTIN Canas      10/14/2024  No major complaints were offered this afternoon.    10/15 seen in room 569 postdialysis.  Wife at bedside and daughter spoken to via telephone.  Patient tolerated dialysis without issue.  Still essentially immobile, receiving some physical therapy.  Prior to admission he was able to ambulate.  Referred to [facility] for outpatient dialysis.  Long discussion with family regarding goals of care.  I did mention that given his age and comorbidities dialysis may be difficult to maintain.  Will start off with twice weekly dialysis to see how he does.  So far volume status has been maintained.  Electrolytes appear to be stable.  Will shoot for palliative dialysis to keep him comfortable and out of the hospital.  Not short of breath, no chest pain.  Starting to eat a bit more.  Still gets agitated from time to time.      10/17/2024    Muscle skeletal pain was an issue this

## 2024-10-21 LAB
BASOPHILS # BLD: 0 K/UL (ref 0–0.1)
BASOPHILS NFR BLD: 0 % (ref 0–1)
DIFFERENTIAL METHOD BLD: ABNORMAL
EOSINOPHIL # BLD: 0.4 K/UL (ref 0–0.4)
EOSINOPHIL NFR BLD: 5 % (ref 0–7)
ERYTHROCYTE [DISTWIDTH] IN BLOOD BY AUTOMATED COUNT: 13.5 % (ref 11.5–14.5)
GLUCOSE BLD STRIP.AUTO-MCNC: 159 MG/DL (ref 65–100)
GLUCOSE BLD STRIP.AUTO-MCNC: 160 MG/DL (ref 65–100)
GLUCOSE BLD STRIP.AUTO-MCNC: 200 MG/DL (ref 65–100)
GLUCOSE BLD STRIP.AUTO-MCNC: 216 MG/DL (ref 65–100)
HCT VFR BLD AUTO: 24.6 % (ref 36.6–50.3)
HGB BLD-MCNC: 8.3 G/DL (ref 12.1–17)
IMM GRANULOCYTES # BLD AUTO: 0.2 K/UL (ref 0–0.04)
IMM GRANULOCYTES NFR BLD AUTO: 2 % (ref 0–0.5)
LYMPHOCYTES # BLD: 0.9 K/UL (ref 0.8–3.5)
LYMPHOCYTES NFR BLD: 11 % (ref 12–49)
MCH RBC QN AUTO: 30.2 PG (ref 26–34)
MCHC RBC AUTO-ENTMCNC: 33.7 G/DL (ref 30–36.5)
MCV RBC AUTO: 89.5 FL (ref 80–99)
MONOCYTES # BLD: 0.7 K/UL (ref 0–1)
MONOCYTES NFR BLD: 9 % (ref 5–13)
NEUTS SEG # BLD: 5.5 K/UL (ref 1.8–8)
NEUTS SEG NFR BLD: 73 % (ref 32–75)
NRBC # BLD: 0 K/UL (ref 0–0.01)
NRBC BLD-RTO: 0 PER 100 WBC
PERFORMED BY:: ABNORMAL
PLATELET # BLD AUTO: 405 K/UL (ref 150–400)
PMV BLD AUTO: 8.4 FL (ref 8.9–12.9)
RBC # BLD AUTO: 2.75 M/UL (ref 4.1–5.7)
WBC # BLD AUTO: 7.6 K/UL (ref 4.1–11.1)

## 2024-10-21 PROCEDURE — 1100000000 HC RM PRIVATE

## 2024-10-21 PROCEDURE — 6360000002 HC RX W HCPCS: Performed by: PHYSICIAN ASSISTANT

## 2024-10-21 PROCEDURE — 36415 COLL VENOUS BLD VENIPUNCTURE: CPT

## 2024-10-21 PROCEDURE — 6370000000 HC RX 637 (ALT 250 FOR IP): Performed by: INTERNAL MEDICINE

## 2024-10-21 PROCEDURE — 82962 GLUCOSE BLOOD TEST: CPT

## 2024-10-21 PROCEDURE — 97110 THERAPEUTIC EXERCISES: CPT

## 2024-10-21 PROCEDURE — 2580000003 HC RX 258: Performed by: INTERNAL MEDICINE

## 2024-10-21 PROCEDURE — 85025 COMPLETE CBC W/AUTO DIFF WBC: CPT

## 2024-10-21 PROCEDURE — 97530 THERAPEUTIC ACTIVITIES: CPT

## 2024-10-21 RX ORDER — INSULIN LISPRO 100 [IU]/ML
7 INJECTION, SOLUTION INTRAVENOUS; SUBCUTANEOUS
Status: DISCONTINUED | OUTPATIENT
Start: 2024-10-21 | End: 2024-10-26 | Stop reason: HOSPADM

## 2024-10-21 RX ADMIN — DICLOFENAC SODIUM 2 G: 10 GEL TOPICAL at 21:10

## 2024-10-21 RX ADMIN — Medication 1 CAPSULE: at 09:39

## 2024-10-21 RX ADMIN — SODIUM CHLORIDE, PRESERVATIVE FREE 10 ML: 5 INJECTION INTRAVENOUS at 20:54

## 2024-10-21 RX ADMIN — SODIUM BICARBONATE 650 MG: 650 TABLET ORAL at 09:39

## 2024-10-21 RX ADMIN — INSULIN LISPRO 2 UNITS: 100 INJECTION, SOLUTION INTRAVENOUS; SUBCUTANEOUS at 11:45

## 2024-10-21 RX ADMIN — CEFUROXIME AXETIL 250 MG: 250 TABLET, FILM COATED ORAL at 09:39

## 2024-10-21 RX ADMIN — THIAMINE HCL TAB 100 MG 100 MG: 100 TAB at 09:39

## 2024-10-21 RX ADMIN — DICLOFENAC SODIUM 2 G: 10 GEL TOPICAL at 09:41

## 2024-10-21 RX ADMIN — INSULIN LISPRO 2 UNITS: 100 INJECTION, SOLUTION INTRAVENOUS; SUBCUTANEOUS at 20:55

## 2024-10-21 RX ADMIN — CEFUROXIME AXETIL 250 MG: 250 TABLET, FILM COATED ORAL at 20:53

## 2024-10-21 RX ADMIN — SODIUM CHLORIDE, PRESERVATIVE FREE 10 ML: 5 INJECTION INTRAVENOUS at 09:40

## 2024-10-21 RX ADMIN — INSULIN LISPRO 5 UNITS: 100 INJECTION, SOLUTION INTRAVENOUS; SUBCUTANEOUS at 09:39

## 2024-10-21 RX ADMIN — ALLOPURINOL 100 MG: 100 TABLET ORAL at 09:40

## 2024-10-21 RX ADMIN — THERA TABS 1 TABLET: TAB at 09:39

## 2024-10-21 RX ADMIN — INSULIN LISPRO 7 UNITS: 100 INJECTION, SOLUTION INTRAVENOUS; SUBCUTANEOUS at 17:41

## 2024-10-21 RX ADMIN — HYDRALAZINE HYDROCHLORIDE 10 MG: 20 INJECTION, SOLUTION INTRAMUSCULAR; INTRAVENOUS at 20:54

## 2024-10-21 RX ADMIN — FINASTERIDE 5 MG: 5 TABLET, FILM COATED ORAL at 09:39

## 2024-10-21 RX ADMIN — TAMSULOSIN HYDROCHLORIDE 0.4 MG: 0.4 CAPSULE ORAL at 09:40

## 2024-10-21 RX ADMIN — INSULIN LISPRO 5 UNITS: 100 INJECTION, SOLUTION INTRAVENOUS; SUBCUTANEOUS at 11:46

## 2024-10-21 RX ADMIN — SODIUM BICARBONATE 650 MG: 650 TABLET ORAL at 20:54

## 2024-10-21 ASSESSMENT — PAIN DESCRIPTION - LOCATION: LOCATION: NECK

## 2024-10-21 ASSESSMENT — PAIN DESCRIPTION - DESCRIPTORS
DESCRIPTORS: ACHING
DESCRIPTORS: ACHING

## 2024-10-21 ASSESSMENT — PAIN DESCRIPTION - ORIENTATION: ORIENTATION: RIGHT;LEFT

## 2024-10-21 ASSESSMENT — PAIN - FUNCTIONAL ASSESSMENT: PAIN_FUNCTIONAL_ASSESSMENT: ACTIVITIES ARE NOT PREVENTED

## 2024-10-21 ASSESSMENT — PAIN SCALES - GENERAL: PAINLEVEL_OUTOF10: 0

## 2024-10-21 NOTE — PROGRESS NOTES
Hospitalist Progress Note               Daily Progress Note: 10/21/2024      Hospital Day: 19     Chief complaint: No chief complaint on file.       Brief HPI/ Hospital course to date:  Michael Fung is a 83-year-old male presented to outside hospital on 9/30 for cough and shortness of breath found to be hypoxic and have x-ray concerning for community-acquired pneumonia in the left mid and lower lung fields. Initiated on community-acquired pneumonia therapy with ceftriaxone and azithromycin. Patient also noted to have elevated creatinine of 4.5 which is his baseline patient is diuretic dependent. Became oliguric during the course of the hospitalization despite Lasix 80 mg. In addition, when he was admitted to [facility], lower extremity ultrasound obtained for elevated D-dimer. Showed bilateral DVTs in the right popliteal and 2 in the femoral and popliteal on the left. Had been initiated on a heparin drip but developed hemoptysis. Substantial enough to stop heparin drip. Patient was put on mid flow oxygen with low sats. Transferred to our ICU on BiPAP transitioned to high flow nasal cannula 50 L, 40% with sats of 97%. Had a low prop VQ scan on 10/1. IVF filter placed on 10/4. Noted has renal failure with potential need for dialysis creatinine increased to 6 and oliguric. Bladder scan negative and no Muñoz catheter or straight cath inserted. Patient was evaluated by nephrology, eventually started on hemodialysis, following which patient's clinical status improved. Subsequently patient was transferred out of the ICU and accepted to the hospitalist service. Hospital course also complicated by ileus and SBO, which subsequently resolved. Patient also started on IV zosyn for treatment of aspiration pneumonia, subsequently weaned to R.A. on 10/13. Completed 7 days IV zosyn course. Also noted to have chronic anemia, iron studies indicative of anemia of chronic disease, requiring blood transfusion once on

## 2024-10-21 NOTE — PROGRESS NOTES
within the right popliteal vein and left femoral and popliteal veins. Performing Technologist:  Roxanne Issa RT(R), RDMS, RVT, RDCS   Electronically Signed by Tony Jones MD 10/2/2024 3:59 PM    NM LUNG VENT/PERFUSION (VQ)    Result Date: 10/1/2024  Low probability for pulmonary embolus. Electronically Signed by Christos Herman MD 10/1/2024 1:45 PM     Assessment:   Renal Specific Problems  Acute on chronic renal failure stage V. On HD now. Tolerating the Tx well.  Volume overload  DVT right popliteal vein and left femoral/popliteal vein  Left multifocal pneumonia  Status post placement of IVC filter  Small bowel ileus-resolved  Hypokalemia - mild- potassium supplements were already ordered    Plan:     Obtain/ Order: labs/cultures/radiology/procedures:     Therapeutic:    HD will change to T-TH-SAT schedule to accommodate outpatient dialysis unit   Continue day 4K bath in light of hypokalemia.  However, this may be adjusted to 3K bath based on tomorrow's chemistry results.    IV Fe to rapidly replete iron stores    Consider liberalizing the diet.  Will need skilled nursing for rehab purposes.  Outpatient will need 3x/wk HD, will change to T-Th-Sat  Needs heparin during treatment to prevent filter clotting    Have Physical therapy adjust goals to have him able to stand and pivot which will allow for easier transport to and from dialysis.      Dr. Tinajero discussed with dialysis staff,  and family regarding thrice weekly dialysis.    Vishnu Basilio MD  [phone]

## 2024-10-21 NOTE — PLAN OF CARE
Problem: Discharge Planning  Goal: Discharge to home or other facility with appropriate resources  Outcome: Progressing  Flowsheets (Taken 10/20/2024 2045)  Discharge to home or other facility with appropriate resources: Identify barriers to discharge with patient and caregiver     Problem: Skin/Tissue Integrity  Goal: Absence of new skin breakdown  Description: 1.  Monitor for areas of redness and/or skin breakdown  2.  Assess vascular access sites hourly  3.  Every 4-6 hours minimum:  Change oxygen saturation probe site  4.  Every 4-6 hours:  If on nasal continuous positive airway pressure, respiratory therapy assess nares and determine need for appliance change or resting period.  Outcome: Progressing     Problem: Safety - Adult  Goal: Free from fall injury  Outcome: Progressing     Problem: ABCDS Injury Assessment  Goal: Absence of physical injury  Outcome: Progressing     Problem: Chronic Conditions and Co-morbidities  Goal: Patient's chronic conditions and co-morbidity symptoms are monitored and maintained or improved  Outcome: Progressing  Flowsheets (Taken 10/20/2024 2045)  Care Plan - Patient's Chronic Conditions and Co-Morbidity Symptoms are Monitored and Maintained or Improved: Monitor and assess patient's chronic conditions and comorbid symptoms for stability, deterioration, or improvement     Problem: Respiratory - Adult  Goal: Achieves optimal ventilation and oxygenation  Outcome: Progressing  Flowsheets (Taken 10/20/2024 2045)  Achieves optimal ventilation and oxygenation: Assess for changes in respiratory status     Problem: Cardiovascular - Adult  Goal: Maintains optimal cardiac output and hemodynamic stability  Outcome: Progressing  Goal: Absence of cardiac dysrhythmias or at baseline  Outcome: Progressing     Problem: Neurosensory - Adult  Goal: Achieves stable or improved neurological status  Outcome: Progressing  Flowsheets (Taken 10/20/2024 2045)  Achieves stable or improved neurological  hydration  Outcome: Progressing     Problem: Pain  Goal: Verbalizes/displays adequate comfort level or baseline comfort level  Outcome: Progressing

## 2024-10-21 NOTE — PLAN OF CARE
PHYSICAL THERAPY REEVALUATION  Patient: Michael Fung (83 y.o. male)  Date: 10/21/2024  Primary Diagnosis: Acute hypoxic respiratory failure [J96.01]       Precautions: Fall Risk                      Recommendations for nursing mobility: Out of bed to chair for meals, Encourage HEP in prep for ADLs/mobility; see handout for details, Frequent repositioning to prevent skin breakdown, Use of bed/chair alarm for safety, Use of BSC for toileting , AD and gt belt for bed to chair , and Assist x1    In place during session: EKG/telemetry   Chart, physical therapy assessment, plan of care, and goals were reviewed.      ASSESSMENT  Patient initially seen for PT evaluation 10/6/2024 and 7 skilled PT sessions since evalution. Patient seen today for PT reevaluation s/t LOS. Patient A&O x self, situation, place. Pt supine upon arrival, agreeable to session.      Based on the objective data described, the patient currently presents with impaired functional mobility, decreased independence in ADLs, impaired ability to perform high-level IADLs, decreased ROM, impaired strength, decreased activity tolerance, decreased command following, and impaired balance. (See below for objective details and assist levels).    Overall pt tolerated session fair today, currently with no c/o pain throughout session. Pt agreeable to complete LE HEP EOB. Pt requires add'l time and modA for sup > sit for trunk support. Pt completed seated therex with minimal difficulty, requires repeated cues to stay on task. Pt completed sit <> stand with modA, and amb 2 lateral steps twd HOB with CGA, pt continues to require verbal cues for hand placement for tranfers. Pt will benefit from continued skilled PT to address above deficits and return to PLOF, PT goals and POC reviewed on this date and continue to remain appropriate at this time. Potential barriers for safe discharge:  pts current support system is unable to meet their requirements for physical

## 2024-10-21 NOTE — PLAN OF CARE
OCCUPATIONAL THERAPY TREATMENT: WEEKLY REASSESSMENT    Patient: Michael Fung (83 y.o. male)  Date: 10/21/2024  Primary Diagnosis: Acute hypoxic respiratory failure [J96.01]       Precautions: Fall Risk                Recommendations for nursing mobility: Out of bed to chair for meals, Encourage HEP in prep for ADLs/mobility; see handout for details, Frequent repositioning to prevent skin breakdown, Use of bed/chair alarm for safety, LE elevation for management of edema, Use of BSC for toileting , AD and gt belt for bed to chair , and Assist x1    In place during session: External Catheter and EKG/telemetry   Chart, occupational therapy assessment, plan of care, and goals were reviewed.  ASSESSMENT  Patient initially seen for OT evaluation 10/7/24 and 5 skilled OT sessions since evalution. Patient seen today for OT reevaluation s/t LOS. Patient A&O x 4. Pt semi supine with wife at bedside upon arrival, agreeable to session.      Based on the objective data described, the patient currently presents with impaired functional mobility, decreased independence in ADLs, impaired ability to perform high-level IADLs, decreased ROM, impaired strength, poor body mechanics, decreased activity tolerance, poor safety awareness, decreased command following, decreased coordination, impaired balance, impaired posture, and increased pain levels. (See below for objective details and assist levels).    Overall pt tolerated session fair today, currently with c/o 6/10 neck pain. Pt now completing bed mobility with CGA- min A and additional time overall. Mod A req'd for scooting toward EOB. OOB functional mobility completed with mod A for transfers and min A for SPT with RW. Multimodal cuing provided for hand placement during transfers. Pt continues to req total A for yovanny care while standing with RW. Therapist reviewed UE HEP with pt and wife (wrist, elbow, and shoulder flex/ext) and to complete throughout the day to increase strength

## 2024-10-21 NOTE — PLAN OF CARE
Problem: Discharge Planning  Goal: Discharge to home or other facility with appropriate resources  10/21/2024 1130 by Gretchen Scott RN  Outcome: Progressing  Flowsheets (Taken 10/21/2024 1130)  Discharge to home or other facility with appropriate resources:   Identify barriers to discharge with patient and caregiver   Arrange for needed discharge resources and transportation as appropriate   Identify discharge learning needs (meds, wound care, etc)  10/21/2024 0049 by Magda Dickinson RN  Outcome: Progressing  Flowsheets (Taken 10/20/2024 2045)  Discharge to home or other facility with appropriate resources: Identify barriers to discharge with patient and caregiver     Problem: Skin/Tissue Integrity  Goal: Absence of new skin breakdown  Description: 1.  Monitor for areas of redness and/or skin breakdown  2.  Assess vascular access sites hourly  3.  Every 4-6 hours minimum:  Change oxygen saturation probe site  4.  Every 4-6 hours:  If on nasal continuous positive airway pressure, respiratory therapy assess nares and determine need for appliance change or resting period.  10/21/2024 1130 by Gretchen Scott RN  Outcome: Progressing  Note: The pt will remain free from developing further skin breakdown by turning and repositioning Q2 hrs.  10/21/2024 0049 by Magda Dickinson RN  Outcome: Progressing     Problem: Safety - Adult  Goal: Free from fall injury  10/21/2024 1130 by Gretchen Scott RN  Outcome: Progressing  Flowsheets (Taken 10/21/2024 1130)  Free From Fall Injury:   Instruct family/caregiver on patient safety   Based on caregiver fall risk screen, instruct family/caregiver to ask for assistance with transferring infant if caregiver noted to have fall risk factors  10/21/2024 0049 by Magda Dickinson, RN  Outcome: Progressing     Problem: ABCDS Injury Assessment  Goal: Absence of physical injury  10/21/2024 1130 by Gretchen Scott RN  Outcome: Progressing  Flowsheets (Taken 10/21/2024 1130)  Absence of Physical  Injury: Implement safety measures based on patient assessment  10/21/2024 0049 by Magda Dickinson RN  Outcome: Progressing     Problem: Chronic Conditions and Co-morbidities  Goal: Patient's chronic conditions and co-morbidity symptoms are monitored and maintained or improved  10/21/2024 1130 by Gretchen Scott RN  Outcome: Progressing  Flowsheets (Taken 10/21/2024 1130)  Care Plan - Patient's Chronic Conditions and Co-Morbidity Symptoms are Monitored and Maintained or Improved: Monitor and assess patient's chronic conditions and comorbid symptoms for stability, deterioration, or improvement  10/21/2024 0049 by Magda Dickinson RN  Outcome: Progressing  Flowsheets (Taken 10/20/2024 2045)  Care Plan - Patient's Chronic Conditions and Co-Morbidity Symptoms are Monitored and Maintained or Improved: Monitor and assess patient's chronic conditions and comorbid symptoms for stability, deterioration, or improvement     Problem: Pain  Goal: Verbalizes/displays adequate comfort level or baseline comfort level  10/21/2024 1130 by Gretchen Scott RN  Outcome: Progressing  Flowsheets (Taken 10/21/2024 1130)  Verbalizes/displays adequate comfort level or baseline comfort level:   Encourage patient to monitor pain and request assistance   Assess pain using appropriate pain scale   Implement non-pharmacological measures as appropriate and evaluate response   Administer analgesics based on type and severity of pain and evaluate response  10/21/2024 0049 by Magda Dickinson RN  Outcome: Progressing

## 2024-10-21 NOTE — CARE COORDINATION
CM reviewed chart. CM opened [facility] referral and faxed referral to facility. Cm spoke with patient's daughter about DCP, she confiirmed first choice is [facility] and 2nd choice is [facility]. CM will continue to follow.

## 2024-10-21 NOTE — CONSULTS
Imaging:  XR CHEST PORTABLE    Result Date: 10/6/2024  The enteric tube terminates in the stomach. Stable mild pulmonary edema. Electronically signed by Alexander Lazo    XR ABDOMEN (2 VIEWS)    Result Date: 10/6/2024  Nonobstructive bowel gas pattern. The NG tube tip is in the stomach. Electronically signed by BRANDI JERNIGAN    IR GUIDED IVC FILTER PLACEMENT    Result Date: 10/4/2024  1.  Placement of infrarenal IVC filter without complications. Electronically signed by PRIETO CRAIG NONTUNNELED VASCULAR CATHETER > 5 YEARS    Result Date: 10/4/2024  1.  Successful placement of right internal jugular temporary dialysis catheter. The catheter is ready for immediate use. Electronically signed by PRIETO SOUZA    XR CHEST PORTABLE    Result Date: 10/4/2024  NG tube courses into the stomach. Increased pulmonary edema.. Electronically signed by Joan Givens    CT CHEST ABDOMEN PELVIS WO CONTRAST Additional Contrast? None    Result Date: 10/4/2024  1. There are bilateral lung infiltrates which may represent infection, aspiration, or less likely infarction. 2. Trace bilateral pleural effusions. 3. There is significant fluid distention of the stomach, and there are mildly dilated proximal small bowel loops in the mid abdomen. Findings may be related to a ileus versus proximal, partial obstruction. The patient may benefit from NG tube decompression. 4. There is a left inguinal hernia containing a nonobstructed loop of distal colon. Electronically signed by BRANDI JERNIGAN    XR CHEST PORTABLE    Result Date: 10/3/2024  Small to moderate left pleural effusion with associated atelectasis. Electronically signed by RAISA PEÑA    CT CHEST WO CONTRAST    Result Date: 10/3/2024  1.  Left upper lobe, lingula, and left lower lobe infiltrates, suspicious for multifocal pneumonia. 2.  Prominent distention of the esophagus and stomach which are nonspecific. Gastric outlet obstruction cannot be excluded. 3.  Coronary  atelectasis left lower lobe supplement potassium 3.1 will start patient on Zosyn high risk for aspiration  10/11 alert awake eating breakfast no shortness of breath at rest he is on room air continue with her dialysis with fluid removal echo shows EF is about 60 to 65% last chest x-ray shows left lower lobe atelectasis patient is on Zosyn  10/12 getting dialysis on room air last hemoglobin 7.4  10/13 on room air was dialyzed yesterday evening breakfast family member at bedside continue pulmonary toilet  10/14 feeling better no sign of distress no shortness of breath at rest or dialysis tomorrow he is on room air, chest x-ray no acute infiltrate  10/15 getting dialysis, on room air status post IVC filter for bilateral lower extremity DVT finish antibiotic for aspiration pneumonia still at high risk but eating comfortably no sign of distress  10/16 doing well on room air wife states he is eating more last chest x-ray clear  10/17 fever to 101.3 yesterday at 8 PM.  Right arm appears to have some cellulitis from infiltrated IV.  Currently on Zosyn will check a blood culture check duplex scan of the right arm.  WBC count remains normal will repeat in a.m.  With his history of lung disease we will check a chest x-ray as well  10/18 Tmax 99.8 overnight duplex scan of the right arm showed no DVT only superficial thrombophlebitis cephalic vein.  He feels better.  Chest x-ray was not done lungs sound clear with improved temperature will not check chest x-ray will repeat inflammatory markers in a.m.  10/19 no further fever noted procalcitonin improved WBC count remains normal right arm exam improved as well.  For dialysis today with hemoglobin 6.9 will reach out to nephrology to see if transfusion warranted  10/20 awake alert still has low-grade temp 99.9 last evening WBC count normal hemoglobin improved after transfusion during dialysis  10/21 awake alert reportedly had some agitation last evening none so far today still

## 2024-10-22 LAB
ALBUMIN SERPL-MCNC: 1.5 G/DL (ref 3.5–5)
ANION GAP SERPL CALC-SCNC: 8 MMOL/L (ref 2–12)
BUN SERPL-MCNC: 54 MG/DL (ref 6–20)
BUN/CREAT SERPL: 9 (ref 12–20)
CA-I BLD-MCNC: 8.8 MG/DL (ref 8.5–10.1)
CHLORIDE SERPL-SCNC: 104 MMOL/L (ref 97–108)
CO2 SERPL-SCNC: 25 MMOL/L (ref 21–32)
CREAT SERPL-MCNC: 6.07 MG/DL (ref 0.7–1.3)
GLUCOSE BLD STRIP.AUTO-MCNC: 139 MG/DL (ref 65–100)
GLUCOSE BLD STRIP.AUTO-MCNC: 178 MG/DL (ref 65–100)
GLUCOSE BLD STRIP.AUTO-MCNC: 179 MG/DL (ref 65–100)
GLUCOSE SERPL-MCNC: 159 MG/DL (ref 65–100)
PERFORMED BY:: ABNORMAL
PHOSPHATE SERPL-MCNC: 4.1 MG/DL (ref 2.6–4.7)
POTASSIUM SERPL-SCNC: 3.8 MMOL/L (ref 3.5–5.1)
SODIUM SERPL-SCNC: 137 MMOL/L (ref 136–145)

## 2024-10-22 PROCEDURE — 2709999900 HC NON-CHARGEABLE SUPPLY

## 2024-10-22 PROCEDURE — 97116 GAIT TRAINING THERAPY: CPT

## 2024-10-22 PROCEDURE — 80069 RENAL FUNCTION PANEL: CPT

## 2024-10-22 PROCEDURE — 82962 GLUCOSE BLOOD TEST: CPT

## 2024-10-22 PROCEDURE — 6360000002 HC RX W HCPCS: Performed by: INTERNAL MEDICINE

## 2024-10-22 PROCEDURE — 6370000000 HC RX 637 (ALT 250 FOR IP): Performed by: INTERNAL MEDICINE

## 2024-10-22 PROCEDURE — 2580000003 HC RX 258: Performed by: INTERNAL MEDICINE

## 2024-10-22 PROCEDURE — 1100000000 HC RM PRIVATE

## 2024-10-22 PROCEDURE — 6370000000 HC RX 637 (ALT 250 FOR IP): Performed by: STUDENT IN AN ORGANIZED HEALTH CARE EDUCATION/TRAINING PROGRAM

## 2024-10-22 PROCEDURE — 36415 COLL VENOUS BLD VENIPUNCTURE: CPT

## 2024-10-22 PROCEDURE — 90935 HEMODIALYSIS ONE EVALUATION: CPT

## 2024-10-22 RX ORDER — NIFEDIPINE 90 MG/1
90 TABLET, EXTENDED RELEASE ORAL
Status: DISCONTINUED | OUTPATIENT
Start: 2024-10-23 | End: 2024-10-22

## 2024-10-22 RX ORDER — NIFEDIPINE 90 MG/1
90 TABLET, EXTENDED RELEASE ORAL
Status: DISCONTINUED | OUTPATIENT
Start: 2024-10-22 | End: 2024-10-26 | Stop reason: HOSPADM

## 2024-10-22 RX ORDER — CARVEDILOL 3.12 MG/1
6.25 TABLET ORAL 2 TIMES DAILY WITH MEALS
Status: DISCONTINUED | OUTPATIENT
Start: 2024-10-22 | End: 2024-10-26 | Stop reason: HOSPADM

## 2024-10-22 RX ORDER — CEFUROXIME AXETIL 250 MG/1
250 TABLET ORAL EVERY 12 HOURS SCHEDULED
Qty: 2 TABLET | Refills: 0 | Status: SHIPPED | OUTPATIENT
Start: 2024-10-22 | End: 2024-10-23

## 2024-10-22 RX ADMIN — THIAMINE HCL TAB 100 MG 100 MG: 100 TAB at 11:39

## 2024-10-22 RX ADMIN — SODIUM BICARBONATE 650 MG: 650 TABLET ORAL at 20:44

## 2024-10-22 RX ADMIN — DICLOFENAC SODIUM 2 G: 10 GEL TOPICAL at 20:46

## 2024-10-22 RX ADMIN — EPOETIN ALFA-EPBX 10000 UNITS: 10000 INJECTION, SOLUTION INTRAVENOUS; SUBCUTANEOUS at 10:48

## 2024-10-22 RX ADMIN — THERA TABS 1 TABLET: TAB at 11:39

## 2024-10-22 RX ADMIN — CEFUROXIME AXETIL 250 MG: 250 TABLET, FILM COATED ORAL at 20:44

## 2024-10-22 RX ADMIN — SODIUM CHLORIDE, PRESERVATIVE FREE 10 ML: 5 INJECTION INTRAVENOUS at 11:48

## 2024-10-22 RX ADMIN — TAMSULOSIN HYDROCHLORIDE 0.4 MG: 0.4 CAPSULE ORAL at 11:39

## 2024-10-22 RX ADMIN — HEPARIN SODIUM 3600 UNITS: 1000 INJECTION INTRAVENOUS; SUBCUTANEOUS at 10:47

## 2024-10-22 RX ADMIN — SODIUM CHLORIDE 125 MG: 9 INJECTION, SOLUTION INTRAVENOUS at 18:59

## 2024-10-22 RX ADMIN — FINASTERIDE 5 MG: 5 TABLET, FILM COATED ORAL at 11:39

## 2024-10-22 RX ADMIN — DICLOFENAC SODIUM 2 G: 10 GEL TOPICAL at 11:50

## 2024-10-22 RX ADMIN — NIFEDIPINE 90 MG: 90 TABLET, FILM COATED, EXTENDED RELEASE ORAL at 11:39

## 2024-10-22 RX ADMIN — CARVEDILOL 6.25 MG: 3.12 TABLET, FILM COATED ORAL at 17:51

## 2024-10-22 RX ADMIN — INSULIN LISPRO 7 UNITS: 100 INJECTION, SOLUTION INTRAVENOUS; SUBCUTANEOUS at 17:51

## 2024-10-22 RX ADMIN — ALLOPURINOL 100 MG: 100 TABLET ORAL at 11:39

## 2024-10-22 RX ADMIN — Medication 1 CAPSULE: at 11:39

## 2024-10-22 RX ADMIN — CEFUROXIME AXETIL 250 MG: 250 TABLET, FILM COATED ORAL at 11:39

## 2024-10-22 RX ADMIN — SODIUM BICARBONATE 650 MG: 650 TABLET ORAL at 11:39

## 2024-10-22 RX ADMIN — INSULIN LISPRO 7 UNITS: 100 INJECTION, SOLUTION INTRAVENOUS; SUBCUTANEOUS at 12:48

## 2024-10-22 RX ADMIN — SODIUM CHLORIDE, PRESERVATIVE FREE 10 ML: 5 INJECTION INTRAVENOUS at 20:46

## 2024-10-22 RX ADMIN — CARVEDILOL 6.25 MG: 3.12 TABLET, FILM COATED ORAL at 11:39

## 2024-10-22 ASSESSMENT — PAIN DESCRIPTION - LOCATION: LOCATION: NECK

## 2024-10-22 ASSESSMENT — PAIN SCALES - GENERAL: PAINLEVEL_OUTOF10: 6

## 2024-10-22 ASSESSMENT — PAIN DESCRIPTION - DESCRIPTORS: DESCRIPTORS: ACHING;CRAMPING

## 2024-10-22 ASSESSMENT — PAIN - FUNCTIONAL ASSESSMENT: PAIN_FUNCTIONAL_ASSESSMENT: ACTIVITIES ARE NOT PREVENTED

## 2024-10-22 ASSESSMENT — PAIN DESCRIPTION - ORIENTATION: ORIENTATION: RIGHT;LEFT

## 2024-10-22 NOTE — PROGRESS NOTES
Wound care consult placed due to skin tears and excoriation to coccyx, buttocks, and left posterior thigh. Patient having multiple liquid/mucoid Bms for many days. Secure message sent to ROBERTO CARLOS Benton requesting appropriate bowel intervention to reduce causative factors and for antifungal powder if appropriate. No new orders received.

## 2024-10-22 NOTE — CARE COORDINATION
CM reviewed chart. CM spoke to family, [facility], and [facility], and [facility]. [facility] [place] does not expect to have beds until Friday or Monday. Family going to tour [facility] today. [facility] has accepted patient for MWF 4:15pm HD Chair. [facility] informed and sending [facility] form for family to complete. Cm will continue to [facility].

## 2024-10-22 NOTE — DISCHARGE SUMMARY
Physician Discharge Summary     Patient ID:    Michael Fung  [id number]  83 y.o.  1940    Admit date: 10/3/2024    Discharge date : 10/22/2024      Final Diagnoses:   Acute hypoxic respiratory failure [J96.01]      Reason for Hospitalization:    Acute respiratory failure with hypoxia 2/2 aspiration pneumonia    Volume overload in the setting of acute on chronic kidney disease stage V requiring initiation of dialysis    Acute bilateral lower extremity DVT    Right arm superficial thrombophlebitis  Right arm cellulitis    Concern for ileus  Small bowel obstruction-resolved    Anemia of chronic disease  Iron deficiency anemia      Hospital Course:     This is a 83-year-old male who presented to the outside hospital on 9/30/2024 for cough and shortness of breath was found to be hypoxic and x-ray revealing community-acquired pneumonia in the left mid and lower lung fields.  Patient was started on Rocephin and azithromycin.  Patient with a past medical history of CKD, creatinine in the range of 3-4.  Patient's hospital course was complicated with oliguria despite Lasix 80 Mg for which patient had to undergo hemo-dialysis inpatient.  Patient also had bilateral DVTs  (right and left popliteal, left femoral) for which patient was started on heparin drip but he eventually developed hemoptysis which was substantial enough to DC heparin drip.     Patient was subsequently transferred out of the ICU on BiPAP which was eventually transitioned to high flow nasal cannula 50 L, 40% with sats in 90s.  Patient had a low probability VQ mismatch on 10/1.  IV filter placed on 10/4.  Patient was noted to have renal failure with potential need for dialysis, creatinine increased to 6.  Bladder scan nonrevealing of any retained urine.  His hospital course was further complicated by ileus and SBO which subsequently resolved with bowel rest.  Patient was also started on IV Zosyn for treatment of aspiration pneumonia  subsequently weaned to room air on 10/13.  Completed 7 days of IV Zosyn course.  Patient was also found to have a chronic anemia and iron deficiency anemia requiring blood transfusion X1 on 10/19/2024.  Patient will require long-term hemodialysis, now in the process of setting [facility]  Hospital course complicated by right arm swelling, DVT ruled out.  Possible cellulitis of the right arm, cefuroxime was started on a 10/18, last dose was yesterday 10/21.  Diabetes controlled with insulin.  Hemoglobin stable 8.3.    Patient seen and examined at bedside.  Patient stable at the day of the discharge.  Vitals are stable  Patient denies having any complaints.    Discharge Medications:      Medication List        START taking these medications      cefUROXime 250 MG tablet  Commonly known as: CEFTIN  Take 1 tablet by mouth every 12 hours for 2 doses            CONTINUE taking these medications      allopurinol 100 MG tablet  Commonly known as: ZYLOPRIM     carvedilol 12.5 MG tablet  Commonly known as: COREG     cyanocobalamin 500 MCG tablet     finasteride 5 MG tablet  Commonly known as: PROSCAR     glipiZIDE 5 MG extended release tablet  Commonly known as: GLUCOTROL XL     MVW Complete Formulation Caps     NIFEdipine 90 MG extended release tablet  Commonly known as: ADALAT CC     sodium bicarbonate 650 MG tablet     tamsulosin 0.4 MG capsule  Commonly known as: FLOMAX     thiamine mononitrate 100 MG tablet     torsemide 5 MG tablet  Commonly known as: DEMADEX               Where to Get Your Medications        These medications were sent to [facility] [address] [place] [address] [phone] F [phone] [address] [facility] [address]      Phone: [phone]   cefUROXime 250 MG tablet           Follow up Care:    Follow-up Information    None              Diet:  renal diet    Disposition:  Home.    Advanced Directive:   FULL    DNR      Discharge Exam:

## 2024-10-22 NOTE — PROGRESS NOTES
Comprehensive Nutrition Assessment    Type and Reason for Visit:  Reassess    Nutrition Recommendations/Plan:   Continue current diet and supplement order  Encourage PO intakes  RD to monitor weight, po intake, labs, GI function     Malnutrition Assessment:  Malnutrition Status:  Severe malnutrition (10/11/24 1021)    Context:  Acute Illness     Findings of the 6 clinical characteristics of malnutrition:  Energy Intake:  50% or less of estimated energy requirements for 5 or more days  Weight Loss:  Greater than 2% over 1 week     Body Fat Loss:  Unable to assess     Muscle Mass Loss:  Unable to assess    Fluid Accumulation:  Unable to assess     Strength:  Not Performed    Nutrition Assessment:    Pt assessed for LOS. Pt admitted for acute respiratory failure. Pt had an ileus, small bowel obstruction now resolved and pt tolerating 60g cho/meal bland diet. Per chart review, pt has lost 9lbs in past week. (10/15) Reassess: pt contiunes on GI bland 60g cho/meal diet order with diabetic ONS TID. Some weight fluctuations noted this admission r/t HD. (10/22) Pt has good PO intakes, 3lb weight gain noted this week. Pt had HD today. Meds and labs reviewed.    Nutrition Related Findings:    PO intakes 51-75%. Last BM 10/21. Wound Type: None       Current Nutrition Intake & Therapies:    Average Meal Intake: 26-50%  Average Supplements Intake: None Ordered  ADULT ORAL NUTRITION SUPPLEMENT; Breakfast, Dinner; Diabetic Oral Supplement  ADULT DIET; Easy to Chew; 4 carb choices (60 gm/meal); GI [place] (GERD/Peptic Ulcer); High Fiber    Anthropometric Measures:  Height: 167.6 cm (5' 6\")  Ideal Body Weight (IBW): 142 lbs (65 kg)       Current Body Weight: 73.5 kg (162 lb), 114.1 % IBW. Weight Source: Bed Scale  Current BMI (kg/m2): 26.2        Weight Adjustment For: No Adjustment     BMI Categories: Overweight (BMI 25.0-29.9)  Last Weight Metrics:      10/22/2024     5:29 AM 10/21/2024     6:00 AM 10/20/2024     6:00 AM  10/16/2024     6:00 AM 10/15/2024     6:00 AM 10/13/2024     6:15 AM 10/12/2024     6:00 AM   Weight Loss Metrics   Weight - Scale 169 lbs 9 oz 165 lbs 13 oz 163 lbs 13 oz 166 lbs 7 oz 169 lbs 12 oz 156 lbs 15 oz 160 lbs 8 oz   BMI (Calculated) 27.4 kg/m2 26.8 kg/m2 26.5 kg/m2 26.9 kg/m2 27.5 kg/m2 25.4 kg/m2 26 kg/m2      Estimated Daily Nutrient Needs:  Energy Requirements Based On: Kcal/kg  Weight Used for Energy Requirements: Current  Energy (kcal/day): 1850-2220kcals/kg (25-30kcal/kg)  Weight Used for Protein Requirements: Current  Protein (g/day): 89-111g (1.2-1.5g/kg)  Method Used for Fluid Requirements: 1 ml/kcal  Fluid (ml/day):      Nutrition Diagnosis:   Unintended weight loss related to acute injury/trauma as evidenced by intake 26-50%    Nutrition Interventions:   Food and/or Nutrient Delivery: Continue Current Diet, Start Oral Nutrition Supplement  Nutrition Education/Counseling: No recommendation at this time  Coordination of Nutrition Care: Continue to monitor while inpatient       Goals:  Previous Goal Met: Progressing toward Goal(s)  Goals: Meet at least 75% of estimated needs, by next RD assessment       Nutrition Monitoring and Evaluation:   Behavioral-Environmental Outcomes: None Identified  Food/Nutrient Intake Outcomes: Food and Nutrient Intake  Physical Signs/Symptoms Outcomes: Biochemical Data, GI Status, Weight    Discharge Planning:    Continue current diet     Angelique Barron RD  Contact: renata

## 2024-10-22 NOTE — CONSULTS
Consult  Pulmonary, Critical Care    Name: Michael Fung MRN: [id number]   : 1940 Hospital: [facility]   Date: 10/22/2024  Admission date: 10/3/2024 Hospital Day: 20       Subjective/Interval History:   Seen in the dialysis unit he is confused does not know why he is in the hospital or that he is in the hospital.  Was admitted at [facility] with cough severe hypoxia found to have bilateral DVT of the legs was placed on heparin subsequently developed severe hemoptysis heparin was discontinued and IVC filter placed.  He was transferred to our facility had CT scan which shows distended esophagus and stomach possible ileus with scattered infiltrates throughout the left lung no documentation of vomiting but his history is not reliable at all.  He has had a VQ scan that was low probability but does show matching defect due to renal insufficiency he did not have a CTA of the chest he initially required high flow nasal oxygen 45% FiO2 reportedly has room air oxygen saturation 95% today although he is wearing oxygen while receiving dialysis    Patient Active Problem List   Diagnosis    Acute hypoxic respiratory failure    CATHY (acute kidney injury) (HCC)    Hemoptysis    Community acquired pneumonia    Acute deep vein thrombosis (DVT) of femoral vein of both lower extremities (HCC)    DM (diabetes mellitus), type 2 (HCC)    Decreased mobility and endurance    Acute upper GI bleed    Ileus (HCC)    Acute urethral obstruction    Incomplete bladder emptying        IMPRESSION:   Acute hypoxic respiratory failure likely due to a combination of aspiration pneumonia probable pulmonary emboli and pulmonary edema/fluid overload from renal failure now on room air  Recurrent fever questionable cellulitis right arm Tmax 99.9 last 24 hours  Pulmonary edema secondary to fluid retention from renal failure clear on last chest x-ray  Aspiration pneumonia has diffuse left-sided infiltrates very likely had  atelectasis left lower lobe supplement potassium 3.1 will start patient on Zosyn high risk for aspiration  10/11 alert awake eating breakfast no shortness of breath at rest he is on room air continue with her dialysis with fluid removal echo shows EF is about 60 to 65% last chest x-ray shows left lower lobe atelectasis patient is on Zosyn  10/12 getting dialysis on room air last hemoglobin 7.4  10/13 on room air was dialyzed yesterday evening breakfast family member at bedside continue pulmonary toilet  10/14 feeling better no sign of distress no shortness of breath at rest or dialysis tomorrow he is on room air, chest x-ray no acute infiltrate  10/15 getting dialysis, on room air status post IVC filter for bilateral lower extremity DVT finish antibiotic for aspiration pneumonia still at high risk but eating comfortably no sign of distress  10/16 doing well on room air wife states he is eating more last chest x-ray clear  10/17 fever to 101.3 yesterday at 8 PM.  Right arm appears to have some cellulitis from infiltrated IV.  Currently on Zosyn will check a blood culture check duplex scan of the right arm.  WBC count remains normal will repeat in a.m.  With his history of lung disease we will check a chest x-ray as well  10/18 Tmax 99.8 overnight duplex scan of the right arm showed no DVT only superficial thrombophlebitis cephalic vein.  He feels better.  Chest x-ray was not done lungs sound clear with improved temperature will not check chest x-ray will repeat inflammatory markers in a.m.  10/19 no further fever noted procalcitonin improved WBC count remains normal right arm exam improved as well.  For dialysis today with hemoglobin 6.9 will reach out to nephrology to see if transfusion warranted  10/20 awake alert still has low-grade temp 99.9 last evening WBC count normal hemoglobin improved after transfusion during dialysis  10/21 awake alert reportedly had some agitation last evening none so far today still

## 2024-10-22 NOTE — PLAN OF CARE
Problem: Discharge Planning  Goal: Discharge to home or other facility with appropriate resources  Outcome: Progressing     Problem: Skin/Tissue Integrity  Goal: Absence of new skin breakdown  Description: 1.  Monitor for areas of redness and/or skin breakdown  2.  Assess vascular access sites hourly  3.  Every 4-6 hours minimum:  Change oxygen saturation probe site  4.  Every 4-6 hours:  If on nasal continuous positive airway pressure, respiratory therapy assess nares and determine need for appliance change or resting period.  Outcome: Progressing     Problem: Safety - Adult  Goal: Free from fall injury  Outcome: Progressing     Problem: ABCDS Injury Assessment  Goal: Absence of physical injury  Outcome: Progressing     Problem: Chronic Conditions and Co-morbidities  Goal: Patient's chronic conditions and co-morbidity symptoms are monitored and maintained or improved  Outcome: Progressing     Problem: Respiratory - Adult  Goal: Achieves optimal ventilation and oxygenation  Outcome: Progressing  Flowsheets (Taken 10/22/2024 5115 by Jim Garcia RN)  Achieves optimal ventilation and oxygenation: Assess for changes in respiratory status     Problem: Cardiovascular - Adult  Goal: Maintains optimal cardiac output and hemodynamic stability  Outcome: Progressing  Goal: Absence of cardiac dysrhythmias or at baseline  Outcome: Progressing     Problem: Neurosensory - Adult  Goal: Achieves stable or improved neurological status  Outcome: Progressing  Goal: Absence of seizures  Outcome: Progressing  Goal: Remains free of injury related to seizures activity  Outcome: Progressing  Goal: Achieves maximal functionality and self care  Outcome: Progressing     Problem: Musculoskeletal - Adult  Goal: Return mobility to safest level of function  Outcome: Progressing  Goal: Maintain proper alignment of affected body part  Outcome: Progressing  Goal: Return ADL status to a safe level of function  Outcome: Progressing     Problem:

## 2024-10-22 NOTE — PROGRESS NOTES
[facility]     Renal Daily Progress Note:     Subjective: Patient underwent dialysis today.  This would be his third dialysis.  Family at the bedside.  Seen around 4 PM.  Has been confused and intermittently agitated.  Now has a sitter at the bedside.  Appetite fair, ate some breakfast and a little bit of lunch.  No nausea or vomiting.  No chest or abdominal pain.  Still making urine.  Muñoz not placed    10/8 more awake today.  Ate breakfast without nausea or vomiting.  Urine output not quantified.  However, today serum creatinine is only 5.12.  Will see if tomorrow's creatinine is less than that which was seen on Saturday.  Not short of breath,    10/9 awake and comfortable.  Seems calm.  Still with sitter is in the room.  Creatinine is trending up.  Will perform dialysis tomorrow.    10/10 seems calm and appropriate.  Wife at bedside.  Still has a sitter.  Had dialysis today without issue.    10/11/24 For HD in AM. Overall improvement.    10/12/24 Seen during HD in the HD suite in the presence of acute HD QUINTIN Canas      10/14/2024  No major complaints were offered this afternoon.    10/15 seen in room 569 postdialysis.  Wife at bedside and daughter spoken to via telephone.  Patient tolerated dialysis without issue.  Still essentially immobile, receiving some physical therapy.  Prior to admission he was able to ambulate.  Referred to [facility] for outpatient dialysis.  Long discussion with family regarding goals of care.  I did mention that given his age and comorbidities dialysis may be difficult to maintain.  Will start off with twice weekly dialysis to see how he does.  So far volume status has been maintained.  Electrolytes appear to be stable.  Will shoot for palliative dialysis to keep him comfortable and out of the hospital.  Not short of breath, no chest pain.  Starting to eat a bit more.  Still gets agitated from time to time.      10/17/2024    Muscle skeletal pain was an issue this  PLACEMENT    Result Date: 10/4/2024  1.  Placement of infrarenal IVC filter without complications. Electronically signed by PRIETO SOUZA    IR NONTUNNELED VASCULAR CATHETER > 5 YEARS    Result Date: 10/4/2024  1.  Successful placement of right internal jugular temporary dialysis catheter. The catheter is ready for immediate use. Electronically signed by PRIETO [email or web address]    XR CHEST PORTABLE    Result Date: 10/4/2024  NG tube courses into the stomach. Increased pulmonary edema.. Electronically signed by Jona Givens    CT CHEST ABDOMEN PELVIS WO CONTRAST Additional Contrast? None    Result Date: 10/4/2024  1. There are bilateral lung infiltrates which may represent infection, aspiration, or less likely infarction. 2. Trace bilateral pleural effusions. 3. There is significant fluid distention of the stomach, and there are mildly dilated proximal small bowel loops in the mid abdomen. Findings may be related to a ileus versus proximal, partial obstruction. The patient may benefit from NG tube decompression. 4. There is a left inguinal hernia containing a nonobstructed loop of distal colon. Electronically signed by BRANDI JERNIGAN    XR CHEST PORTABLE    Result Date: 10/3/2024  Small to moderate left pleural effusion with associated atelectasis. Electronically signed by RAISA PEÑA    CT CHEST WO CONTRAST    Result Date: 10/3/2024  1.  Left upper lobe, lingula, and left lower lobe infiltrates, suspicious for multifocal pneumonia. 2.  Prominent distention of the esophagus and stomach which are nonspecific. Gastric outlet obstruction cannot be excluded. 3.  Coronary artery calcifications. Electronically Signed by Fernandez Caicedo MD 10/3/2024 8:28 AM    XR CHEST 1 VIEW    Result Date: 10/2/2024  1. Hazy airspace disease within the left mid and lower lung, which may reflect underlying pneumonia. Electronically Signed by Conor Noyola DO 10/2/2024 11:18 PM    US upper extremity veins bilateral    Result Date: 10/2/2024  No

## 2024-10-22 NOTE — PLAN OF CARE
PHYSICAL THERAPY TREATMENT     Patient: Michael Fung (83 y.o. male)  Date: 10/22/2024  Diagnosis: Acute hypoxic respiratory failure [J96.01] Acute hypoxic respiratory failure      Precautions: Fall Risk                      Recommendations for nursing mobility: Out of bed to chair for meals, Encourage HEP in prep for ADLs/mobility; see handout for details, Frequent repositioning to prevent skin breakdown, Use of bed/chair alarm for safety, AD and gt belt for bed to chair , Amb to bathroom with AD and gait belt, and Assist x1    In place during session: External Catheter  Chart, physical therapy assessment, plan of care and goals were reviewed.  ASSESSMENT  Patient continues with skilled PT services and is slowly progressing towards goals. Pt semi supine upon PT arrival, agreeable to session. Pt A&O x 4. (See below for objective details and assist levels).     Overall pt tolerated session well today with no c/o pain throughout session. Pt continues to require Rocio, elevated HOB and use of bed rail for sup > sit. Pt demos increased independence with functional tranfers, requires Rocio for anterior weight shift and VC for hand placement on bed. Pt also demos increased activity tolerance this date, amb 20' with RW, gait belt and CGA; demos slow ivan, decreased step length and clearance and shuffling with increased distance.  Will continue to benefit from skilled PT services, and will continue to progress as tolerated. Current PT DC recommendation Moderate intensity short-term skilled physical therapy up to 5x/week once medically appropriate.    GOALS:    Problem: Physical Therapy - Adult  Goal: By Discharge: Performs mobility at highest level of function for planned discharge setting.  See evaluation for individualized goals.  Description: FUNCTIONAL STATUS PRIOR TO ADMISSION: Patient was modified independent using a walker/cane  for functional mobility.    HOME SUPPORT PRIOR TO ADMISSION: The patient  lived with spouse but did not require assistance.    Physical Therapy Goals  Initiated 10/6/2024  Reviewed 10/15/2024  Revieweded 10/21/2024  Pt stated goal: to get better    I with LE HEP x7 days  Mod I with bed mob x7 days  SBA with all transfers x7 days  Amb 25-75ft with RW and CGAx1 x7 days      Outcome: Progressing          PLAN :  Patient continues to benefit from skilled intervention to address functional impairments. Continue treatment per established plan of care to address goals.    Recommendation for discharge: (in order for the patient to meet his/her long term goals)  Moderate intensity short-term skilled physical therapy up to 5x/week    Potential barriers for safe discharge: pts current support system is unable to meet their requirements for physical assistance, pt is a high fall risk, pt is not safe to be alone, and concern for pt safely navigating or managing the home environment.    IF patient discharges home will need the following DME:rolling walker     SUBJECTIVE:   Patient stated “If I walk at all that will be good.”    OBJECTIVE DATA SUMMARY:   Critical Behavior:  Orientation  Overall Orientation Status: Within Normal Limits  Orientation Level: Oriented X4  Cognition  Overall Cognitive Status: Exceptions  Arousal/Alertness: Appears intact  Following Commands: Follows multistep commands with repitition;Follows one step commands with repetition  Attention Span: Appears intact  Safety Judgement: Decreased awareness of need for assistance;Decreased awareness of need for safety  Problem Solving: Decreased awareness of errors;Assistance required to correct errors made;Assistance required to implement solutions  Insights: Decreased awareness of deficits  Initiation: Requires cues for some  Sequencing: Requires cues for some    Functional Mobility Training:  Bed Mobility:  Bed Mobility Training  Bed Mobility Training: Yes  Interventions: Verbal cues  Supine to Sit: Minimum assistance;Assist

## 2024-10-23 PROBLEM — R60.0 EDEMA OF RIGHT UPPER ARM: Status: ACTIVE | Noted: 2024-10-23

## 2024-10-23 PROBLEM — K56.609 SMALL BOWEL OBSTRUCTION (HCC): Status: ACTIVE | Noted: 2024-10-23

## 2024-10-23 LAB
BASOPHILS # BLD: 0 K/UL (ref 0–0.1)
BASOPHILS NFR BLD: 1 % (ref 0–1)
DIFFERENTIAL METHOD BLD: ABNORMAL
EOSINOPHIL # BLD: 0.4 K/UL (ref 0–0.4)
EOSINOPHIL NFR BLD: 5 % (ref 0–7)
ERYTHROCYTE [DISTWIDTH] IN BLOOD BY AUTOMATED COUNT: 13.3 % (ref 11.5–14.5)
GLUCOSE BLD STRIP.AUTO-MCNC: 122 MG/DL (ref 65–100)
GLUCOSE BLD STRIP.AUTO-MCNC: 160 MG/DL (ref 65–100)
GLUCOSE BLD STRIP.AUTO-MCNC: 182 MG/DL (ref 65–100)
GLUCOSE BLD STRIP.AUTO-MCNC: 205 MG/DL (ref 65–100)
HCT VFR BLD AUTO: 26.1 % (ref 36.6–50.3)
HGB BLD-MCNC: 8.7 G/DL (ref 12.1–17)
IMM GRANULOCYTES # BLD AUTO: 0.2 K/UL (ref 0–0.04)
IMM GRANULOCYTES NFR BLD AUTO: 2 % (ref 0–0.5)
LYMPHOCYTES # BLD: 1.2 K/UL (ref 0.8–3.5)
LYMPHOCYTES NFR BLD: 15 % (ref 12–49)
MCH RBC QN AUTO: 29.7 PG (ref 26–34)
MCHC RBC AUTO-ENTMCNC: 33.3 G/DL (ref 30–36.5)
MCV RBC AUTO: 89.1 FL (ref 80–99)
MONOCYTES # BLD: 0.8 K/UL (ref 0–1)
MONOCYTES NFR BLD: 10 % (ref 5–13)
NEUTS SEG # BLD: 5.6 K/UL (ref 1.8–8)
NEUTS SEG NFR BLD: 67 % (ref 32–75)
NRBC # BLD: 0 K/UL (ref 0–0.01)
NRBC BLD-RTO: 0 PER 100 WBC
PERFORMED BY:: ABNORMAL
PLATELET # BLD AUTO: 466 K/UL (ref 150–400)
PMV BLD AUTO: 8.8 FL (ref 8.9–12.9)
RBC # BLD AUTO: 2.93 M/UL (ref 4.1–5.7)
WBC # BLD AUTO: 8.2 K/UL (ref 4.1–11.1)

## 2024-10-23 PROCEDURE — 85025 COMPLETE CBC W/AUTO DIFF WBC: CPT

## 2024-10-23 PROCEDURE — 2500000003 HC RX 250 WO HCPCS: Performed by: STUDENT IN AN ORGANIZED HEALTH CARE EDUCATION/TRAINING PROGRAM

## 2024-10-23 PROCEDURE — 2580000003 HC RX 258: Performed by: INTERNAL MEDICINE

## 2024-10-23 PROCEDURE — 97535 SELF CARE MNGMENT TRAINING: CPT

## 2024-10-23 PROCEDURE — 6370000000 HC RX 637 (ALT 250 FOR IP): Performed by: INTERNAL MEDICINE

## 2024-10-23 PROCEDURE — 1100000000 HC RM PRIVATE

## 2024-10-23 PROCEDURE — 97530 THERAPEUTIC ACTIVITIES: CPT

## 2024-10-23 PROCEDURE — 36415 COLL VENOUS BLD VENIPUNCTURE: CPT

## 2024-10-23 PROCEDURE — 82962 GLUCOSE BLOOD TEST: CPT

## 2024-10-23 PROCEDURE — 6370000000 HC RX 637 (ALT 250 FOR IP): Performed by: STUDENT IN AN ORGANIZED HEALTH CARE EDUCATION/TRAINING PROGRAM

## 2024-10-23 RX ADMIN — SODIUM CHLORIDE, PRESERVATIVE FREE 10 ML: 5 INJECTION INTRAVENOUS at 08:51

## 2024-10-23 RX ADMIN — INSULIN LISPRO 2 UNITS: 100 INJECTION, SOLUTION INTRAVENOUS; SUBCUTANEOUS at 12:19

## 2024-10-23 RX ADMIN — WHITE PETROLATUM,ZINC OXIDE: 57; 17 PASTE TOPICAL at 20:30

## 2024-10-23 RX ADMIN — NIFEDIPINE 90 MG: 90 TABLET, FILM COATED, EXTENDED RELEASE ORAL at 05:40

## 2024-10-23 RX ADMIN — WHITE PETROLATUM,ZINC OXIDE: 57; 17 PASTE TOPICAL at 16:33

## 2024-10-23 RX ADMIN — Medication 1 CAPSULE: at 08:50

## 2024-10-23 RX ADMIN — CARVEDILOL 6.25 MG: 3.12 TABLET, FILM COATED ORAL at 08:50

## 2024-10-23 RX ADMIN — DICLOFENAC SODIUM 2 G: 10 GEL TOPICAL at 20:36

## 2024-10-23 RX ADMIN — SODIUM BICARBONATE 650 MG: 650 TABLET ORAL at 20:30

## 2024-10-23 RX ADMIN — FINASTERIDE 5 MG: 5 TABLET, FILM COATED ORAL at 08:50

## 2024-10-23 RX ADMIN — TAMSULOSIN HYDROCHLORIDE 0.4 MG: 0.4 CAPSULE ORAL at 08:50

## 2024-10-23 RX ADMIN — INSULIN LISPRO 7 UNITS: 100 INJECTION, SOLUTION INTRAVENOUS; SUBCUTANEOUS at 18:15

## 2024-10-23 RX ADMIN — ACETAMINOPHEN 650 MG: 325 TABLET ORAL at 20:30

## 2024-10-23 RX ADMIN — SODIUM BICARBONATE 650 MG: 650 TABLET ORAL at 08:50

## 2024-10-23 RX ADMIN — THIAMINE HCL TAB 100 MG 100 MG: 100 TAB at 08:50

## 2024-10-23 RX ADMIN — INSULIN LISPRO 7 UNITS: 100 INJECTION, SOLUTION INTRAVENOUS; SUBCUTANEOUS at 08:59

## 2024-10-23 RX ADMIN — THERA TABS 1 TABLET: TAB at 08:50

## 2024-10-23 RX ADMIN — INSULIN LISPRO 7 UNITS: 100 INJECTION, SOLUTION INTRAVENOUS; SUBCUTANEOUS at 12:19

## 2024-10-23 RX ADMIN — CARVEDILOL 6.25 MG: 3.12 TABLET, FILM COATED ORAL at 18:15

## 2024-10-23 RX ADMIN — SODIUM CHLORIDE, PRESERVATIVE FREE 10 ML: 5 INJECTION INTRAVENOUS at 20:32

## 2024-10-23 RX ADMIN — INSULIN LISPRO 2 UNITS: 100 INJECTION, SOLUTION INTRAVENOUS; SUBCUTANEOUS at 20:36

## 2024-10-23 RX ADMIN — ALLOPURINOL 100 MG: 100 TABLET ORAL at 08:50

## 2024-10-23 ASSESSMENT — PAIN SCALES - GENERAL
PAINLEVEL_OUTOF10: 3
PAINLEVEL_OUTOF10: 0

## 2024-10-23 ASSESSMENT — PAIN DESCRIPTION - DESCRIPTORS: DESCRIPTORS: ACHING

## 2024-10-23 ASSESSMENT — PAIN DESCRIPTION - LOCATION
LOCATION: ARM;NECK
LOCATION: NECK

## 2024-10-23 ASSESSMENT — PAIN SCALES - WONG BAKER: WONGBAKER_NUMERICALRESPONSE: NO HURT

## 2024-10-23 ASSESSMENT — PAIN DESCRIPTION - ORIENTATION
ORIENTATION: RIGHT
ORIENTATION: LEFT;RIGHT

## 2024-10-23 NOTE — PROGRESS NOTES
[facility]     Renal Daily Progress Note:     Subjective: Patient underwent dialysis today.  This would be his third dialysis.  Family at the bedside.  Seen around 4 PM.  Has been confused and intermittently agitated.  Now has a sitter at the bedside.  Appetite fair, ate some breakfast and a little bit of lunch.  No nausea or vomiting.  No chest or abdominal pain.  Still making urine.  Muñoz not placed    10/8 more awake today.  Ate breakfast without nausea or vomiting.  Urine output not quantified.  However, today serum creatinine is only 5.12.  Will see if tomorrow's creatinine is less than that which was seen on Saturday.  Not short of breath,    10/9 awake and comfortable.  Seems calm.  Still with sitter is in the room.  Creatinine is trending up.  Will perform dialysis tomorrow.    10/10 seems calm and appropriate.  Wife at bedside.  Still has a sitter.  Had dialysis today without issue.    10/11/24 For HD in AM. Overall improvement.    10/12/24 Seen during HD in the HD suite in the presence of acute HD QUINTIN Canas      10/14/2024  No major complaints were offered this afternoon.    10/15 seen in room 569 postdialysis.  Wife at bedside and daughter spoken to via telephone.  Patient tolerated dialysis without issue.  Still essentially immobile, receiving some physical therapy.  Prior to admission he was able to ambulate.  Referred to [facility] for outpatient dialysis.  Long discussion with family regarding goals of care.  I did mention that given his age and comorbidities dialysis may be difficult to maintain.  Will start off with twice weekly dialysis to see how he does.  So far volume status has been maintained.  Electrolytes appear to be stable.  Will shoot for palliative dialysis to keep him comfortable and out of the hospital.  Not short of breath, no chest pain.  Starting to eat a bit more.  Still gets agitated from time to time.      10/17/2024    Muscle skeletal pain was an issue this  gas pattern. The NG tube tip is in the stomach. Electronically signed by BRANDI JERNIGAN    IR GUIDED IVC FILTER PLACEMENT    Result Date: 10/4/2024  1.  Placement of infrarenal IVC filter without complications. Electronically signed by PRIETO CRAIG NONTUNNELED VASCULAR CATHETER > 5 YEARS    Result Date: 10/4/2024  1.  Successful placement of right internal jugular temporary dialysis catheter. The catheter is ready for immediate use. Electronically signed by PRIETO [email or web address]    XR CHEST PORTABLE    Result Date: 10/4/2024  NG tube courses into the stomach. Increased pulmonary edema.. Electronically signed by Jona Givens    CT CHEST ABDOMEN PELVIS WO CONTRAST Additional Contrast? None    Result Date: 10/4/2024  1. There are bilateral lung infiltrates which may represent infection, aspiration, or less likely infarction. 2. Trace bilateral pleural effusions. 3. There is significant fluid distention of the stomach, and there are mildly dilated proximal small bowel loops in the mid abdomen. Findings may be related to a ileus versus proximal, partial obstruction. The patient may benefit from NG tube decompression. 4. There is a left inguinal hernia containing a nonobstructed loop of distal colon. Electronically signed by BRANDI JERNIGAN    XR CHEST PORTABLE    Result Date: 10/3/2024  Small to moderate left pleural effusion with associated atelectasis. Electronically signed by RAISA PEÑA    CT CHEST WO CONTRAST    Result Date: 10/3/2024  1.  Left upper lobe, lingula, and left lower lobe infiltrates, suspicious for multifocal pneumonia. 2.  Prominent distention of the esophagus and stomach which are nonspecific. Gastric outlet obstruction cannot be excluded. 3.  Coronary artery calcifications. Electronically Signed by Fernandez Caicedo MD 10/3/2024 8:28 AM    XR CHEST 1 VIEW    Result Date: 10/2/2024  1. Hazy airspace disease within the left mid and lower lung, which may reflect underlying pneumonia. Electronically Signed

## 2024-10-23 NOTE — WOUND CARE
IP WOUND CONSULT    Michael Fung  MEDICAL RECORD NUMBER:  [id number]  AGE: 83 y.o.   GENDER: male  : 1940  TODAY'S DATE:  10/23/2024    GENERAL     [] Follow-up   [x] New Consult    Michael Fung is a 83 y.o. male referred by:   [x] Physician  [] Nursing  [] Other:         PAST MEDICAL HISTORY    History reviewed. No pertinent past medical history.     PAST SURGICAL HISTORY    Past Surgical History:   Procedure Laterality Date    IR IVC FILTER PLACEMENT W IMAGING  10/4/2024    IR IVC FILTER PLACEMENT W IMAGING 10/4/2024 Saad Mancini MD [facility]    IR NONTUNNELED VASCULAR CATHETER  10/4/2024    IR NONTUNNELED VASCULAR CATHETER 10/4/2024 Saad Mancini MD [facility]    IR TUNNELED CATHETER PLACEMENT GREATER THAN 5 YEARS  10/16/2024    IR TUNNELED CATHETER PLACEMENT GREATER THAN 5 YEARS 10/16/2024 Gracie Santana APRN - NP [facility]       FAMILY HISTORY    No family history on file.      ALLERGIES    No Known Allergies    MEDICATIONS    No current facility-administered medications on file prior to encounter.     Current Outpatient Medications on File Prior to Encounter   Medication Sig Dispense Refill    sodium bicarbonate 650 MG tablet Take 2 tablets by mouth 2 times daily      allopurinol (ZYLOPRIM) 100 MG tablet Take 1 tablet by mouth daily      carvedilol (COREG) 12.5 MG tablet Take 0.5 tablets by mouth 2 times daily (with meals)      cyanocobalamin 500 MCG tablet Take 1 tablet by mouth daily      finasteride (PROSCAR) 5 MG tablet Take 1 tablet by mouth daily      glipiZIDE (GLUCOTROL XL) 5 MG extended release tablet Take 0.5 tablets by mouth daily      Multiple Vitamins-Minerals (MVW COMPLETE FORMULATION) CAPS Take 1 capsule by mouth daily      NIFEdipine (ADALAT CC) 90 MG extended release tablet Take 1 tablet by mouth every morning (before breakfast)      tamsulosin (FLOMAX) 0.4 MG capsule Take 2 capsules by mouth daily      thiamine mononitrate 100 MG tablet Take 1  Other 10/23/24 1405   Dressing Status Other (Comment);New dressing applied 10/23/24 1405   Wound Cleansed Other (Comment) 10/23/24 1405   Dressing/Treatment Zinc paste;Foam 10/23/24 1405   Dressing Change Due 10/24/24 10/23/24 1405   Wound Length (cm) 0.4 cm 10/23/24 1405   Wound Width (cm) 0.5 cm 10/23/24 1405   Wound Depth (cm) 0.1 cm 10/23/24 1405   Wound Surface Area (cm^2) 0.2 cm^2 10/23/24 1405   Wound Volume (cm^3) 0.02 cm^3 10/23/24 1405   Wound Assessment Dry;Pink/red 10/23/24 1405   Drainage Amount None (dry) 10/23/24 1405   Odor None 10/23/24 1405   Trisha-wound Assessment Fragile;Erosion;Blanchable erythema;Denuded 10/23/24 1405   Margins Attached edges 10/23/24 1405   Wound Thickness Description not for Pressure Injury Partial thickness 10/23/24 1405   Number of days: 0       Wound 10/23/24 Buttocks Right (Active)   Wound Image   10/23/24 1403   Wound Etiology Other 10/23/24 1403   Dressing Status New dressing applied 10/23/24 1403   Wound Cleansed Other (Comment) 10/23/24 1403   Dressing/Treatment Zinc paste;Foam 10/23/24 1403   Dressing Change Due 10/24/24 10/23/24 1403   Wound Length (cm) 0.4 cm 10/23/24 1403   Wound Width (cm) 0.2 cm 10/23/24 1403   Wound Depth (cm) 0.1 cm 10/23/24 1403   Wound Surface Area (cm^2) 0.08 cm^2 10/23/24 1403   Wound Volume (cm^3) 0.008 cm^3 10/23/24 1403   Wound Assessment Dry;Denuded;Pink/red 10/23/24 1403   Drainage Amount None (dry) 10/23/24 1403   Odor None 10/23/24 1403   Trisha-wound Assessment Fragile;Erosion;Denuded;Blanchable erythema 10/23/24 1403   Margins Attached edges 10/23/24 1403   Wound Thickness Description not for Pressure Injury Partial thickness 10/23/24 2103   Number of days: 0        Assessment:   -A&O x: 4  -Verbally communicative (Y/N): Y  -Mobile (Y/N): Y      -Assists in repositioning (Y/N): Y    -Able to turn independently (Y/N): N      PLAN     Skin Care & Pressure Relief Recommendations  Minimize Layers of Linen, Turn/reposition approximately

## 2024-10-23 NOTE — PLAN OF CARE
PHYSICAL THERAPY TREATMENT     Patient: Michael Fung (83 y.o. male)  Date: 10/23/2024  Diagnosis: Acute hypoxic respiratory failure [J96.01] Acute hypoxic respiratory failure      Precautions: Fall Risk                      Recommendations for nursing mobility: Out of bed to chair for meals, Encourage HEP in prep for ADLs/mobility; see handout for details, AD and gt belt for bed to chair , Amb to bathroom with AD and gait belt, and Assist x1    In place during session: External Catheter  Chart, physical therapy assessment, plan of care and goals were reviewed.  ASSESSMENT  Patient continues with skilled PT services and is progressing towards goals. Pt supine in bed upon PT arrival, agreeable to session. (See below for objective details and assist levels).     Overall pt tolerated session fair today. Pt transferred to eob requiring min A. Pt sat eob and demonstrated good/fair sitting balance. Pt stood from bed with RW and ambulated ~35 ft. Gait was slow and steady with no lob or knee buckling noted. Pt returned to room and sat in recliner chair. Pt educated to complete LE therex when not feeling as tired, pt verbalized understanding. Pt left sitting up in chair with all needs met. Will continue to benefit from skilled PT services, and will continue to progress as tolerated. Current PT DC recommendation Moderate intensity short-term skilled physical therapy up to 5x/week once medically appropriate.    GOALS:  Problem: Physical Therapy - Adult  Goal: By Discharge: Performs mobility at highest level of function for planned discharge setting.  See evaluation for individualized goals.  Description: FUNCTIONAL STATUS PRIOR TO ADMISSION: Patient was modified independent using a walker/cane  for functional mobility.    HOME SUPPORT PRIOR TO ADMISSION: The patient lived with spouse but did not require assistance.    Physical Therapy Goals  Initiated 10/6/2024  Reviewed 10/15/2024  Revieweded 10/21/2024  Pt stated  Dynamic: Good (unsupported);Fair (occasional)  Standing: Impaired  Standing - Static: Fair;Constant support;Good  Standing - Dynamic: Constant support;Fair;Good  Wheelchair Mobility:  Wheelchair Management  Wheelchair Management: No  Ambulation/Gait Training:  Gait  Gait Training: Yes  Overall Level of Assistance: Contact-guard assistance;Additional time;Minimum assistance;Assist X1  Distance (ft): 35 Feet  Assistive Device: Gait belt;Walker, rolling  Base of Support: Narrowed  Speed/Danielle: Slow;Shuffled  Step Length: Right shortened;Left shortened  Gait Abnormalities: Decreased step clearance;Shuffling gait    Therapeutic Exercises:     EXERCISE   Sets   Reps   Active Active Assist   Passive Self ROM   Comments   Ankle Pumps   [] [] [] [] educated   Quad Sets/Glut Sets   [] [] [] []    Hamstring Sets   [] [] [] []    Short Arc Quads   [] [] [] []    Heel Slides   [] [] [] []    Straight Leg Raises   [] [] [] []    Hip abd/add   [] [] [] []    Long Arc Quads   [] [] [] [] educated   Marching   [] [] [] [] educated   Seated HR/TR   [] [] [] []       [] [] [] []       Pain Ratin/10 reported    Activity Tolerance:   Fair     After treatment patient left in no apparent distress:   Bed locked and returned to lowest position, Patient left in no apparent distress sitting up in chair and Call bell within reach, and nsg updated     COMMUNICATION/COLLABORATION:   The patient’s plan of care was discussed with: Registered nurse    Patient Education  Education Given To: Patient;Family  Education Provided: Transfer Training;Plan of Care;Role of Therapy;Equipment;Home Exercise Program;Energy Conservation;Fall Prevention Strategies;Precautions  Education Method: Verbal;Demonstration  Education Outcome: Verbalized understanding;Continued education needed;Demonstrated understanding    Monse Tapia PTA  Minutes: 17

## 2024-10-23 NOTE — DISCHARGE SUMMARY
Physician Discharge Summary     Patient ID:    Michael Fung  [id number]  83 y.o.  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  Patient was started on Rocephin and azithromycin.  Patient with a past medical history of CKD, creatinine in the range of 3-4.  Patient's hospital course was complicated with oliguria despite Lasix 80 Mg for which patient had to undergo hemo-dialysis inpatient.  Patient also had bilateral DVTs  (right and left popliteal, left femoral) for which patient was started on heparin drip but he eventually developed hemoptysis which was substantial enough to DC heparin drip.     Patient was subsequently transferred out of the ICU on BiPAP which was eventually transitioned to high flow nasal cannula 50 L, 40% with sats in 90s.  Patient had a low probability VQ mismatch on 10/1.  IV filter placed on 10/4.  Patient was noted to have renal failure with potential need for dialysis, creatinine increased to 6.  Bladder scan nonrevealing of any retained urine.  His hospital course was further complicated by ileus and SBO which subsequently resolved with bowel rest.  Patient was also started on IV Zosyn for treatment of aspiration pneumonia

## 2024-10-23 NOTE — PROGRESS NOTES
OCCUPATIONAL THERAPY TREATMENT  Patient: Michael Fung (83 y.o. male)  Date: 10/23/2024  Primary Diagnosis: Acute hypoxic respiratory failure [J96.01]       Precautions: Fall Risk                Recommendations for nursing mobility: Out of bed to chair for meals, Encourage HEP in prep for ADLs/mobility; see handout for details, Frequent repositioning to prevent skin breakdown, Use of bed/chair alarm for safety, Use of BSC for toileting , AD and gt belt for bed to chair , and Assist x1    In place during session: External Catheter  Chart, occupational therapy assessment, plan of care, and goals were reviewed.  ASSESSMENT  Patient continues with skilled OT services and is progressing towards goals. Pt presented supine upon MALLOY arrival, agreeable to session. Pt A&O x 2. Orientation provided w/ fair carry over noted. Pt completed  bed mobility to EOB.  Pt sat EOB and was able to don briefs over feet with effort and requiring two rest breaks.  Pt stood and was incontinent of BM.  [place] [facility]  Pt was able to complete some hygiene, requiring assistance for thoroughness.  Pt returned to bed and was left supine with all needs met. (See below for objective details and assist levels).     Overall pt tolerated session fair today with rest breaks. Pt has improved from dependent to mod A for toileting.  Pt's functional mobility has improved to overall min A.  Pt continues to benefit from skilled OT to increase strength, endurance and independence with basic self care and functional ambulation/transfers.  Current OT recommendations for discharge Moderate intensity short-term skilled occupational therapy up to 5x/week. Will continue to benefit from skilled OT services, and will continue to progress as tolerated.      Start of Session End of Session   SPO2 (%) 98 99   Heart Rate (BPM) 89 90     GOALS:    Problem: Occupational Therapy - Adult  Goal: By Discharge: Performs self-care activities at highest level of  seated EOB, required assistance to pull to waist, required assistance to doff when in bed    Toileting: Moderate assistance  Toileting Skilled Clinical Factors: A for balance while standing, assistance for clothing management and assistance for posterior hygiene for thoroughness         Pain Ratin/10   Pain Intervention(s):   pain is at a level acceptable to the patient    Activity Tolerance:   Fair  and requires rest breaks    After treatment patient left in no apparent distress:   Bed locked and returned to lowest position, Patient left in no apparent distress in bed, Call bell within reach, Bed/ chair alarm activated, Side rails x3, and Updated patient's board on functional status and mobility recommendations, and nsg updated     COMMUNICATION/EDUCATION:   The patient’s plan of care was discussed with: Physical therapy assistant and Registered nurse    Patient Education  Education Given To: Patient  Education Provided: Plan of Care;Transfer Training;ADL Adaptive Strategies;Energy Conservation  Education Method: Demonstration;Verbal  Barriers to Learning: Cognition  Education Outcome: Demonstrated understanding;Continued education needed    Thank you for this referral.  CARMENZA Jaffe  Minutes: 47

## 2024-10-23 NOTE — PROGRESS NOTES
4 Eyes Skin Assessment     NAME:  Michael Fung  YOB: 1940  MEDICAL RECORD NUMBER:  [id number]    The patient is being assessed for  Other per protocol    I agree that at least one RN has performed a thorough Head to Toe Skin Assessment on the patient. ALL assessment sites listed below have been assessed.      Areas assessed by both nurses:    Head, Face, Ears, Shoulders, Back, Chest, Arms, Elbows, Hands, Sacrum. Buttock, Coccyx, Ischium, Legs. Feet and Heels, and Under Medical Devices         Does the Patient have a Wound? Yes wound(s) were present on assessment. LDA wound assessment was Initiated and completed by RN       Fabrizio Prevention initiated by RN: Yes  Wound Care Orders initiated by RN: Yes wound care consult placed.     Pressure Injury (Stage 3,4, Unstageable, DTI, NWPT, and Complex wounds) if present, place Wound referral order by RN under : No    New Ostomies, if present place, Ostomy referral order under : No     Nurse 1 eSignature: Electronically signed by Brina Haro RN on 10/23/24 at 3:45 AM EDT    **SHARE this note so that the co-signing nurse can place an eSignature**    Nurse 2 eSignature: Electronically signed by Kelly Lorenzo RN on 10/23/24 at 4:01 AM EDT

## 2024-10-23 NOTE — CARE COORDINATION
CM reviewed chart. CM met with patient and family at bedside to update on DCP and provided transportation benefit form from [facility] for family to fill out. CM also emailed form to patient's daughter per spouse request. CM will continue to follow.

## 2024-10-23 NOTE — CONSULTS
suspicious for multifocal pneumonia. 2.  Prominent distention of the esophagus and stomach which are nonspecific. Gastric outlet obstruction cannot be excluded. 3.  Coronary artery calcifications. Electronically Signed by Fernandez Caicedo MD 10/3/2024 8:28 AM    XR CHEST 1 VIEW    Result Date: 10/2/2024  1. Hazy airspace disease within the left mid and lower lung, which may reflect underlying pneumonia. Electronically Signed by Conor Noyola DO 10/2/2024 11:18 PM    US upper extremity veins bilateral    Result Date: 10/2/2024  No evidence for DVT within the bilateral upper extremities. Performing Technologist:  Roxanne Issa RT(R), RDMS, RVT, RDCS   Electronically Signed by Marquis Arvizu MD 10/2/2024 5:04 PM    US lower extremity veins bilateral    Result Date: 10/2/2024  Evidence for acute nonocclusive DVT within the right popliteal vein and left femoral and popliteal veins. Performing Technologist:  Roxanne Issa RT(R), RDMS, RVT, RDCS   Electronically Signed by Tony Jones MD 10/2/2024 3:59 PM    NM LUNG VENT/PERFUSION (VQ)    Result Date: 10/1/2024  Low probability for pulmonary embolus. Electronically Signed by Christos Herman MD 10/1/2024 1:45 PM         Discussion hypoxia markedly improved very likely had pulmonary emboli from his bilateral DVT despite low probability VQ scan it did show matching defects so I would put it as indeterminate.  He also had diffuse pulmonary infiltrates on initial CT of the chest probable aspiration due to his abnormal esophagus and stomach.  These may represent presbyesophagus and gastroparesis with resultant aspiration.  He will need a barium swallow to rule out reflux once oral intake is allowed.  Once hemoglobin is stabilized would suggest anticoagulation due to his DVT  10/8 alert awake on room air high risk for aspiration speech has seen the patient, no hemoptysis patient had DVT of both lower extremities  10/9 patient had IVC filter placed 10/4 he is alert awake eating  evening WBC count normal hemoglobin improved after transfusion during dialysis  10/21 awake alert reportedly had some agitation last evening none so far today still having low-grade temperature 100.1 overnight right arm markedly improved exam  10/22 getting dialysis and pressure improved 98.4 last chest x-ray no acute infiltrate  10/23 alert awake eating breakfast on room air dialyzed yesterday okay to discharge from pulmonary standpoint    Sivakumar Henning MD

## 2024-10-24 LAB
GLUCOSE BLD STRIP.AUTO-MCNC: 145 MG/DL (ref 65–100)
GLUCOSE BLD STRIP.AUTO-MCNC: 170 MG/DL (ref 65–100)
GLUCOSE BLD STRIP.AUTO-MCNC: 181 MG/DL (ref 65–100)
PERFORMED BY:: ABNORMAL

## 2024-10-24 PROCEDURE — 90935 HEMODIALYSIS ONE EVALUATION: CPT

## 2024-10-24 PROCEDURE — 1100000000 HC RM PRIVATE

## 2024-10-24 PROCEDURE — 6370000000 HC RX 637 (ALT 250 FOR IP): Performed by: INTERNAL MEDICINE

## 2024-10-24 PROCEDURE — 82962 GLUCOSE BLOOD TEST: CPT

## 2024-10-24 PROCEDURE — 6360000002 HC RX W HCPCS: Performed by: INTERNAL MEDICINE

## 2024-10-24 PROCEDURE — 2709999900 HC NON-CHARGEABLE SUPPLY

## 2024-10-24 PROCEDURE — 2580000003 HC RX 258: Performed by: INTERNAL MEDICINE

## 2024-10-24 PROCEDURE — 6370000000 HC RX 637 (ALT 250 FOR IP): Performed by: STUDENT IN AN ORGANIZED HEALTH CARE EDUCATION/TRAINING PROGRAM

## 2024-10-24 RX ADMIN — INSULIN LISPRO 7 UNITS: 100 INJECTION, SOLUTION INTRAVENOUS; SUBCUTANEOUS at 17:15

## 2024-10-24 RX ADMIN — WHITE PETROLATUM,ZINC OXIDE: 57; 17 PASTE TOPICAL at 11:47

## 2024-10-24 RX ADMIN — CARVEDILOL 6.25 MG: 3.12 TABLET, FILM COATED ORAL at 17:15

## 2024-10-24 RX ADMIN — TAMSULOSIN HYDROCHLORIDE 0.4 MG: 0.4 CAPSULE ORAL at 11:12

## 2024-10-24 RX ADMIN — NIFEDIPINE 90 MG: 90 TABLET, FILM COATED, EXTENDED RELEASE ORAL at 11:11

## 2024-10-24 RX ADMIN — SODIUM CHLORIDE, PRESERVATIVE FREE 10 ML: 5 INJECTION INTRAVENOUS at 19:41

## 2024-10-24 RX ADMIN — CARVEDILOL 6.25 MG: 3.12 TABLET, FILM COATED ORAL at 11:11

## 2024-10-24 RX ADMIN — HEPARIN SODIUM 3600 UNITS: 1000 INJECTION INTRAVENOUS; SUBCUTANEOUS at 10:58

## 2024-10-24 RX ADMIN — THIAMINE HCL TAB 100 MG 100 MG: 100 TAB at 11:12

## 2024-10-24 RX ADMIN — SODIUM BICARBONATE 650 MG: 650 TABLET ORAL at 11:12

## 2024-10-24 RX ADMIN — SODIUM BICARBONATE 650 MG: 650 TABLET ORAL at 19:40

## 2024-10-24 RX ADMIN — INSULIN LISPRO 2 UNITS: 100 INJECTION, SOLUTION INTRAVENOUS; SUBCUTANEOUS at 19:42

## 2024-10-24 RX ADMIN — SODIUM CHLORIDE 125 MG: 9 INJECTION, SOLUTION INTRAVENOUS at 11:45

## 2024-10-24 RX ADMIN — ALLOPURINOL 100 MG: 100 TABLET ORAL at 11:12

## 2024-10-24 RX ADMIN — INSULIN LISPRO 7 UNITS: 100 INJECTION, SOLUTION INTRAVENOUS; SUBCUTANEOUS at 12:12

## 2024-10-24 RX ADMIN — DICLOFENAC SODIUM 2 G: 10 GEL TOPICAL at 19:44

## 2024-10-24 RX ADMIN — EPOETIN ALFA-EPBX 10000 UNITS: 10000 INJECTION, SOLUTION INTRAVENOUS; SUBCUTANEOUS at 09:51

## 2024-10-24 RX ADMIN — THERA TABS 1 TABLET: TAB at 11:12

## 2024-10-24 RX ADMIN — FINASTERIDE 5 MG: 5 TABLET, FILM COATED ORAL at 11:11

## 2024-10-24 RX ADMIN — WHITE PETROLATUM,ZINC OXIDE: 57; 17 PASTE TOPICAL at 19:40

## 2024-10-24 RX ADMIN — SODIUM CHLORIDE, PRESERVATIVE FREE 10 ML: 5 INJECTION INTRAVENOUS at 11:12

## 2024-10-24 RX ADMIN — Medication 1 CAPSULE: at 11:12

## 2024-10-24 RX ADMIN — ACETAMINOPHEN 650 MG: 325 TABLET ORAL at 16:25

## 2024-10-24 ASSESSMENT — PAIN SCALES - GENERAL: PAINLEVEL_OUTOF10: 0

## 2024-10-24 NOTE — DISCHARGE SUMMARY
Physician Discharge Summary     Patient ID:    Michael Fung  [id number]  83 y.o.  1940    Admit date: 10/3/2024    Discharge date : 10/24/2024      Final Diagnoses:   Acute hypoxic respiratory failure [J96.01]      Reason for Hospitalization:    Acute respiratory failure with hypoxia 2/2 aspiration pneumonia    Volume overload in the setting of acute on chronic kidney disease stage V requiring initiation of dialysis    Acute bilateral lower extremity DVT    Right arm superficial thrombophlebitis  Right arm cellulitis    Concern for ileus  Small bowel obstruction-resolved    Anemia of chronic disease  Iron deficiency anemia      Hospital Course:     This is a 83-year-old male who presented to the outside hospital on 9/30/2024 for cough and shortness of breath was found to be hypoxic and x-ray revealing community-acquired pneumonia in the left mid and lower lung fields.  Patient was started on Rocephin and azithromycin.  Patient with a past medical history of CKD, creatinine in the range of 3-4.  Patient's hospital course was complicated with oliguria despite Lasix 80 Mg for which patient had to undergo hemo-dialysis inpatient.  Patient also had bilateral DVTs  (right and left popliteal, left femoral) for which patient was started on heparin drip but he eventually developed hemoptysis which was substantial enough to DC heparin drip.     Patient was subsequently transferred out of the ICU on BiPAP which was eventually transitioned to high flow nasal cannula 50 L, 40% with sats in 90s.  Patient had a low probability VQ mismatch on 10/1.  IV filter placed on 10/4.  Patient was noted to have renal failure with potential need for dialysis, creatinine increased to 6.  Bladder scan nonrevealing of any retained urine.  His hospital course was further complicated by ileus and SBO which subsequently resolved with bowel rest.  Patient was also started on IV Zosyn for treatment of aspiration pneumonia

## 2024-10-24 NOTE — PLAN OF CARE
Problem: Discharge Planning  Goal: Discharge to home or other facility with appropriate resources  10/23/2024 2153 by Mabel Morrison RN  Outcome: Progressing  10/23/2024 1006 by Shanita Lora RN  Outcome: Progressing  Flowsheets (Taken 10/22/2024 2052 by Brina Haro, RN)  Discharge to home or other facility with appropriate resources:   Identify barriers to discharge with patient and caregiver   Arrange for needed discharge resources and transportation as appropriate   Identify discharge learning needs (meds, wound care, etc)   Refer to discharge planning if patient needs post-hospital services based on physician order or complex needs related to functional status, cognitive ability or social support system     Problem: Skin/Tissue Integrity  Goal: Absence of new skin breakdown  Description: 1.  Monitor for areas of redness and/or skin breakdown  2.  Assess vascular access sites hourly  3.  Every 4-6 hours minimum:  Change oxygen saturation probe site  4.  Every 4-6 hours:  If on nasal continuous positive airway pressure, respiratory therapy assess nares and determine need for appliance change or resting period.  10/23/2024 2153 by Mabel Morrison RN  Outcome: Progressing  10/23/2024 1006 by Shanita Lora RN  Outcome: Progressing     Problem: Safety - Adult  Goal: Free from fall injury  10/23/2024 2153 by Mabel Morrison RN  Outcome: Progressing  10/23/2024 1006 by Shanita Lora RN  Outcome: Progressing     Problem: ABCDS Injury Assessment  Goal: Absence of physical injury  Outcome: Progressing     Problem: Chronic Conditions and Co-morbidities  Goal: Patient's chronic conditions and co-morbidity symptoms are monitored and maintained or improved  Outcome: Progressing     Problem: Respiratory - Adult  Goal: Achieves optimal ventilation and oxygenation  Outcome: Progressing     Problem: Cardiovascular - Adult  Goal: Maintains optimal cardiac output and hemodynamic stability  Outcome: Progressing  Goal:  a walker/cane  for functional mobility.    HOME SUPPORT PRIOR TO ADMISSION: The patient lived with spouse but did not require assistance.    Physical Therapy Goals  Initiated 10/6/2024  Reviewed 10/15/2024  Revieweded 10/21/2024  Pt stated goal: to get better    I with LE HEP x7 days  Mod I with bed mob x7 days  SBA with all transfers x7 days  Amb 25-75ft with RW and CGAx1 x7 days      10/23/2024 1202 by Monse Tapia PTA  Outcome: Progressing     Problem: Safety - Medical Restraint  Goal: Remains free of injury from restraints (Restraint for Interference with Medical Device)  Description: INTERVENTIONS:  1. Determine that other, less restrictive measures have been tried or would not be effective before applying the restraint  2. Evaluate the patient's condition at the time of restraint application  3. Inform patient/family regarding the reason for restraint  4. Q2H: Monitor safety, psychosocial status, comfort, nutrition and hydration  Outcome: Progressing     Problem: Pain  Goal: Verbalizes/displays adequate comfort level or baseline comfort level  Outcome: Progressing     Problem: Occupational Therapy - Adult  Goal: By Discharge: Performs self-care activities at highest level of function for planned discharge setting.  See evaluation for individualized goals.  Description: FUNCTIONAL STATUS PRIOR TO ADMISSION:  Per pt's wife, pt was mod I-supervision w/ most ADLs, requiring additional time and ambulated mod I w/ no AD, using walls and furniture for support prior to admission.     HOME SUPPORT: The patient lived with spouse who provided assistance w/ ADLs as needed.    Occupational Therapy Goals:  Initiated 10/7/2024  Patient/Family stated goal: increase OOB mobility   1.  Patient will perform grooming with Beauregard within 7 day(s).  2.  Patient will perform upper body dressing with Beauregard within 7 day(s).  3.  Patient will perform lower body dressing with Modified Beauregard within 7 day(s).  4.

## 2024-10-24 NOTE — PROGRESS NOTES
afternoon.  This is being addressed by the primary team  Mr. Farmer is on a Tuesday/Saturday dialysis schedule going forward.  His next dialysis session was scheduled for .    10/18 discussed with patient's wife and son.  Axel Aviles would not take twice a week dialysis, will change him to 3 times a week starting next week.  Regular dialysis scheduled for tomorrow.  Has was walked by physical therapy.  He was ambulatory pre hospitalization    10/19 seen on dialysis, awake and alert, no complaints, coomfortable, Hgb 6.9,  getting 1 unit PRBC's on HD    10/20 comfortable, able to stand and pivot but not ambulatory.  Wife and daughter at bedside.  He seems alert without any complaints.  Ate lunch.  Hemoglobin 8.2 posttransfusion.  Receiving Procrit and IV iron        10/21/2024    No major complaints were offered this afternoon.      10/22/2024    Had dialysis earlier.  There were no reported difficulties with his HD session  His course was also discussed with Wanda [RN-dialysis].    10/23/2024    It seems Mr. Fung had a fair morning.  He is working with physical therapy.    10/24/2024    No new complaints were offered this morning.          Review of Systems  Pertinent items are noted in HPI.    Objective:     BP (!) 145/64   Pulse 88   Temp 98.6 °F (37 °C)   Resp 18   Ht 1.676 m (5' 6\")   Wt 76.3 kg (168 lb 3.4 oz)   SpO2 99%   BMI 27.15 kg/m²   Temp (24hrs), Av.6 °F (37 °C), Min:98.4 °F (36.9 °C), Max:98.8 °F (37.1 °C)      No intake or output data in the 24 hours ending 10/24/24 1021        Current Facility-Administered Medications   Medication Dose Route Frequency    Petrolatum-Zinc Oxide ointment   Topical BID    ferric gluconate (FERRLECIT) 125 mg in sodium chloride 0.9 % 100 mL IVPB  125 mg IntraVENous Once per day on     carvedilol (COREG) tablet 6.25 mg  6.25 mg Oral BID WC    NIFEdipine (PROCARDIA XL) extended release tablet 90 mg  90 mg Oral QAM AC  mild pulmonary edema. Electronically signed by Alexander Lazo    XR ABDOMEN (2 VIEWS)    Result Date: 10/6/2024  Nonobstructive bowel gas pattern. The NG tube tip is in the stomach. Electronically signed by BRANDI JERNIGAN    IR GUIDED IVC FILTER PLACEMENT    Result Date: 10/4/2024  1.  Placement of infrarenal IVC filter without complications. Electronically signed by PRIETO SOUZA    IR NONTUNNELED VASCULAR CATHETER > 5 YEARS    Result Date: 10/4/2024  1.  Successful placement of right internal jugular temporary dialysis catheter. The catheter is ready for immediate use. Electronically signed by PRIETO PILLUTLA    XR CHEST PORTABLE    Result Date: 10/4/2024  NG tube courses into the stomach. Increased pulmonary edema.. Electronically signed by Jona Givens    CT CHEST ABDOMEN PELVIS WO CONTRAST Additional Contrast? None    Result Date: 10/4/2024  1. There are bilateral lung infiltrates which may represent infection, aspiration, or less likely infarction. 2. Trace bilateral pleural effusions. 3. There is significant fluid distention of the stomach, and there are mildly dilated proximal small bowel loops in the mid abdomen. Findings may be related to a ileus versus proximal, partial obstruction. The patient may benefit from NG tube decompression. 4. There is a left inguinal hernia containing a nonobstructed loop of distal colon. Electronically signed by BRANDI JERNIGAN    XR CHEST PORTABLE    Result Date: 10/3/2024  Small to moderate left pleural effusion with associated atelectasis. Electronically signed by RAISA PEÑA    CT CHEST WO CONTRAST    Result Date: 10/3/2024  1.  Left upper lobe, lingula, and left lower lobe infiltrates, suspicious for multifocal pneumonia. 2.  Prominent distention of the esophagus and stomach which are nonspecific. Gastric outlet obstruction cannot be excluded. 3.  Coronary artery calcifications. Electronically Signed by Fernandez Caicedo MD 10/3/2024 8:28 AM    XR CHEST 1 VIEW    Result Date:

## 2024-10-25 LAB
GLUCOSE BLD STRIP.AUTO-MCNC: 151 MG/DL (ref 65–100)
GLUCOSE BLD STRIP.AUTO-MCNC: 157 MG/DL (ref 65–100)
GLUCOSE BLD STRIP.AUTO-MCNC: 279 MG/DL (ref 65–100)
PERFORMED BY:: ABNORMAL

## 2024-10-25 PROCEDURE — 97530 THERAPEUTIC ACTIVITIES: CPT

## 2024-10-25 PROCEDURE — 6370000000 HC RX 637 (ALT 250 FOR IP): Performed by: INTERNAL MEDICINE

## 2024-10-25 PROCEDURE — 82962 GLUCOSE BLOOD TEST: CPT

## 2024-10-25 PROCEDURE — 1100000000 HC RM PRIVATE

## 2024-10-25 PROCEDURE — 2580000003 HC RX 258: Performed by: INTERNAL MEDICINE

## 2024-10-25 PROCEDURE — 6370000000 HC RX 637 (ALT 250 FOR IP): Performed by: STUDENT IN AN ORGANIZED HEALTH CARE EDUCATION/TRAINING PROGRAM

## 2024-10-25 PROCEDURE — 97535 SELF CARE MNGMENT TRAINING: CPT

## 2024-10-25 RX ADMIN — NIFEDIPINE 90 MG: 90 TABLET, FILM COATED, EXTENDED RELEASE ORAL at 11:02

## 2024-10-25 RX ADMIN — ALLOPURINOL 100 MG: 100 TABLET ORAL at 11:03

## 2024-10-25 RX ADMIN — SODIUM BICARBONATE 650 MG: 650 TABLET ORAL at 11:02

## 2024-10-25 RX ADMIN — DICLOFENAC SODIUM 2 G: 10 GEL TOPICAL at 11:05

## 2024-10-25 RX ADMIN — THERA TABS 1 TABLET: TAB at 11:03

## 2024-10-25 RX ADMIN — Medication 1 CAPSULE: at 11:07

## 2024-10-25 RX ADMIN — FINASTERIDE 5 MG: 5 TABLET, FILM COATED ORAL at 11:03

## 2024-10-25 RX ADMIN — INSULIN LISPRO 7 UNITS: 100 INJECTION, SOLUTION INTRAVENOUS; SUBCUTANEOUS at 16:58

## 2024-10-25 RX ADMIN — INSULIN LISPRO 4 UNITS: 100 INJECTION, SOLUTION INTRAVENOUS; SUBCUTANEOUS at 11:04

## 2024-10-25 RX ADMIN — DICLOFENAC SODIUM 2 G: 10 GEL TOPICAL at 19:34

## 2024-10-25 RX ADMIN — CARVEDILOL 6.25 MG: 3.12 TABLET, FILM COATED ORAL at 16:58

## 2024-10-25 RX ADMIN — SODIUM CHLORIDE, PRESERVATIVE FREE 10 ML: 5 INJECTION INTRAVENOUS at 11:04

## 2024-10-25 RX ADMIN — INSULIN LISPRO 7 UNITS: 100 INJECTION, SOLUTION INTRAVENOUS; SUBCUTANEOUS at 11:04

## 2024-10-25 RX ADMIN — ACETAMINOPHEN 650 MG: 325 TABLET ORAL at 19:32

## 2024-10-25 RX ADMIN — WHITE PETROLATUM,ZINC OXIDE: 57; 17 PASTE TOPICAL at 11:05

## 2024-10-25 RX ADMIN — CARVEDILOL 6.25 MG: 3.12 TABLET, FILM COATED ORAL at 11:03

## 2024-10-25 RX ADMIN — SODIUM BICARBONATE 650 MG: 650 TABLET ORAL at 19:32

## 2024-10-25 RX ADMIN — SODIUM CHLORIDE, PRESERVATIVE FREE 10 ML: 5 INJECTION INTRAVENOUS at 19:35

## 2024-10-25 RX ADMIN — WHITE PETROLATUM,ZINC OXIDE: 57; 17 PASTE TOPICAL at 19:34

## 2024-10-25 RX ADMIN — TAMSULOSIN HYDROCHLORIDE 0.4 MG: 0.4 CAPSULE ORAL at 11:02

## 2024-10-25 RX ADMIN — THIAMINE HCL TAB 100 MG 100 MG: 100 TAB at 11:02

## 2024-10-25 ASSESSMENT — PAIN SCALES - GENERAL
PAINLEVEL_OUTOF10: 3
PAINLEVEL_OUTOF10: 1
PAINLEVEL_OUTOF10: 0

## 2024-10-25 ASSESSMENT — PAIN DESCRIPTION - PAIN TYPE: TYPE: CHRONIC PAIN

## 2024-10-25 ASSESSMENT — PAIN - FUNCTIONAL ASSESSMENT: PAIN_FUNCTIONAL_ASSESSMENT: ACTIVITIES ARE NOT PREVENTED

## 2024-10-25 ASSESSMENT — PAIN DESCRIPTION - LOCATION: LOCATION: NECK

## 2024-10-25 NOTE — PLAN OF CARE
Problem: Discharge Planning  Goal: Discharge to home or other facility with appropriate resources  Outcome: Progressing  Flowsheets (Taken 10/25/2024 1208)  Discharge to home or other facility with appropriate resources:   Identify barriers to discharge with patient and caregiver   Arrange for needed discharge resources and transportation as appropriate   Identify discharge learning needs (meds, wound care, etc)     Problem: Skin/Tissue Integrity  Goal: Absence of new skin breakdown  Description: 1.  Monitor for areas of redness and/or skin breakdown  2.  Assess vascular access sites hourly  3.  Every 4-6 hours minimum:  Change oxygen saturation probe site  4.  Every 4-6 hours:  If on nasal continuous positive airway pressure, respiratory therapy assess nares and determine need for appliance change or resting period.  Outcome: Progressing  Note: The pt will remain free from developing further skin breakdown by turning and repositioning Q2 hrs      Problem: Safety - Adult  Goal: Free from fall injury  Outcome: Progressing  Flowsheets (Taken 10/25/2024 1208)  Free From Fall Injury:   Instruct family/caregiver on patient safety   Based on caregiver fall risk screen, instruct family/caregiver to ask for assistance with transferring infant if caregiver noted to have fall risk factors     Problem: ABCDS Injury Assessment  Goal: Absence of physical injury  Outcome: Progressing  Flowsheets (Taken 10/25/2024 1208)  Absence of Physical Injury: Implement safety measures based on patient assessment     Problem: Chronic Conditions and Co-morbidities  Goal: Patient's chronic conditions and co-morbidity symptoms are monitored and maintained or improved  Outcome: Progressing  Flowsheets (Taken 10/25/2024 1208)  Care Plan - Patient's Chronic Conditions and Co-Morbidity Symptoms are Monitored and Maintained or Improved: Monitor and assess patient's chronic conditions and comorbid symptoms for stability, deterioration, or  improvement     Problem: Pain  Goal: Verbalizes/displays adequate comfort level or baseline comfort level  Outcome: Progressing  Flowsheets (Taken 10/25/2024 1200)  Verbalizes/displays adequate comfort level or baseline comfort level:   Encourage patient to monitor pain and request assistance   Assess pain using appropriate pain scale   Implement non-pharmacological measures as appropriate and evaluate response   Administer analgesics based on type and severity of pain and evaluate response

## 2024-10-25 NOTE — PLAN OF CARE
OCCUPATIONAL THERAPY TREATMENT  Patient: Michael Fung (83 y.o. male)  Date: 10/25/2024  Primary Diagnosis: Acute hypoxic respiratory failure [J96.01]       Precautions: Fall Risk                Recommendations for nursing mobility: Out of bed to chair for meals, Encourage HEP in prep for ADLs/mobility; see handout for details, AD and gt belt for bed to chair , Amb to bathroom with AD and gait belt, and Assist x1    In place during session: External Catheter  Chart, occupational therapy assessment, plan of care, and goals were reviewed.  ASSESSMENT  Patient continues with skilled OT services and is progressing towards goals. Pt presented with PTA upon MALLOY arrival, agreeable to session. Pt A&O x 2. Pt completed anterior and posterior hygiene while standing at the commode. Pt stood at the sink to complete hand and facial hygiene.  Pt required verbal cues to step closer to the sink and for sequencing of tasks. Pt returned to recliner and participated in B UE therex to maintain/ increase strength and endurance to aid in adl performance. See below for objective details and assist levels).     Overall pt tolerated session fair today with improved standing balance and tolerance. Pt continues to benefit from skilled OT to increase strength, endurance, safety and  independence with basic self care.  Current OT recommendations for discharge Moderate intensity short-term skilled occupational therapy up to 5x/week. Will continue to benefit from skilled OT services, and will continue to progress as tolerated.       GOALS:    Problem: Occupational Therapy - Adult  Goal: By Discharge: Performs self-care activities at highest level of function for planned discharge setting.  See evaluation for individualized goals.  Description: FUNCTIONAL STATUS PRIOR TO ADMISSION:  Per pt's wife, pt was mod I-supervision w/ most ADLs, requiring additional time and ambulated mod I w/ no AD, using walls and furniture for support prior to  multistep commands with repitition  Attention Span: Appears intact  Memory: Impaired  Safety Judgement: Decreased awareness of need for assistance;Decreased awareness of need for safety  Problem Solving: Decreased awareness of errors  Insights: Decreased awareness of deficits  Initiation: Requires cues for some  Sequencing: Requires cues for some    Functional Mobility and Transfers for ADLs:  Bed Mobility:         ADL Intervention:    Grooming: Verbal cueing;Contact guard assistance   Grooming Skilled Clinical Factors: CGA and vcs while standing at sink for hand and facial hygiene    Toileting: Moderate assistance  Toileting Skilled Clinical Factors: A for balance while standing, assistance for clothing management and assistance for posterior hygiene for thoroughness      Therapeutic exercise:  Pt completed B UE therex, w/ min verbal, visual and physical cues for proper technique and self pacing.     Exercise Sets Reps AROM AAROM PROM Self PROM Comments   Shoulder flex/ext 2 5 [x] [x] [] []    Chest presses 1 10 [x] [] [] []    Bicep curls 1 10 [x] [] [] []       [] [] [] []      Pain Ratin/10   Pain Intervention(s):   pain is at a level acceptable to the patient    Activity Tolerance:   Fair     After treatment patient left in no apparent distress:   Bed locked and returned to lowest position, Patient left in no apparent distress sitting up in chair, Call bell within reach, Bed/ chair alarm activated, Caregiver / family present, and Updated patient's board on functional status and mobility recommendations, and nsg updated     COMMUNICATION/EDUCATION:   The patient’s plan of care was discussed with: Physical therapy assistant and Registered nurse    Patient Education  Education Provided: Plan of Care;Home Exercise Program  Education Provided Comments: safety when standing at sink  Education Method: Verbal;Demonstration  Barriers to Learning: Cognition  Education Outcome: Demonstrated understanding;Continued

## 2024-10-25 NOTE — DISCHARGE SUMMARY
Physician Discharge Summary     Patient ID:    Michael Fung  [id number]  83 y.o.  1940    Admit date: 10/3/2024    Discharge date : 10/25/2024      Final Diagnoses:   Acute hypoxic respiratory failure [J96.01]      Reason for Hospitalization:    Acute respiratory failure with hypoxia 2/2 aspiration pneumonia    Volume overload in the setting of acute on chronic kidney disease stage V requiring initiation of dialysis    Acute bilateral lower extremity DVT    Right arm superficial thrombophlebitis  Right arm cellulitis    Concern for ileus  Small bowel obstruction-resolved    Anemia of chronic disease  Iron deficiency anemia      Hospital Course:     This is a 83-year-old male who presented to the outside hospital on 9/30/2024 for cough and shortness of breath was found to be hypoxic and x-ray revealing community-acquired pneumonia in the left mid and lower lung fields.  Patient was started on Rocephin and azithromycin.  Patient with a past medical history of CKD, creatinine in the range of 3-4.  Patient's hospital course was complicated with oliguria despite Lasix 80 Mg for which patient had to undergo hemo-dialysis inpatient.  Patient also had bilateral DVTs  (right and left popliteal, left femoral) for which patient was started on heparin drip but he eventually developed hemoptysis which was substantial enough to DC heparin drip.     Patient was subsequently transferred out of the ICU on BiPAP which was eventually transitioned to high flow nasal cannula 50 L, 40% with sats in 90s.  Patient had a low probability VQ mismatch on 10/1.  IV filter placed on 10/4.  Patient was noted to have renal failure with potential need for dialysis, creatinine increased to 6.  Bladder scan nonrevealing of any retained urine.  His hospital course was further complicated by ileus and SBO which subsequently resolved with bowel rest.  Patient was also started on IV Zosyn for treatment of aspiration pneumonia  subsequently weaned to room air on 10/13.  Completed 7 days of IV Zosyn course.  Patient was also found to have a chronic anemia and iron deficiency anemia requiring blood transfusion X1 on 10/19/2024.  Patient will require long-term hemodialysis, now in the process of [facility]  Hospital course complicated by right arm swelling, DVT ruled out.  Possible cellulitis of the right arm, cefuroxime was started on a 10/18, last dose was yesterday 10/21.  Diabetes controlled with insulin.  Hemoglobin stable 8.3.    10/23/2024: patient seen and examined at bedside.  Patient stable at the day of the discharge.  Vitals are stable  Patient denies having any complaints.  Patient eating food comfortably.  Patient's wife at bedside agrees with discharge plan.  [facility]    10/20/2024 : patient seen and examined at bedside.  Patient patient getting hemodialysis.  Patient tolerated procedure well.  No acute complaints.    10/25: NAEO.  Patient seen and examined at bedside.  Patient denies having any complaints.  Patient's wife present at bedside.      Discharge Medications:      Medication List        CONTINUE taking these medications      allopurinol 100 MG tablet  Commonly known as: ZYLOPRIM     carvedilol 12.5 MG tablet  Commonly known as: COREG     cyanocobalamin 500 MCG tablet     finasteride 5 MG tablet  Commonly known as: PROSCAR     glipiZIDE 5 MG extended release tablet  Commonly known as: GLUCOTROL XL     MVW Complete Formulation Caps     NIFEdipine 90 MG extended release tablet  Commonly known as: ADALAT CC     sodium bicarbonate 650 MG tablet     tamsulosin 0.4 MG capsule  Commonly known as: FLOMAX     thiamine mononitrate 100 MG tablet     torsemide 5 MG tablet  Commonly known as: DEMADEX            ASK your doctor about these medications      cefUROXime 250 MG tablet  Commonly known as: CEFTIN  Take 1 tablet by mouth every 12 hours for 2 doses  Ask about:

## 2024-10-25 NOTE — PLAN OF CARE
Problem: Discharge Planning  Goal: Discharge to home or other facility with appropriate resources  10/24/2024 2028 by Mabel Morrison RN  Outcome: Progressing  10/24/2024 1130 by Shanita Lora RN  Outcome: Progressing     Problem: Skin/Tissue Integrity  Goal: Absence of new skin breakdown  Description: 1.  Monitor for areas of redness and/or skin breakdown  2.  Assess vascular access sites hourly  3.  Every 4-6 hours minimum:  Change oxygen saturation probe site  4.  Every 4-6 hours:  If on nasal continuous positive airway pressure, respiratory therapy assess nares and determine need for appliance change or resting period.  10/24/2024 2028 by Mabel Morrison RN  Outcome: Progressing  10/24/2024 1130 by Shanita Lora RN  Outcome: Progressing     Problem: Safety - Adult  Goal: Free from fall injury  10/24/2024 2028 by Mabel Morrison RN  Outcome: Progressing  10/24/2024 1130 by Shanita Lora RN  Outcome: Progressing     Problem: ABCDS Injury Assessment  Goal: Absence of physical injury  Outcome: Progressing     Problem: Chronic Conditions and Co-morbidities  Goal: Patient's chronic conditions and co-morbidity symptoms are monitored and maintained or improved  Outcome: Progressing     Problem: Respiratory - Adult  Goal: Achieves optimal ventilation and oxygenation  Outcome: Progressing     Problem: Cardiovascular - Adult  Goal: Maintains optimal cardiac output and hemodynamic stability  Outcome: Progressing  Goal: Absence of cardiac dysrhythmias or at baseline  Outcome: Progressing     Problem: Neurosensory - Adult  Goal: Achieves stable or improved neurological status  Outcome: Progressing  Goal: Absence of seizures  Outcome: Progressing  Goal: Remains free of injury related to seizures activity  Outcome: Progressing  Goal: Achieves maximal functionality and self care  Outcome: Progressing     Problem: Musculoskeletal - Adult  Goal: Return mobility to safest level of function  Outcome: Progressing  Goal: Maintain

## 2024-10-25 NOTE — PLAN OF CARE
PHYSICAL THERAPY TREATMENT     Patient: Michael Fung (83 y.o. male)  Date: 10/25/2024  Diagnosis: Acute hypoxic respiratory failure [J96.01] Acute hypoxic respiratory failure      Precautions: Fall Risk                      Recommendations for nursing mobility: Out of bed to chair for meals, AD and gt belt for bed to chair , Amb to bathroom with AD and gait belt, and Assist x1    In place during session: External Catheter  Chart, physical therapy assessment, plan of care and goals were reviewed.  ASSESSMENT  Patient continues with skilled PT services and is progressing towards goals. Pt supine in bed upon PT arrival, agreeable to session. (See below for objective details and assist levels).     Overall pt tolerated session fair today. Pt performed supine AP in bed prior to mobility. Pt transferred to eob with no hands on assistance but additional time required. Pt ambulated to bathroom and transferred onto toilet cga/min A. When finished pt ambulated to sink and performed good/fair dynamic standing. Pt returned sitting in recliner chair. Pt left sitting up with MALLOY present in room. Will continue to benefit from skilled PT services, and will continue to progress as tolerated. Current PT DC recommendation Moderate intensity short-term skilled physical therapy up to 5x/week once medically appropriate.    GOALS:  Problem: Physical Therapy - Adult  Goal: By Discharge: Performs mobility at highest level of function for planned discharge setting.  See evaluation for individualized goals.  Description: FUNCTIONAL STATUS PRIOR TO ADMISSION: Patient was modified independent using a walker/cane  for functional mobility.    HOME SUPPORT PRIOR TO ADMISSION: The patient lived with spouse but did not require assistance.    Physical Therapy Goals  Initiated 10/6/2024  Reviewed 10/15/2024  Revieweded 10/21/2024  Pt stated goal: to get better    I with LE HEP x7 days  Mod I with bed mob x7 days  SBA with all transfers

## 2024-10-25 NOTE — PROGRESS NOTES
Mr. Farmer was sleeping quite comfortably in bed this afternoon  He was not disturbed  His Wife was present as well.    Plan    Dialysis again tomorrow [10/26/2024].

## 2024-10-25 NOTE — CONSULTS
Consult  Pulmonary, Critical Care    Name: Michael Fung MRN: [id number]   : 1940 Hospital: [facility]   Date: 10/25/2024  Admission date: 10/3/2024 Hospital Day: 23       Subjective/Interval History:   Seen in the dialysis unit he is confused does not know why he is in the hospital or that he is in the hospital.  Was admitted at [facility] with cough severe hypoxia found to have bilateral DVT of the legs was placed on heparin subsequently developed severe hemoptysis heparin was discontinued and IVC filter placed.  He was transferred to our facility had CT scan which shows distended esophagus and stomach possible ileus with scattered infiltrates throughout the left lung no documentation of vomiting but his history is not reliable at all.  He has had a VQ scan that was low probability but does show matching defect due to renal insufficiency he did not have a CTA of the chest he initially required high flow nasal oxygen 45% FiO2 reportedly has room air oxygen saturation 95% today although he is wearing oxygen while receiving dialysis    Patient Active Problem List   Diagnosis    Acute hypoxic respiratory failure    CATHY (acute kidney injury) (HCC)    Hemoptysis    Community acquired pneumonia    Acute deep vein thrombosis (DVT) of femoral vein of both lower extremities (HCC)    DM (diabetes mellitus), type 2 (HCC)    Decreased mobility and endurance    Acute upper GI bleed    Ileus (HCC)    Acute urethral obstruction    Incomplete bladder emptying    Small bowel obstruction (HCC)    Edema of right upper arm        IMPRESSION:   Acute hypoxic respiratory failure likely due to a combination of aspiration pneumonia probable pulmonary emboli and pulmonary edema/fluid overload from renal failure now on room air  Recurrent fever questionable cellulitis right arm Tmax 99.9 last 24 hours  Pulmonary edema secondary to fluid retention from renal failure clear on last chest x-ray  Aspiration  WBC count remains normal right arm exam improved as well.  For dialysis today with hemoglobin 6.9 will reach out to nephrology to see if transfusion warranted  10/20 awake alert still has low-grade temp 99.9 last evening WBC count normal hemoglobin improved after transfusion during dialysis  10/21 awake alert reportedly had some agitation last evening none so far today still having low-grade temperature 100.1 overnight right arm markedly improved exam  10/22 getting dialysis and pressure improved 98.4 last chest x-ray no acute infiltrate  10/23 alert awake eating breakfast on room air dialyzed yesterday okay to discharge from pulmonary standpoint  10/24 continue with the dialysis  10/25 no shortness of breath at rest pending discharge    Sivakumar Henning MD

## 2024-10-25 NOTE — CARE COORDINATION
CM reviewed chart. [facility], patient does not qualify for [facility] benefits. CM informed family. Daughter looking into transportation companies. CM will continue to follow

## 2024-10-26 VITALS
TEMPERATURE: 98.5 F | SYSTOLIC BLOOD PRESSURE: 144 MMHG | WEIGHT: 167.33 LBS | DIASTOLIC BLOOD PRESSURE: 66 MMHG | RESPIRATION RATE: 18 BRPM | BODY MASS INDEX: 26.89 KG/M2 | HEART RATE: 89 BPM | HEIGHT: 66 IN | OXYGEN SATURATION: 98 %

## 2024-10-26 LAB
GLUCOSE BLD STRIP.AUTO-MCNC: 148 MG/DL (ref 65–100)
PERFORMED BY:: ABNORMAL

## 2024-10-26 PROCEDURE — 82962 GLUCOSE BLOOD TEST: CPT

## 2024-10-26 PROCEDURE — 6370000000 HC RX 637 (ALT 250 FOR IP): Performed by: INTERNAL MEDICINE

## 2024-10-26 PROCEDURE — 90935 HEMODIALYSIS ONE EVALUATION: CPT

## 2024-10-26 PROCEDURE — 6370000000 HC RX 637 (ALT 250 FOR IP): Performed by: STUDENT IN AN ORGANIZED HEALTH CARE EDUCATION/TRAINING PROGRAM

## 2024-10-26 PROCEDURE — 6360000002 HC RX W HCPCS: Performed by: INTERNAL MEDICINE

## 2024-10-26 PROCEDURE — 2580000003 HC RX 258: Performed by: INTERNAL MEDICINE

## 2024-10-26 PROCEDURE — 2709999900 HC NON-CHARGEABLE SUPPLY

## 2024-10-26 RX ADMIN — ALLOPURINOL 100 MG: 100 TABLET ORAL at 10:56

## 2024-10-26 RX ADMIN — TAMSULOSIN HYDROCHLORIDE 0.4 MG: 0.4 CAPSULE ORAL at 10:56

## 2024-10-26 RX ADMIN — SODIUM CHLORIDE, PRESERVATIVE FREE 10 ML: 5 INJECTION INTRAVENOUS at 10:57

## 2024-10-26 RX ADMIN — DICLOFENAC SODIUM 2 G: 10 GEL TOPICAL at 10:57

## 2024-10-26 RX ADMIN — EPOETIN ALFA-EPBX 10000 UNITS: 10000 INJECTION, SOLUTION INTRAVENOUS; SUBCUTANEOUS at 10:19

## 2024-10-26 RX ADMIN — CARVEDILOL 6.25 MG: 3.12 TABLET, FILM COATED ORAL at 10:57

## 2024-10-26 RX ADMIN — HEPARIN SODIUM 3600 UNITS: 1000 INJECTION INTRAVENOUS; SUBCUTANEOUS at 10:19

## 2024-10-26 RX ADMIN — THERA TABS 1 TABLET: TAB at 10:56

## 2024-10-26 RX ADMIN — THIAMINE HCL TAB 100 MG 100 MG: 100 TAB at 10:56

## 2024-10-26 RX ADMIN — NIFEDIPINE 90 MG: 90 TABLET, FILM COATED, EXTENDED RELEASE ORAL at 10:57

## 2024-10-26 RX ADMIN — FINASTERIDE 5 MG: 5 TABLET, FILM COATED ORAL at 10:56

## 2024-10-26 RX ADMIN — Medication 1 CAPSULE: at 10:57

## 2024-10-26 RX ADMIN — WHITE PETROLATUM,ZINC OXIDE: 57; 17 PASTE TOPICAL at 10:58

## 2024-10-26 RX ADMIN — SODIUM BICARBONATE 650 MG: 650 TABLET ORAL at 10:57

## 2024-10-26 ASSESSMENT — PAIN SCALES - WONG BAKER: WONGBAKER_NUMERICALRESPONSE: NO HURT

## 2024-10-26 ASSESSMENT — PAIN SCALES - GENERAL: PAINLEVEL_OUTOF10: 0

## 2024-10-26 ASSESSMENT — PAIN - FUNCTIONAL ASSESSMENT: PAIN_FUNCTIONAL_ASSESSMENT: ACTIVITIES ARE NOT PREVENTED

## 2024-10-26 ASSESSMENT — PAIN DESCRIPTION - DESCRIPTORS: DESCRIPTORS: ACHING

## 2024-10-26 NOTE — CONSULTS
Consult  Pulmonary, Critical Care    Name: Michael Fung MRN: [id number]   : 1940 Hospital: [facility]   Date: 10/26/2024  Admission date: 10/3/2024 Hospital Day: 24       Subjective/Interval History:   Seen in the dialysis unit he is confused does not know why he is in the hospital or that he is in the hospital.  Was admitted at [facility] with cough severe hypoxia found to have bilateral DVT of the legs was placed on heparin subsequently developed severe hemoptysis heparin was discontinued and IVC filter placed.  He was transferred to our facility had CT scan which shows distended esophagus and stomach possible ileus with scattered infiltrates throughout the left lung no documentation of vomiting but his history is not reliable at all.  He has had a VQ scan that was low probability but does show matching defect due to renal insufficiency he did not have a CTA of the chest he initially required high flow nasal oxygen 45% FiO2 reportedly has room air oxygen saturation 95% today although he is wearing oxygen while receiving dialysis    Patient Active Problem List   Diagnosis    Acute hypoxic respiratory failure    CATHY (acute kidney injury) (HCC)    Hemoptysis    Community acquired pneumonia    Acute deep vein thrombosis (DVT) of femoral vein of both lower extremities (HCC)    DM (diabetes mellitus), type 2 (HCC)    Decreased mobility and endurance    Acute upper GI bleed    Ileus (HCC)    Acute urethral obstruction    Incomplete bladder emptying    Small bowel obstruction (HCC)    Edema of right upper arm        IMPRESSION:   Acute hypoxic respiratory failure likely due to a combination of aspiration pneumonia probable pulmonary emboli and pulmonary edema/fluid overload from renal failure now on room air  Recurrent fever questionable cellulitis right arm Tmax 99.9 last 24 hours  Pulmonary edema secondary to fluid retention from renal failure clear on last chest x-ray  Aspiration  normal right arm exam improved as well.  For dialysis today with hemoglobin 6.9 will reach out to nephrology to see if transfusion warranted  10/20 awake alert still has low-grade temp 99.9 last evening WBC count normal hemoglobin improved after transfusion during dialysis  10/21 awake alert reportedly had some agitation last evening none so far today still having low-grade temperature 100.1 overnight right arm markedly improved exam  10/22 getting dialysis and pressure improved 98.4 last chest x-ray no acute infiltrate  10/23 alert awake eating breakfast on room air dialyzed yesterday okay to discharge from pulmonary standpoint  10/24 continue with the dialysis  10/25 no shortness of breath at rest pending discharge  10/26 getting dialysis discussed with the family awaiting discharge    Sivakumar Henning MD

## 2024-10-26 NOTE — DISCHARGE SUMMARY
Physician Discharge Summary     Patient ID:    Michael Fung  [id number]  83 y.o.  1940    Admit date: 10/3/2024    Discharge date : 10/26/2024      Final Diagnoses:   Acute hypoxic respiratory failure [J96.01]      Reason for Hospitalization:    Acute respiratory failure with hypoxia 2/2 aspiration pneumonia    Volume overload in the setting of acute on chronic kidney disease stage V requiring initiation of dialysis    Acute bilateral lower extremity DVT    Right arm superficial thrombophlebitis  Right arm cellulitis    Concern for ileus  Small bowel obstruction-resolved    Anemia of chronic disease  Iron deficiency anemia      Hospital Course:     This is a 83-year-old male who presented to the outside hospital on 9/30/2024 for cough and shortness of breath was found to be hypoxic and x-ray revealing community-acquired pneumonia in the left mid and lower lung fields.  Patient was started on Rocephin and azithromycin.  Patient with a past medical history of CKD, creatinine in the range of 3-4.  Patient's hospital course was complicated with oliguria despite Lasix 80 Mg for which patient had to undergo hemo-dialysis inpatient.  Patient also had bilateral DVTs  (right and left popliteal, left femoral) for which patient was started on heparin drip but he eventually developed hemoptysis which was substantial enough to DC heparin drip.     Patient was subsequently transferred out of the ICU on BiPAP which was eventually transitioned to high flow nasal cannula 50 L, 40% with sats in 90s.  Patient had a low probability VQ mismatch on 10/1.  IV filter placed on 10/4.  Patient was noted to have renal failure with potential need for dialysis, creatinine increased to 6.  Bladder scan nonrevealing of any retained urine.  His hospital course was further complicated by ileus and SBO which subsequently resolved with bowel rest.  Patient was also started on IV Zosyn for treatment of aspiration pneumonia

## 2024-10-26 NOTE — PLAN OF CARE
Problem: Discharge Planning  Goal: Discharge to home or other facility with appropriate resources  Outcome: Adequate for Discharge     Problem: Skin/Tissue Integrity  Goal: Absence of new skin breakdown  Description: 1.  Monitor for areas of redness and/or skin breakdown  2.  Assess vascular access sites hourly  3.  Every 4-6 hours minimum:  Change oxygen saturation probe site  4.  Every 4-6 hours:  If on nasal continuous positive airway pressure, respiratory therapy assess nares and determine need for appliance change or resting period.  Outcome: Adequate for Discharge     Problem: Safety - Adult  Goal: Free from fall injury  Outcome: Adequate for Discharge     Problem: ABCDS Injury Assessment  Goal: Absence of physical injury  Outcome: Adequate for Discharge     Problem: Chronic Conditions and Co-morbidities  Goal: Patient's chronic conditions and co-morbidity symptoms are monitored and maintained or improved  Outcome: Adequate for Discharge     Problem: Respiratory - Adult  Goal: Achieves optimal ventilation and oxygenation  Outcome: Adequate for Discharge     Problem: Cardiovascular - Adult  Goal: Maintains optimal cardiac output and hemodynamic stability  Outcome: Adequate for Discharge  Goal: Absence of cardiac dysrhythmias or at baseline  Outcome: Adequate for Discharge     Problem: Neurosensory - Adult  Goal: Achieves stable or improved neurological status  Outcome: Adequate for Discharge  Goal: Absence of seizures  Outcome: Adequate for Discharge  Goal: Remains free of injury related to seizures activity  Outcome: Adequate for Discharge  Goal: Achieves maximal functionality and self care  Outcome: Adequate for Discharge     Problem: Musculoskeletal - Adult  Goal: Return mobility to safest level of function  Outcome: Adequate for Discharge  Goal: Maintain proper alignment of affected body part  Outcome: Adequate for Discharge  Goal: Return ADL status to a safe level of function  Outcome: Adequate for

## 2024-10-26 NOTE — CARE COORDINATION
Transition of Care Plan:    RUR:   Prior Level of Functioning:   Disposition:   VINAY:   If SNF or IPR: Date FOC offered:   Date FOC received:   Accepting facility:   Date authorization started with reference number:   Date authorization received and expires:   Follow up appointments:   DME needed:   Transportation at discharge:   IM/IMM Medicare/ letter given:   Is patient a [place] and connected with [facility]? Yes   If yes, was  transfer form completed and [facility] notified? Yes  Caregiver Contact: Melly Fung [phone]  Discharge Caregiver contacted prior to discharge? Yes  Care Conference needed? No  Barriers to discharge: HD completion today.     Patient has been accepted to admit to [facility] re[facility] SNF located at [address] [place] [address].  Patient will go to room 238. Nurse to call report to [phone].  CM called [facility] [phone], They have patient on the schedule for 4:15pm on Monday 10/28/2024. Patient will need to arrive 45 minutes early for paperwork.  Family will transport patient to and from Hemodialysis.      Family is unable to transport to SNF from hospital today.  CM called [facility], they are booked up today.  CM called [facility], they will see if they have availability for today and call CM back.  [facility] has availability to  patient in 30 minutes. Primary nurse notified.

## 2024-10-26 NOTE — DISCHARGE INSTR - COC
Continuity of Care Form    Patient Name: Michael Fung   :  1940  MRN:  [id number]    Admit date:  10/3/2024  Discharge date:  10/26/2024    Code Status Order: Full Code   Advance Directives:   Advance Care Flowsheet Documentation             Admitting Physician:  Alex Maya MD  PCP: No primary care provider on file.    Discharging Nurse: Gretchen Scott RN  Discharging Hospital Unit/Room#: 569/01  Discharging Unit Phone Number: [phone]    Emergency Contact:   Extended Emergency Contact Information  Primary Emergency Contact: Melly Fung  Home Phone: [phone]  Mobile Phone: [phone]  Relation: Spouse   needed? No  Secondary Emergency Contact: Renate Fung  Home Phone: [phone]  Mobile Phone: [phone]  Relation: Grandchild   needed? No    Past Surgical History:  Past Surgical History:   Procedure Laterality Date    IR IVC FILTER PLACEMENT W IMAGING  10/4/2024    IR IVC FILTER PLACEMENT W IMAGING 10/4/2024 Saad Mancini MD [facility]    IR NONTUNNELED VASCULAR CATHETER  10/4/2024    IR NONTUNNELED VASCULAR CATHETER 10/4/2024 Saad Mancini MD [facility]    IR TUNNELED CATHETER PLACEMENT GREATER THAN 5 YEARS  10/16/2024    IR TUNNELED CATHETER PLACEMENT GREATER THAN 5 YEARS 10/16/2024 Gracie Santana APRN - NP [facility]       Immunization History:     There is no immunization history on file for this patient.    Active Problems:  Patient Active Problem List   Diagnosis Code    Acute hypoxic respiratory failure J96.01    CATHY (acute kidney injury) ([facility]) N17.9    Hemoptysis R04.2    Community acquired pneumonia J18.9    Acute deep vein thrombosis (DVT) of femoral vein of both lower extremities ([facility]) I82.413    DM (diabetes mellitus), type 2 ([facility]) E11.9    Decreased mobility and endurance Z74.09    Acute upper GI bleed K92.2    Ileus ([facility]) K56.7    Acute urethral obstruction N36.8    Incomplete bladder emptying R33.9    Small  bowel obstruction (HCC) K56.609    Edema of right upper arm R60.0       Isolation/Infection:   Isolation            No Isolation          Patient Infection Status       None to display                     Nurse Assessment:  Last Vital Signs: BP (!) 144/66   Pulse 89   Temp 98.5 °F (36.9 °C) (Oral)   Resp 18   Ht 1.676 m (5' 6\")   Wt 75.9 kg (167 lb 5.3 oz)   SpO2 98%   BMI 27.01 kg/m²     Last documented pain score (0-10 scale): Pain Level: 0  Last Weight:   Wt Readings from Last 1 Encounters:   10/26/24 75.9 kg (167 lb 5.3 oz)     Mental Status:  oriented, alert, coherent, logical, thought processes intact, and able to concentrate and follow conversation    IV Access:  - None    Nursing Mobility/ADLs:  Walking   Assisted  Transfer  Assisted  Bathing  Assisted  Dressing  Assisted  Toileting  Assisted  Feeding  Independent  Med Admin  Assisted  Med Delivery   whole    Wound Care Documentation and Therapy:  Wound 10/20/24 Thigh Left;Posterior;Proximal (Active)   Wound Image   10/23/24 1359   Wound Etiology Other 10/23/24 1359   Dressing Status New dressing applied 10/23/24 1359   Wound Cleansed Other (Comment) 10/23/24 1359   Dressing/Treatment Protective barrier 10/26/24 0506   Wound Length (cm) 0.6 cm 10/23/24 1359   Wound Width (cm) 0.4 cm 10/23/24 1359   Wound Depth (cm) 0.1 cm 10/23/24 1359   Wound Surface Area (cm^2) 0.24 cm^2 10/23/24 1359   Wound Volume (cm^3) 0.024 cm^3 10/23/24 1359   Wound Assessment Dry;Pink/red 10/23/24 1359   Drainage Amount None (dry) 10/23/24 1359   Odor None 10/23/24 1359   Trisha-wound Assessment Erosion;Denuded;Fragile 10/23/24 1359   Margins Attached edges 10/23/24 1359   Wound Thickness Description not for Pressure Injury Partial thickness 10/23/24 1359   Number of days: 5       Wound 10/23/24 Buttocks Left (Active)   Wound Image   10/23/24 1405   Wound Etiology Other 10/23/24 1405   Dressing Status New dressing applied 10/26/24 0506   Wound Cleansed Other (Comment) 10/23/24

## 2024-10-26 NOTE — PROGRESS NOTES
Pt transported via [facility] to [facility]. Pts IV removed and all questions and concerns addressed.     Report called to Michelle @ [facility]. All questions and concerns were addressed.

## 2024-10-26 NOTE — DIALYSIS
Dr Tinajero at the bedside during dialysis treatment.  Verbal order received to stop ultrafiltration and continue dialysis for ordered time.  
Estefania in telemetry notified pt is in dialysis, box #26  
Notified Estefania in tele that pt is in dialysis.  
Patient completed and tolerated dialysis treatment today, Duration 3 hours, Fluid removed 1.0 Liter. Post HD /62 mmHg, HR 79 bpm. Report given to nurse in charge Melody and telemetry Romina informed of  pt transport back to room.  
Patient completed and tolerated procedure well, treatment time 3 hours, UF removed 1.0 Liter. Post HD /64 mmHg, HR 85 bpm. Retacrit given. Seen and examined by Dr. Basilio. Report given to JAVIER Diehl and transported back to room.        
Patient tolerated treatment. 1000 ml of fluid was removed. Patient was alert during treatment. 61.1 liters of blood was processed. Report was given to primary nurse Orin.  
Pt fany 3 hour hemodialysis well.  Epogen 84558 units giiven IV.  One unit PRBC given, pt fany well.  One liter nett fluid removed.  Report called to Julia Calhoun  
Pt fany 3 hour hemodialysis well.  One liter net fluid removed.  Epogen 59548 units given IV.    Report given to Martha, 5 west    
Pt fany 3.25 hour hemodialysis well.  0.5 liters net fluid removed.  Report given face to face to Sheree Gonsalves.  Estefania in telemetry notified pt is back in his room, box #27  
Report called to Renetta, pt's primary nurse.  Pt dialyzed for 3 hours, removed 1 liter of fluid. Pt tolerated treatment well. Pt en route to his room.  
Report given to Annabel pt's primary nurse. Pt dialyzed for 2.5 hours, removed 510 mls of fluid. Pt tolerated treatment well.   
Report given to Gretchen pt's primary nurse. Pt dialyzed for 3 hours, removed 1 liter of fluid,voided 150 mls of urine during treatment. Pt received epogen intra dialysis. Pt tolerated treatment well. Pt en route to his room.  
Report given to lizandro Cisneros's primary nurse. Pt dialyzed for 3 hours, removed 1 liter of fluid. Epogen given intra dialysis. Pt tolerated treatment well. Notified Stefanie in tele that pt is en route to his room.  
36.5

## 2024-10-26 NOTE — PLAN OF CARE
Problem: Discharge Planning  Goal: Discharge to home or other facility with appropriate resources  10/25/2024 2002 by Mabel Morrison RN  Outcome: Progressing  10/25/2024 1208 by Gretchen Scott RN  Outcome: Progressing  Flowsheets (Taken 10/25/2024 1208)  Discharge to home or other facility with appropriate resources:   Identify barriers to discharge with patient and caregiver   Arrange for needed discharge resources and transportation as appropriate   Identify discharge learning needs (meds, wound care, etc)     Problem: Skin/Tissue Integrity  Goal: Absence of new skin breakdown  Description: 1.  Monitor for areas of redness and/or skin breakdown  2.  Assess vascular access sites hourly  3.  Every 4-6 hours minimum:  Change oxygen saturation probe site  4.  Every 4-6 hours:  If on nasal continuous positive airway pressure, respiratory therapy assess nares and determine need for appliance change or resting period.  10/25/2024 2002 by Mabel Morrison RN  Outcome: Progressing  10/25/2024 1208 by Gretchen Scott RN  Outcome: Progressing  Note: The pt will remain free from developing further skin breakdown by turning and repositioning Q2 hrs      Problem: Safety - Adult  Goal: Free from fall injury  10/25/2024 2002 by Mabel Morrison RN  Outcome: Progressing  10/25/2024 1208 by Gretchen Scott RN  Outcome: Progressing  Flowsheets (Taken 10/25/2024 1208)  Free From Fall Injury:   Instruct family/caregiver on patient safety   Based on caregiver fall risk screen, instruct family/caregiver to ask for assistance with transferring infant if caregiver noted to have fall risk factors     Problem: ABCDS Injury Assessment  Goal: Absence of physical injury  10/25/2024 2002 by Mabel Morrison RN  Outcome: Progressing  10/25/2024 1208 by Gretchen Scott RN  Outcome: Progressing  Flowsheets (Taken 10/25/2024 1208)  Absence of Physical Injury: Implement safety measures based on patient assessment     Problem: Chronic Conditions and  Co-morbidities  Goal: Patient's chronic conditions and co-morbidity symptoms are monitored and maintained or improved  10/25/2024 2002 by Mabel Morrison RN  Outcome: Progressing  10/25/2024 1208 by Gretchen Scott RN  Outcome: Progressing  Flowsheets (Taken 10/25/2024 1208)  Care Plan - Patient's Chronic Conditions and Co-Morbidity Symptoms are Monitored and Maintained or Improved: Monitor and assess patient's chronic conditions and comorbid symptoms for stability, deterioration, or improvement     Problem: Respiratory - Adult  Goal: Achieves optimal ventilation and oxygenation  Outcome: Progressing     Problem: Cardiovascular - Adult  Goal: Maintains optimal cardiac output and hemodynamic stability  Outcome: Progressing  Goal: Absence of cardiac dysrhythmias or at baseline  Outcome: Progressing     Problem: Neurosensory - Adult  Goal: Achieves stable or improved neurological status  Outcome: Progressing  Goal: Absence of seizures  Outcome: Progressing  Goal: Remains free of injury related to seizures activity  Outcome: Progressing  Goal: Achieves maximal functionality and self care  Outcome: Progressing     Problem: Musculoskeletal - Adult  Goal: Return mobility to safest level of function  Outcome: Progressing  Goal: Maintain proper alignment of affected body part  Outcome: Progressing  Goal: Return ADL status to a safe level of function  Outcome: Progressing     Problem: Infection - Adult  Goal: Absence of infection at discharge  Outcome: Progressing  Goal: Absence of infection during hospitalization  Outcome: Progressing  Goal: Absence of fever/infection during anticipated neutropenic period  Outcome: Progressing     Problem: Metabolic/Fluid and Electrolytes - Adult  Goal: Electrolytes maintained within normal limits  Outcome: Progressing  Goal: Hemodynamic stability and optimal renal function maintained  Outcome: Progressing  Goal: Glucose maintained within prescribed range  Outcome: Progressing     Problem:

## 2024-10-31 ENCOUNTER — HOSPITAL ENCOUNTER (INPATIENT)
Facility: HOSPITAL | Age: 84
LOS: 18 days | Discharge: NURSING, REHAB OR LONG-TERM CARE | DRG: 853 | End: 2024-11-18
Admitting: STUDENT IN AN ORGANIZED HEALTH CARE EDUCATION/TRAINING PROGRAM
Payer: OTHER GOVERNMENT

## 2024-10-31 DIAGNOSIS — K40.30 UNILATERAL INGUINAL HERNIA WITH OBSTRUCTION AND WITHOUT GANGRENE, RECURRENCE NOT SPECIFIED: ICD-10-CM

## 2024-10-31 PROBLEM — J18.9 ACUTE PNEUMONIA: Status: ACTIVE | Noted: 2024-10-31

## 2024-10-31 LAB
ALBUMIN SERPL-MCNC: 1.7 G/DL (ref 3.5–5)
ANION GAP SERPL CALC-SCNC: 9 MMOL/L (ref 2–12)
BNP SERPL-MCNC: 3742 PG/ML
BUN SERPL-MCNC: 27 MG/DL (ref 6–20)
BUN/CREAT SERPL: 6 (ref 12–20)
CA-I BLD-MCNC: 7.8 MG/DL (ref 8.5–10.1)
CHLORIDE SERPL-SCNC: 100 MMOL/L (ref 97–108)
CO2 SERPL-SCNC: 29 MMOL/L (ref 21–32)
CREAT SERPL-MCNC: 4.45 MG/DL (ref 0.7–1.3)
GLUCOSE BLD STRIP.AUTO-MCNC: 215 MG/DL (ref 65–100)
GLUCOSE BLD STRIP.AUTO-MCNC: 229 MG/DL (ref 65–100)
GLUCOSE SERPL-MCNC: 154 MG/DL (ref 65–100)
PERFORMED BY:: ABNORMAL
PERFORMED BY:: ABNORMAL
PHOSPHATE SERPL-MCNC: 4.6 MG/DL (ref 2.6–4.7)
POTASSIUM SERPL-SCNC: 2.9 MMOL/L (ref 3.5–5.1)
PROCALCITONIN SERPL-MCNC: 1.79 NG/ML
SODIUM SERPL-SCNC: 138 MMOL/L (ref 136–145)

## 2024-10-31 PROCEDURE — 6370000000 HC RX 637 (ALT 250 FOR IP): Performed by: INTERNAL MEDICINE

## 2024-10-31 PROCEDURE — 1100000000 HC RM PRIVATE

## 2024-10-31 PROCEDURE — 82962 GLUCOSE BLOOD TEST: CPT

## 2024-10-31 PROCEDURE — 6360000002 HC RX W HCPCS: Performed by: STUDENT IN AN ORGANIZED HEALTH CARE EDUCATION/TRAINING PROGRAM

## 2024-10-31 PROCEDURE — 2580000003 HC RX 258: Performed by: STUDENT IN AN ORGANIZED HEALTH CARE EDUCATION/TRAINING PROGRAM

## 2024-10-31 PROCEDURE — 80069 RENAL FUNCTION PANEL: CPT

## 2024-10-31 PROCEDURE — 83880 ASSAY OF NATRIURETIC PEPTIDE: CPT

## 2024-10-31 PROCEDURE — 36415 COLL VENOUS BLD VENIPUNCTURE: CPT

## 2024-10-31 PROCEDURE — 6370000000 HC RX 637 (ALT 250 FOR IP): Performed by: STUDENT IN AN ORGANIZED HEALTH CARE EDUCATION/TRAINING PROGRAM

## 2024-10-31 PROCEDURE — 84145 PROCALCITONIN (PCT): CPT

## 2024-10-31 RX ORDER — GLUCAGON 1 MG/ML
1 KIT INJECTION PRN
Status: DISCONTINUED | OUTPATIENT
Start: 2024-10-31 | End: 2024-11-18 | Stop reason: HOSPADM

## 2024-10-31 RX ORDER — LEVOFLOXACIN 5 MG/ML
500 INJECTION, SOLUTION INTRAVENOUS ONCE
Status: COMPLETED | OUTPATIENT
Start: 2024-10-31 | End: 2024-10-31

## 2024-10-31 RX ORDER — POLYETHYLENE GLYCOL 3350 17 G/17G
17 POWDER, FOR SOLUTION ORAL DAILY PRN
Status: DISCONTINUED | OUTPATIENT
Start: 2024-10-31 | End: 2024-11-18 | Stop reason: HOSPADM

## 2024-10-31 RX ORDER — ACETAMINOPHEN 325 MG/1
650 TABLET ORAL EVERY 6 HOURS PRN
Status: DISCONTINUED | OUTPATIENT
Start: 2024-10-31 | End: 2024-11-18 | Stop reason: HOSPADM

## 2024-10-31 RX ORDER — LEVOFLOXACIN 5 MG/ML
250 INJECTION, SOLUTION INTRAVENOUS ONCE
Status: COMPLETED | OUTPATIENT
Start: 2024-10-31 | End: 2024-10-31

## 2024-10-31 RX ORDER — SODIUM CHLORIDE 0.9 % (FLUSH) 0.9 %
5-40 SYRINGE (ML) INJECTION EVERY 12 HOURS SCHEDULED
Status: DISCONTINUED | OUTPATIENT
Start: 2024-10-31 | End: 2024-11-18 | Stop reason: HOSPADM

## 2024-10-31 RX ORDER — ACETAMINOPHEN 650 MG/1
650 SUPPOSITORY RECTAL EVERY 6 HOURS PRN
Status: DISCONTINUED | OUTPATIENT
Start: 2024-10-31 | End: 2024-11-18 | Stop reason: HOSPADM

## 2024-10-31 RX ORDER — DEXTROSE MONOHYDRATE 100 MG/ML
INJECTION, SOLUTION INTRAVENOUS CONTINUOUS PRN
Status: DISCONTINUED | OUTPATIENT
Start: 2024-10-31 | End: 2024-11-18 | Stop reason: HOSPADM

## 2024-10-31 RX ORDER — SODIUM CHLORIDE 0.9 % (FLUSH) 0.9 %
5-40 SYRINGE (ML) INJECTION PRN
Status: DISCONTINUED | OUTPATIENT
Start: 2024-10-31 | End: 2024-11-18 | Stop reason: HOSPADM

## 2024-10-31 RX ORDER — LEVOFLOXACIN 5 MG/ML
750 INJECTION, SOLUTION INTRAVENOUS EVERY 24 HOURS
Status: DISCONTINUED | OUTPATIENT
Start: 2024-10-31 | End: 2024-10-31

## 2024-10-31 RX ORDER — LEVOFLOXACIN 5 MG/ML
500 INJECTION, SOLUTION INTRAVENOUS
Status: DISCONTINUED | OUTPATIENT
Start: 2024-11-02 | End: 2024-11-02

## 2024-10-31 RX ORDER — NEPHROCAP 1 MG
1 CAP ORAL DAILY
Status: DISCONTINUED | OUTPATIENT
Start: 2024-10-31 | End: 2024-11-18 | Stop reason: HOSPADM

## 2024-10-31 RX ORDER — ONDANSETRON 2 MG/ML
4 INJECTION INTRAMUSCULAR; INTRAVENOUS EVERY 6 HOURS PRN
Status: DISCONTINUED | OUTPATIENT
Start: 2024-10-31 | End: 2024-11-10

## 2024-10-31 RX ORDER — SODIUM CHLORIDE 9 MG/ML
INJECTION, SOLUTION INTRAVENOUS PRN
Status: DISCONTINUED | OUTPATIENT
Start: 2024-10-31 | End: 2024-11-18 | Stop reason: HOSPADM

## 2024-10-31 RX ORDER — INSULIN LISPRO 100 [IU]/ML
0-8 INJECTION, SOLUTION INTRAVENOUS; SUBCUTANEOUS
Status: DISCONTINUED | OUTPATIENT
Start: 2024-10-31 | End: 2024-11-18

## 2024-10-31 RX ORDER — ONDANSETRON 4 MG/1
4 TABLET, ORALLY DISINTEGRATING ORAL EVERY 8 HOURS PRN
Status: DISCONTINUED | OUTPATIENT
Start: 2024-10-31 | End: 2024-11-10

## 2024-10-31 RX ADMIN — LEVOFLOXACIN 250 MG: 5 INJECTION, SOLUTION INTRAVENOUS at 19:35

## 2024-10-31 RX ADMIN — LEVOFLOXACIN 500 MG: 5 INJECTION, SOLUTION INTRAVENOUS at 17:53

## 2024-10-31 RX ADMIN — CEFTRIAXONE SODIUM 1000 MG: 1 INJECTION, POWDER, FOR SOLUTION INTRAMUSCULAR; INTRAVENOUS at 18:10

## 2024-10-31 RX ADMIN — Medication 1 MG: at 19:34

## 2024-10-31 RX ADMIN — SODIUM CHLORIDE, PRESERVATIVE FREE 10 ML: 5 INJECTION INTRAVENOUS at 21:04

## 2024-10-31 RX ADMIN — ACETAMINOPHEN 650 MG: 325 TABLET ORAL at 21:04

## 2024-10-31 RX ADMIN — INSULIN LISPRO 2 UNITS: 100 INJECTION, SOLUTION INTRAVENOUS; SUBCUTANEOUS at 21:04

## 2024-10-31 ASSESSMENT — PAIN SCALES - GENERAL: PAINLEVEL_OUTOF10: 0

## 2024-10-31 NOTE — H&P
History & Physical    Primary Care Provider: No primary care provider on file.  Source of Information: Patient/family     Chief complaint: No chief complaint on file.       History of Presenting Illness:   Michael Fung is a 83 y.o. male with multiple medical problems DVT s/p IVC filter, ESRD on HD  Presented again with chest pain.  Patient was recently discharged on 10/25.  During the following admission he was in the hospital for acute hypoxic respiratory failure 2/2 community-acquired pneumonia in left lung.  During the stay patient received Rocephin and azithromycin.  He had a DVT for which he underwent IVC filter placement.  During his prior hospital stay he was in the ICU requiring BiPAP.  Patient was transitioned to high flow nasal cannula 50 L on 40% starting at 90%.  Had a low probability VQ mismatch on 10/1.  IV filter was placed on 10/4.  His antibiotics were changed to IV Zosyn he finished a course of 7-day IV Zosyn.  Patient received a dose of blood transfusion      Patient presents again with chest pain, CXR found to have a airspace opacity right perihilar region and right lower lobe concerning for acute pneumonia.   Patient denies having any other complaints other than chest pain.  He denies having any cough, shortness of breath, palpitations, nausea/vomiting, abdominal pain.         Review of Systems:  A comprehensive review of systems was negative except for that written in the History of Present Illness.     No past medical history on file.     Past Surgical History:   Procedure Laterality Date    IR IVC FILTER PLACEMENT W IMAGING  10/4/2024    IR IVC FILTER PLACEMENT W IMAGING 10/4/2024 Saad Mancini MD [facility]    IR NONTUNNELED VASCULAR CATHETER  10/4/2024    IR NONTUNNELED VASCULAR CATHETER 10/4/2024 Saad Mancini MD [facility] IR    IR TUNNELED CATHETER PLACEMENT GREATER THAN 5 YEARS  10/16/2024    IR TUNNELED CATHETER PLACEMENT GREATER THAN 5 YEARS 10/16/2024

## 2024-10-31 NOTE — CARE COORDINATION
10/31/24 1414   Service Assessment   Patient Orientation Alert and Oriented   Cognition Alert   History Provided By Spouse   Primary Caregiver Other (Comment)  (from [facility])   Accompanied By/Relationship family   Support Systems Spouse/Significant Other   Patient's Healthcare Decision Maker is: Legal Next of Kin   PCP Verified by CM No   Prior Functional Level Assistance with the following:;Mobility;Bathing;Dressing;Housework;Shopping;Cooking   Current Functional Level Assistance with the following:;Mobility;Bathing;Dressing;Housework;Shopping;Cooking   Can patient return to prior living arrangement Yes   Ability to make needs known: Good   Family able to assist with home care needs: Yes   Would you like for me to discuss the discharge plan with any other family members/significant others, and if so, who? Yes  (wife)   Financial Resources  ([facility])   Community Resources None   CM/SW Referral Other (see comment)  (discharge planning)   Discharge Planning   Patient expects to be discharged to: Skilled nursing facility  ([facility])     CM spoke with wife and verified demographics are correct. Pt discharged from hospital last week to [facility] and plans for pt to return when medically cleared. Wife reports pt is able to ambulate with assistance and a rw at baseline.     Advance Care Planning     General Advance Care Planning (ACP) Conversation    Date of Conversation: 10/31/2024  Conducted with: Patient with Decision Making Capacity  Other persons present: Spouse Coco Fung    Healthcare Decision Maker:   Primary Decision Maker: Melly Fung - Spouse - [phone]       Content/Action Overview:  Has ACP document(s) on file - reflects the patient's care preferences    Length of Voluntary ACP Conversation in minutes:  <16 minutes (Non-Billable)    Gricel Beatty RN            10/31/24 1422   Readmission Assessment   Number of Days since last admission? 1-7 days   Previous Disposition SNF

## 2024-11-01 ENCOUNTER — APPOINTMENT (OUTPATIENT)
Facility: HOSPITAL | Age: 84
DRG: 853 | End: 2024-11-01
Payer: OTHER GOVERNMENT

## 2024-11-01 ENCOUNTER — APPOINTMENT (OUTPATIENT)
Facility: HOSPITAL | Age: 84
DRG: 853 | End: 2024-11-01
Attending: INTERNAL MEDICINE
Payer: OTHER GOVERNMENT

## 2024-11-01 LAB
ANION GAP SERPL CALC-SCNC: 12 MMOL/L (ref 2–12)
BASOPHILS # BLD: 0 K/UL (ref 0–0.1)
BASOPHILS NFR BLD: 0 % (ref 0–1)
BUN SERPL-MCNC: 32 MG/DL (ref 6–20)
BUN/CREAT SERPL: 6 (ref 12–20)
CA-I BLD-MCNC: 8.6 MG/DL (ref 8.5–10.1)
CHLORIDE SERPL-SCNC: 97 MMOL/L (ref 97–108)
CO2 SERPL-SCNC: 26 MMOL/L (ref 21–32)
CREAT SERPL-MCNC: 5.19 MG/DL (ref 0.7–1.3)
DIFFERENTIAL METHOD BLD: ABNORMAL
EOSINOPHIL # BLD: 0.2 K/UL (ref 0–0.4)
EOSINOPHIL NFR BLD: 2 % (ref 0–7)
ERYTHROCYTE [DISTWIDTH] IN BLOOD BY AUTOMATED COUNT: 13.9 % (ref 11.5–14.5)
GLUCOSE BLD STRIP.AUTO-MCNC: 129 MG/DL (ref 65–100)
GLUCOSE BLD STRIP.AUTO-MCNC: 138 MG/DL (ref 65–100)
GLUCOSE BLD STRIP.AUTO-MCNC: 142 MG/DL (ref 65–100)
GLUCOSE BLD STRIP.AUTO-MCNC: 306 MG/DL (ref 65–100)
GLUCOSE SERPL-MCNC: 133 MG/DL (ref 65–100)
HCT VFR BLD AUTO: 26.3 % (ref 36.6–50.3)
HGB BLD-MCNC: 8.5 G/DL (ref 12.1–17)
IMM GRANULOCYTES # BLD AUTO: 0.1 K/UL (ref 0–0.04)
IMM GRANULOCYTES NFR BLD AUTO: 1 % (ref 0–0.5)
LYMPHOCYTES # BLD: 1.1 K/UL (ref 0.8–3.5)
LYMPHOCYTES NFR BLD: 11 % (ref 12–49)
MCH RBC QN AUTO: 29.3 PG (ref 26–34)
MCHC RBC AUTO-ENTMCNC: 32.3 G/DL (ref 30–36.5)
MCV RBC AUTO: 90.7 FL (ref 80–99)
MONOCYTES # BLD: 1 K/UL (ref 0–1)
MONOCYTES NFR BLD: 9 % (ref 5–13)
NEUTS SEG # BLD: 8.2 K/UL (ref 1.8–8)
NEUTS SEG NFR BLD: 77 % (ref 32–75)
NRBC # BLD: 0 K/UL (ref 0–0.01)
NRBC BLD-RTO: 0 PER 100 WBC
PERFORMED BY:: ABNORMAL
PLATELET # BLD AUTO: 367 K/UL (ref 150–400)
PMV BLD AUTO: 9.1 FL (ref 8.9–12.9)
POTASSIUM SERPL-SCNC: 2.8 MMOL/L (ref 3.5–5.1)
PROCALCITONIN SERPL-MCNC: 1.41 NG/ML
RBC # BLD AUTO: 2.9 M/UL (ref 4.1–5.7)
SODIUM SERPL-SCNC: 135 MMOL/L (ref 136–145)
TROPONIN I SERPL HS-MCNC: 12 NG/L (ref 0–76)
WBC # BLD AUTO: 10.6 K/UL (ref 4.1–11.1)

## 2024-11-01 PROCEDURE — 71045 X-RAY EXAM CHEST 1 VIEW: CPT

## 2024-11-01 PROCEDURE — 6360000002 HC RX W HCPCS: Performed by: INTERNAL MEDICINE

## 2024-11-01 PROCEDURE — 6370000000 HC RX 637 (ALT 250 FOR IP): Performed by: STUDENT IN AN ORGANIZED HEALTH CARE EDUCATION/TRAINING PROGRAM

## 2024-11-01 PROCEDURE — 6370000000 HC RX 637 (ALT 250 FOR IP): Performed by: INTERNAL MEDICINE

## 2024-11-01 PROCEDURE — 1100000000 HC RM PRIVATE

## 2024-11-01 PROCEDURE — 6370000000 HC RX 637 (ALT 250 FOR IP)

## 2024-11-01 PROCEDURE — 92611 MOTION FLUOROSCOPY/SWALLOW: CPT

## 2024-11-01 PROCEDURE — 6370000000 HC RX 637 (ALT 250 FOR IP): Performed by: PHYSICIAN ASSISTANT

## 2024-11-01 PROCEDURE — 90935 HEMODIALYSIS ONE EVALUATION: CPT

## 2024-11-01 PROCEDURE — 2580000003 HC RX 258: Performed by: STUDENT IN AN ORGANIZED HEALTH CARE EDUCATION/TRAINING PROGRAM

## 2024-11-01 PROCEDURE — 5A1D70Z PERFORMANCE OF URINARY FILTRATION, INTERMITTENT, LESS THAN 6 HOURS PER DAY: ICD-10-PCS | Performed by: INTERNAL MEDICINE

## 2024-11-01 PROCEDURE — 6360000002 HC RX W HCPCS: Performed by: PHYSICIAN ASSISTANT

## 2024-11-01 PROCEDURE — 36415 COLL VENOUS BLD VENIPUNCTURE: CPT

## 2024-11-01 PROCEDURE — 93005 ELECTROCARDIOGRAM TRACING: CPT | Performed by: PHYSICIAN ASSISTANT

## 2024-11-01 PROCEDURE — 82962 GLUCOSE BLOOD TEST: CPT

## 2024-11-01 PROCEDURE — 85025 COMPLETE CBC W/AUTO DIFF WBC: CPT

## 2024-11-01 PROCEDURE — 74230 X-RAY XM SWLNG FUNCJ C+: CPT

## 2024-11-01 PROCEDURE — 84484 ASSAY OF TROPONIN QUANT: CPT

## 2024-11-01 PROCEDURE — 2709999900 HC NON-CHARGEABLE SUPPLY

## 2024-11-01 PROCEDURE — 84145 PROCALCITONIN (PCT): CPT

## 2024-11-01 PROCEDURE — 2500000003 HC RX 250 WO HCPCS

## 2024-11-01 PROCEDURE — 2580000003 HC RX 258: Performed by: INTERNAL MEDICINE

## 2024-11-01 PROCEDURE — 80048 BASIC METABOLIC PNL TOTAL CA: CPT

## 2024-11-01 RX ORDER — PEDIATRIC MULTIVIT 61/D3/VIT K 1500-800
1 CAPSULE ORAL DAILY
Status: DISCONTINUED | OUTPATIENT
Start: 2024-11-01 | End: 2024-11-01 | Stop reason: SDUPTHER

## 2024-11-01 RX ORDER — ALLOPURINOL 100 MG/1
100 TABLET ORAL DAILY
Status: DISCONTINUED | OUTPATIENT
Start: 2024-11-01 | End: 2024-11-18 | Stop reason: HOSPADM

## 2024-11-01 RX ORDER — POTASSIUM CHLORIDE 7.45 MG/ML
10 INJECTION INTRAVENOUS
Status: COMPLETED | OUTPATIENT
Start: 2024-11-01 | End: 2024-11-02

## 2024-11-01 RX ORDER — FINASTERIDE 5 MG/1
5 TABLET, FILM COATED ORAL DAILY
Status: DISCONTINUED | OUTPATIENT
Start: 2024-11-01 | End: 2024-11-18 | Stop reason: HOSPADM

## 2024-11-01 RX ORDER — GLIPIZIDE 5 MG/1
2.5 TABLET ORAL
Status: DISCONTINUED | OUTPATIENT
Start: 2024-11-01 | End: 2024-11-18

## 2024-11-01 RX ORDER — LOPERAMIDE HYDROCHLORIDE 2 MG/1
2 CAPSULE ORAL 4 TIMES DAILY PRN
Status: DISCONTINUED | OUTPATIENT
Start: 2024-11-01 | End: 2024-11-01

## 2024-11-01 RX ORDER — HEPARIN SODIUM 1000 [USP'U]/ML
3600 INJECTION, SOLUTION INTRAVENOUS; SUBCUTANEOUS ONCE
Status: COMPLETED | OUTPATIENT
Start: 2024-11-01 | End: 2024-11-01

## 2024-11-01 RX ORDER — UREA 10 %
500 LOTION (ML) TOPICAL DAILY
Status: DISCONTINUED | OUTPATIENT
Start: 2024-11-01 | End: 2024-11-18 | Stop reason: HOSPADM

## 2024-11-01 RX ORDER — POTASSIUM CHLORIDE 1500 MG/1
40 TABLET, EXTENDED RELEASE ORAL ONCE
Status: COMPLETED | OUTPATIENT
Start: 2024-11-01 | End: 2024-11-01

## 2024-11-01 RX ORDER — GAUZE BANDAGE 2" X 2"
100 BANDAGE TOPICAL DAILY
Status: DISCONTINUED | OUTPATIENT
Start: 2024-11-01 | End: 2024-11-18 | Stop reason: HOSPADM

## 2024-11-01 RX ORDER — NIFEDIPINE 30 MG/1
90 TABLET, EXTENDED RELEASE ORAL
Status: DISCONTINUED | OUTPATIENT
Start: 2024-11-01 | End: 2024-11-15

## 2024-11-01 RX ORDER — CARVEDILOL 3.12 MG/1
6.25 TABLET ORAL 2 TIMES DAILY WITH MEALS
Status: DISCONTINUED | OUTPATIENT
Start: 2024-11-01 | End: 2024-11-18 | Stop reason: HOSPADM

## 2024-11-01 RX ADMIN — POTASSIUM CHLORIDE 10 MEQ: 7.46 INJECTION, SOLUTION INTRAVENOUS at 22:06

## 2024-11-01 RX ADMIN — POTASSIUM CHLORIDE 10 MEQ: 7.46 INJECTION, SOLUTION INTRAVENOUS at 21:03

## 2024-11-01 RX ADMIN — FINASTERIDE 5 MG: 5 TABLET, FILM COATED ORAL at 13:03

## 2024-11-01 RX ADMIN — ALLOPURINOL 100 MG: 100 TABLET ORAL at 13:04

## 2024-11-01 RX ADMIN — NIFEDIPINE 90 MG: 30 TABLET, FILM COATED, EXTENDED RELEASE ORAL at 13:02

## 2024-11-01 RX ADMIN — MEROPENEM 1000 MG: 1 INJECTION INTRAVENOUS at 13:44

## 2024-11-01 RX ADMIN — GLIPIZIDE 2.5 MG: 5 TABLET ORAL at 13:02

## 2024-11-01 RX ADMIN — CARVEDILOL 6.25 MG: 3.12 TABLET, FILM COATED ORAL at 17:47

## 2024-11-01 RX ADMIN — EPOETIN ALFA-EPBX 6000 UNITS: 10000 INJECTION, SOLUTION INTRAVENOUS; SUBCUTANEOUS at 10:07

## 2024-11-01 RX ADMIN — HEPARIN SODIUM 3600 UNITS: 1000 INJECTION, SOLUTION INTRAVENOUS; SUBCUTANEOUS at 12:00

## 2024-11-01 RX ADMIN — BARIUM SULFATE 30 ML: 400 SUSPENSION ORAL at 12:25

## 2024-11-01 RX ADMIN — THIAMINE HCL TAB 100 MG 100 MG: 100 TAB at 13:04

## 2024-11-01 RX ADMIN — CYANOCOBALAMIN TAB 500 MCG 500 MCG: 500 TAB at 13:04

## 2024-11-01 RX ADMIN — INSULIN LISPRO 6 UNITS: 100 INJECTION, SOLUTION INTRAVENOUS; SUBCUTANEOUS at 21:04

## 2024-11-01 RX ADMIN — POTASSIUM CHLORIDE 40 MEQ: 1500 TABLET, EXTENDED RELEASE ORAL at 21:04

## 2024-11-01 RX ADMIN — BARIUM SULFATE 30 ML: 400 PASTE ORAL at 12:25

## 2024-11-01 RX ADMIN — POTASSIUM CHLORIDE 10 MEQ: 7.46 INJECTION, SOLUTION INTRAVENOUS at 23:01

## 2024-11-01 RX ADMIN — CARVEDILOL 6.25 MG: 3.12 TABLET, FILM COATED ORAL at 13:04

## 2024-11-01 RX ADMIN — SODIUM CHLORIDE, PRESERVATIVE FREE 10 ML: 5 INJECTION INTRAVENOUS at 13:05

## 2024-11-01 RX ADMIN — SODIUM CHLORIDE, PRESERVATIVE FREE 10 ML: 5 INJECTION INTRAVENOUS at 21:02

## 2024-11-01 RX ADMIN — LOPERAMIDE HYDROCHLORIDE 2 MG: 2 CAPSULE ORAL at 17:59

## 2024-11-01 RX ADMIN — Medication 1 MG: at 13:02

## 2024-11-01 RX ADMIN — BARIUM SULFATE 100 ML: 0.81 POWDER, FOR SUSPENSION ORAL at 12:20

## 2024-11-01 ASSESSMENT — PAIN SCALES - GENERAL: PAINLEVEL_OUTOF10: 5

## 2024-11-01 NOTE — DIALYSIS
Report given to Inna, pt's primary nurse. Pt dialyzed for 3.5 hours, removed 2 liters of fluid. Pt received epogen intra dialysis. Pt tolerated treatment well. Pt en route to xray. Notified Lucy in tele.

## 2024-11-02 LAB
ALBUMIN SERPL-MCNC: 1.6 G/DL (ref 3.5–5)
ANION GAP SERPL CALC-SCNC: 6 MMOL/L (ref 2–12)
BASOPHILS # BLD: 0 K/UL (ref 0–0.1)
BASOPHILS NFR BLD: 0 % (ref 0–1)
BUN SERPL-MCNC: 25 MG/DL (ref 6–20)
BUN/CREAT SERPL: 6 (ref 12–20)
CA-I BLD-MCNC: 8.8 MG/DL (ref 8.5–10.1)
CHLORIDE SERPL-SCNC: 97 MMOL/L (ref 97–108)
CO2 SERPL-SCNC: 27 MMOL/L (ref 21–32)
CREAT SERPL-MCNC: 4.49 MG/DL (ref 0.7–1.3)
DIFFERENTIAL METHOD BLD: ABNORMAL
EKG ATRIAL RATE: 88 BPM
EKG DIAGNOSIS: NORMAL
EKG P AXIS: 70 DEGREES
EKG Q-T INTERVAL: 394 MS
EKG QRS DURATION: 88 MS
EKG QTC CALCULATION (BAZETT): 460 MS
EKG R AXIS: -27 DEGREES
EKG T AXIS: 37 DEGREES
EKG VENTRICULAR RATE: 82 BPM
EOSINOPHIL # BLD: 0.2 K/UL (ref 0–0.4)
EOSINOPHIL NFR BLD: 2 % (ref 0–7)
ERYTHROCYTE [DISTWIDTH] IN BLOOD BY AUTOMATED COUNT: 14 % (ref 11.5–14.5)
GLUCOSE BLD STRIP.AUTO-MCNC: 161 MG/DL (ref 65–100)
GLUCOSE BLD STRIP.AUTO-MCNC: 167 MG/DL (ref 65–100)
GLUCOSE BLD STRIP.AUTO-MCNC: 210 MG/DL (ref 65–100)
GLUCOSE BLD STRIP.AUTO-MCNC: 245 MG/DL (ref 65–100)
GLUCOSE SERPL-MCNC: 146 MG/DL (ref 65–100)
HCT VFR BLD AUTO: 23 % (ref 36.6–50.3)
HGB BLD-MCNC: 7.6 G/DL (ref 12.1–17)
IMM GRANULOCYTES # BLD AUTO: 0.1 K/UL (ref 0–0.04)
IMM GRANULOCYTES NFR BLD AUTO: 1 % (ref 0–0.5)
LYMPHOCYTES # BLD: 1.8 K/UL (ref 0.8–3.5)
LYMPHOCYTES NFR BLD: 14 % (ref 12–49)
MAGNESIUM SERPL-MCNC: 1.8 MG/DL (ref 1.6–2.4)
MCH RBC QN AUTO: 28.6 PG (ref 26–34)
MCHC RBC AUTO-ENTMCNC: 33 G/DL (ref 30–36.5)
MCV RBC AUTO: 86.5 FL (ref 80–99)
MONOCYTES # BLD: 1.6 K/UL (ref 0–1)
MONOCYTES NFR BLD: 13 % (ref 5–13)
NEUTS SEG # BLD: 9 K/UL (ref 1.8–8)
NEUTS SEG NFR BLD: 70 % (ref 32–75)
NRBC # BLD: 0 K/UL (ref 0–0.01)
NRBC BLD-RTO: 0 PER 100 WBC
PERFORMED BY:: ABNORMAL
PHOSPHATE SERPL-MCNC: 3 MG/DL (ref 2.6–4.7)
PLATELET # BLD AUTO: 393 K/UL (ref 150–400)
PMV BLD AUTO: 8.7 FL (ref 8.9–12.9)
POTASSIUM SERPL-SCNC: 4.8 MMOL/L (ref 3.5–5.1)
PROCALCITONIN SERPL-MCNC: 4.17 NG/ML
RBC # BLD AUTO: 2.66 M/UL (ref 4.1–5.7)
SODIUM SERPL-SCNC: 130 MMOL/L (ref 136–145)
TROPONIN I SERPL HS-MCNC: 12 NG/L (ref 0–76)
WBC # BLD AUTO: 12.8 K/UL (ref 4.1–11.1)

## 2024-11-02 PROCEDURE — 6370000000 HC RX 637 (ALT 250 FOR IP)

## 2024-11-02 PROCEDURE — 97161 PT EVAL LOW COMPLEX 20 MIN: CPT

## 2024-11-02 PROCEDURE — 83735 ASSAY OF MAGNESIUM: CPT

## 2024-11-02 PROCEDURE — 87449 NOS EACH ORGANISM AG IA: CPT

## 2024-11-02 PROCEDURE — 6360000002 HC RX W HCPCS: Performed by: PHYSICIAN ASSISTANT

## 2024-11-02 PROCEDURE — 84484 ASSAY OF TROPONIN QUANT: CPT

## 2024-11-02 PROCEDURE — 84145 PROCALCITONIN (PCT): CPT

## 2024-11-02 PROCEDURE — 6370000000 HC RX 637 (ALT 250 FOR IP): Performed by: STUDENT IN AN ORGANIZED HEALTH CARE EDUCATION/TRAINING PROGRAM

## 2024-11-02 PROCEDURE — 85025 COMPLETE CBC W/AUTO DIFF WBC: CPT

## 2024-11-02 PROCEDURE — 80069 RENAL FUNCTION PANEL: CPT

## 2024-11-02 PROCEDURE — 97530 THERAPEUTIC ACTIVITIES: CPT

## 2024-11-02 PROCEDURE — 82962 GLUCOSE BLOOD TEST: CPT

## 2024-11-02 PROCEDURE — 87324 CLOSTRIDIUM AG IA: CPT

## 2024-11-02 PROCEDURE — 87506 IADNA-DNA/RNA PROBE TQ 6-11: CPT

## 2024-11-02 PROCEDURE — 6370000000 HC RX 637 (ALT 250 FOR IP): Performed by: INTERNAL MEDICINE

## 2024-11-02 PROCEDURE — 2580000003 HC RX 258: Performed by: INTERNAL MEDICINE

## 2024-11-02 PROCEDURE — 6360000002 HC RX W HCPCS: Performed by: INTERNAL MEDICINE

## 2024-11-02 PROCEDURE — 2580000003 HC RX 258: Performed by: STUDENT IN AN ORGANIZED HEALTH CARE EDUCATION/TRAINING PROGRAM

## 2024-11-02 PROCEDURE — 1100000000 HC RM PRIVATE

## 2024-11-02 RX ADMIN — FINASTERIDE 5 MG: 5 TABLET, FILM COATED ORAL at 11:09

## 2024-11-02 RX ADMIN — INSULIN LISPRO 2 UNITS: 100 INJECTION, SOLUTION INTRAVENOUS; SUBCUTANEOUS at 12:53

## 2024-11-02 RX ADMIN — CARVEDILOL 6.25 MG: 3.12 TABLET, FILM COATED ORAL at 17:38

## 2024-11-02 RX ADMIN — SODIUM CHLORIDE, PRESERVATIVE FREE 10 ML: 5 INJECTION INTRAVENOUS at 20:12

## 2024-11-02 RX ADMIN — CARVEDILOL 6.25 MG: 3.12 TABLET, FILM COATED ORAL at 11:08

## 2024-11-02 RX ADMIN — THIAMINE HCL TAB 100 MG 100 MG: 100 TAB at 11:09

## 2024-11-02 RX ADMIN — POTASSIUM CHLORIDE 10 MEQ: 7.46 INJECTION, SOLUTION INTRAVENOUS at 01:05

## 2024-11-02 RX ADMIN — POTASSIUM CHLORIDE 10 MEQ: 7.46 INJECTION, SOLUTION INTRAVENOUS at 02:10

## 2024-11-02 RX ADMIN — CYANOCOBALAMIN TAB 500 MCG 500 MCG: 500 TAB at 11:09

## 2024-11-02 RX ADMIN — INSULIN LISPRO 2 UNITS: 100 INJECTION, SOLUTION INTRAVENOUS; SUBCUTANEOUS at 21:06

## 2024-11-02 RX ADMIN — ALLOPURINOL 100 MG: 100 TABLET ORAL at 11:08

## 2024-11-02 RX ADMIN — Medication 1 MG: at 11:08

## 2024-11-02 RX ADMIN — ACETAMINOPHEN 650 MG: 325 TABLET ORAL at 00:27

## 2024-11-02 RX ADMIN — POTASSIUM CHLORIDE 10 MEQ: 7.46 INJECTION, SOLUTION INTRAVENOUS at 00:02

## 2024-11-02 RX ADMIN — GLIPIZIDE 2.5 MG: 5 TABLET ORAL at 11:09

## 2024-11-02 RX ADMIN — SODIUM CHLORIDE, PRESERVATIVE FREE 10 ML: 5 INJECTION INTRAVENOUS at 11:10

## 2024-11-02 RX ADMIN — MEROPENEM 500 MG: 500 INJECTION INTRAVENOUS at 11:19

## 2024-11-02 ASSESSMENT — PAIN SCALES - GENERAL
PAINLEVEL_OUTOF10: 0

## 2024-11-02 ASSESSMENT — PAIN DESCRIPTION - DESCRIPTORS: DESCRIPTORS: ACHING

## 2024-11-02 ASSESSMENT — PAIN DESCRIPTION - LOCATION: LOCATION: HEAD

## 2024-11-02 NOTE — SIGNIFICANT EVENT
Significant event for overnight coverage:    Subjective:  83-year-old hold male admitted for chest pain, previous lower extremity DVT and found to have aspiration pneumonia.  Speech therapy evaluating.  Pulmonary services evaluated and change antibiotics to meropenem.  Patient developed chest pain substernal and troponin was normal at 12.  Will repeat again in 90 minutes.  Chest x-ray with no acute process.  EKG with no ischemic changes.    Vitals:    11/01/24 2222   BP:    Pulse:    Resp:    Temp:    SpO2: 94%       PHYSICAL EXAM:    Physical Exam  Vitals reviewed.   Cardiovascular:      Rate and Rhythm: Normal rate and regular rhythm.      Heart sounds: Normal heart sounds.   Pulmonary:      Effort: Pulmonary effort is normal.      Breath sounds: Normal breath sounds.   Abdominal:      General: Abdomen is flat. Bowel sounds are normal.      Palpations: Abdomen is soft.   Musculoskeletal:      Cervical back: Normal range of motion and neck supple.   Neurological:      Mental Status: He is alert and oriented to person, place, and time.          Data Review    Recent Results (from the past 24 hour(s))   POCT Glucose    Collection Time: 11/01/24  7:58 AM   Result Value Ref Range    POC Glucose 129 (H) 65 - 100 mg/dL    Performed by: Yusuf Rubalcava PCT    CBC with Auto Differential    Collection Time: 11/01/24  8:05 AM   Result Value Ref Range    WBC 10.6 4.1 - 11.1 K/uL    RBC 2.90 (L) 4.10 - 5.70 M/uL    Hemoglobin 8.5 (L) 12.1 - 17.0 g/dL    Hematocrit 26.3 (L) 36.6 - 50.3 %    MCV 90.7 80.0 - 99.0 FL    MCH 29.3 26.0 - 34.0 PG    MCHC 32.3 30.0 - 36.5 g/dL    RDW 13.9 11.5 - 14.5 %    Platelets 367 150 - 400 K/uL    MPV 9.1 8.9 - 12.9 FL    Nucleated RBCs 0.0 0.0  WBC    nRBC 0.00 0.00 - 0.01 K/uL    Neutrophils % 77 (H) 32 - 75 %    Lymphocytes % 11 (L) 12 - 49 %    Monocytes % 9 5 - 13 %    Eosinophils % 2 0 - 7 %    Basophils % 0 0 - 1 %    Immature Granulocytes % 1 (H) 0 - 0.5 %    Neutrophils Absolute

## 2024-11-03 ENCOUNTER — APPOINTMENT (OUTPATIENT)
Facility: HOSPITAL | Age: 84
DRG: 853 | End: 2024-11-03
Payer: OTHER GOVERNMENT

## 2024-11-03 LAB
ANION GAP SERPL CALC-SCNC: 8 MMOL/L (ref 2–12)
APTT PPP: 25.7 SEC (ref 21.2–34.1)
BASOPHILS # BLD: 0 K/UL (ref 0–0.1)
BASOPHILS NFR BLD: 0 % (ref 0–1)
BUN SERPL-MCNC: 56 MG/DL (ref 6–20)
BUN/CREAT SERPL: 9 (ref 12–20)
C COLI+JEJUNI TUF STL QL NAA+PROBE: NEGATIVE
C DIFF GDH STL QL: NEGATIVE
C DIFF TOX A+B STL QL IA: NEGATIVE
C DIFF TOXIN INTERPRETATION: NORMAL
CA-I BLD-MCNC: 8.6 MG/DL (ref 8.5–10.1)
CHLORIDE SERPL-SCNC: 94 MMOL/L (ref 97–108)
CO2 SERPL-SCNC: 28 MMOL/L (ref 21–32)
CREAT SERPL-MCNC: 6.19 MG/DL (ref 0.7–1.3)
DIFFERENTIAL METHOD BLD: ABNORMAL
EC STX1+STX2 GENES STL QL NAA+PROBE: NEGATIVE
EOSINOPHIL # BLD: 0.2 K/UL (ref 0–0.4)
EOSINOPHIL NFR BLD: 2 % (ref 0–7)
ERYTHROCYTE [DISTWIDTH] IN BLOOD BY AUTOMATED COUNT: 13.8 % (ref 11.5–14.5)
ERYTHROCYTE [DISTWIDTH] IN BLOOD BY AUTOMATED COUNT: 13.9 % (ref 11.5–14.5)
ERYTHROCYTE [DISTWIDTH] IN BLOOD BY AUTOMATED COUNT: 13.9 % (ref 11.5–14.5)
ERYTHROCYTE [DISTWIDTH] IN BLOOD BY AUTOMATED COUNT: 14 % (ref 11.5–14.5)
ETEC ELTA+ESTB GENES STL QL NAA+PROBE: NEGATIVE
GASTROCULT GAST QL: POSITIVE
GLUCOSE BLD STRIP.AUTO-MCNC: 209 MG/DL (ref 65–100)
GLUCOSE BLD STRIP.AUTO-MCNC: 212 MG/DL (ref 65–100)
GLUCOSE BLD STRIP.AUTO-MCNC: 230 MG/DL (ref 65–100)
GLUCOSE BLD STRIP.AUTO-MCNC: 237 MG/DL (ref 65–100)
GLUCOSE BLD STRIP.AUTO-MCNC: 242 MG/DL (ref 65–100)
GLUCOSE BLD STRIP.AUTO-MCNC: 279 MG/DL (ref 65–100)
GLUCOSE SERPL-MCNC: 247 MG/DL (ref 65–100)
HCT VFR BLD AUTO: 24.3 % (ref 36.6–50.3)
HCT VFR BLD AUTO: 24.8 % (ref 36.6–50.3)
HCT VFR BLD AUTO: 25.6 % (ref 36.6–50.3)
HCT VFR BLD AUTO: 26.1 % (ref 36.6–50.3)
HGB BLD-MCNC: 8.1 G/DL (ref 12.1–17)
HGB BLD-MCNC: 8.3 G/DL (ref 12.1–17)
HGB BLD-MCNC: 8.5 G/DL (ref 12.1–17)
HGB BLD-MCNC: 8.7 G/DL (ref 12.1–17)
IMM GRANULOCYTES # BLD AUTO: 0.1 K/UL (ref 0–0.04)
IMM GRANULOCYTES # BLD AUTO: 0.2 K/UL (ref 0–0.04)
IMM GRANULOCYTES NFR BLD AUTO: 1 % (ref 0–0.5)
INR PPP: 1.2 (ref 0.9–1.1)
LYMPHOCYTES # BLD: 1.4 K/UL (ref 0.8–3.5)
LYMPHOCYTES # BLD: 1.4 K/UL (ref 0.8–3.5)
LYMPHOCYTES # BLD: 1.5 K/UL (ref 0.8–3.5)
LYMPHOCYTES # BLD: 1.5 K/UL (ref 0.8–3.5)
LYMPHOCYTES NFR BLD: 10 % (ref 12–49)
LYMPHOCYTES NFR BLD: 11 % (ref 12–49)
MCH RBC QN AUTO: 28.6 PG (ref 26–34)
MCH RBC QN AUTO: 28.9 PG (ref 26–34)
MCH RBC QN AUTO: 28.9 PG (ref 26–34)
MCH RBC QN AUTO: 29.3 PG (ref 26–34)
MCHC RBC AUTO-ENTMCNC: 32.7 G/DL (ref 30–36.5)
MCHC RBC AUTO-ENTMCNC: 33.2 G/DL (ref 30–36.5)
MCHC RBC AUTO-ENTMCNC: 33.3 G/DL (ref 30–36.5)
MCHC RBC AUTO-ENTMCNC: 34.2 G/DL (ref 30–36.5)
MCV RBC AUTO: 85.9 FL (ref 80–99)
MCV RBC AUTO: 86.2 FL (ref 80–99)
MCV RBC AUTO: 86.7 FL (ref 80–99)
MCV RBC AUTO: 88.6 FL (ref 80–99)
MONOCYTES # BLD: 1.1 K/UL (ref 0–1)
MONOCYTES # BLD: 1.1 K/UL (ref 0–1)
MONOCYTES # BLD: 1.2 K/UL (ref 0–1)
MONOCYTES # BLD: 1.2 K/UL (ref 0–1)
MONOCYTES NFR BLD: 10 % (ref 5–13)
MONOCYTES NFR BLD: 7 % (ref 5–13)
MONOCYTES NFR BLD: 8 % (ref 5–13)
MONOCYTES NFR BLD: 9 % (ref 5–13)
NEUTS SEG # BLD: 10.8 K/UL (ref 1.8–8)
NEUTS SEG # BLD: 12.1 K/UL (ref 1.8–8)
NEUTS SEG # BLD: 9.3 K/UL (ref 1.8–8)
NEUTS SEG # BLD: 9.9 K/UL (ref 1.8–8)
NEUTS SEG NFR BLD: 76 % (ref 32–75)
NEUTS SEG NFR BLD: 77 % (ref 32–75)
NEUTS SEG NFR BLD: 78 % (ref 32–75)
NEUTS SEG NFR BLD: 80 % (ref 32–75)
NRBC # BLD: 0 K/UL (ref 0–0.01)
NRBC BLD-RTO: 0 PER 100 WBC
P SHIGELLOIDES DNA STL QL NAA+PROBE: NEGATIVE
PERFORMED BY:: ABNORMAL
PH GAST: 4 (ref 1.5–3.5)
PLATELET # BLD AUTO: 456 K/UL (ref 150–400)
PLATELET # BLD AUTO: 466 K/UL (ref 150–400)
PLATELET # BLD AUTO: 477 K/UL (ref 150–400)
PLATELET # BLD AUTO: 486 K/UL (ref 150–400)
PMV BLD AUTO: 8.8 FL (ref 8.9–12.9)
PMV BLD AUTO: 8.9 FL (ref 8.9–12.9)
POTASSIUM SERPL-SCNC: 4.7 MMOL/L (ref 3.5–5.1)
PROTHROMBIN TIME: 15.1 SEC (ref 11.9–14.6)
RBC # BLD AUTO: 2.8 M/UL (ref 4.1–5.7)
RBC # BLD AUTO: 2.83 M/UL (ref 4.1–5.7)
RBC # BLD AUTO: 2.97 M/UL (ref 4.1–5.7)
RBC # BLD AUTO: 3.01 M/UL (ref 4.1–5.7)
SALMONELLA SP SPAO STL QL NAA+PROBE: NEGATIVE
SHIGELLA SP+EIEC IPAH STL QL NAA+PROBE: NEGATIVE
SODIUM SERPL-SCNC: 130 MMOL/L (ref 136–145)
THERAPEUTIC RANGE: NORMAL SEC (ref 82–109)
V CHOL+PARA+VUL DNA STL QL NAA+NON-PROBE: NEGATIVE
WBC # BLD AUTO: 12.2 K/UL (ref 4.1–11.1)
WBC # BLD AUTO: 12.8 K/UL (ref 4.1–11.1)
WBC # BLD AUTO: 13.8 K/UL (ref 4.1–11.1)
WBC # BLD AUTO: 15.2 K/UL (ref 4.1–11.1)
Y ENTEROCOL DNA STL QL NAA+NON-PROBE: NEGATIVE

## 2024-11-03 PROCEDURE — 6370000000 HC RX 637 (ALT 250 FOR IP): Performed by: INTERNAL MEDICINE

## 2024-11-03 PROCEDURE — 85610 PROTHROMBIN TIME: CPT

## 2024-11-03 PROCEDURE — 2580000003 HC RX 258: Performed by: STUDENT IN AN ORGANIZED HEALTH CARE EDUCATION/TRAINING PROGRAM

## 2024-11-03 PROCEDURE — 86900 BLOOD TYPING SEROLOGIC ABO: CPT

## 2024-11-03 PROCEDURE — 80048 BASIC METABOLIC PNL TOTAL CA: CPT

## 2024-11-03 PROCEDURE — 71045 X-RAY EXAM CHEST 1 VIEW: CPT

## 2024-11-03 PROCEDURE — 86901 BLOOD TYPING SEROLOGIC RH(D): CPT

## 2024-11-03 PROCEDURE — 82271 OCCULT BLOOD OTHER SOURCES: CPT

## 2024-11-03 PROCEDURE — 6370000000 HC RX 637 (ALT 250 FOR IP)

## 2024-11-03 PROCEDURE — 0D9670Z DRAINAGE OF STOMACH WITH DRAINAGE DEVICE, VIA NATURAL OR ARTIFICIAL OPENING: ICD-10-PCS

## 2024-11-03 PROCEDURE — 1100000000 HC RM PRIVATE

## 2024-11-03 PROCEDURE — 6360000002 HC RX W HCPCS: Performed by: PHYSICIAN ASSISTANT

## 2024-11-03 PROCEDURE — 6360000002 HC RX W HCPCS: Performed by: INTERNAL MEDICINE

## 2024-11-03 PROCEDURE — 85730 THROMBOPLASTIN TIME PARTIAL: CPT

## 2024-11-03 PROCEDURE — 36415 COLL VENOUS BLD VENIPUNCTURE: CPT

## 2024-11-03 PROCEDURE — 6370000000 HC RX 637 (ALT 250 FOR IP): Performed by: STUDENT IN AN ORGANIZED HEALTH CARE EDUCATION/TRAINING PROGRAM

## 2024-11-03 PROCEDURE — 74176 CT ABD & PELVIS W/O CONTRAST: CPT

## 2024-11-03 PROCEDURE — 74018 RADEX ABDOMEN 1 VIEW: CPT

## 2024-11-03 PROCEDURE — 99253 IP/OBS CNSLTJ NEW/EST LOW 45: CPT | Performed by: SURGERY

## 2024-11-03 PROCEDURE — 86850 RBC ANTIBODY SCREEN: CPT

## 2024-11-03 PROCEDURE — 82962 GLUCOSE BLOOD TEST: CPT

## 2024-11-03 PROCEDURE — 87340 HEPATITIS B SURFACE AG IA: CPT

## 2024-11-03 PROCEDURE — 6360000002 HC RX W HCPCS: Performed by: STUDENT IN AN ORGANIZED HEALTH CARE EDUCATION/TRAINING PROGRAM

## 2024-11-03 PROCEDURE — 2580000003 HC RX 258: Performed by: INTERNAL MEDICINE

## 2024-11-03 PROCEDURE — 85025 COMPLETE CBC W/AUTO DIFF WBC: CPT

## 2024-11-03 PROCEDURE — 86923 COMPATIBILITY TEST ELECTRIC: CPT

## 2024-11-03 RX ORDER — LIDOCAINE HYDROCHLORIDE 20 MG/ML
JELLY TOPICAL ONCE
Status: DISCONTINUED | OUTPATIENT
Start: 2024-11-03 | End: 2024-11-18 | Stop reason: HOSPADM

## 2024-11-03 RX ORDER — LIDOCAINE HYDROCHLORIDE 20 MG/ML
JELLY TOPICAL ONCE
Status: DISCONTINUED | OUTPATIENT
Start: 2024-11-03 | End: 2024-11-03

## 2024-11-03 RX ORDER — PANTOPRAZOLE SODIUM 40 MG/10ML
40 INJECTION, POWDER, LYOPHILIZED, FOR SOLUTION INTRAVENOUS 2 TIMES DAILY
Status: COMPLETED | OUTPATIENT
Start: 2024-11-03 | End: 2024-11-05

## 2024-11-03 RX ORDER — OXYMETAZOLINE HYDROCHLORIDE 0.05 G/100ML
2 SPRAY NASAL ONCE
Status: DISPENSED | OUTPATIENT
Start: 2024-11-03 | End: 2024-11-06

## 2024-11-03 RX ADMIN — GLIPIZIDE 2.5 MG: 5 TABLET ORAL at 10:35

## 2024-11-03 RX ADMIN — ALLOPURINOL 100 MG: 100 TABLET ORAL at 10:35

## 2024-11-03 RX ADMIN — CYANOCOBALAMIN TAB 500 MCG 500 MCG: 500 TAB at 10:35

## 2024-11-03 RX ADMIN — PANTOPRAZOLE SODIUM 40 MG: 40 INJECTION, POWDER, FOR SOLUTION INTRAVENOUS at 02:11

## 2024-11-03 RX ADMIN — DICLOFENAC SODIUM 2 G: 10 GEL TOPICAL at 20:06

## 2024-11-03 RX ADMIN — PANTOPRAZOLE SODIUM 40 MG: 40 INJECTION, POWDER, FOR SOLUTION INTRAVENOUS at 20:05

## 2024-11-03 RX ADMIN — INSULIN LISPRO 2 UNITS: 100 INJECTION, SOLUTION INTRAVENOUS; SUBCUTANEOUS at 10:38

## 2024-11-03 RX ADMIN — ONDANSETRON 4 MG: 2 INJECTION INTRAMUSCULAR; INTRAVENOUS at 16:10

## 2024-11-03 RX ADMIN — SODIUM CHLORIDE, PRESERVATIVE FREE 10 ML: 5 INJECTION INTRAVENOUS at 10:36

## 2024-11-03 RX ADMIN — NIFEDIPINE 90 MG: 30 TABLET, FILM COATED, EXTENDED RELEASE ORAL at 06:44

## 2024-11-03 RX ADMIN — THIAMINE HCL TAB 100 MG 100 MG: 100 TAB at 10:35

## 2024-11-03 RX ADMIN — DICLOFENAC SODIUM 2 G: 10 GEL TOPICAL at 10:43

## 2024-11-03 RX ADMIN — FINASTERIDE 5 MG: 5 TABLET, FILM COATED ORAL at 10:35

## 2024-11-03 RX ADMIN — Medication 1 MG: at 10:35

## 2024-11-03 RX ADMIN — CARVEDILOL 6.25 MG: 3.12 TABLET, FILM COATED ORAL at 16:10

## 2024-11-03 RX ADMIN — CARVEDILOL 6.25 MG: 3.12 TABLET, FILM COATED ORAL at 10:35

## 2024-11-03 RX ADMIN — ONDANSETRON 4 MG: 2 INJECTION INTRAMUSCULAR; INTRAVENOUS at 00:22

## 2024-11-03 RX ADMIN — MEROPENEM 500 MG: 500 INJECTION INTRAVENOUS at 10:48

## 2024-11-03 RX ADMIN — PANTOPRAZOLE SODIUM 40 MG: 40 INJECTION, POWDER, FOR SOLUTION INTRAVENOUS at 10:36

## 2024-11-03 RX ADMIN — SODIUM CHLORIDE, PRESERVATIVE FREE 10 ML: 5 INJECTION INTRAVENOUS at 20:05

## 2024-11-03 ASSESSMENT — PAIN DESCRIPTION - ORIENTATION
ORIENTATION: LOWER
ORIENTATION: LEFT

## 2024-11-03 ASSESSMENT — PAIN SCALES - GENERAL
PAINLEVEL_OUTOF10: 2
PAINLEVEL_OUTOF10: 4
PAINLEVEL_OUTOF10: 0
PAINLEVEL_OUTOF10: 0
PAINLEVEL_OUTOF10: 4

## 2024-11-03 ASSESSMENT — PAIN SCALES - WONG BAKER: WONGBAKER_NUMERICALRESPONSE: HURTS A LITTLE BIT

## 2024-11-03 ASSESSMENT — PAIN DESCRIPTION - DESCRIPTORS
DESCRIPTORS: ACHING
DESCRIPTORS: DISCOMFORT

## 2024-11-03 ASSESSMENT — PAIN DESCRIPTION - LOCATION
LOCATION: NECK
LOCATION: ABDOMEN

## 2024-11-03 ASSESSMENT — PAIN - FUNCTIONAL ASSESSMENT: PAIN_FUNCTIONAL_ASSESSMENT: ACTIVITIES ARE NOT PREVENTED

## 2024-11-03 NOTE — SIGNIFICANT EVENT
Patient had another episode of bilious vomiting.  KUB with early/partial SBO.  CT abdomen ordered with no contrast due to ESRD patient.   NPO.  NG tube insertion with imaging after.  General surgery consult placed.      Monitor patient closely.    Of note, patient states that he has not had any abdominal surgeries before.    He has had previous SBO in October.

## 2024-11-03 NOTE — SIGNIFICANT EVENT
Significant event for overnight coverage:    Subjective:  83-year-old male admitted on 10/31/2024 with hospital-acquired pneumonia.  Currently on Zosyn.  Nurse reports large volume coffee-ground emesis.  Gastric occult has been ordered.  Patient is normotensive and answering questions appropriately.  Will start Protonix and asked for GI consult.  Will transfuse for hemoglobin less than 7 or symptomatic.    Vitals:    11/02/24 2345   BP: (!) 124/53   Pulse: 92   Resp: 22   Temp: 98.4 °F (36.9 °C)   SpO2: 98%       PHYSICAL EXAM:    Physical Exam  Vitals reviewed.   Cardiovascular:      Rate and Rhythm: Normal rate and regular rhythm.      Heart sounds: Normal heart sounds.   Pulmonary:      Effort: Pulmonary effort is normal.      Breath sounds: Normal breath sounds.   Abdominal:      General: Abdomen is flat. Bowel sounds are normal.      Palpations: Abdomen is soft.   Neurological:      Mental Status: He is alert.          Data Review    Recent Results (from the past 24 hour(s))   CBC with Auto Differential    Collection Time: 11/02/24  5:04 AM   Result Value Ref Range    WBC 12.8 (H) 4.1 - 11.1 K/uL    RBC 2.66 (L) 4.10 - 5.70 M/uL    Hemoglobin 7.6 (L) 12.1 - 17.0 g/dL    Hematocrit 23.0 (L) 36.6 - 50.3 %    MCV 86.5 80.0 - 99.0 FL    MCH 28.6 26.0 - 34.0 PG    MCHC 33.0 30.0 - 36.5 g/dL    RDW 14.0 11.5 - 14.5 %    Platelets 393 150 - 400 K/uL    MPV 8.7 (L) 8.9 - 12.9 FL    Nucleated RBCs 0.0 0.0  WBC    nRBC 0.00 0.00 - 0.01 K/uL    Neutrophils % 70 32 - 75 %    Lymphocytes % 14 12 - 49 %    Monocytes % 13 5 - 13 %    Eosinophils % 2 0 - 7 %    Basophils % 0 0 - 1 %    Immature Granulocytes % 1 (H) 0 - 0.5 %    Neutrophils Absolute 9.0 (H) 1.8 - 8.0 K/UL    Lymphocytes Absolute 1.8 0.8 - 3.5 K/UL    Monocytes Absolute 1.6 (H) 0.0 - 1.0 K/UL    Eosinophils Absolute 0.2 0.0 - 0.4 K/UL    Basophils Absolute 0.0 0.0 - 0.1 K/UL    Immature Granulocytes Absolute 0.1 (H) 0.00 - 0.04 K/UL    Differential Type

## 2024-11-04 ENCOUNTER — HOSPITAL ENCOUNTER (INPATIENT)
Facility: HOSPITAL | Age: 84
Discharge: HOME | DRG: 853 | End: 2024-11-07
Payer: OTHER GOVERNMENT

## 2024-11-04 ENCOUNTER — APPOINTMENT (OUTPATIENT)
Facility: HOSPITAL | Age: 84
DRG: 853 | End: 2024-11-04
Payer: OTHER GOVERNMENT

## 2024-11-04 LAB
ANION GAP SERPL CALC-SCNC: 11 MMOL/L (ref 2–12)
BASOPHILS # BLD: 0 K/UL (ref 0–0.1)
BASOPHILS NFR BLD: 0 % (ref 0–1)
BUN SERPL-MCNC: 71 MG/DL (ref 6–20)
BUN/CREAT SERPL: 9 (ref 12–20)
CA-I BLD-MCNC: 8.9 MG/DL (ref 8.5–10.1)
CHLORIDE SERPL-SCNC: 96 MMOL/L (ref 97–108)
CO2 SERPL-SCNC: 24 MMOL/L (ref 21–32)
CREAT SERPL-MCNC: 7.56 MG/DL (ref 0.7–1.3)
DIFFERENTIAL METHOD BLD: ABNORMAL
EKG ATRIAL RATE: 90 BPM
EKG DIAGNOSIS: NORMAL
EKG P AXIS: 59 DEGREES
EKG Q-T INTERVAL: 392 MS
EKG QRS DURATION: 86 MS
EKG QTC CALCULATION (BAZETT): 423 MS
EKG R AXIS: -25 DEGREES
EKG T AXIS: 17 DEGREES
EKG VENTRICULAR RATE: 70 BPM
EOSINOPHIL # BLD: 0.2 K/UL (ref 0–0.4)
EOSINOPHIL # BLD: 0.3 K/UL (ref 0–0.4)
EOSINOPHIL # BLD: 0.3 K/UL (ref 0–0.4)
EOSINOPHIL NFR BLD: 2 % (ref 0–7)
ERYTHROCYTE [DISTWIDTH] IN BLOOD BY AUTOMATED COUNT: 13.9 % (ref 11.5–14.5)
ERYTHROCYTE [DISTWIDTH] IN BLOOD BY AUTOMATED COUNT: 14.1 % (ref 11.5–14.5)
ERYTHROCYTE [DISTWIDTH] IN BLOOD BY AUTOMATED COUNT: 14.2 % (ref 11.5–14.5)
GLUCOSE BLD STRIP.AUTO-MCNC: 132 MG/DL (ref 65–100)
GLUCOSE BLD STRIP.AUTO-MCNC: 137 MG/DL (ref 65–100)
GLUCOSE BLD STRIP.AUTO-MCNC: 140 MG/DL (ref 65–100)
GLUCOSE BLD STRIP.AUTO-MCNC: 197 MG/DL (ref 65–100)
GLUCOSE SERPL-MCNC: 192 MG/DL (ref 65–100)
HBV SURFACE AG SER QL: 0.11 INDEX
HBV SURFACE AG SER QL: NEGATIVE
HCT VFR BLD AUTO: 22.7 % (ref 36.6–50.3)
HCT VFR BLD AUTO: 22.9 % (ref 36.6–50.3)
HCT VFR BLD AUTO: 25.5 % (ref 36.6–50.3)
HGB BLD-MCNC: 7.4 G/DL (ref 12.1–17)
HGB BLD-MCNC: 7.5 G/DL (ref 12.1–17)
HGB BLD-MCNC: 8.3 G/DL (ref 12.1–17)
IMM GRANULOCYTES # BLD AUTO: 0.1 K/UL (ref 0–0.04)
IMM GRANULOCYTES # BLD AUTO: 0.2 K/UL (ref 0–0.04)
IMM GRANULOCYTES # BLD AUTO: 0.3 K/UL (ref 0–0.04)
IMM GRANULOCYTES NFR BLD AUTO: 1 % (ref 0–0.5)
IMM GRANULOCYTES NFR BLD AUTO: 1 % (ref 0–0.5)
IMM GRANULOCYTES NFR BLD AUTO: 2 % (ref 0–0.5)
LYMPHOCYTES # BLD: 1.4 K/UL (ref 0.8–3.5)
LYMPHOCYTES # BLD: 1.5 K/UL (ref 0.8–3.5)
LYMPHOCYTES # BLD: 1.5 K/UL (ref 0.8–3.5)
LYMPHOCYTES NFR BLD: 10 % (ref 12–49)
LYMPHOCYTES NFR BLD: 11 % (ref 12–49)
LYMPHOCYTES NFR BLD: 11 % (ref 12–49)
MCH RBC QN AUTO: 28.7 PG (ref 26–34)
MCH RBC QN AUTO: 28.7 PG (ref 26–34)
MCH RBC QN AUTO: 28.8 PG (ref 26–34)
MCHC RBC AUTO-ENTMCNC: 32.5 G/DL (ref 30–36.5)
MCHC RBC AUTO-ENTMCNC: 32.6 G/DL (ref 30–36.5)
MCHC RBC AUTO-ENTMCNC: 32.8 G/DL (ref 30–36.5)
MCV RBC AUTO: 87.7 FL (ref 80–99)
MCV RBC AUTO: 88.2 FL (ref 80–99)
MCV RBC AUTO: 88.3 FL (ref 80–99)
MONOCYTES # BLD: 1 K/UL (ref 0–1)
MONOCYTES # BLD: 1.1 K/UL (ref 0–1)
MONOCYTES # BLD: 1.3 K/UL (ref 0–1)
MONOCYTES NFR BLD: 8 % (ref 5–13)
NEUTS SEG # BLD: 10.4 K/UL (ref 1.8–8)
NEUTS SEG # BLD: 11.2 K/UL (ref 1.8–8)
NEUTS SEG # BLD: 11.7 K/UL (ref 1.8–8)
NEUTS SEG NFR BLD: 78 % (ref 32–75)
NRBC # BLD: 0 K/UL (ref 0–0.01)
NRBC BLD-RTO: 0 PER 100 WBC
PERFORMED BY:: ABNORMAL
PLATELET # BLD AUTO: 434 K/UL (ref 150–400)
PLATELET # BLD AUTO: 469 K/UL (ref 150–400)
PLATELET # BLD AUTO: 498 K/UL (ref 150–400)
PMV BLD AUTO: 9.1 FL (ref 8.9–12.9)
POTASSIUM SERPL-SCNC: 5 MMOL/L (ref 3.5–5.1)
RBC # BLD AUTO: 2.57 M/UL (ref 4.1–5.7)
RBC # BLD AUTO: 2.61 M/UL (ref 4.1–5.7)
RBC # BLD AUTO: 2.89 M/UL (ref 4.1–5.7)
SODIUM SERPL-SCNC: 131 MMOL/L (ref 136–145)
WBC # BLD AUTO: 13.2 K/UL (ref 4.1–11.1)
WBC # BLD AUTO: 14.3 K/UL (ref 4.1–11.1)
WBC # BLD AUTO: 15 K/UL (ref 4.1–11.1)

## 2024-11-04 PROCEDURE — 6360000002 HC RX W HCPCS: Performed by: PHYSICIAN ASSISTANT

## 2024-11-04 PROCEDURE — 6360000002 HC RX W HCPCS: Performed by: INTERNAL MEDICINE

## 2024-11-04 PROCEDURE — 85025 COMPLETE CBC W/AUTO DIFF WBC: CPT

## 2024-11-04 PROCEDURE — 99232 SBSQ HOSP IP/OBS MODERATE 35: CPT | Performed by: SURGERY

## 2024-11-04 PROCEDURE — 82962 GLUCOSE BLOOD TEST: CPT

## 2024-11-04 PROCEDURE — 90935 HEMODIALYSIS ONE EVALUATION: CPT

## 2024-11-04 PROCEDURE — 93005 ELECTROCARDIOGRAM TRACING: CPT

## 2024-11-04 PROCEDURE — 74018 RADEX ABDOMEN 1 VIEW: CPT

## 2024-11-04 PROCEDURE — 80048 BASIC METABOLIC PNL TOTAL CA: CPT

## 2024-11-04 PROCEDURE — 6360000002 HC RX W HCPCS: Performed by: STUDENT IN AN ORGANIZED HEALTH CARE EDUCATION/TRAINING PROGRAM

## 2024-11-04 PROCEDURE — 2709999900 HC NON-CHARGEABLE SUPPLY

## 2024-11-04 PROCEDURE — 1100000000 HC RM PRIVATE

## 2024-11-04 PROCEDURE — 2580000003 HC RX 258: Performed by: STUDENT IN AN ORGANIZED HEALTH CARE EDUCATION/TRAINING PROGRAM

## 2024-11-04 PROCEDURE — 6370000000 HC RX 637 (ALT 250 FOR IP): Performed by: INTERNAL MEDICINE

## 2024-11-04 PROCEDURE — 6370000000 HC RX 637 (ALT 250 FOR IP)

## 2024-11-04 PROCEDURE — 36415 COLL VENOUS BLD VENIPUNCTURE: CPT

## 2024-11-04 PROCEDURE — 2580000003 HC RX 258: Performed by: INTERNAL MEDICINE

## 2024-11-04 RX ORDER — HEPARIN SODIUM 1000 [USP'U]/ML
3600 INJECTION, SOLUTION INTRAVENOUS; SUBCUTANEOUS PRN
Status: DISCONTINUED | OUTPATIENT
Start: 2024-11-04 | End: 2024-11-18 | Stop reason: HOSPADM

## 2024-11-04 RX ADMIN — FINASTERIDE 5 MG: 5 TABLET, FILM COATED ORAL at 15:10

## 2024-11-04 RX ADMIN — ALLOPURINOL 100 MG: 100 TABLET ORAL at 15:13

## 2024-11-04 RX ADMIN — PANTOPRAZOLE SODIUM 40 MG: 40 INJECTION, POWDER, FOR SOLUTION INTRAVENOUS at 20:46

## 2024-11-04 RX ADMIN — SODIUM CHLORIDE, PRESERVATIVE FREE 10 ML: 5 INJECTION INTRAVENOUS at 15:15

## 2024-11-04 RX ADMIN — INSULIN LISPRO 0 UNITS: 100 INJECTION, SOLUTION INTRAVENOUS; SUBCUTANEOUS at 20:57

## 2024-11-04 RX ADMIN — EPOETIN ALFA-EPBX 6000 UNITS: 10000 INJECTION, SOLUTION INTRAVENOUS; SUBCUTANEOUS at 12:39

## 2024-11-04 RX ADMIN — SODIUM CHLORIDE, PRESERVATIVE FREE 10 ML: 5 INJECTION INTRAVENOUS at 20:54

## 2024-11-04 RX ADMIN — HEPARIN SODIUM 3600 UNITS: 1000 INJECTION INTRAVENOUS; SUBCUTANEOUS at 13:30

## 2024-11-04 RX ADMIN — DICLOFENAC SODIUM 2 G: 10 GEL TOPICAL at 15:13

## 2024-11-04 RX ADMIN — PANTOPRAZOLE SODIUM 40 MG: 40 INJECTION, POWDER, FOR SOLUTION INTRAVENOUS at 15:15

## 2024-11-04 RX ADMIN — CYANOCOBALAMIN TAB 500 MCG 500 MCG: 500 TAB at 15:10

## 2024-11-04 RX ADMIN — Medication 1 MG: at 15:10

## 2024-11-04 RX ADMIN — MEROPENEM 500 MG: 500 INJECTION INTRAVENOUS at 15:24

## 2024-11-04 RX ADMIN — ONDANSETRON 4 MG: 2 INJECTION INTRAMUSCULAR; INTRAVENOUS at 20:55

## 2024-11-04 RX ADMIN — THIAMINE HCL TAB 100 MG 100 MG: 100 TAB at 15:10

## 2024-11-04 ASSESSMENT — PAIN SCALES - GENERAL
PAINLEVEL_OUTOF10: 0
PAINLEVEL_OUTOF10: 0

## 2024-11-04 NOTE — WOUND CARE
WCN attempted to see patient but patient is off unit in dialysis.  WCN will follow up this afternoon if time allows.

## 2024-11-04 NOTE — DIALYSIS
Report given to Galileo pt's primary nurse. Pt dialyzed for 3.5 hours, removed 1.2 liters of fluid. Epogen given intra dialysis. Pt tolerated treatment well. Pt en route to xray. Notified [place] in tele.

## 2024-11-04 NOTE — CARE COORDINATION
DCP: [facility]  VINAY: 48 hours    CM has reviewed pt chart. Pt on IV merrem (not followed by ID), NPO, pt removed NGT, pulm following, and awaiting GI and general surgery clearances.

## 2024-11-05 LAB
ANION GAP SERPL CALC-SCNC: 11 MMOL/L (ref 2–12)
BASOPHILS # BLD: 0 K/UL (ref 0–0.1)
BASOPHILS NFR BLD: 0 % (ref 0–1)
BUN SERPL-MCNC: 47 MG/DL (ref 6–20)
BUN/CREAT SERPL: 9 (ref 12–20)
CA-I BLD-MCNC: 8.6 MG/DL (ref 8.5–10.1)
CHLORIDE SERPL-SCNC: 101 MMOL/L (ref 97–108)
CO2 SERPL-SCNC: 24 MMOL/L (ref 21–32)
CREAT SERPL-MCNC: 5.47 MG/DL (ref 0.7–1.3)
DIFFERENTIAL METHOD BLD: ABNORMAL
EOSINOPHIL # BLD: 0.4 K/UL (ref 0–0.4)
EOSINOPHIL NFR BLD: 3 % (ref 0–7)
ERYTHROCYTE [DISTWIDTH] IN BLOOD BY AUTOMATED COUNT: 14.6 % (ref 11.5–14.5)
GLUCOSE BLD STRIP.AUTO-MCNC: 137 MG/DL (ref 65–100)
GLUCOSE BLD STRIP.AUTO-MCNC: 180 MG/DL (ref 65–100)
GLUCOSE BLD STRIP.AUTO-MCNC: 186 MG/DL (ref 65–100)
GLUCOSE BLD STRIP.AUTO-MCNC: 264 MG/DL (ref 65–100)
GLUCOSE SERPL-MCNC: 113 MG/DL (ref 65–100)
HCT VFR BLD AUTO: 21.2 % (ref 36.6–50.3)
HCT VFR BLD AUTO: 25.4 % (ref 36.6–50.3)
HGB BLD-MCNC: 6.9 G/DL (ref 12.1–17)
HGB BLD-MCNC: 8.5 G/DL (ref 12.1–17)
IMM GRANULOCYTES # BLD AUTO: 0.1 K/UL (ref 0–0.04)
IMM GRANULOCYTES NFR BLD AUTO: 1 % (ref 0–0.5)
LYMPHOCYTES # BLD: 1.4 K/UL (ref 0.8–3.5)
LYMPHOCYTES NFR BLD: 12 % (ref 12–49)
MCH RBC QN AUTO: 29.6 PG (ref 26–34)
MCHC RBC AUTO-ENTMCNC: 32.5 G/DL (ref 30–36.5)
MCV RBC AUTO: 91 FL (ref 80–99)
MONOCYTES # BLD: 0.9 K/UL (ref 0–1)
MONOCYTES NFR BLD: 8 % (ref 5–13)
NEUTS SEG # BLD: 9 K/UL (ref 1.8–8)
NEUTS SEG NFR BLD: 76 % (ref 32–75)
NRBC # BLD: 0 K/UL (ref 0–0.01)
NRBC BLD-RTO: 0 PER 100 WBC
PERFORMED BY:: ABNORMAL
PLATELET # BLD AUTO: 480 K/UL (ref 150–400)
PMV BLD AUTO: 8.9 FL (ref 8.9–12.9)
POTASSIUM SERPL-SCNC: 4.4 MMOL/L (ref 3.5–5.1)
RBC # BLD AUTO: 2.33 M/UL (ref 4.1–5.7)
RBC MORPH BLD: ABNORMAL
SODIUM SERPL-SCNC: 136 MMOL/L (ref 136–145)
WBC # BLD AUTO: 11.8 K/UL (ref 4.1–11.1)

## 2024-11-05 PROCEDURE — 97530 THERAPEUTIC ACTIVITIES: CPT

## 2024-11-05 PROCEDURE — 85014 HEMATOCRIT: CPT

## 2024-11-05 PROCEDURE — 6360000002 HC RX W HCPCS: Performed by: INTERNAL MEDICINE

## 2024-11-05 PROCEDURE — 2580000003 HC RX 258: Performed by: INTERNAL MEDICINE

## 2024-11-05 PROCEDURE — 85025 COMPLETE CBC W/AUTO DIFF WBC: CPT

## 2024-11-05 PROCEDURE — 36415 COLL VENOUS BLD VENIPUNCTURE: CPT

## 2024-11-05 PROCEDURE — 6370000000 HC RX 637 (ALT 250 FOR IP): Performed by: STUDENT IN AN ORGANIZED HEALTH CARE EDUCATION/TRAINING PROGRAM

## 2024-11-05 PROCEDURE — 85018 HEMOGLOBIN: CPT

## 2024-11-05 PROCEDURE — 1100000000 HC RM PRIVATE

## 2024-11-05 PROCEDURE — 82962 GLUCOSE BLOOD TEST: CPT

## 2024-11-05 PROCEDURE — 36430 TRANSFUSION BLD/BLD COMPNT: CPT

## 2024-11-05 PROCEDURE — 99232 SBSQ HOSP IP/OBS MODERATE 35: CPT | Performed by: SURGERY

## 2024-11-05 PROCEDURE — 6360000002 HC RX W HCPCS: Performed by: STUDENT IN AN ORGANIZED HEALTH CARE EDUCATION/TRAINING PROGRAM

## 2024-11-05 PROCEDURE — 6370000000 HC RX 637 (ALT 250 FOR IP): Performed by: INTERNAL MEDICINE

## 2024-11-05 PROCEDURE — P9016 RBC LEUKOCYTES REDUCED: HCPCS

## 2024-11-05 PROCEDURE — 6370000000 HC RX 637 (ALT 250 FOR IP)

## 2024-11-05 PROCEDURE — 6360000002 HC RX W HCPCS: Performed by: PHYSICIAN ASSISTANT

## 2024-11-05 PROCEDURE — 80048 BASIC METABOLIC PNL TOTAL CA: CPT

## 2024-11-05 PROCEDURE — 2580000003 HC RX 258: Performed by: STUDENT IN AN ORGANIZED HEALTH CARE EDUCATION/TRAINING PROGRAM

## 2024-11-05 RX ORDER — SODIUM CHLORIDE 9 MG/ML
INJECTION, SOLUTION INTRAVENOUS PRN
Status: DISCONTINUED | OUTPATIENT
Start: 2024-11-05 | End: 2024-11-18 | Stop reason: HOSPADM

## 2024-11-05 RX ADMIN — INSULIN LISPRO 2 UNITS: 100 INJECTION, SOLUTION INTRAVENOUS; SUBCUTANEOUS at 12:10

## 2024-11-05 RX ADMIN — THIAMINE HCL TAB 100 MG 100 MG: 100 TAB at 09:54

## 2024-11-05 RX ADMIN — MEROPENEM 500 MG: 500 INJECTION INTRAVENOUS at 10:10

## 2024-11-05 RX ADMIN — INSULIN LISPRO 4 UNITS: 100 INJECTION, SOLUTION INTRAVENOUS; SUBCUTANEOUS at 17:04

## 2024-11-05 RX ADMIN — CYANOCOBALAMIN TAB 500 MCG 500 MCG: 500 TAB at 09:54

## 2024-11-05 RX ADMIN — ONDANSETRON 4 MG: 2 INJECTION INTRAMUSCULAR; INTRAVENOUS at 21:14

## 2024-11-05 RX ADMIN — Medication 1 MG: at 09:54

## 2024-11-05 RX ADMIN — CARVEDILOL 6.25 MG: 3.12 TABLET, FILM COATED ORAL at 09:54

## 2024-11-05 RX ADMIN — INSULIN LISPRO 2 UNITS: 100 INJECTION, SOLUTION INTRAVENOUS; SUBCUTANEOUS at 20:35

## 2024-11-05 RX ADMIN — SODIUM CHLORIDE, PRESERVATIVE FREE 10 ML: 5 INJECTION INTRAVENOUS at 20:36

## 2024-11-05 RX ADMIN — ALLOPURINOL 100 MG: 100 TABLET ORAL at 09:54

## 2024-11-05 RX ADMIN — DICLOFENAC SODIUM 2 G: 10 GEL TOPICAL at 09:57

## 2024-11-05 RX ADMIN — DICLOFENAC SODIUM 2 G: 10 GEL TOPICAL at 20:37

## 2024-11-05 RX ADMIN — FINASTERIDE 5 MG: 5 TABLET, FILM COATED ORAL at 09:54

## 2024-11-05 RX ADMIN — PANTOPRAZOLE SODIUM 40 MG: 40 INJECTION, POWDER, FOR SOLUTION INTRAVENOUS at 09:54

## 2024-11-05 RX ADMIN — SODIUM CHLORIDE, PRESERVATIVE FREE 10 ML: 5 INJECTION INTRAVENOUS at 09:57

## 2024-11-05 ASSESSMENT — PAIN SCALES - GENERAL: PAINLEVEL_OUTOF10: 1

## 2024-11-05 ASSESSMENT — PAIN DESCRIPTION - DESCRIPTORS: DESCRIPTORS: ACHING

## 2024-11-05 ASSESSMENT — PAIN DESCRIPTION - LOCATION: LOCATION: NECK

## 2024-11-05 NOTE — CARE COORDINATION
Patient has been accepted at [facility] when medically stable for discharge, Clinical updates sent to SNF for review.  Patient has a HD chair at [facility] at 4:15pm.  Family was transporting to and from HD.

## 2024-11-05 NOTE — WOUND CARE
IP WOUND CONSULT    Michael Fung  MEDICAL RECORD NUMBER:  [id number]  AGE: 83 y.o.   GENDER: male  : 1940  TODAY'S DATE:  2024    GENERAL     [] Follow-up   [x] New Consult    Michael Fung is a 83 y.o. male referred by:   [x] Physician  [] Nursing  [] Other:         PAST MEDICAL HISTORY    No past medical history on file.     PAST SURGICAL HISTORY    Past Surgical History:   Procedure Laterality Date    IR IVC FILTER PLACEMENT W IMAGING  10/4/2024    IR IVC FILTER PLACEMENT W IMAGING 10/4/2024 Saad Mancini MD [facility] IR    IR NONTUNNELED VASCULAR CATHETER  10/4/2024    IR NONTUNNELED VASCULAR CATHETER 10/4/2024 Saad Mancini MD [facility] IR    IR TUNNELED CATHETER PLACEMENT GREATER THAN 5 YEARS  10/16/2024    IR TUNNELED CATHETER PLACEMENT GREATER THAN 5 YEARS 10/16/2024 Gracie Santana APRN - NP [facility] IR       FAMILY HISTORY    No family history on file.      ALLERGIES    No Known Allergies    MEDICATIONS    No current facility-administered medications on file prior to encounter.     Current Outpatient Medications on File Prior to Encounter   Medication Sig Dispense Refill    Petrolatum-Zinc Oxide 57-17 % ointment Apply 1 each topically in the morning and at bedtime 1 each 1    allopurinol (ZYLOPRIM) 100 MG tablet Take 1 tablet by mouth daily      carvedilol (COREG) 12.5 MG tablet Take 0.5 tablets by mouth 2 times daily (with meals)      cyanocobalamin 500 MCG tablet Take 1 tablet by mouth daily      finasteride (PROSCAR) 5 MG tablet Take 1 tablet by mouth daily      glipiZIDE (GLUCOTROL XL) 5 MG extended release tablet Take 0.5 tablets by mouth daily      Multiple Vitamins-Minerals (MVW COMPLETE FORMULATION) CAPS Take 1 capsule by mouth daily      NIFEdipine (ADALAT CC) 90 MG extended release tablet Take 1 tablet by mouth every morning (before breakfast)      sodium bicarbonate 650 MG tablet Take 2 tablets by mouth 2 times daily      tamsulosin (FLOMAX) 0.4 MG

## 2024-11-06 ENCOUNTER — APPOINTMENT (OUTPATIENT)
Facility: HOSPITAL | Age: 84
DRG: 853 | End: 2024-11-06
Payer: OTHER GOVERNMENT

## 2024-11-06 LAB
ABO + RH BLD: NORMAL
BLD PROD TYP BPU: NORMAL
BLOOD BANK BLOOD PRODUCT EXPIRATION DATE: NORMAL
BLOOD BANK DISPENSE STATUS: NORMAL
BLOOD BANK ISBT PRODUCT BLOOD TYPE: 5100
BLOOD BANK PRODUCT CODE: NORMAL
BLOOD BANK UNIT TYPE AND RH: NORMAL
BLOOD GROUP ANTIBODIES SERPL: NEGATIVE
BPU ID: NORMAL
CROSSMATCH RESULT: NORMAL
GLUCOSE BLD STRIP.AUTO-MCNC: 131 MG/DL (ref 65–100)
GLUCOSE BLD STRIP.AUTO-MCNC: 143 MG/DL (ref 65–100)
GLUCOSE BLD STRIP.AUTO-MCNC: 90 MG/DL (ref 65–100)
PERFORMED BY:: ABNORMAL
PERFORMED BY:: ABNORMAL
PERFORMED BY:: NORMAL
SPECIMEN EXP DATE BLD: NORMAL
TRANSFUSION STATUS PATIENT QL: NORMAL
UNIT DIVISION: 0
UNIT ISSUE DATE/TIME: NORMAL

## 2024-11-06 PROCEDURE — 2709999900 HC NON-CHARGEABLE SUPPLY

## 2024-11-06 PROCEDURE — 6360000002 HC RX W HCPCS: Performed by: INTERNAL MEDICINE

## 2024-11-06 PROCEDURE — 6370000000 HC RX 637 (ALT 250 FOR IP): Performed by: INTERNAL MEDICINE

## 2024-11-06 PROCEDURE — 6370000000 HC RX 637 (ALT 250 FOR IP)

## 2024-11-06 PROCEDURE — 90935 HEMODIALYSIS ONE EVALUATION: CPT

## 2024-11-06 PROCEDURE — 71045 X-RAY EXAM CHEST 1 VIEW: CPT

## 2024-11-06 PROCEDURE — 2580000003 HC RX 258: Performed by: STUDENT IN AN ORGANIZED HEALTH CARE EDUCATION/TRAINING PROGRAM

## 2024-11-06 PROCEDURE — 1100000000 HC RM PRIVATE

## 2024-11-06 PROCEDURE — 97530 THERAPEUTIC ACTIVITIES: CPT

## 2024-11-06 PROCEDURE — 82962 GLUCOSE BLOOD TEST: CPT

## 2024-11-06 PROCEDURE — 2580000003 HC RX 258: Performed by: INTERNAL MEDICINE

## 2024-11-06 RX ADMIN — DICLOFENAC SODIUM 2 G: 10 GEL TOPICAL at 14:31

## 2024-11-06 RX ADMIN — HEPARIN SODIUM 3600 UNITS: 1000 INJECTION INTRAVENOUS; SUBCUTANEOUS at 12:50

## 2024-11-06 RX ADMIN — CYANOCOBALAMIN TAB 500 MCG 500 MCG: 500 TAB at 14:29

## 2024-11-06 RX ADMIN — THIAMINE HCL TAB 100 MG 100 MG: 100 TAB at 14:29

## 2024-11-06 RX ADMIN — DICLOFENAC SODIUM 2 G: 10 GEL TOPICAL at 21:25

## 2024-11-06 RX ADMIN — MEROPENEM 500 MG: 500 INJECTION INTRAVENOUS at 14:25

## 2024-11-06 RX ADMIN — ALLOPURINOL 100 MG: 100 TABLET ORAL at 14:29

## 2024-11-06 RX ADMIN — SODIUM CHLORIDE, PRESERVATIVE FREE 10 ML: 5 INJECTION INTRAVENOUS at 14:28

## 2024-11-06 RX ADMIN — SODIUM CHLORIDE, PRESERVATIVE FREE 10 ML: 5 INJECTION INTRAVENOUS at 21:26

## 2024-11-06 RX ADMIN — GLIPIZIDE 2.5 MG: 5 TABLET ORAL at 14:30

## 2024-11-06 RX ADMIN — FINASTERIDE 5 MG: 5 TABLET, FILM COATED ORAL at 14:29

## 2024-11-06 RX ADMIN — EPOETIN ALFA-EPBX 6000 UNITS: 10000 INJECTION, SOLUTION INTRAVENOUS; SUBCUTANEOUS at 12:08

## 2024-11-06 RX ADMIN — CARVEDILOL 6.25 MG: 3.12 TABLET, FILM COATED ORAL at 16:55

## 2024-11-06 RX ADMIN — Medication 1 MG: at 14:28

## 2024-11-06 NOTE — DIALYSIS
Report called to Gina, pt's primary nurse. Pt dialyzed for 3.5 hours, removed 1 liter of fluid. Epogen given intra dialysis. Pt tolerated treatment well. Notified Estefania in tele that pt is en route to his room.

## 2024-11-06 NOTE — CARE COORDINATION
Chart reviewed    ESRD on HD @ [facility]    DCP: [facility] and rehab once medically stable    Recent clinicals attached in [facility]    Patient is a . Transfer Refusal form and recent clinicals faxed to [facility] @ [phone]

## 2024-11-07 LAB
GLUCOSE BLD STRIP.AUTO-MCNC: 119 MG/DL (ref 65–100)
GLUCOSE BLD STRIP.AUTO-MCNC: 238 MG/DL (ref 65–100)
GLUCOSE BLD STRIP.AUTO-MCNC: 249 MG/DL (ref 65–100)
GLUCOSE BLD STRIP.AUTO-MCNC: 74 MG/DL (ref 65–100)
PERFORMED BY:: ABNORMAL
PERFORMED BY:: NORMAL

## 2024-11-07 PROCEDURE — 82962 GLUCOSE BLOOD TEST: CPT

## 2024-11-07 PROCEDURE — 2580000003 HC RX 258: Performed by: STUDENT IN AN ORGANIZED HEALTH CARE EDUCATION/TRAINING PROGRAM

## 2024-11-07 PROCEDURE — 6370000000 HC RX 637 (ALT 250 FOR IP): Performed by: INTERNAL MEDICINE

## 2024-11-07 PROCEDURE — 97535 SELF CARE MNGMENT TRAINING: CPT

## 2024-11-07 PROCEDURE — 2580000003 HC RX 258: Performed by: INTERNAL MEDICINE

## 2024-11-07 PROCEDURE — 6370000000 HC RX 637 (ALT 250 FOR IP)

## 2024-11-07 PROCEDURE — 6360000002 HC RX W HCPCS: Performed by: STUDENT IN AN ORGANIZED HEALTH CARE EDUCATION/TRAINING PROGRAM

## 2024-11-07 PROCEDURE — 97530 THERAPEUTIC ACTIVITIES: CPT

## 2024-11-07 PROCEDURE — 99232 SBSQ HOSP IP/OBS MODERATE 35: CPT | Performed by: SURGERY

## 2024-11-07 PROCEDURE — 6360000002 HC RX W HCPCS: Performed by: INTERNAL MEDICINE

## 2024-11-07 PROCEDURE — 1100000000 HC RM PRIVATE

## 2024-11-07 PROCEDURE — 97166 OT EVAL MOD COMPLEX 45 MIN: CPT

## 2024-11-07 PROCEDURE — 6370000000 HC RX 637 (ALT 250 FOR IP): Performed by: STUDENT IN AN ORGANIZED HEALTH CARE EDUCATION/TRAINING PROGRAM

## 2024-11-07 RX ADMIN — FINASTERIDE 5 MG: 5 TABLET, FILM COATED ORAL at 10:00

## 2024-11-07 RX ADMIN — MEROPENEM 500 MG: 500 INJECTION INTRAVENOUS at 14:14

## 2024-11-07 RX ADMIN — GLIPIZIDE 2.5 MG: 5 TABLET ORAL at 06:35

## 2024-11-07 RX ADMIN — SODIUM CHLORIDE, PRESERVATIVE FREE 10 ML: 5 INJECTION INTRAVENOUS at 21:13

## 2024-11-07 RX ADMIN — CARVEDILOL 6.25 MG: 3.12 TABLET, FILM COATED ORAL at 10:00

## 2024-11-07 RX ADMIN — THIAMINE HCL TAB 100 MG 100 MG: 100 TAB at 10:02

## 2024-11-07 RX ADMIN — Medication 1 MG: at 10:00

## 2024-11-07 RX ADMIN — INSULIN LISPRO 2 UNITS: 100 INJECTION, SOLUTION INTRAVENOUS; SUBCUTANEOUS at 21:12

## 2024-11-07 RX ADMIN — ONDANSETRON 4 MG: 2 INJECTION INTRAMUSCULAR; INTRAVENOUS at 21:13

## 2024-11-07 RX ADMIN — ALLOPURINOL 100 MG: 100 TABLET ORAL at 10:01

## 2024-11-07 RX ADMIN — SODIUM CHLORIDE, PRESERVATIVE FREE 10 ML: 5 INJECTION INTRAVENOUS at 10:01

## 2024-11-07 RX ADMIN — CYANOCOBALAMIN TAB 500 MCG 500 MCG: 500 TAB at 10:00

## 2024-11-07 RX ADMIN — NIFEDIPINE 90 MG: 30 TABLET, FILM COATED, EXTENDED RELEASE ORAL at 06:35

## 2024-11-07 RX ADMIN — DICLOFENAC SODIUM 2 G: 10 GEL TOPICAL at 10:02

## 2024-11-07 ASSESSMENT — PAIN SCALES - GENERAL: PAINLEVEL_OUTOF10: 0

## 2024-11-07 NOTE — CARE COORDINATION
CM reviewed chart    DCP: return to [facility] and rehab once medically stable for dc    Patient on HD @ [facility] 4:45 pm chair time.     Recent clinicals attached in [facility]    Recent clinicals faxed to [facility] clinical command center @ [phone]

## 2024-11-08 ENCOUNTER — APPOINTMENT (OUTPATIENT)
Facility: HOSPITAL | Age: 84
DRG: 853 | End: 2024-11-08
Payer: OTHER GOVERNMENT

## 2024-11-08 LAB
GLUCOSE BLD STRIP.AUTO-MCNC: 110 MG/DL (ref 65–100)
GLUCOSE BLD STRIP.AUTO-MCNC: 123 MG/DL (ref 65–100)
GLUCOSE BLD STRIP.AUTO-MCNC: 159 MG/DL (ref 65–100)
PERFORMED BY:: ABNORMAL

## 2024-11-08 PROCEDURE — 71045 X-RAY EXAM CHEST 1 VIEW: CPT

## 2024-11-08 PROCEDURE — 6360000002 HC RX W HCPCS: Performed by: INTERNAL MEDICINE

## 2024-11-08 PROCEDURE — 6360000002 HC RX W HCPCS

## 2024-11-08 PROCEDURE — 6370000000 HC RX 637 (ALT 250 FOR IP)

## 2024-11-08 PROCEDURE — 2580000003 HC RX 258: Performed by: INTERNAL MEDICINE

## 2024-11-08 PROCEDURE — 2709999900 HC NON-CHARGEABLE SUPPLY

## 2024-11-08 PROCEDURE — 2580000003 HC RX 258: Performed by: STUDENT IN AN ORGANIZED HEALTH CARE EDUCATION/TRAINING PROGRAM

## 2024-11-08 PROCEDURE — 99232 SBSQ HOSP IP/OBS MODERATE 35: CPT | Performed by: SURGERY

## 2024-11-08 PROCEDURE — 6370000000 HC RX 637 (ALT 250 FOR IP): Performed by: STUDENT IN AN ORGANIZED HEALTH CARE EDUCATION/TRAINING PROGRAM

## 2024-11-08 PROCEDURE — 1100000000 HC RM PRIVATE

## 2024-11-08 PROCEDURE — 74176 CT ABD & PELVIS W/O CONTRAST: CPT

## 2024-11-08 PROCEDURE — 97530 THERAPEUTIC ACTIVITIES: CPT

## 2024-11-08 PROCEDURE — 90935 HEMODIALYSIS ONE EVALUATION: CPT

## 2024-11-08 PROCEDURE — 6370000000 HC RX 637 (ALT 250 FOR IP): Performed by: INTERNAL MEDICINE

## 2024-11-08 PROCEDURE — 82962 GLUCOSE BLOOD TEST: CPT

## 2024-11-08 PROCEDURE — P9047 ALBUMIN (HUMAN), 25%, 50ML: HCPCS

## 2024-11-08 RX ORDER — ALBUMIN (HUMAN) 12.5 G/50ML
12.5 SOLUTION INTRAVENOUS
Status: DISPENSED | OUTPATIENT
Start: 2024-11-08 | End: 2024-11-08

## 2024-11-08 RX ORDER — SODIUM CHLORIDE 9 MG/ML
INJECTION, SOLUTION INTRAVENOUS CONTINUOUS
Status: DISPENSED | OUTPATIENT
Start: 2024-11-08 | End: 2024-11-09

## 2024-11-08 RX ORDER — ALBUMIN (HUMAN) 12.5 G/50ML
SOLUTION INTRAVENOUS
Status: COMPLETED
Start: 2024-11-08 | End: 2024-11-08

## 2024-11-08 RX ADMIN — HEPARIN SODIUM 3600 UNITS: 1000 INJECTION INTRAVENOUS; SUBCUTANEOUS at 11:42

## 2024-11-08 RX ADMIN — SODIUM CHLORIDE: 9 INJECTION, SOLUTION INTRAVENOUS at 12:37

## 2024-11-08 RX ADMIN — EPOETIN ALFA-EPBX 6000 UNITS: 10000 INJECTION, SOLUTION INTRAVENOUS; SUBCUTANEOUS at 15:10

## 2024-11-08 RX ADMIN — MEROPENEM 500 MG: 500 INJECTION INTRAVENOUS at 15:10

## 2024-11-08 RX ADMIN — ALLOPURINOL 100 MG: 100 TABLET ORAL at 12:28

## 2024-11-08 RX ADMIN — NIFEDIPINE 90 MG: 30 TABLET, FILM COATED, EXTENDED RELEASE ORAL at 15:12

## 2024-11-08 RX ADMIN — CYANOCOBALAMIN TAB 500 MCG 500 MCG: 500 TAB at 12:28

## 2024-11-08 RX ADMIN — SODIUM CHLORIDE: 9 INJECTION, SOLUTION INTRAVENOUS at 16:14

## 2024-11-08 RX ADMIN — DICLOFENAC SODIUM 2 G: 10 GEL TOPICAL at 21:38

## 2024-11-08 RX ADMIN — Medication 1 MG: at 12:27

## 2024-11-08 RX ADMIN — SODIUM CHLORIDE, PRESERVATIVE FREE 10 ML: 5 INJECTION INTRAVENOUS at 12:31

## 2024-11-08 RX ADMIN — ALBUMIN (HUMAN) 12.5 G: 0.25 INJECTION, SOLUTION INTRAVENOUS at 09:01

## 2024-11-08 RX ADMIN — SODIUM CHLORIDE, PRESERVATIVE FREE 10 ML: 5 INJECTION INTRAVENOUS at 21:38

## 2024-11-08 RX ADMIN — ACETAMINOPHEN 650 MG: 325 TABLET ORAL at 12:41

## 2024-11-08 RX ADMIN — THIAMINE HCL TAB 100 MG 100 MG: 100 TAB at 12:27

## 2024-11-08 RX ADMIN — FINASTERIDE 5 MG: 5 TABLET, FILM COATED ORAL at 12:28

## 2024-11-08 ASSESSMENT — PAIN SCALES - GENERAL
PAINLEVEL_OUTOF10: 0
PAINLEVEL_OUTOF10: 4
PAINLEVEL_OUTOF10: 0

## 2024-11-08 ASSESSMENT — PAIN DESCRIPTION - LOCATION: LOCATION: THROAT

## 2024-11-08 NOTE — CARE COORDINATION
CM reviewed chart    Per note, NGT re inserted      DCP: return to [facility] and rehab once medically stable for dc     Patient on HD @ [facility] 4:45 pm chair time.      Recent clinicals attached in [facility]     Recent clinicals faxed to [facility] clinical command center @ [phone]

## 2024-11-08 NOTE — DIALYSIS
Pt fany 3.5 hour hemodialysis fair, due to low BP.  Unable to remove fluid, due to low BP.  Dr Basilio aware.  Report given to Trevor  edwige.  Stefanie in telemetry notified pt is enroute to his room, box #30

## 2024-11-08 NOTE — SIGNIFICANT EVENT
0100 patient vomited 3x bilious in apperance, updated Sylvie Avila. NGT placed as order and placed on low intermittent suction.     0148 NGT output of 800 ml bilious in appearance, Updated Margarita Mercer.

## 2024-11-09 ENCOUNTER — ANESTHESIA EVENT (OUTPATIENT)
Facility: HOSPITAL | Age: 84
End: 2024-11-09
Payer: OTHER GOVERNMENT

## 2024-11-09 LAB
GLUCOSE BLD STRIP.AUTO-MCNC: 103 MG/DL (ref 65–100)
GLUCOSE BLD STRIP.AUTO-MCNC: 117 MG/DL (ref 65–100)
GLUCOSE BLD STRIP.AUTO-MCNC: 179 MG/DL (ref 65–100)
GLUCOSE BLD STRIP.AUTO-MCNC: 209 MG/DL (ref 65–100)
GLUCOSE BLD STRIP.AUTO-MCNC: 66 MG/DL (ref 65–100)
GLUCOSE BLD STRIP.AUTO-MCNC: 77 MG/DL (ref 65–100)
PERFORMED BY:: ABNORMAL
PERFORMED BY:: NORMAL
PERFORMED BY:: NORMAL

## 2024-11-09 PROCEDURE — 82962 GLUCOSE BLOOD TEST: CPT

## 2024-11-09 PROCEDURE — 2580000003 HC RX 258: Performed by: STUDENT IN AN ORGANIZED HEALTH CARE EDUCATION/TRAINING PROGRAM

## 2024-11-09 PROCEDURE — 6370000000 HC RX 637 (ALT 250 FOR IP)

## 2024-11-09 PROCEDURE — 1100000000 HC RM PRIVATE

## 2024-11-09 PROCEDURE — 99232 SBSQ HOSP IP/OBS MODERATE 35: CPT | Performed by: SURGERY

## 2024-11-09 PROCEDURE — 6370000000 HC RX 637 (ALT 250 FOR IP): Performed by: INTERNAL MEDICINE

## 2024-11-09 RX ADMIN — CYANOCOBALAMIN TAB 500 MCG 500 MCG: 500 TAB at 09:23

## 2024-11-09 RX ADMIN — FINASTERIDE 5 MG: 5 TABLET, FILM COATED ORAL at 09:24

## 2024-11-09 RX ADMIN — SODIUM CHLORIDE, PRESERVATIVE FREE 10 ML: 5 INJECTION INTRAVENOUS at 09:25

## 2024-11-09 RX ADMIN — DICLOFENAC SODIUM 2 G: 10 GEL TOPICAL at 09:27

## 2024-11-09 RX ADMIN — THIAMINE HCL TAB 100 MG 100 MG: 100 TAB at 09:23

## 2024-11-09 RX ADMIN — GLIPIZIDE 2.5 MG: 5 TABLET ORAL at 09:23

## 2024-11-09 RX ADMIN — Medication 1 MG: at 09:24

## 2024-11-09 RX ADMIN — SODIUM CHLORIDE: 9 INJECTION, SOLUTION INTRAVENOUS at 03:51

## 2024-11-09 RX ADMIN — CARVEDILOL 6.25 MG: 3.12 TABLET, FILM COATED ORAL at 09:26

## 2024-11-09 RX ADMIN — ALLOPURINOL 100 MG: 100 TABLET ORAL at 09:23

## 2024-11-09 RX ADMIN — SODIUM CHLORIDE, PRESERVATIVE FREE 10 ML: 5 INJECTION INTRAVENOUS at 22:00

## 2024-11-09 ASSESSMENT — PAIN SCALES - GENERAL
PAINLEVEL_OUTOF10: 0
PAINLEVEL_OUTOF10: 0

## 2024-11-10 ENCOUNTER — ANESTHESIA (OUTPATIENT)
Facility: HOSPITAL | Age: 84
End: 2024-11-10
Payer: OTHER GOVERNMENT

## 2024-11-10 LAB
ALBUMIN SERPL-MCNC: 2.1 G/DL (ref 3.5–5)
ANION GAP SERPL CALC-SCNC: 12 MMOL/L (ref 2–12)
ANION GAP SERPL CALC-SCNC: 12 MMOL/L (ref 2–12)
BASOPHILS # BLD: 0 K/UL (ref 0–0.1)
BASOPHILS NFR BLD: 0 % (ref 0–1)
BUN SERPL-MCNC: 24 MG/DL (ref 6–20)
BUN SERPL-MCNC: 24 MG/DL (ref 6–20)
BUN/CREAT SERPL: 4 (ref 12–20)
BUN/CREAT SERPL: 4 (ref 12–20)
CA-I BLD-MCNC: 8.5 MG/DL (ref 8.5–10.1)
CA-I BLD-MCNC: 8.6 MG/DL (ref 8.5–10.1)
CHLORIDE SERPL-SCNC: 106 MMOL/L (ref 97–108)
CHLORIDE SERPL-SCNC: 106 MMOL/L (ref 97–108)
CO2 SERPL-SCNC: 26 MMOL/L (ref 21–32)
CO2 SERPL-SCNC: 26 MMOL/L (ref 21–32)
CREAT SERPL-MCNC: 6.54 MG/DL (ref 0.7–1.3)
CREAT SERPL-MCNC: 6.58 MG/DL (ref 0.7–1.3)
DIFFERENTIAL METHOD BLD: ABNORMAL
EOSINOPHIL # BLD: 0 K/UL (ref 0–0.4)
EOSINOPHIL NFR BLD: 0 % (ref 0–7)
ERYTHROCYTE [DISTWIDTH] IN BLOOD BY AUTOMATED COUNT: 15.6 % (ref 11.5–14.5)
GLUCOSE BLD STRIP.AUTO-MCNC: 75 MG/DL (ref 65–100)
GLUCOSE BLD STRIP.AUTO-MCNC: 83 MG/DL (ref 65–100)
GLUCOSE BLD STRIP.AUTO-MCNC: 83 MG/DL (ref 65–100)
GLUCOSE BLD STRIP.AUTO-MCNC: 86 MG/DL (ref 65–100)
GLUCOSE BLD STRIP.AUTO-MCNC: 88 MG/DL (ref 65–100)
GLUCOSE SERPL-MCNC: 75 MG/DL (ref 65–100)
GLUCOSE SERPL-MCNC: 75 MG/DL (ref 65–100)
HCT VFR BLD AUTO: 28.8 % (ref 36.6–50.3)
HGB BLD-MCNC: 9.3 G/DL (ref 12.1–17)
IMM GRANULOCYTES # BLD AUTO: 0 K/UL
IMM GRANULOCYTES NFR BLD AUTO: 0 %
LYMPHOCYTES # BLD: 1.5 K/UL (ref 0.8–3.5)
LYMPHOCYTES NFR BLD: 14 % (ref 12–49)
MCH RBC QN AUTO: 29.6 PG (ref 26–34)
MCHC RBC AUTO-ENTMCNC: 32.3 G/DL (ref 30–36.5)
MCV RBC AUTO: 91.7 FL (ref 80–99)
MONOCYTES # BLD: 1.1 K/UL (ref 0–1)
MONOCYTES NFR BLD: 10 % (ref 5–13)
NEUTS BAND NFR BLD MANUAL: 2 % (ref 0–6)
NEUTS SEG # BLD: 8 K/UL (ref 1.8–8)
NEUTS SEG NFR BLD: 74 % (ref 32–75)
NRBC # BLD: 0 K/UL (ref 0–0.01)
NRBC BLD-RTO: 0 PER 100 WBC
PERFORMED BY:: NORMAL
PHOSPHATE SERPL-MCNC: 5 MG/DL (ref 2.6–4.7)
PLATELET # BLD AUTO: 436 K/UL (ref 150–400)
PMV BLD AUTO: 8.3 FL (ref 8.9–12.9)
POTASSIUM SERPL-SCNC: 2.8 MMOL/L (ref 3.5–5.1)
POTASSIUM SERPL-SCNC: 2.8 MMOL/L (ref 3.5–5.1)
RBC # BLD AUTO: 3.14 M/UL (ref 4.1–5.7)
RBC MORPH BLD: ABNORMAL
SODIUM SERPL-SCNC: 144 MMOL/L (ref 136–145)
SODIUM SERPL-SCNC: 144 MMOL/L (ref 136–145)
WBC # BLD AUTO: 10.6 K/UL (ref 4.1–11.1)

## 2024-11-10 PROCEDURE — 82962 GLUCOSE BLOOD TEST: CPT

## 2024-11-10 PROCEDURE — 94761 N-INVAS EAR/PLS OXIMETRY MLT: CPT

## 2024-11-10 PROCEDURE — 2580000003 HC RX 258: Performed by: STUDENT IN AN ORGANIZED HEALTH CARE EDUCATION/TRAINING PROGRAM

## 2024-11-10 PROCEDURE — 2500000003 HC RX 250 WO HCPCS: Performed by: SURGERY

## 2024-11-10 PROCEDURE — 7100000001 HC PACU RECOVERY - ADDTL 15 MIN: Performed by: SURGERY

## 2024-11-10 PROCEDURE — 7100000000 HC PACU RECOVERY - FIRST 15 MIN: Performed by: SURGERY

## 2024-11-10 PROCEDURE — 99232 SBSQ HOSP IP/OBS MODERATE 35: CPT | Performed by: SURGERY

## 2024-11-10 PROCEDURE — 80069 RENAL FUNCTION PANEL: CPT

## 2024-11-10 PROCEDURE — 3700000000 HC ANESTHESIA ATTENDED CARE: Performed by: SURGERY

## 2024-11-10 PROCEDURE — 49507 PRP I/HERN INIT BLOCK >5 YR: CPT | Performed by: SURGERY

## 2024-11-10 PROCEDURE — 3600000002 HC SURGERY LEVEL 2 BASE: Performed by: SURGERY

## 2024-11-10 PROCEDURE — 88302 TISSUE EXAM BY PATHOLOGIST: CPT

## 2024-11-10 PROCEDURE — 36415 COLL VENOUS BLD VENIPUNCTURE: CPT

## 2024-11-10 PROCEDURE — 6360000002 HC RX W HCPCS: Performed by: NURSE ANESTHETIST, CERTIFIED REGISTERED

## 2024-11-10 PROCEDURE — 80048 BASIC METABOLIC PNL TOTAL CA: CPT

## 2024-11-10 PROCEDURE — 0YU60JZ SUPPLEMENT LEFT INGUINAL REGION WITH SYNTHETIC SUBSTITUTE, OPEN APPROACH: ICD-10-PCS | Performed by: SURGERY

## 2024-11-10 PROCEDURE — 2709999900 HC NON-CHARGEABLE SUPPLY: Performed by: SURGERY

## 2024-11-10 PROCEDURE — 85025 COMPLETE CBC W/AUTO DIFF WBC: CPT

## 2024-11-10 PROCEDURE — 2580000003 HC RX 258: Performed by: SURGERY

## 2024-11-10 PROCEDURE — 3600000012 HC SURGERY LEVEL 2 ADDTL 15MIN: Performed by: SURGERY

## 2024-11-10 PROCEDURE — 2580000003 HC RX 258: Performed by: NURSE ANESTHETIST, CERTIFIED REGISTERED

## 2024-11-10 PROCEDURE — 6360000002 HC RX W HCPCS: Performed by: ANESTHESIOLOGY

## 2024-11-10 PROCEDURE — 6360000002 HC RX W HCPCS: Performed by: SURGERY

## 2024-11-10 PROCEDURE — 1100000000 HC RM PRIVATE

## 2024-11-10 PROCEDURE — 2500000003 HC RX 250 WO HCPCS: Performed by: ANESTHESIOLOGY

## 2024-11-10 PROCEDURE — 97530 THERAPEUTIC ACTIVITIES: CPT

## 2024-11-10 PROCEDURE — C1781 MESH (IMPLANTABLE): HCPCS | Performed by: SURGERY

## 2024-11-10 PROCEDURE — 3700000001 HC ADD 15 MINUTES (ANESTHESIA): Performed by: SURGERY

## 2024-11-10 DEVICE — MESH SURG W8X15CM DIA5MM POLYPR RECT FLAT FOR SFT TISS REP: Type: IMPLANTABLE DEVICE | Site: GROIN | Status: FUNCTIONAL

## 2024-11-10 RX ORDER — MIDODRINE HYDROCHLORIDE 5 MG/1
5 TABLET ORAL
Status: DISCONTINUED | OUTPATIENT
Start: 2024-11-10 | End: 2024-11-15

## 2024-11-10 RX ORDER — LIDOCAINE 4 G/G
1 PATCH TOPICAL AS NEEDED
Status: DISCONTINUED | OUTPATIENT
Start: 2024-11-10 | End: 2024-11-10 | Stop reason: HOSPADM

## 2024-11-10 RX ORDER — SODIUM CHLORIDE 0.9 % (FLUSH) 0.9 %
5-40 SYRINGE (ML) INJECTION PRN
Status: DISCONTINUED | OUTPATIENT
Start: 2024-11-10 | End: 2024-11-18 | Stop reason: HOSPADM

## 2024-11-10 RX ORDER — SODIUM CHLORIDE, SODIUM LACTATE, POTASSIUM CHLORIDE, CALCIUM CHLORIDE 600; 310; 30; 20 MG/100ML; MG/100ML; MG/100ML; MG/100ML
INJECTION, SOLUTION INTRAVENOUS ONCE
Status: DISCONTINUED | OUTPATIENT
Start: 2024-11-10 | End: 2024-11-10 | Stop reason: HOSPADM

## 2024-11-10 RX ORDER — DEXAMETHASONE SODIUM PHOSPHATE 4 MG/ML
INJECTION, SOLUTION INTRA-ARTICULAR; INTRALESIONAL; INTRAMUSCULAR; INTRAVENOUS; SOFT TISSUE
Status: DISCONTINUED | OUTPATIENT
Start: 2024-11-10 | End: 2024-11-10 | Stop reason: SDUPTHER

## 2024-11-10 RX ORDER — ROCURONIUM BROMIDE 10 MG/ML
INJECTION, SOLUTION INTRAVENOUS
Status: DISCONTINUED | OUTPATIENT
Start: 2024-11-10 | End: 2024-11-10 | Stop reason: SDUPTHER

## 2024-11-10 RX ORDER — GLUCAGON 1 MG/ML
1 KIT INJECTION PRN
Status: DISCONTINUED | OUTPATIENT
Start: 2024-11-10 | End: 2024-11-10 | Stop reason: HOSPADM

## 2024-11-10 RX ORDER — 0.9 % SODIUM CHLORIDE 0.9 %
INTRAVENOUS SOLUTION INTRAVENOUS
Status: DISCONTINUED | OUTPATIENT
Start: 2024-11-10 | End: 2024-11-10 | Stop reason: SDUPTHER

## 2024-11-10 RX ORDER — SODIUM CHLORIDE 0.9 % (FLUSH) 0.9 %
5-40 SYRINGE (ML) INJECTION EVERY 12 HOURS SCHEDULED
Status: DISCONTINUED | OUTPATIENT
Start: 2024-11-10 | End: 2024-11-10 | Stop reason: HOSPADM

## 2024-11-10 RX ORDER — ONDANSETRON 4 MG/1
4 TABLET, ORALLY DISINTEGRATING ORAL EVERY 8 HOURS PRN
Status: DISCONTINUED | OUTPATIENT
Start: 2024-11-10 | End: 2024-11-18 | Stop reason: HOSPADM

## 2024-11-10 RX ORDER — NALOXONE HYDROCHLORIDE 0.4 MG/ML
INJECTION, SOLUTION INTRAMUSCULAR; INTRAVENOUS; SUBCUTANEOUS PRN
Status: DISCONTINUED | OUTPATIENT
Start: 2024-11-10 | End: 2024-11-10 | Stop reason: HOSPADM

## 2024-11-10 RX ORDER — SODIUM CHLORIDE, SODIUM LACTATE, POTASSIUM CHLORIDE, CALCIUM CHLORIDE 600; 310; 30; 20 MG/100ML; MG/100ML; MG/100ML; MG/100ML
INJECTION, SOLUTION INTRAVENOUS
Status: DISCONTINUED | OUTPATIENT
Start: 2024-11-10 | End: 2024-11-10

## 2024-11-10 RX ORDER — OXYCODONE HYDROCHLORIDE 5 MG/1
10 TABLET ORAL PRN
Status: DISCONTINUED | OUTPATIENT
Start: 2024-11-10 | End: 2024-11-10 | Stop reason: HOSPADM

## 2024-11-10 RX ORDER — SODIUM CHLORIDE 0.9 % (FLUSH) 0.9 %
5-40 SYRINGE (ML) INJECTION EVERY 12 HOURS SCHEDULED
Status: DISCONTINUED | OUTPATIENT
Start: 2024-11-10 | End: 2024-11-18 | Stop reason: HOSPADM

## 2024-11-10 RX ORDER — PROPOFOL 10 MG/ML
INJECTION, EMULSION INTRAVENOUS
Status: DISCONTINUED | OUTPATIENT
Start: 2024-11-10 | End: 2024-11-10 | Stop reason: SDUPTHER

## 2024-11-10 RX ORDER — HYDROMORPHONE HYDROCHLORIDE 1 MG/ML
0.5 INJECTION, SOLUTION INTRAMUSCULAR; INTRAVENOUS; SUBCUTANEOUS EVERY 4 HOURS PRN
Status: ACTIVE | OUTPATIENT
Start: 2024-11-10 | End: 2024-11-13

## 2024-11-10 RX ORDER — LORAZEPAM 2 MG/ML
0.5 INJECTION INTRAMUSCULAR
Status: DISCONTINUED | OUTPATIENT
Start: 2024-11-10 | End: 2024-11-10 | Stop reason: HOSPADM

## 2024-11-10 RX ORDER — LABETALOL HYDROCHLORIDE 5 MG/ML
10 INJECTION, SOLUTION INTRAVENOUS
Status: DISCONTINUED | OUTPATIENT
Start: 2024-11-10 | End: 2024-11-10 | Stop reason: HOSPADM

## 2024-11-10 RX ORDER — IPRATROPIUM BROMIDE AND ALBUTEROL SULFATE 2.5; .5 MG/3ML; MG/3ML
1 SOLUTION RESPIRATORY (INHALATION)
Status: DISCONTINUED | OUTPATIENT
Start: 2024-11-10 | End: 2024-11-10 | Stop reason: HOSPADM

## 2024-11-10 RX ORDER — ONDANSETRON 2 MG/ML
INJECTION INTRAMUSCULAR; INTRAVENOUS
Status: DISCONTINUED | OUTPATIENT
Start: 2024-11-10 | End: 2024-11-10 | Stop reason: SDUPTHER

## 2024-11-10 RX ORDER — ONDANSETRON 2 MG/ML
4 INJECTION INTRAMUSCULAR; INTRAVENOUS
Status: DISCONTINUED | OUTPATIENT
Start: 2024-11-10 | End: 2024-11-10 | Stop reason: HOSPADM

## 2024-11-10 RX ORDER — OXYCODONE HYDROCHLORIDE 5 MG/1
5 TABLET ORAL PRN
Status: DISCONTINUED | OUTPATIENT
Start: 2024-11-10 | End: 2024-11-10 | Stop reason: HOSPADM

## 2024-11-10 RX ORDER — SODIUM CHLORIDE 9 MG/ML
INJECTION, SOLUTION INTRAVENOUS PRN
Status: DISCONTINUED | OUTPATIENT
Start: 2024-11-10 | End: 2024-11-18 | Stop reason: HOSPADM

## 2024-11-10 RX ORDER — PHENYLEPHRINE HCL IN 0.9% NACL 1 MG/10 ML
SYRINGE (ML) INTRAVENOUS
Status: DISCONTINUED | OUTPATIENT
Start: 2024-11-10 | End: 2024-11-10 | Stop reason: SDUPTHER

## 2024-11-10 RX ORDER — FENTANYL CITRATE 50 UG/ML
INJECTION, SOLUTION INTRAMUSCULAR; INTRAVENOUS
Status: DISCONTINUED | OUTPATIENT
Start: 2024-11-10 | End: 2024-11-10 | Stop reason: SDUPTHER

## 2024-11-10 RX ORDER — LIDOCAINE HYDROCHLORIDE 10 MG/ML
INJECTION, SOLUTION INFILTRATION; PERINEURAL PRN
Status: DISCONTINUED | OUTPATIENT
Start: 2024-11-10 | End: 2024-11-10 | Stop reason: HOSPADM

## 2024-11-10 RX ORDER — HYDROMORPHONE HYDROCHLORIDE 1 MG/ML
0.5 INJECTION, SOLUTION INTRAMUSCULAR; INTRAVENOUS; SUBCUTANEOUS EVERY 5 MIN PRN
Status: DISCONTINUED | OUTPATIENT
Start: 2024-11-10 | End: 2024-11-10 | Stop reason: HOSPADM

## 2024-11-10 RX ORDER — SUCCINYLCHOLINE/SOD CL,ISO/PF 200MG/10ML
SYRINGE (ML) INTRAVENOUS
Status: DISCONTINUED | OUTPATIENT
Start: 2024-11-10 | End: 2024-11-10 | Stop reason: SDUPTHER

## 2024-11-10 RX ORDER — ONDANSETRON 2 MG/ML
4 INJECTION INTRAMUSCULAR; INTRAVENOUS EVERY 6 HOURS PRN
Status: DISCONTINUED | OUTPATIENT
Start: 2024-11-10 | End: 2024-11-18 | Stop reason: HOSPADM

## 2024-11-10 RX ORDER — 0.9 % SODIUM CHLORIDE 0.9 %
500 INTRAVENOUS SOLUTION INTRAVENOUS ONCE
Status: COMPLETED | OUTPATIENT
Start: 2024-11-10 | End: 2024-11-10

## 2024-11-10 RX ORDER — DIPHENHYDRAMINE HYDROCHLORIDE 50 MG/ML
12.5 INJECTION INTRAMUSCULAR; INTRAVENOUS
Status: DISCONTINUED | OUTPATIENT
Start: 2024-11-10 | End: 2024-11-10 | Stop reason: HOSPADM

## 2024-11-10 RX ORDER — SODIUM CHLORIDE 9 MG/ML
INJECTION, SOLUTION INTRAVENOUS PRN
Status: DISCONTINUED | OUTPATIENT
Start: 2024-11-10 | End: 2024-11-10 | Stop reason: HOSPADM

## 2024-11-10 RX ORDER — HYDRALAZINE HYDROCHLORIDE 20 MG/ML
10 INJECTION INTRAMUSCULAR; INTRAVENOUS
Status: DISCONTINUED | OUTPATIENT
Start: 2024-11-10 | End: 2024-11-10 | Stop reason: HOSPADM

## 2024-11-10 RX ORDER — FENTANYL CITRATE 0.05 MG/ML
50 INJECTION, SOLUTION INTRAMUSCULAR; INTRAVENOUS EVERY 5 MIN PRN
Status: DISCONTINUED | OUTPATIENT
Start: 2024-11-10 | End: 2024-11-10 | Stop reason: HOSPADM

## 2024-11-10 RX ORDER — SODIUM CHLORIDE 9 MG/ML
INJECTION, SOLUTION INTRAVENOUS CONTINUOUS
Status: DISCONTINUED | OUTPATIENT
Start: 2024-11-10 | End: 2024-11-14

## 2024-11-10 RX ORDER — DEXTROSE MONOHYDRATE 100 MG/ML
INJECTION, SOLUTION INTRAVENOUS CONTINUOUS PRN
Status: DISCONTINUED | OUTPATIENT
Start: 2024-11-10 | End: 2024-11-10 | Stop reason: HOSPADM

## 2024-11-10 RX ORDER — SODIUM CHLORIDE 0.9 % (FLUSH) 0.9 %
5-40 SYRINGE (ML) INJECTION PRN
Status: DISCONTINUED | OUTPATIENT
Start: 2024-11-10 | End: 2024-11-10 | Stop reason: HOSPADM

## 2024-11-10 RX ADMIN — SODIUM CHLORIDE: 9 INJECTION, SOLUTION INTRAVENOUS at 21:15

## 2024-11-10 RX ADMIN — FENTANYL CITRATE 100 MCG: 50 INJECTION INTRAMUSCULAR; INTRAVENOUS at 18:56

## 2024-11-10 RX ADMIN — CEFAZOLIN 2000 MG: 1 INJECTION, POWDER, FOR SOLUTION INTRAMUSCULAR; INTRAVENOUS at 19:02

## 2024-11-10 RX ADMIN — Medication 200 MCG: at 19:31

## 2024-11-10 RX ADMIN — Medication 100 MCG: at 19:41

## 2024-11-10 RX ADMIN — SODIUM CHLORIDE, PRESERVATIVE FREE 10 ML: 5 INJECTION INTRAVENOUS at 08:53

## 2024-11-10 RX ADMIN — PROPOFOL 90 MG: 10 INJECTION, EMULSION INTRAVENOUS at 18:57

## 2024-11-10 RX ADMIN — ONDANSETRON 4 MG: 2 INJECTION INTRAMUSCULAR; INTRAVENOUS at 19:27

## 2024-11-10 RX ADMIN — SODIUM CHLORIDE: 9 INJECTION, SOLUTION INTRAVENOUS at 06:06

## 2024-11-10 RX ADMIN — SODIUM CHLORIDE 500 ML: 9 INJECTION, SOLUTION INTRAVENOUS at 11:33

## 2024-11-10 RX ADMIN — SODIUM CHLORIDE 100 ML/HR: 9 INJECTION, SOLUTION INTRAVENOUS at 18:46

## 2024-11-10 RX ADMIN — SODIUM CHLORIDE, PRESERVATIVE FREE 10 ML: 5 INJECTION INTRAVENOUS at 21:15

## 2024-11-10 RX ADMIN — SODIUM CHLORIDE 250 ML: 9 INJECTION, SOLUTION INTRAVENOUS at 20:03

## 2024-11-10 RX ADMIN — DICLOFENAC SODIUM 2 G: 10 GEL TOPICAL at 21:30

## 2024-11-10 RX ADMIN — Medication 100 MG: at 18:57

## 2024-11-10 RX ADMIN — ROCURONIUM BROMIDE 25 MG: 10 INJECTION, SOLUTION INTRAVENOUS at 19:08

## 2024-11-10 RX ADMIN — LIDOCAINE HYDROCHLORIDE 80 MG: 20 INJECTION, SOLUTION EPIDURAL; INFILTRATION; INTRACAUDAL; PERINEURAL at 18:57

## 2024-11-10 RX ADMIN — DEXAMETHASONE SODIUM PHOSPHATE 4 MG: 4 INJECTION, SOLUTION INTRA-ARTICULAR; INTRALESIONAL; INTRAMUSCULAR; INTRAVENOUS; SOFT TISSUE at 19:27

## 2024-11-10 ASSESSMENT — PAIN - FUNCTIONAL ASSESSMENT: PAIN_FUNCTIONAL_ASSESSMENT: NONE - DENIES PAIN

## 2024-11-11 LAB
ALBUMIN SERPL-MCNC: 2 G/DL (ref 3.5–5)
ALBUMIN/GLOB SERPL: 0.4 (ref 1.1–2.2)
ALP SERPL-CCNC: 58 U/L (ref 45–117)
ALT SERPL-CCNC: 7 U/L (ref 12–78)
ANION GAP SERPL CALC-SCNC: 19 MMOL/L (ref 2–12)
AST SERPL W P-5'-P-CCNC: 9 U/L (ref 15–37)
BASOPHILS # BLD: 0 K/UL (ref 0–0.1)
BASOPHILS NFR BLD: 0 % (ref 0–1)
BILIRUB SERPL-MCNC: 0.4 MG/DL (ref 0.2–1)
BUN SERPL-MCNC: 31 MG/DL (ref 6–20)
BUN/CREAT SERPL: 4 (ref 12–20)
CA-I BLD-MCNC: 8.2 MG/DL (ref 8.5–10.1)
CHLORIDE SERPL-SCNC: 106 MMOL/L (ref 97–108)
CO2 SERPL-SCNC: 20 MMOL/L (ref 21–32)
CREAT SERPL-MCNC: 7.17 MG/DL (ref 0.7–1.3)
DIFFERENTIAL METHOD BLD: ABNORMAL
EOSINOPHIL # BLD: 0 K/UL (ref 0–0.4)
EOSINOPHIL NFR BLD: 0 % (ref 0–7)
ERYTHROCYTE [DISTWIDTH] IN BLOOD BY AUTOMATED COUNT: 15.5 % (ref 11.5–14.5)
GLOBULIN SER CALC-MCNC: 4.5 G/DL (ref 2–4)
GLUCOSE BLD STRIP.AUTO-MCNC: 81 MG/DL (ref 65–100)
GLUCOSE SERPL-MCNC: 129 MG/DL (ref 65–100)
HCT VFR BLD AUTO: 30.6 % (ref 36.6–50.3)
HGB BLD-MCNC: 9.7 G/DL (ref 12.1–17)
IMM GRANULOCYTES # BLD AUTO: 0 K/UL
IMM GRANULOCYTES NFR BLD AUTO: 0 %
LYMPHOCYTES # BLD: 1.1 K/UL (ref 0.8–3.5)
LYMPHOCYTES NFR BLD: 9 % (ref 12–49)
MCH RBC QN AUTO: 29.6 PG (ref 26–34)
MCHC RBC AUTO-ENTMCNC: 31.7 G/DL (ref 30–36.5)
MCV RBC AUTO: 93.3 FL (ref 80–99)
MONOCYTES # BLD: 0.5 K/UL (ref 0–1)
MONOCYTES NFR BLD: 4 % (ref 5–13)
NEUTS SEG # BLD: 10.2 K/UL (ref 1.8–8)
NEUTS SEG NFR BLD: 87 % (ref 32–75)
NRBC # BLD: 0 K/UL (ref 0–0.01)
NRBC BLD-RTO: 0 PER 100 WBC
PERFORMED BY:: NORMAL
PLATELET # BLD AUTO: 470 K/UL (ref 150–400)
PMV BLD AUTO: 8.5 FL (ref 8.9–12.9)
POTASSIUM SERPL-SCNC: 3.2 MMOL/L (ref 3.5–5.1)
PROT SERPL-MCNC: 6.5 G/DL (ref 6.4–8.2)
RBC # BLD AUTO: 3.28 M/UL (ref 4.1–5.7)
RBC MORPH BLD: ABNORMAL
RBC MORPH BLD: ABNORMAL
SODIUM SERPL-SCNC: 145 MMOL/L (ref 136–145)
WBC # BLD AUTO: 11.8 K/UL (ref 4.1–11.1)

## 2024-11-11 PROCEDURE — 6360000002 HC RX W HCPCS: Performed by: INTERNAL MEDICINE

## 2024-11-11 PROCEDURE — 99024 POSTOP FOLLOW-UP VISIT: CPT | Performed by: SURGERY

## 2024-11-11 PROCEDURE — 82962 GLUCOSE BLOOD TEST: CPT

## 2024-11-11 PROCEDURE — 36415 COLL VENOUS BLD VENIPUNCTURE: CPT

## 2024-11-11 PROCEDURE — 2580000003 HC RX 258: Performed by: STUDENT IN AN ORGANIZED HEALTH CARE EDUCATION/TRAINING PROGRAM

## 2024-11-11 PROCEDURE — 80053 COMPREHEN METABOLIC PANEL: CPT

## 2024-11-11 PROCEDURE — 90935 HEMODIALYSIS ONE EVALUATION: CPT

## 2024-11-11 PROCEDURE — 1100000000 HC RM PRIVATE

## 2024-11-11 PROCEDURE — 2709999900 HC NON-CHARGEABLE SUPPLY

## 2024-11-11 PROCEDURE — 85025 COMPLETE CBC W/AUTO DIFF WBC: CPT

## 2024-11-11 RX ORDER — HEPARIN SODIUM 1000 [USP'U]/ML
2000 INJECTION, SOLUTION INTRAVENOUS; SUBCUTANEOUS PRN
Status: DISCONTINUED | OUTPATIENT
Start: 2024-11-11 | End: 2024-11-18 | Stop reason: HOSPADM

## 2024-11-11 RX ADMIN — EPOETIN ALFA-EPBX 6000 UNITS: 10000 INJECTION, SOLUTION INTRAVENOUS; SUBCUTANEOUS at 11:43

## 2024-11-11 RX ADMIN — SODIUM CHLORIDE, PRESERVATIVE FREE 10 ML: 5 INJECTION INTRAVENOUS at 22:40

## 2024-11-11 RX ADMIN — HEPARIN SODIUM 3600 UNITS: 1000 INJECTION INTRAVENOUS; SUBCUTANEOUS at 16:44

## 2024-11-11 RX ADMIN — DICLOFENAC SODIUM 2 G: 10 GEL TOPICAL at 22:40

## 2024-11-11 ASSESSMENT — PAIN SCALES - GENERAL: PAINLEVEL_OUTOF10: 0

## 2024-11-11 NOTE — DIALYSIS
Patient tolerated treatment. 1000 ml of fluid was removed. Patient was alert and oriented during treatment. Treatment paused twice due to clotting. MD notified and heparin bolus was given per MD. 52.8 liters of blood was processed. Report was given to primary nurse Marcial.

## 2024-11-11 NOTE — OP NOTE
Operative Note      Patient: Michael Fung  YOB: 1940  MRN: [id number]    Date of Procedure: 11/10/2024    Pre-Op Diagnosis Codes:      * Unilateral inguinal hernia with obstruction and without gangrene, recurrence not specified [K40.30]    Post-Op Diagnosis:  Left indirect indirect inguinal hernia with bowel obstruction.       Procedure(s):  LEFT INGUINAL HERNIA REPAIR with mesh implantation with Prolene mesh.  Teresita type hernia repair    Surgeon(s):  Jad Peters MD Adepoju, Linda J, MD    Assistant:   * No surgical staff found *    Anesthesia: General    Estimated Blood Loss (mL): Minimal    Complications: Other: None    Specimens:   ID Type Source Tests Collected by Time Destination   1 : LEFT INGUINAL HERNIA SAC Tissue Hernia Sac SURGICAL PATHOLOGY Jad Peters MD 11/10/2024 1919        Implants:  Implant Name Type Inv. Item Serial No.  Lot No. LRB No. Used Action   MESH SURG [id number] DIA5MM POLYPR RECT FLAT FOR SFT TISS REP - [id number]  MESH SURG [id number] DIA5MM POLYPR RECT FLAT FOR SFT TISS REP  [employer] [id number] Left 1 Implanted         Drains:   NG/OG/NJ/NE Tube Nasogastric 16 fr Right nostril (Active)   Surrounding Skin Clean, dry & intact 11/10/24 2049   Securement device Tape 11/10/24 2049   Status Clamped 11/10/24 2049   Placement Verified External Catheter Length 11/10/24 0855   NG/OG/NJ/NE External Measurement (cm) 60 cm 11/09/24 2000   Drainage Appearance Bile 11/10/24 0855   Output (mL) 500 ml 11/10/24 1358   Residual Volume (ml) 100 ml 11/10/24 0825       [REMOVED] NG/OG/NJ/NE Tube Nasogastric 16 fr Right nostril (Removed)   Surrounding Skin Clean, dry & intact 10/06/24 2015   Securement device Adhesive based gómez 10/06/24 2015   Status Suction-low continuous 10/06/24 2015   Placement Verified Gastric Contents 10/06/24 2015   NG/OG/NJ/NE External Measurement (cm) 63 cm 10/05/24 1100   Drainage Appearance Brown 10/06/24 2015   Free Water/Flush

## 2024-11-12 LAB
ALBUMIN SERPL-MCNC: 1.9 G/DL (ref 3.5–5)
ANION GAP SERPL CALC-SCNC: 11 MMOL/L (ref 2–12)
BUN SERPL-MCNC: 23 MG/DL (ref 6–20)
BUN/CREAT SERPL: 5 (ref 12–20)
CA-I BLD-MCNC: 8.7 MG/DL (ref 8.5–10.1)
CHLORIDE SERPL-SCNC: 103 MMOL/L (ref 97–108)
CO2 SERPL-SCNC: 26 MMOL/L (ref 21–32)
CREAT SERPL-MCNC: 5.04 MG/DL (ref 0.7–1.3)
GLUCOSE BLD STRIP.AUTO-MCNC: 101 MG/DL (ref 65–100)
GLUCOSE BLD STRIP.AUTO-MCNC: 102 MG/DL (ref 65–100)
GLUCOSE BLD STRIP.AUTO-MCNC: 131 MG/DL (ref 65–100)
GLUCOSE BLD STRIP.AUTO-MCNC: 143 MG/DL (ref 65–100)
GLUCOSE BLD STRIP.AUTO-MCNC: 86 MG/DL (ref 65–100)
GLUCOSE SERPL-MCNC: 93 MG/DL (ref 65–100)
MAGNESIUM SERPL-MCNC: 1.9 MG/DL (ref 1.6–2.4)
PERFORMED BY:: ABNORMAL
PERFORMED BY:: NORMAL
PHOSPHATE SERPL-MCNC: 5.3 MG/DL (ref 2.6–4.7)
POTASSIUM SERPL-SCNC: 2.8 MMOL/L (ref 3.5–5.1)
SODIUM SERPL-SCNC: 140 MMOL/L (ref 136–145)

## 2024-11-12 PROCEDURE — 97535 SELF CARE MNGMENT TRAINING: CPT

## 2024-11-12 PROCEDURE — 1100000000 HC RM PRIVATE

## 2024-11-12 PROCEDURE — 2580000003 HC RX 258: Performed by: SURGERY

## 2024-11-12 PROCEDURE — 6370000000 HC RX 637 (ALT 250 FOR IP): Performed by: INTERNAL MEDICINE

## 2024-11-12 PROCEDURE — 83735 ASSAY OF MAGNESIUM: CPT

## 2024-11-12 PROCEDURE — 6360000002 HC RX W HCPCS: Performed by: INTERNAL MEDICINE

## 2024-11-12 PROCEDURE — 99024 POSTOP FOLLOW-UP VISIT: CPT | Performed by: SURGERY

## 2024-11-12 PROCEDURE — 2580000003 HC RX 258: Performed by: STUDENT IN AN ORGANIZED HEALTH CARE EDUCATION/TRAINING PROGRAM

## 2024-11-12 PROCEDURE — 36415 COLL VENOUS BLD VENIPUNCTURE: CPT

## 2024-11-12 PROCEDURE — 80069 RENAL FUNCTION PANEL: CPT

## 2024-11-12 PROCEDURE — 97530 THERAPEUTIC ACTIVITIES: CPT

## 2024-11-12 RX ORDER — POTASSIUM CHLORIDE 7.45 MG/ML
10 INJECTION INTRAVENOUS
Status: DISCONTINUED | OUTPATIENT
Start: 2024-11-12 | End: 2024-11-12

## 2024-11-12 RX ORDER — POTASSIUM CHLORIDE 1500 MG/1
40 TABLET, EXTENDED RELEASE ORAL ONCE
Status: COMPLETED | OUTPATIENT
Start: 2024-11-12 | End: 2024-11-12

## 2024-11-12 RX ORDER — LABETALOL HYDROCHLORIDE 5 MG/ML
10 INJECTION, SOLUTION INTRAVENOUS EVERY 4 HOURS PRN
Status: DISCONTINUED | OUTPATIENT
Start: 2024-11-12 | End: 2024-11-18 | Stop reason: HOSPADM

## 2024-11-12 RX ADMIN — DICLOFENAC SODIUM 2 G: 10 GEL TOPICAL at 21:29

## 2024-11-12 RX ADMIN — POTASSIUM CHLORIDE 40 MEQ: 1500 TABLET, EXTENDED RELEASE ORAL at 17:02

## 2024-11-12 RX ADMIN — SODIUM CHLORIDE: 9 INJECTION, SOLUTION INTRAVENOUS at 23:14

## 2024-11-12 RX ADMIN — SODIUM CHLORIDE, PRESERVATIVE FREE 10 ML: 5 INJECTION INTRAVENOUS at 09:45

## 2024-11-12 RX ADMIN — POTASSIUM CHLORIDE 10 MEQ: 7.46 INJECTION, SOLUTION INTRAVENOUS at 15:43

## 2024-11-12 RX ADMIN — DICLOFENAC SODIUM 2 G: 10 GEL TOPICAL at 09:36

## 2024-11-12 RX ADMIN — SODIUM CHLORIDE, PRESERVATIVE FREE 10 ML: 5 INJECTION INTRAVENOUS at 09:44

## 2024-11-12 RX ADMIN — SODIUM CHLORIDE, PRESERVATIVE FREE 10 ML: 5 INJECTION INTRAVENOUS at 21:27

## 2024-11-12 RX ADMIN — POTASSIUM CHLORIDE 10 MEQ: 7.46 INJECTION, SOLUTION INTRAVENOUS at 14:09

## 2024-11-12 ASSESSMENT — PAIN SCALES - GENERAL
PAINLEVEL_OUTOF10: 6
PAINLEVEL_OUTOF10: 0
PAINLEVEL_OUTOF10: 0

## 2024-11-13 ENCOUNTER — APPOINTMENT (OUTPATIENT)
Facility: HOSPITAL | Age: 84
DRG: 853 | End: 2024-11-13
Payer: OTHER GOVERNMENT

## 2024-11-13 LAB
ALBUMIN SERPL-MCNC: 1.8 G/DL (ref 3.5–5)
ANION GAP SERPL CALC-SCNC: 14 MMOL/L (ref 2–12)
BASOPHILS # BLD: 0 K/UL (ref 0–0.1)
BASOPHILS NFR BLD: 0 % (ref 0–1)
BUN SERPL-MCNC: 34 MG/DL (ref 6–20)
BUN/CREAT SERPL: 5 (ref 12–20)
CA-I BLD-MCNC: 8 MG/DL (ref 8.5–10.1)
CHLORIDE SERPL-SCNC: 105 MMOL/L (ref 97–108)
CO2 SERPL-SCNC: 26 MMOL/L (ref 21–32)
CREAT SERPL-MCNC: 6.86 MG/DL (ref 0.7–1.3)
DIFFERENTIAL METHOD BLD: ABNORMAL
EOSINOPHIL # BLD: 0.2 K/UL (ref 0–0.4)
EOSINOPHIL NFR BLD: 2 % (ref 0–7)
ERYTHROCYTE [DISTWIDTH] IN BLOOD BY AUTOMATED COUNT: 15.7 % (ref 11.5–14.5)
GLUCOSE BLD STRIP.AUTO-MCNC: 101 MG/DL (ref 65–100)
GLUCOSE BLD STRIP.AUTO-MCNC: 120 MG/DL (ref 65–100)
GLUCOSE BLD STRIP.AUTO-MCNC: 92 MG/DL (ref 65–100)
GLUCOSE SERPL-MCNC: 107 MG/DL (ref 65–100)
HCT VFR BLD AUTO: 24 % (ref 36.6–50.3)
HGB BLD-MCNC: 7.7 G/DL (ref 12.1–17)
IMM GRANULOCYTES # BLD AUTO: 0.2 K/UL (ref 0–0.04)
IMM GRANULOCYTES NFR BLD AUTO: 2 % (ref 0–0.5)
LYMPHOCYTES # BLD: 1.2 K/UL (ref 0.8–3.5)
LYMPHOCYTES NFR BLD: 13 % (ref 12–49)
MCH RBC QN AUTO: 29.8 PG (ref 26–34)
MCHC RBC AUTO-ENTMCNC: 32.1 G/DL (ref 30–36.5)
MCV RBC AUTO: 93 FL (ref 80–99)
MONOCYTES # BLD: 0.9 K/UL (ref 0–1)
MONOCYTES NFR BLD: 10 % (ref 5–13)
NEUTS SEG # BLD: 6.9 K/UL (ref 1.8–8)
NEUTS SEG NFR BLD: 73 % (ref 32–75)
NRBC # BLD: 0 K/UL (ref 0–0.01)
NRBC BLD-RTO: 0 PER 100 WBC
PERFORMED BY:: ABNORMAL
PERFORMED BY:: ABNORMAL
PERFORMED BY:: NORMAL
PHOSPHATE SERPL-MCNC: 5.4 MG/DL (ref 2.6–4.7)
PLATELET # BLD AUTO: 394 K/UL (ref 150–400)
PMV BLD AUTO: 8.7 FL (ref 8.9–12.9)
POTASSIUM SERPL-SCNC: 2.8 MMOL/L (ref 3.5–5.1)
RBC # BLD AUTO: 2.58 M/UL (ref 4.1–5.7)
SODIUM SERPL-SCNC: 145 MMOL/L (ref 136–145)
WBC # BLD AUTO: 9.3 K/UL (ref 4.1–11.1)

## 2024-11-13 PROCEDURE — 6360000002 HC RX W HCPCS: Performed by: INTERNAL MEDICINE

## 2024-11-13 PROCEDURE — 1100000000 HC RM PRIVATE

## 2024-11-13 PROCEDURE — 2580000003 HC RX 258: Performed by: SURGERY

## 2024-11-13 PROCEDURE — 2709999900 HC NON-CHARGEABLE SUPPLY

## 2024-11-13 PROCEDURE — 82962 GLUCOSE BLOOD TEST: CPT

## 2024-11-13 PROCEDURE — 36415 COLL VENOUS BLD VENIPUNCTURE: CPT

## 2024-11-13 PROCEDURE — 6370000000 HC RX 637 (ALT 250 FOR IP): Performed by: INTERNAL MEDICINE

## 2024-11-13 PROCEDURE — 97116 GAIT TRAINING THERAPY: CPT

## 2024-11-13 PROCEDURE — 85025 COMPLETE CBC W/AUTO DIFF WBC: CPT

## 2024-11-13 PROCEDURE — 2580000003 HC RX 258: Performed by: STUDENT IN AN ORGANIZED HEALTH CARE EDUCATION/TRAINING PROGRAM

## 2024-11-13 PROCEDURE — 6360000002 HC RX W HCPCS: Performed by: SURGERY

## 2024-11-13 PROCEDURE — 99024 POSTOP FOLLOW-UP VISIT: CPT | Performed by: SURGERY

## 2024-11-13 PROCEDURE — 74176 CT ABD & PELVIS W/O CONTRAST: CPT

## 2024-11-13 PROCEDURE — 90935 HEMODIALYSIS ONE EVALUATION: CPT

## 2024-11-13 PROCEDURE — 80069 RENAL FUNCTION PANEL: CPT

## 2024-11-13 PROCEDURE — 97110 THERAPEUTIC EXERCISES: CPT

## 2024-11-13 RX ORDER — POTASSIUM CHLORIDE 7.45 MG/ML
10 INJECTION INTRAVENOUS
Status: COMPLETED | OUTPATIENT
Start: 2024-11-13 | End: 2024-11-13

## 2024-11-13 RX ORDER — POTASSIUM CHLORIDE 1500 MG/1
40 TABLET, EXTENDED RELEASE ORAL ONCE
Status: COMPLETED | OUTPATIENT
Start: 2024-11-13 | End: 2024-11-13

## 2024-11-13 RX ORDER — DIATRIZOATE MEGLUMINE AND DIATRIZOATE SODIUM 660; 100 MG/ML; MG/ML
30 SOLUTION ORAL; RECTAL
Status: DISCONTINUED | OUTPATIENT
Start: 2024-11-13 | End: 2024-11-18 | Stop reason: HOSPADM

## 2024-11-13 RX ORDER — PANTOPRAZOLE SODIUM 40 MG/10ML
40 INJECTION, POWDER, LYOPHILIZED, FOR SOLUTION INTRAVENOUS DAILY
Status: DISCONTINUED | OUTPATIENT
Start: 2024-11-13 | End: 2024-11-17

## 2024-11-13 RX ADMIN — DICLOFENAC SODIUM 2 G: 10 GEL TOPICAL at 20:34

## 2024-11-13 RX ADMIN — SODIUM CHLORIDE, PRESERVATIVE FREE 10 ML: 5 INJECTION INTRAVENOUS at 20:34

## 2024-11-13 RX ADMIN — SODIUM CHLORIDE, PRESERVATIVE FREE 10 ML: 5 INJECTION INTRAVENOUS at 14:51

## 2024-11-13 RX ADMIN — HEPARIN SODIUM 3600 UNITS: 1000 INJECTION INTRAVENOUS; SUBCUTANEOUS at 10:15

## 2024-11-13 RX ADMIN — DICLOFENAC SODIUM 2 G: 10 GEL TOPICAL at 14:24

## 2024-11-13 RX ADMIN — POTASSIUM CHLORIDE 10 MEQ: 7.46 INJECTION, SOLUTION INTRAVENOUS at 20:33

## 2024-11-13 RX ADMIN — POTASSIUM CHLORIDE 10 MEQ: 7.46 INJECTION, SOLUTION INTRAVENOUS at 22:01

## 2024-11-13 RX ADMIN — POTASSIUM CHLORIDE 10 MEQ: 7.46 INJECTION, SOLUTION INTRAVENOUS at 14:41

## 2024-11-13 RX ADMIN — POTASSIUM CHLORIDE 40 MEQ: 1500 TABLET, EXTENDED RELEASE ORAL at 14:32

## 2024-11-13 RX ADMIN — SODIUM CHLORIDE, PRESERVATIVE FREE 10 ML: 5 INJECTION INTRAVENOUS at 14:52

## 2024-11-13 RX ADMIN — POTASSIUM CHLORIDE 10 MEQ: 7.46 INJECTION, SOLUTION INTRAVENOUS at 18:36

## 2024-11-13 RX ADMIN — PANTOPRAZOLE SODIUM 40 MG: 40 INJECTION, POWDER, FOR SOLUTION INTRAVENOUS at 14:31

## 2024-11-13 RX ADMIN — EPOETIN ALFA-EPBX 6000 UNITS: 10000 INJECTION, SOLUTION INTRAVENOUS; SUBCUTANEOUS at 10:13

## 2024-11-13 RX ADMIN — ONDANSETRON 4 MG: 2 INJECTION INTRAMUSCULAR; INTRAVENOUS at 15:24

## 2024-11-13 ASSESSMENT — PAIN SCALES - GENERAL
PAINLEVEL_OUTOF10: 0

## 2024-11-13 ASSESSMENT — PAIN SCALES - WONG BAKER: WONGBAKER_NUMERICALRESPONSE: NO HURT

## 2024-11-13 NOTE — DIALYSIS
Tx initiated via a patent right upper chest catheter. Dressing changed per protocol. No s/s of infection or skin breakdown noted. Net fluid removal goal is 2kgs and 3.5 hours tx time.

## 2024-11-13 NOTE — CARE COORDINATION
0830: Chart reviewed.    Per notes; dialysis patient with NGT followed via cardiology, dietitian, general surgery, nephrology and pulmonology.    CT of the ABD/Pelvis pending.    Patient receives dialysis MWF at [facility] with 4:45PM chair time.    [facility] accepted patient when medically cleared for discharge.    [facility] and internally faxed to the [facility].    CM will continue to follow patient and recs of medical team.    1253: Updates attached in [facility].

## 2024-11-14 LAB
ALBUMIN SERPL-MCNC: 1.9 G/DL (ref 3.5–5)
ANION GAP SERPL CALC-SCNC: 11 MMOL/L (ref 2–12)
BASOPHILS # BLD: 0.1 K/UL (ref 0–0.1)
BASOPHILS NFR BLD: 1 % (ref 0–1)
BUN SERPL-MCNC: 16 MG/DL (ref 6–20)
BUN/CREAT SERPL: 4 (ref 12–20)
CA-I BLD-MCNC: 8.4 MG/DL (ref 8.5–10.1)
CHLORIDE SERPL-SCNC: 104 MMOL/L (ref 97–108)
CO2 SERPL-SCNC: 20 MMOL/L (ref 21–32)
CREAT SERPL-MCNC: 3.68 MG/DL (ref 0.7–1.3)
DIFFERENTIAL METHOD BLD: ABNORMAL
EOSINOPHIL # BLD: 0 K/UL (ref 0–0.4)
EOSINOPHIL NFR BLD: 0 % (ref 0–7)
ERYTHROCYTE [DISTWIDTH] IN BLOOD BY AUTOMATED COUNT: 15.7 % (ref 11.5–14.5)
GLUCOSE BLD STRIP.AUTO-MCNC: 151 MG/DL (ref 65–100)
GLUCOSE BLD STRIP.AUTO-MCNC: 157 MG/DL (ref 65–100)
GLUCOSE BLD STRIP.AUTO-MCNC: 169 MG/DL (ref 65–100)
GLUCOSE BLD STRIP.AUTO-MCNC: 68 MG/DL (ref 65–100)
GLUCOSE SERPL-MCNC: 74 MG/DL (ref 65–100)
HCT VFR BLD AUTO: 28.3 % (ref 36.6–50.3)
HGB BLD-MCNC: 8.8 G/DL (ref 12.1–17)
IMM GRANULOCYTES # BLD AUTO: 0 K/UL
IMM GRANULOCYTES NFR BLD AUTO: 0 %
LYMPHOCYTES # BLD: 1.6 K/UL (ref 0.8–3.5)
LYMPHOCYTES NFR BLD: 15 % (ref 12–49)
MCH RBC QN AUTO: 29.5 PG (ref 26–34)
MCHC RBC AUTO-ENTMCNC: 31.1 G/DL (ref 30–36.5)
MCV RBC AUTO: 95 FL (ref 80–99)
METAMYELOCYTES NFR BLD MANUAL: 1 %
MONOCYTES # BLD: 0.6 K/UL (ref 0–1)
MONOCYTES NFR BLD: 6 % (ref 5–13)
NEUTS BAND NFR BLD MANUAL: 1 % (ref 0–6)
NEUTS SEG # BLD: 8.1 K/UL (ref 1.8–8)
NEUTS SEG NFR BLD: 76 % (ref 32–75)
NRBC # BLD: 0 K/UL (ref 0–0.01)
NRBC BLD-RTO: 0 PER 100 WBC
PERFORMED BY:: ABNORMAL
PERFORMED BY:: NORMAL
PHOSPHATE SERPL-MCNC: 3.5 MG/DL (ref 2.6–4.7)
PLATELET # BLD AUTO: 406 K/UL (ref 150–400)
PMV BLD AUTO: 8.4 FL (ref 8.9–12.9)
POTASSIUM SERPL-SCNC: 4.8 MMOL/L (ref 3.5–5.1)
RBC # BLD AUTO: 2.98 M/UL (ref 4.1–5.7)
RBC MORPH BLD: ABNORMAL
SODIUM SERPL-SCNC: 135 MMOL/L (ref 136–145)
WBC # BLD AUTO: 10.5 K/UL (ref 4.1–11.1)

## 2024-11-14 PROCEDURE — 1100000000 HC RM PRIVATE

## 2024-11-14 PROCEDURE — 6360000002 HC RX W HCPCS: Performed by: INTERNAL MEDICINE

## 2024-11-14 PROCEDURE — 6370000000 HC RX 637 (ALT 250 FOR IP): Performed by: INTERNAL MEDICINE

## 2024-11-14 PROCEDURE — 99024 POSTOP FOLLOW-UP VISIT: CPT | Performed by: SURGERY

## 2024-11-14 PROCEDURE — 85025 COMPLETE CBC W/AUTO DIFF WBC: CPT

## 2024-11-14 PROCEDURE — 2580000003 HC RX 258: Performed by: STUDENT IN AN ORGANIZED HEALTH CARE EDUCATION/TRAINING PROGRAM

## 2024-11-14 PROCEDURE — 97530 THERAPEUTIC ACTIVITIES: CPT

## 2024-11-14 PROCEDURE — 97535 SELF CARE MNGMENT TRAINING: CPT

## 2024-11-14 PROCEDURE — 97116 GAIT TRAINING THERAPY: CPT

## 2024-11-14 PROCEDURE — 82962 GLUCOSE BLOOD TEST: CPT

## 2024-11-14 PROCEDURE — 2580000003 HC RX 258: Performed by: SURGERY

## 2024-11-14 PROCEDURE — 36415 COLL VENOUS BLD VENIPUNCTURE: CPT

## 2024-11-14 PROCEDURE — 6370000000 HC RX 637 (ALT 250 FOR IP)

## 2024-11-14 PROCEDURE — 6370000000 HC RX 637 (ALT 250 FOR IP): Performed by: STUDENT IN AN ORGANIZED HEALTH CARE EDUCATION/TRAINING PROGRAM

## 2024-11-14 PROCEDURE — 97110 THERAPEUTIC EXERCISES: CPT

## 2024-11-14 PROCEDURE — 80069 RENAL FUNCTION PANEL: CPT

## 2024-11-14 RX ADMIN — FINASTERIDE 5 MG: 5 TABLET, FILM COATED ORAL at 09:31

## 2024-11-14 RX ADMIN — SODIUM CHLORIDE, PRESERVATIVE FREE 10 ML: 5 INJECTION INTRAVENOUS at 09:32

## 2024-11-14 RX ADMIN — CARVEDILOL 6.25 MG: 3.12 TABLET, FILM COATED ORAL at 16:36

## 2024-11-14 RX ADMIN — MIDODRINE HYDROCHLORIDE 5 MG: 5 TABLET ORAL at 09:32

## 2024-11-14 RX ADMIN — PANTOPRAZOLE SODIUM 40 MG: 40 INJECTION, POWDER, FOR SOLUTION INTRAVENOUS at 09:36

## 2024-11-14 RX ADMIN — CYANOCOBALAMIN TAB 500 MCG 500 MCG: 500 TAB at 09:36

## 2024-11-14 RX ADMIN — Medication 1 MG: at 09:31

## 2024-11-14 RX ADMIN — SODIUM CHLORIDE, PRESERVATIVE FREE 10 ML: 5 INJECTION INTRAVENOUS at 21:35

## 2024-11-14 RX ADMIN — THIAMINE HCL TAB 100 MG 100 MG: 100 TAB at 09:31

## 2024-11-14 RX ADMIN — MIDODRINE HYDROCHLORIDE 5 MG: 5 TABLET ORAL at 16:36

## 2024-11-14 RX ADMIN — DICLOFENAC SODIUM 2 G: 10 GEL TOPICAL at 09:36

## 2024-11-14 RX ADMIN — MIDODRINE HYDROCHLORIDE 5 MG: 5 TABLET ORAL at 12:28

## 2024-11-14 RX ADMIN — CARVEDILOL 6.25 MG: 3.12 TABLET, FILM COATED ORAL at 09:31

## 2024-11-14 RX ADMIN — ALLOPURINOL 100 MG: 100 TABLET ORAL at 09:31

## 2024-11-14 ASSESSMENT — PAIN SCALES - GENERAL: PAINLEVEL_OUTOF10: 0

## 2024-11-14 NOTE — CARE COORDINATION
0805: Chart reviewed.    Per notes; dialysis patient followed via cardiology, dietitian, general surgery, nephrology and pulmonology.    Per general surgery, NGT to be removed and liquid diet order noted.    Patient receives dialysis MWF at [facility] with 4:45PM chair time.    [facility] accepted patient when medically cleared for discharge.    Updates attached in [employer] and internally faxed to the [facility].     will continue to follow patient and recs of medical team.    1330: Update provided to Mrs. Fung at desk.    Updates attached in [employer]

## 2024-11-15 LAB
ALBUMIN SERPL-MCNC: 1.9 G/DL (ref 3.5–5)
ANION GAP SERPL CALC-SCNC: 6 MMOL/L (ref 2–12)
BASOPHILS # BLD: 0 K/UL (ref 0–0.1)
BASOPHILS NFR BLD: 0 % (ref 0–1)
BUN SERPL-MCNC: 22 MG/DL (ref 6–20)
BUN/CREAT SERPL: 4 (ref 12–20)
CA-I BLD-MCNC: 8.5 MG/DL (ref 8.5–10.1)
CHLORIDE SERPL-SCNC: 106 MMOL/L (ref 97–108)
CO2 SERPL-SCNC: 24 MMOL/L (ref 21–32)
CREAT SERPL-MCNC: 5.54 MG/DL (ref 0.7–1.3)
DIFFERENTIAL METHOD BLD: ABNORMAL
EOSINOPHIL # BLD: 0.3 K/UL (ref 0–0.4)
EOSINOPHIL NFR BLD: 4 % (ref 0–7)
ERYTHROCYTE [DISTWIDTH] IN BLOOD BY AUTOMATED COUNT: 15.8 % (ref 11.5–14.5)
GLUCOSE BLD STRIP.AUTO-MCNC: 118 MG/DL (ref 65–100)
GLUCOSE BLD STRIP.AUTO-MCNC: 219 MG/DL (ref 65–100)
GLUCOSE BLD STRIP.AUTO-MCNC: 90 MG/DL (ref 65–100)
GLUCOSE SERPL-MCNC: 135 MG/DL (ref 65–100)
HCT VFR BLD AUTO: 29.1 % (ref 36.6–50.3)
HGB BLD-MCNC: 9.2 G/DL (ref 12.1–17)
IMM GRANULOCYTES # BLD AUTO: 0.2 K/UL (ref 0–0.04)
IMM GRANULOCYTES NFR BLD AUTO: 2 % (ref 0–0.5)
LYMPHOCYTES # BLD: 1.1 K/UL (ref 0.8–3.5)
LYMPHOCYTES NFR BLD: 12 % (ref 12–49)
MCH RBC QN AUTO: 29.4 PG (ref 26–34)
MCHC RBC AUTO-ENTMCNC: 31.6 G/DL (ref 30–36.5)
MCV RBC AUTO: 93 FL (ref 80–99)
MONOCYTES # BLD: 0.6 K/UL (ref 0–1)
MONOCYTES NFR BLD: 6 % (ref 5–13)
NEUTS SEG # BLD: 7.1 K/UL (ref 1.8–8)
NEUTS SEG NFR BLD: 76 % (ref 32–75)
NRBC # BLD: 0 K/UL (ref 0–0.01)
NRBC BLD-RTO: 0 PER 100 WBC
PERFORMED BY:: ABNORMAL
PERFORMED BY:: ABNORMAL
PERFORMED BY:: NORMAL
PHOSPHATE SERPL-MCNC: 3.9 MG/DL (ref 2.6–4.7)
PLATELET # BLD AUTO: 370 K/UL (ref 150–400)
PMV BLD AUTO: 8.6 FL (ref 8.9–12.9)
POTASSIUM SERPL-SCNC: 4.2 MMOL/L (ref 3.5–5.1)
RBC # BLD AUTO: 3.13 M/UL (ref 4.1–5.7)
SODIUM SERPL-SCNC: 136 MMOL/L (ref 136–145)
WBC # BLD AUTO: 9.3 K/UL (ref 4.1–11.1)

## 2024-11-15 PROCEDURE — 6370000000 HC RX 637 (ALT 250 FOR IP)

## 2024-11-15 PROCEDURE — 82962 GLUCOSE BLOOD TEST: CPT

## 2024-11-15 PROCEDURE — 6370000000 HC RX 637 (ALT 250 FOR IP): Performed by: INTERNAL MEDICINE

## 2024-11-15 PROCEDURE — 90935 HEMODIALYSIS ONE EVALUATION: CPT

## 2024-11-15 PROCEDURE — 6360000002 HC RX W HCPCS: Performed by: INTERNAL MEDICINE

## 2024-11-15 PROCEDURE — 2580000003 HC RX 258: Performed by: SURGERY

## 2024-11-15 PROCEDURE — 80069 RENAL FUNCTION PANEL: CPT

## 2024-11-15 PROCEDURE — 99024 POSTOP FOLLOW-UP VISIT: CPT | Performed by: SURGERY

## 2024-11-15 PROCEDURE — 1100000000 HC RM PRIVATE

## 2024-11-15 PROCEDURE — 2580000003 HC RX 258: Performed by: STUDENT IN AN ORGANIZED HEALTH CARE EDUCATION/TRAINING PROGRAM

## 2024-11-15 PROCEDURE — 2709999900 HC NON-CHARGEABLE SUPPLY

## 2024-11-15 PROCEDURE — 85025 COMPLETE CBC W/AUTO DIFF WBC: CPT

## 2024-11-15 PROCEDURE — 36415 COLL VENOUS BLD VENIPUNCTURE: CPT

## 2024-11-15 PROCEDURE — 6370000000 HC RX 637 (ALT 250 FOR IP): Performed by: STUDENT IN AN ORGANIZED HEALTH CARE EDUCATION/TRAINING PROGRAM

## 2024-11-15 RX ORDER — LACTOBACILLUS RHAMNOSUS GG 10B CELL
1 CAPSULE ORAL
Qty: 30 CAPSULE | Refills: 1 | Status: SHIPPED | OUTPATIENT
Start: 2024-11-15 | End: 2025-01-14

## 2024-11-15 RX ORDER — LACTOBACILLUS RHAMNOSUS GG 10B CELL
1 CAPSULE ORAL
Status: DISCONTINUED | OUTPATIENT
Start: 2024-11-16 | End: 2024-11-15

## 2024-11-15 RX ORDER — NIFEDIPINE 30 MG/1
60 TABLET, EXTENDED RELEASE ORAL
Status: DISCONTINUED | OUTPATIENT
Start: 2024-11-16 | End: 2024-11-18 | Stop reason: HOSPADM

## 2024-11-15 RX ORDER — LACTOBACILLUS RHAMNOSUS GG 10B CELL
1 CAPSULE ORAL
Status: DISCONTINUED | OUTPATIENT
Start: 2024-11-15 | End: 2024-11-18 | Stop reason: HOSPADM

## 2024-11-15 RX ADMIN — CARVEDILOL 6.25 MG: 3.12 TABLET, FILM COATED ORAL at 17:41

## 2024-11-15 RX ADMIN — SODIUM CHLORIDE, PRESERVATIVE FREE 10 ML: 5 INJECTION INTRAVENOUS at 12:23

## 2024-11-15 RX ADMIN — EPOETIN ALFA-EPBX 6000 UNITS: 4000 INJECTION, SOLUTION INTRAVENOUS; SUBCUTANEOUS at 08:26

## 2024-11-15 RX ADMIN — SODIUM CHLORIDE, PRESERVATIVE FREE 10 ML: 5 INJECTION INTRAVENOUS at 22:15

## 2024-11-15 RX ADMIN — SODIUM CHLORIDE, PRESERVATIVE FREE 10 ML: 5 INJECTION INTRAVENOUS at 12:25

## 2024-11-15 RX ADMIN — HEPARIN SODIUM 3600 UNITS: 1000 INJECTION INTRAVENOUS; SUBCUTANEOUS at 11:47

## 2024-11-15 RX ADMIN — ALLOPURINOL 100 MG: 100 TABLET ORAL at 12:22

## 2024-11-15 RX ADMIN — CARVEDILOL 6.25 MG: 3.12 TABLET, FILM COATED ORAL at 12:22

## 2024-11-15 RX ADMIN — PANTOPRAZOLE SODIUM 40 MG: 40 INJECTION, POWDER, FOR SOLUTION INTRAVENOUS at 12:22

## 2024-11-15 RX ADMIN — NIFEDIPINE 90 MG: 30 TABLET, FILM COATED, EXTENDED RELEASE ORAL at 05:46

## 2024-11-15 RX ADMIN — Medication 1 CAPSULE: at 15:30

## 2024-11-15 RX ADMIN — CYANOCOBALAMIN TAB 500 MCG 500 MCG: 500 TAB at 12:22

## 2024-11-15 RX ADMIN — FINASTERIDE 5 MG: 5 TABLET, FILM COATED ORAL at 12:22

## 2024-11-15 RX ADMIN — INSULIN LISPRO 2 UNITS: 100 INJECTION, SOLUTION INTRAVENOUS; SUBCUTANEOUS at 17:43

## 2024-11-15 RX ADMIN — THIAMINE HCL TAB 100 MG 100 MG: 100 TAB at 12:22

## 2024-11-15 RX ADMIN — Medication 1 MG: at 12:22

## 2024-11-15 RX ADMIN — GLIPIZIDE 2.5 MG: 5 TABLET ORAL at 05:46

## 2024-11-15 RX ADMIN — DICLOFENAC SODIUM 2 G: 10 GEL TOPICAL at 12:24

## 2024-11-15 ASSESSMENT — PAIN SCALES - GENERAL
PAINLEVEL_OUTOF10: 0

## 2024-11-15 NOTE — CARE COORDINATION
DCP: [facility]; no bed until 11/18/24  VINAY: today    CM has reviewed pt chart. Pt must tolerate diet prior to dc.     1108: CM sent message to [facility] to see if they are able to accept back today, CM will await response.     1151: CM received message from [facility] stating they will not have a bed available until Monday 11/18    1323: CM met with pt at bedside and informed that there is no bed available at [facility] until 11/18. Pt reports understanding.

## 2024-11-15 NOTE — DIALYSIS
Serum potassium drawn pre-dialysis. Result came back while on treatment, level is at 4.2. report called to Dr. Tinajero. Advised to keep Acid bath at 4K/2.5Ca

## 2024-11-15 NOTE — DIALYSIS
Report called to Galileo, pt's primary nurse. Pt dialyzed for 3.5 hours, net fluid removal of 1.6 liters. Epogen given intra-dialysis. Pt tolerated treatment well. Notified Stefanie in tele that pt is en route to his room.

## 2024-11-16 LAB
GLUCOSE BLD STRIP.AUTO-MCNC: 131 MG/DL (ref 65–100)
GLUCOSE BLD STRIP.AUTO-MCNC: 142 MG/DL (ref 65–100)
GLUCOSE BLD STRIP.AUTO-MCNC: 219 MG/DL (ref 65–100)
GLUCOSE BLD STRIP.AUTO-MCNC: 64 MG/DL (ref 65–100)
GLUCOSE BLD STRIP.AUTO-MCNC: 79 MG/DL (ref 65–100)
GLUCOSE BLD STRIP.AUTO-MCNC: 83 MG/DL (ref 65–100)
PERFORMED BY:: ABNORMAL
PERFORMED BY:: NORMAL
PERFORMED BY:: NORMAL

## 2024-11-16 PROCEDURE — 1100000000 HC RM PRIVATE

## 2024-11-16 PROCEDURE — 6370000000 HC RX 637 (ALT 250 FOR IP): Performed by: STUDENT IN AN ORGANIZED HEALTH CARE EDUCATION/TRAINING PROGRAM

## 2024-11-16 PROCEDURE — 2580000003 HC RX 258: Performed by: SURGERY

## 2024-11-16 PROCEDURE — 2580000003 HC RX 258: Performed by: STUDENT IN AN ORGANIZED HEALTH CARE EDUCATION/TRAINING PROGRAM

## 2024-11-16 PROCEDURE — 6370000000 HC RX 637 (ALT 250 FOR IP): Performed by: INTERNAL MEDICINE

## 2024-11-16 PROCEDURE — 6360000002 HC RX W HCPCS: Performed by: INTERNAL MEDICINE

## 2024-11-16 PROCEDURE — 97530 THERAPEUTIC ACTIVITIES: CPT

## 2024-11-16 PROCEDURE — 82962 GLUCOSE BLOOD TEST: CPT

## 2024-11-16 PROCEDURE — 6370000000 HC RX 637 (ALT 250 FOR IP)

## 2024-11-16 RX ADMIN — PANTOPRAZOLE SODIUM 40 MG: 40 INJECTION, POWDER, FOR SOLUTION INTRAVENOUS at 09:22

## 2024-11-16 RX ADMIN — DICLOFENAC SODIUM 2 G: 10 GEL TOPICAL at 20:39

## 2024-11-16 RX ADMIN — SODIUM CHLORIDE, PRESERVATIVE FREE 10 ML: 5 INJECTION INTRAVENOUS at 20:36

## 2024-11-16 RX ADMIN — ALLOPURINOL 100 MG: 100 TABLET ORAL at 09:21

## 2024-11-16 RX ADMIN — SODIUM CHLORIDE, PRESERVATIVE FREE 10 ML: 5 INJECTION INTRAVENOUS at 09:26

## 2024-11-16 RX ADMIN — THIAMINE HCL TAB 100 MG 100 MG: 100 TAB at 09:22

## 2024-11-16 RX ADMIN — Medication 1 CAPSULE: at 09:21

## 2024-11-16 RX ADMIN — INSULIN LISPRO 2 UNITS: 100 INJECTION, SOLUTION INTRAVENOUS; SUBCUTANEOUS at 12:22

## 2024-11-16 RX ADMIN — Medication 1 MG: at 09:22

## 2024-11-16 RX ADMIN — SODIUM CHLORIDE, PRESERVATIVE FREE 10 ML: 5 INJECTION INTRAVENOUS at 09:22

## 2024-11-16 RX ADMIN — GLIPIZIDE 2.5 MG: 5 TABLET ORAL at 09:21

## 2024-11-16 RX ADMIN — FINASTERIDE 5 MG: 5 TABLET, FILM COATED ORAL at 09:22

## 2024-11-16 RX ADMIN — CYANOCOBALAMIN TAB 500 MCG 500 MCG: 500 TAB at 09:21

## 2024-11-16 RX ADMIN — DICLOFENAC SODIUM 2 G: 10 GEL TOPICAL at 09:25

## 2024-11-16 ASSESSMENT — PAIN DESCRIPTION - LOCATION: LOCATION: NECK

## 2024-11-16 ASSESSMENT — PAIN SCALES - GENERAL: PAINLEVEL_OUTOF10: 0

## 2024-11-16 NOTE — CARE COORDINATION
CM noted discharge order.  Patient has been accepted back at [facility], but they do not have a bed until Monday 11/18/24.  Patient and family aware.    CM will continue to follow.

## 2024-11-17 LAB
ANION GAP SERPL CALC-SCNC: 9 MMOL/L (ref 2–12)
BASOPHILS # BLD: 0 K/UL (ref 0–0.1)
BASOPHILS NFR BLD: 0 % (ref 0–1)
BUN SERPL-MCNC: 25 MG/DL (ref 6–20)
BUN/CREAT SERPL: 4 (ref 12–20)
CA-I BLD-MCNC: 7.7 MG/DL (ref 8.5–10.1)
CHLORIDE SERPL-SCNC: 103 MMOL/L (ref 97–108)
CO2 SERPL-SCNC: 23 MMOL/L (ref 21–32)
CREAT SERPL-MCNC: 6.09 MG/DL (ref 0.7–1.3)
DIFFERENTIAL METHOD BLD: ABNORMAL
EOSINOPHIL # BLD: 0.3 K/UL (ref 0–0.4)
EOSINOPHIL NFR BLD: 3 % (ref 0–7)
ERYTHROCYTE [DISTWIDTH] IN BLOOD BY AUTOMATED COUNT: 16.3 % (ref 11.5–14.5)
GLUCOSE BLD STRIP.AUTO-MCNC: 43 MG/DL (ref 65–100)
GLUCOSE BLD STRIP.AUTO-MCNC: 44 MG/DL (ref 65–100)
GLUCOSE BLD STRIP.AUTO-MCNC: 48 MG/DL (ref 65–100)
GLUCOSE BLD STRIP.AUTO-MCNC: 56 MG/DL (ref 65–100)
GLUCOSE BLD STRIP.AUTO-MCNC: 60 MG/DL (ref 65–100)
GLUCOSE BLD STRIP.AUTO-MCNC: 78 MG/DL (ref 65–100)
GLUCOSE BLD STRIP.AUTO-MCNC: 82 MG/DL (ref 65–100)
GLUCOSE BLD STRIP.AUTO-MCNC: 98 MG/DL (ref 65–100)
GLUCOSE SERPL-MCNC: 56 MG/DL (ref 65–100)
HCT VFR BLD AUTO: 27.2 % (ref 36.6–50.3)
HGB BLD-MCNC: 8.9 G/DL (ref 12.1–17)
IMM GRANULOCYTES # BLD AUTO: 0.3 K/UL (ref 0–0.04)
IMM GRANULOCYTES NFR BLD AUTO: 2 % (ref 0–0.5)
LYMPHOCYTES # BLD: 1.3 K/UL (ref 0.8–3.5)
LYMPHOCYTES NFR BLD: 11 % (ref 12–49)
MCH RBC QN AUTO: 30.4 PG (ref 26–34)
MCHC RBC AUTO-ENTMCNC: 32.7 G/DL (ref 30–36.5)
MCV RBC AUTO: 92.8 FL (ref 80–99)
MONOCYTES # BLD: 0.9 K/UL (ref 0–1)
MONOCYTES NFR BLD: 7 % (ref 5–13)
NEUTS SEG # BLD: 9.5 K/UL (ref 1.8–8)
NEUTS SEG NFR BLD: 77 % (ref 32–75)
NRBC # BLD: 0 K/UL (ref 0–0.01)
NRBC BLD-RTO: 0 PER 100 WBC
PERFORMED BY:: ABNORMAL
PERFORMED BY:: NORMAL
PLATELET # BLD AUTO: 283 K/UL (ref 150–400)
PMV BLD AUTO: 8.5 FL (ref 8.9–12.9)
POTASSIUM SERPL-SCNC: 3.8 MMOL/L (ref 3.5–5.1)
RBC # BLD AUTO: 2.93 M/UL (ref 4.1–5.7)
SODIUM SERPL-SCNC: 135 MMOL/L (ref 136–145)
WBC # BLD AUTO: 12.3 K/UL (ref 4.1–11.1)

## 2024-11-17 PROCEDURE — 2580000003 HC RX 258: Performed by: STUDENT IN AN ORGANIZED HEALTH CARE EDUCATION/TRAINING PROGRAM

## 2024-11-17 PROCEDURE — 6370000000 HC RX 637 (ALT 250 FOR IP): Performed by: INTERNAL MEDICINE

## 2024-11-17 PROCEDURE — 97530 THERAPEUTIC ACTIVITIES: CPT

## 2024-11-17 PROCEDURE — 2580000003 HC RX 258: Performed by: SURGERY

## 2024-11-17 PROCEDURE — 1100000000 HC RM PRIVATE

## 2024-11-17 PROCEDURE — 6360000002 HC RX W HCPCS: Performed by: INTERNAL MEDICINE

## 2024-11-17 PROCEDURE — 80048 BASIC METABOLIC PNL TOTAL CA: CPT

## 2024-11-17 PROCEDURE — 82962 GLUCOSE BLOOD TEST: CPT

## 2024-11-17 PROCEDURE — 97110 THERAPEUTIC EXERCISES: CPT

## 2024-11-17 PROCEDURE — 36415 COLL VENOUS BLD VENIPUNCTURE: CPT

## 2024-11-17 PROCEDURE — 6370000000 HC RX 637 (ALT 250 FOR IP)

## 2024-11-17 PROCEDURE — 85025 COMPLETE CBC W/AUTO DIFF WBC: CPT

## 2024-11-17 RX ORDER — PANTOPRAZOLE SODIUM 40 MG/1
40 TABLET, DELAYED RELEASE ORAL
Status: DISCONTINUED | OUTPATIENT
Start: 2024-11-18 | End: 2024-11-18 | Stop reason: HOSPADM

## 2024-11-17 RX ADMIN — SODIUM CHLORIDE, PRESERVATIVE FREE 10 ML: 5 INJECTION INTRAVENOUS at 10:48

## 2024-11-17 RX ADMIN — Medication 1 CAPSULE: at 10:39

## 2024-11-17 RX ADMIN — GLIPIZIDE 2.5 MG: 5 TABLET ORAL at 05:43

## 2024-11-17 RX ADMIN — CYANOCOBALAMIN TAB 500 MCG 500 MCG: 500 TAB at 10:39

## 2024-11-17 RX ADMIN — NIFEDIPINE 60 MG: 30 TABLET, FILM COATED, EXTENDED RELEASE ORAL at 05:43

## 2024-11-17 RX ADMIN — Medication 1 MG: at 10:39

## 2024-11-17 RX ADMIN — DICLOFENAC SODIUM 2 G: 10 GEL TOPICAL at 10:40

## 2024-11-17 RX ADMIN — DICLOFENAC SODIUM 2 G: 10 GEL TOPICAL at 20:24

## 2024-11-17 RX ADMIN — THIAMINE HCL TAB 100 MG 100 MG: 100 TAB at 10:39

## 2024-11-17 RX ADMIN — FINASTERIDE 5 MG: 5 TABLET, FILM COATED ORAL at 10:39

## 2024-11-17 RX ADMIN — PANTOPRAZOLE SODIUM 40 MG: 40 INJECTION, POWDER, FOR SOLUTION INTRAVENOUS at 10:40

## 2024-11-17 RX ADMIN — ALLOPURINOL 100 MG: 100 TABLET ORAL at 10:39

## 2024-11-17 ASSESSMENT — PAIN SCALES - GENERAL: PAINLEVEL_OUTOF10: 0

## 2024-11-17 NOTE — CONSULTS
Gastroenterology Consult Note        Patient: Michael Fung MRN: [id number]  SSN: [id number]    YOB: 1940  Age: 83 y.o.  Sex: male      Subjective:      Michael Fung is a 83 y.o. male who is being seen for anemia possible GI bleeding.    83 y.o. male with multiple medical problems DVT s/p IVC filter, ESRD on HD who was back to ED due to shortness of breath.  Patient was recently discharged on 10/25.   He had a DVT for which he underwent IVC filter placement.  During his prior hospital stay he was in the ICU requiring BiPAP.  Now he has been treated for the pneumonia, hypoxia, on Zosyn.  I saw the patient for the anemia, possible GI bleeding, I recommended patient for a outpatient follow-up, no inpatient endoscopy was performed.  Patient received a dose of blood transfusion, overnight patient had a cough, emesis, patient was placed on PPI, n.p.o. patient has no abdominal pain, no chest pain, no palpitation, no shortness of breath from baseline    . Now  Hemoglobin dropped further  No past medical history on file.  Past Surgical History:   Procedure Laterality Date    IR IVC FILTER PLACEMENT W IMAGING  10/4/2024    IR IVC FILTER PLACEMENT W IMAGING 10/4/2024 Saad Mancini MD [facility]    IR NONTUNNELED VASCULAR CATHETER  10/4/2024    IR NONTUNNELED VASCULAR CATHETER 10/4/2024 Saad Mancini MD [facility]    IR TUNNELED CATHETER PLACEMENT GREATER THAN 5 YEARS  10/16/2024    IR TUNNELED CATHETER PLACEMENT GREATER THAN 5 YEARS 10/16/2024 Gracie Santana APRN - NP [facility]      No family history on file.  Social History     Tobacco Use    Smoking status: Never     Passive exposure: Never    Smokeless tobacco: Never   Substance Use Topics    Alcohol use: Defer      Current Facility-Administered Medications   Medication Dose Route Frequency Provider Last Rate Last Admin    pantoprazole (PROTONIX) injection 40 mg  40 mg IntraVENous BID Shawn Floyd PA-C 
         Consult    NAME: Michael Fung   :  1940   MRN:  [id number]     Date/Time:  2024 12:40 PM    Patient PCP: No primary care provider on file.  ________________________________________________________________________      Subjective:   CHIEF COMPLAINT:       Came to see patient however patient is out of the floor and I will evaluate later.  Thank you.      HISTORY OF PRESENT ILLNESS:              No past medical history on file.   Past Surgical History:   Procedure Laterality Date    IR IVC FILTER PLACEMENT W IMAGING  10/4/2024    IR IVC FILTER PLACEMENT W IMAGING 10/4/2024 Saad Mancini MD [facility]    IR NONTUNNELED VASCULAR CATHETER  10/4/2024    IR NONTUNNELED VASCULAR CATHETER 10/4/2024 Saad Mancini MD [facility]    IR TUNNELED CATHETER PLACEMENT GREATER THAN 5 YEARS  10/16/2024    IR TUNNELED CATHETER PLACEMENT GREATER THAN 5 YEARS 10/16/2024 Gracie Santana APRN - NP [facility]     No Known Allergies   Meds:  See below  Social History     Tobacco Use    Smoking status: Never     Passive exposure: Never    Smokeless tobacco: Never   Substance Use Topics    Alcohol use: Defer      No family history on file.     FAMILY HISTORY:     REVIEW OF SYSTEMS:     []         Unable to obtain  ROS due to ---   [x]         Total of 12 systems reviewed as follows:    Constitutional: negative fever, negative chills, negative weight loss  Eyes:   negative visual changes  ENT:   negative sore throat, tongue or lip swelling  Respiratory:  negative cough, negative dyspnea  Cards:  negative for chest pain, palpitations, lower extremity edema  GI:   negative for nausea, vomiting, diarrhea, and abdominal pain  Genitourinary: negative for frequency, dysuria  Integument:  negative for rash   Hematologic:  negative for easy bruising and gum/nose bleeding  Musculoskel: negative for myalgias,  back pain  Neurological:  negative for headaches, dizziness, vertigo, weakness  Behavl/Psych: 
         Consult    NAME: Michael Fung   :  1940   MRN:  [id number]     Date/Time:  2024 12:53 PM    Patient PCP: No primary care provider on file.  ________________________________________________________________________      Subjective:   CHIEF COMPLAINT: Chest pain while patient was undergoing dialysis.    HISTORY OF PRESENT ILLNESS:   Chart reviewed.  Discussed with the wife.  Wife stated that patient was having dialysis and experienced chest pain in the he has been transferred here from [facility].  Patient had a very prolonged stay and was recently discharged and was on rehab.  He also was found to have a reducible inguinal hernia which contained sigmoid colon on the left side.  Patient also complained of shortness of breath and is found to have a pneumonia.  Patient had a problem of acute DVT recently and received a IVC filter.  His BNP is elevated to 3742 cardiology consultation is invited.           No past medical history on file.   Past Surgical History:   Procedure Laterality Date    IR IVC FILTER PLACEMENT W IMAGING  10/4/2024    IR IVC FILTER PLACEMENT W IMAGING 10/4/2024 Saad Mancini MD [facility]    IR NONTUNNELED VASCULAR CATHETER  10/4/2024    IR NONTUNNELED VASCULAR CATHETER 10/4/2024 Saad Mancini MD [facility]    IR TUNNELED CATHETER PLACEMENT GREATER THAN 5 YEARS  10/16/2024    IR TUNNELED CATHETER PLACEMENT GREATER THAN 5 YEARS 10/16/2024 Gracie Santana APRN - NP [facility]     No Known Allergies   Meds:  See below  Social History     Tobacco Use    Smoking status: Never     Passive exposure: Never    Smokeless tobacco: Never   Substance Use Topics    Alcohol use: Defer   Patient used to smoke less than half pack a day though he has quit almost 60 years ago and did take couple drinks a day but no history of drug abuse.      No family history on file.     FAMILY HISTORY: Mother  in her 70s and had a stroke and father  in his 50s and 
  NEPHROLOGY CONSULT NOTE     Patient: Michael Fung MRN: [id number]  PCP: No primary care provider on file.   :     1940  Age:   83 y.o.  Sex:  male      Referring physician: Isaías Gonzalez MD    Reason for consultation:     End-stage kidney disease    Mr. Fung was seen and examined in room 410 at [facility] this afternoon.    Mrs. Fung and Radha [daughter] were present during my visit.    Admission Date: 10/31/2024 11:11 AM  LOS: 0 days     DISCUSSION / PLAN :   Resume dialysis tomorrow, Friday, .  Dialysis orders were entered in [employer].  Dialysis will provide clearance, assist with maintenance of normokalemia and also help with optimization of volume status    Management of pneumonia will be deferred to the admitting team      Will check the CBC in the AM.  The target hemoglobin level is 10 to 11 g/dL.      Check renal panel today.    Dose medications for GFR    Add renal caps [1 daily].    A Phos binder will be added to his regimen if his serum Phos is above 5.5.         Active Problems / Assessment AAActive  :   Principal Problem:    Acute pneumonia  Resolved Problems:    * No resolved hospital problems. *  ESRD  Anemia chronic kidney disease  Diabetes mellitus     Subjective:       Pneumonia    Obtaining the history and ROS was challenging.  The database in epic was reviewed.  His course was also discussed with Mrs. Farmer at the bedside      HPI:     Mr. Farmer is an 83 old gentleman with a history of chronic kidney disease, diabetes mellitus and anemia of chronic kidney disease.  He is primary followed at the [facility].  It seems he was diagnosed with stage V CKD there.  However[facility] that Mr[facility] Fung was not necessarily interested in dialysis therapy on an ongoing basis.  [This was not confirmed].    He was admitted to [facility] earlier this month [10/3/2024 to 
Consult  Pulmonary, Critical Care    Name: Michael Fung MRN: [id number]   : 1940 Hospital: [facility]   Date: 2024  Admission date: 10/31/2024 Hospital Day: 3       Subjective/Interval History:   Seen in the dialysis unit awake alert on room air oxygen saturation 96% no distress history is that prolonged hospitalization just transferred 10/26 to rehab was having dialysis in [place] noted chest discomfort fever chest x-ray showed new right perihilar and inferior infiltrates and he was transferred to our hospital for admission.  Wife and he both note that he has had some problems with coughing while swallowing perhaps more so with liquids and with solids for \"some time\"   awake alert still having some low-grade temperature Tmax 100.2 respirations nonlabored remains on room air with good oxygen saturation    Patient Active Problem List   Diagnosis    Acute hypoxic respiratory failure    CATHY (acute kidney injury) (HCC)    Hemoptysis    Community acquired pneumonia    Acute deep vein thrombosis (DVT) of femoral vein of both lower extremities (HCC)    DM (diabetes mellitus), type 2 (HCC)    Decreased mobility and endurance    Acute upper GI bleed    Ileus (HCC)    Acute urethral obstruction    Incomplete bladder emptying    Small bowel obstruction (HCC)    Edema of right upper arm    Acute pneumonia       IMPRESSION:   Aspiration pneumonia right lung  Pharyngeal dysphagia minimal  Probable esophageal dysmotility  End-stage renal disease  Hypokalemia repleted  Body mass index is 24.99 kg/m².        RECOMMENDATIONS/PLAN:   Continue Merrem for aspiration pneumonia will discontinue Levaquin  Modified barium swallow with no significant aspiration will check upper GI to rule out significant reflux or esophageal dysmotility             Subjective/Initial History:   I have reviewed the flowsheet and previous notes. .    I was asked by Isaías Gonzalez MD to see Michael Fung a 83 y.o.   
Consult  Pulmonary, Critical Care    Name: Michael Fung MRN: [id number]   : 1940 Hospital: [facility]   Date: 2024  Admission date: 10/31/2024 Hospital Day: 6       Subjective/Interval History:   Seen in the dialysis unit awake alert on room air oxygen saturation 96% no distress history is that prolonged hospitalization just transferred 10/26 to rehab was having dialysis in [place] noted chest discomfort fever chest x-ray showed new right perihilar and inferior infiltrates and he was transferred to our hospital for admission.  Wife and he both note that he has had some problems with coughing while swallowing perhaps more so with liquids and with solids for \"some time\"   awake alert still having some low-grade temperature Tmax 100.2 respirations nonlabored remains on room air with good oxygen saturation  11/3 episode of vomiting last night but has had breakfast this morning with no nausea or vomiting remains on room air saturation 98%.  KUB to me shows distended gastric silhouette otherwise unremarkable   no further vomiting still has some cough no significant fever WBC count improved    Patient Active Problem List   Diagnosis    Acute hypoxic respiratory failure    CATHY (acute kidney injury) ([facility])    Hemoptysis    Community acquired pneumonia    Acute deep vein thrombosis (DVT) of femoral vein of both lower extremities (HCC)    DM (diabetes mellitus), type 2 (HCC)    Decreased mobility and endurance    Acute upper GI bleed    Ileus (HCC)    Acute urethral obstruction    Incomplete bladder emptying    Small bowel obstruction (HCC)    Edema of right upper arm    Acute pneumonia       IMPRESSION:   Aspiration pneumonia right lung  Pharyngeal dysphagia minimal  Probable esophageal dysmotility  Questionable gastroparesis with distended stomach on x-ray and vomiting during the night  Possible small bowel obstruction  End-stage renal disease  Hypokalemia repleted  Body mass index is 
Consult  Pulmonary, Critical Care    Name: Michael Fung MRN: [id number]   : 1940 Hospital: [facility]   Date: 2024  Admission date: 10/31/2024 Hospital Day: 5       Subjective/Interval History:   Seen in the dialysis unit awake alert on room air oxygen saturation 96% no distress history is that prolonged hospitalization just transferred 10/26 to rehab was having dialysis in [place] noted chest discomfort fever chest x-ray showed new right perihilar and inferior infiltrates and he was transferred to our hospital for admission.  Wife and he both note that he has had some problems with coughing while swallowing perhaps more so with liquids and with solids for \"some time\"   awake alert still having some low-grade temperature Tmax 100.2 respirations nonlabored remains on room air with good oxygen saturation  11/3 episode of vomiting last night but has had breakfast this morning with no nausea or vomiting remains on room air saturation 98%.  KUB to me shows distended gastric silhouette otherwise unremarkable   no further vomiting still has some cough no significant fever WBC count improved    Patient Active Problem List   Diagnosis    Acute hypoxic respiratory failure    CATHY (acute kidney injury) ([facility])    Hemoptysis    Community acquired pneumonia    Acute deep vein thrombosis (DVT) of femoral vein of both lower extremities (HCC)    DM (diabetes mellitus), type 2 (HCC)    Decreased mobility and endurance    Acute upper GI bleed    Ileus (HCC)    Acute urethral obstruction    Incomplete bladder emptying    Small bowel obstruction (HCC)    Edema of right upper arm    Acute pneumonia       IMPRESSION:   Aspiration pneumonia right lung  Pharyngeal dysphagia minimal  Probable esophageal dysmotility  Questionable gastroparesis with distended stomach on x-ray and vomiting during the night  Possible small bowel obstruction  End-stage renal disease  Hypokalemia repleted  Body mass index is 
Covering for Dr. Peters     [facility] SURGERY CONSULT          Chief Complaint: None    History of Present Illness:    Mr. Michael Fung is a 83 y.o. year old * male was hospitalized due to small bowel obstruction secondary to obstructed left inguinal hernia.  He underwent repair of left sided obstructed incarcerated inguinal hernia with mesh by Dr. Peters on 11/10/2024.  He has no new complaints.  He has been doing well.  He is tolerating diet.  No nausea or vomiting.  No fever or chills.      Past Medical History: No past medical history on file.    Past Surgical History:   Past Surgical History:   Procedure Laterality Date    HERNIA REPAIR Left 11/10/2024    LEFT INGUINAL HERNIA REPAIR with mesh implantation with Prolene mesh.  Teresita type hernia repair performed by Jad Peters MD at [facility] MAIN OR    IR IVC FILTER PLACEMENT W IMAGING  10/4/2024    IR IVC FILTER PLACEMENT W IMAGING 10/4/2024 Saad Mancini MD [facility] RAD ANGIO IR    IR NONTUNNELED VASCULAR CATHETER  10/4/2024    IR NONTUNNELED VASCULAR CATHETER 10/4/2024 Saad Mancini MD [facility] RAD ANGIO IR    IR TUNNELED CATHETER PLACEMENT GREATER THAN 5 YEARS  10/16/2024    IR TUNNELED CATHETER PLACEMENT GREATER THAN 5 YEARS 10/16/2024 Gracie Santana APRN - NP [facility] RAD ANGIO IR        Allergy:No Known Allergies    Social History:  reports that he has never smoked. He has never been exposed to tobacco smoke. He has never used smokeless tobacco. Alcohol use questions deferred to the physician. Drug use questions deferred to the physician.     Family History:No family history on file.     Current Medications:  Current Facility-Administered Medications:     lactobacillus (CULTURELLE) capsule 1 capsule, 1 capsule, Oral, Daily with breakfast, KerrieSam MD, 1 capsule at 11/16/24 0921    NIFEdipine (PROCARDIA XL) extended release tablet 60 mg, 60 mg, Oral, QALior VENTURA Sandy M, MD    diatrizoate meglumine-sodium (GASTROGRAFIN) 66-10 % solution 30 mL, 30 mL, 
General Surgery Consultation      History of Present Illness      Michael Fung is an 83 y.o.  with multiple advanced medical conditions including acute respiratory failure with active pneumonia, ESRD on hemodialysis, concern over gi bleeding who was found to have either a partial small bowel obstruction or ileus on CT , he also has a reducible yet intermittently incarcerating left inguinal hernia involving the sigmoid colon ,not the small bowel. , no reported history of abdominal surgery per patient.    No past medical history on file.  No family history on file.  Prior to Admission Medications   Prescriptions Last Dose Informant Patient Reported? Taking?   Multiple Vitamins-Minerals (MVW COMPLETE FORMULATION) CAPS   Yes No   Sig: Take 1 capsule by mouth daily   NIFEdipine (ADALAT CC) 90 MG extended release tablet   Yes No   Sig: Take 1 tablet by mouth every morning (before breakfast)   Petrolatum-Zinc Oxide 57-17 % ointment   No No   Sig: Apply 1 each topically in the morning and at bedtime   allopurinol (ZYLOPRIM) 100 MG tablet   Yes No   Sig: Take 1 tablet by mouth daily   carvedilol (COREG) 12.5 MG tablet   Yes No   Sig: Take 0.5 tablets by mouth 2 times daily (with meals)   cyanocobalamin 500 MCG tablet   Yes No   Sig: Take 1 tablet by mouth daily   finasteride (PROSCAR) 5 MG tablet   Yes No   Sig: Take 1 tablet by mouth daily   glipiZIDE (GLUCOTROL XL) 5 MG extended release tablet   Yes No   Sig: Take 0.5 tablets by mouth daily   sodium bicarbonate 650 MG tablet   Yes No   Sig: Take 2 tablets by mouth 2 times daily   tamsulosin (FLOMAX) 0.4 MG capsule   Yes No   Sig: Take 2 capsules by mouth daily   thiamine mononitrate 100 MG tablet   Yes No   Sig: Take 1 tablet by mouth daily   torsemide (DEMADEX) 5 MG tablet   Yes No   Sig: Take 2 tablets by mouth daily as needed      Facility-Administered Medications: None     No Known Allergies  Social History     Socioeconomic History    Marital status:      
Soft nontender normal bowel sounds  Ext: no edema; no joint swelling; No clubbing  : Scant clear urine  Neuro: Very mildly lethargic but awakens easily is alert moves all 4 extremities well speech is clear  Psych- no agitation, oriented to person;   Skin: warm, dry, no cyanosis;   Pulses: Brachial and radial pulses intact  Capillary: Normal capillary refill      Labs:    Recent Labs     11/01/24  0805   WBC 10.6   HGB 8.5*        Recent Labs     10/31/24  1640 11/01/24  0805    135*   K 2.9* 2.8*    97   CO2 29 26   GLUCOSE 154* 133*   BUN 27* 32*   CREATININE 4.45* 5.19*   CALCIUM 7.8* 8.6   PHOS 4.6  --        Lab Results   Component Value Date/Time    PROBNP 3,742 10/31/2024 04:40 PM      Results       Procedure Component Value Units Date/Time    Culture, Respiratory (with Gram Stain) [[id number]]     Order Status: Sent Specimen: Sputum Expectorated            Lab Results   Component Value Date/Time    TSH 1.33 10/08/2024 12:00 PM     Recent Labs     10/31/24  1640 11/01/24  0805   CO2 29 26       ARTERIAL BLOOD GAS    Imaging:  I have personally reviewed the patient’s radiographs and have reviewed the reports:  XR CHEST 1 VIEW    Result Date: 10/30/2024  Airspace opacity right perihilar region and right lower lobe concerning for acute pneumonia. Electronically Signed by Basil Enriquez DO 10/30/2024 9:17 PM     Discussion: Patient was noted on last hospital admission to have distended stomach and material remaining in esophagus with left lower lobe infiltrates consistent with aspiration pneumonia.  He is readmitted at this time with right lower lobe pneumonia states that he has been having cough associated with swallowing.  Very likely has recurrent aspiration pneumonia just had a course of Zosyn so we will use Merrem to cover aspiration type organisms associated with prolonged hospitalization.  Will check modified barium swallow but if no abnormalities on it would proceed with upper GI 
only shows perihilar infiltrate outside report was right perihilar and right lower lobe based upon the reading it sounds as if it is improving will discontinue Levaquin.  Modified barium swallow showed no significant aspiration will order upper GI to rule out esophageal dysmotility and reflux  11/3 Tmax 99.5 did have episode of vomiting last night none this morning after breakfast.  KUB to me unremarkable except for gastric distention.  Will await upper GI to see if any evidence of esophageal dysmotility or gastroparesis which would warrant starting Reglan           Thank you for allowing us to participate in the care of this patient.  We will follow along with you     Shawn Randolph MD

## 2024-11-18 VITALS
HEIGHT: 66 IN | DIASTOLIC BLOOD PRESSURE: 65 MMHG | WEIGHT: 139.33 LBS | RESPIRATION RATE: 19 BRPM | BODY MASS INDEX: 22.39 KG/M2 | SYSTOLIC BLOOD PRESSURE: 104 MMHG | HEART RATE: 87 BPM | OXYGEN SATURATION: 100 % | TEMPERATURE: 98.4 F

## 2024-11-18 LAB
GLUCOSE BLD STRIP.AUTO-MCNC: 65 MG/DL (ref 65–100)
GLUCOSE BLD STRIP.AUTO-MCNC: 80 MG/DL (ref 65–100)
PERFORMED BY:: NORMAL
PERFORMED BY:: NORMAL

## 2024-11-18 PROCEDURE — 6360000002 HC RX W HCPCS: Performed by: INTERNAL MEDICINE

## 2024-11-18 PROCEDURE — 90935 HEMODIALYSIS ONE EVALUATION: CPT

## 2024-11-18 PROCEDURE — 6370000000 HC RX 637 (ALT 250 FOR IP)

## 2024-11-18 PROCEDURE — 2709999900 HC NON-CHARGEABLE SUPPLY

## 2024-11-18 PROCEDURE — 99024 POSTOP FOLLOW-UP VISIT: CPT | Performed by: SURGERY

## 2024-11-18 PROCEDURE — 82962 GLUCOSE BLOOD TEST: CPT

## 2024-11-18 PROCEDURE — 97535 SELF CARE MNGMENT TRAINING: CPT

## 2024-11-18 PROCEDURE — 6370000000 HC RX 637 (ALT 250 FOR IP): Performed by: INTERNAL MEDICINE

## 2024-11-18 RX ADMIN — EPOETIN ALFA-EPBX 6000 UNITS: 4000 INJECTION, SOLUTION INTRAVENOUS; SUBCUTANEOUS at 11:25

## 2024-11-18 RX ADMIN — DICLOFENAC SODIUM 2 G: 10 GEL TOPICAL at 13:41

## 2024-11-18 RX ADMIN — Medication 1 MG: at 13:43

## 2024-11-18 RX ADMIN — THIAMINE HCL TAB 100 MG 100 MG: 100 TAB at 13:42

## 2024-11-18 RX ADMIN — HEPARIN SODIUM 3600 UNITS: 1000 INJECTION INTRAVENOUS; SUBCUTANEOUS at 12:56

## 2024-11-18 RX ADMIN — PANTOPRAZOLE SODIUM 40 MG: 40 TABLET, DELAYED RELEASE ORAL at 13:43

## 2024-11-18 RX ADMIN — ALLOPURINOL 100 MG: 100 TABLET ORAL at 13:42

## 2024-11-18 RX ADMIN — FINASTERIDE 5 MG: 5 TABLET, FILM COATED ORAL at 13:43

## 2024-11-18 RX ADMIN — Medication 1 CAPSULE: at 13:43

## 2024-11-18 ASSESSMENT — PAIN SCALES - GENERAL: PAINLEVEL_OUTOF10: 0

## 2024-11-18 NOTE — DIALYSIS
Patient completed and tolerated treatment well, duration 3.5 hours and UF removed 2  Liters. Post HD /52 mmHg, HR 80 bpm. Retacrit given. Seen and examined by Dr. Tinajero.  Report given to QUINTIN Bell and transport back to room.

## 2024-11-18 NOTE — PLAN OF CARE
PHYSICAL THERAPY TREATMENT     Patient: Michael Fung (83 y.o. male)  Date: 11/13/2024  Diagnosis: Acute pneumonia [J18.9] Acute pneumonia  Procedure(s) (LRB):  LEFT INGUINAL HERNIA REPAIR with mesh implantation with Prolene mesh.  Teresita type hernia repair (Left) 3 Days Post-Op  Precautions: Fall Risk                      Recommendations for nursing mobility: Out of bed to chair for meals, Encourage HEP in prep for ADLs/mobility; see handout for details, Frequent repositioning to prevent skin breakdown, AD and gt belt for bed to chair , and Assist x1    In place during session: Peripheral IV, EKG/telemetry , and Nasogastric Tube  Chart, physical therapy assessment, plan of care and goals were reviewed.  ASSESSMENT  Patient continues with skilled PT services and is slowly progressing towards goals.attempted PT treatment this am however patient was in dialysis.  Pt lying in semi-supine position upon PT arrival, agreeable to session with encouragement from this writer and patient's wife. Pt A&O x 1. (See below for objective details and assist levels). Patient's wife present for treatment    Overall pt tolerated session fair today with PT.  patient initially c/o L groin pain but once patient was moving no more c/o pain in L groin. patient transfer to eob and sat for ~ 3 minutes prior to standing and ambulating. Patient c/o pain at IV site. Patient gt ~40' with rw and min assist x 1. Patient gt with slow ivan and short step length. Patient with NBOS. Patient given encouragement to ambulate with longer step length however patient did not perform. Following ambulation patient sat up in recliner with pillows positioned on either side of buttocks.  Patient perform there-ex sitting in recliner. Patient began to c/o abdominal pain and began to have N/V. RN notified.  Will continue to benefit from skilled PT services, and will continue to progress as tolerated. Current PT DC recommendation Moderate 
         PHYSICAL THERAPY TREATMENT     Patient: Michael Fung (83 y.o. male)  Date: 11/14/2024  Diagnosis: Acute pneumonia [J18.9] Acute pneumonia  Procedure(s) (LRB):  LEFT INGUINAL HERNIA REPAIR with mesh implantation with Prolene mesh.  Teresita type hernia repair (Left) 4 Days Post-Op  Precautions: Fall Risk                      Recommendations for nursing mobility: Out of bed to chair for meals, Encourage HEP in prep for ADLs/mobility; see handout for details, Frequent repositioning to prevent skin breakdown, Use of bed/chair alarm for safety, Use of BSC for toileting , AD and gt belt for bed to chair , and Assist x1    In place during session: External Catheter and EKG/telemetry   Chart, physical therapy assessment, plan of care and goals were reviewed.  ASSESSMENT  Patient continues with skilled PT services and is slowly progressing towards goals. Pt lying in semi-supine position upon PT arrival, agreeable to session. Pt A&O x person and place. (See below for objective details and assist levels). Patient could not remember his birthday today. Patient's wife present for treatment and involved in treatment.    Overall pt tolerated session fair today with PT.  patient ambulated further today with less c/o pain and fatigue today. Following ambulation pillows placed on either side of patient's buttock secondary to patient c/o buttock pain when sitting. Patient perform there-ex sitting in recliner. Patient had urgency to have a bowel movement. [place] [facility] [place] min assist x 1. Patient left up on bsc with RN aware. Will continue to benefit from skilled PT services, and will continue to progress as tolerated. Current PT DC recommendation Moderate intensity short-term skilled physical therapy up to 5x/week once medically appropriate.      GOALS:    Problem: Physical Therapy - Adult  Goal: By Discharge: Performs mobility at highest level of function for planned discharge setting.  
         PHYSICAL THERAPY TREATMENT     Patient: Michael Fung (83 y.o. male)  Date: 11/16/2024  Diagnosis: Acute pneumonia [J18.9] Acute pneumonia  Procedure(s) (LRB):  LEFT INGUINAL HERNIA REPAIR with mesh implantation with Prolene mesh.  Teresita type hernia repair (Left) 6 Days Post-Op  Precautions: Fall Risk                      Recommendations for nursing mobility: Out of bed to chair for meals, AD and gt belt for bed to chair , and Amb to bathroom with AD and gait belt    In place during session:   Chart, physical therapy assessment, plan of care and goals were reviewed.  ASSESSMENT  Patient continues with skilled PT services and is slowly progressing towards goals. Pt supine upon PT arrival, agreeable to session. Pt A&O x 4. (See below for objective details and assist levels).     Overall pt tolerated session fair today with PT. Decreased assistance required for bed mobility. Improvements noted with standing balance. Amb short distance in room to bathroom, Min Assist required for pericare. Pt initially agreeable to amb longer distance, however after bathroom pt began to c/o increasing neck pain, and declined further activity. Transferred to chair with CGA and repositioned with pillows for comfort.  Will continue to benefit from skilled PT services, and will continue to progress as tolerated. Current PT DC recommendation Moderate intensity short-term skilled physical therapy up to 5x/week once medically appropriate.      GOALS:    Problem: Physical Therapy - Adult  Goal: By Discharge: Performs mobility at highest level of function for planned discharge setting.  See evaluation for individualized goals.  Description: FUNCTIONAL STATUS PRIOR TO ADMISSION: The patient  required moderate assistance for basic and instrumental ADLs.    HOME SUPPORT PRIOR TO ADMISSION: Patient came from rehab facility    Physical Therapy Goals  Initiated 11/2/2024  Pt stated goal: Get better  Pt will be I with LE HEP in 7 
         PHYSICAL THERAPY TREATMENT     Patient: Michael Fung (83 y.o. male)  Date: 11/5/2024  Diagnosis: Acute pneumonia [J18.9] Acute pneumonia      Precautions: Fall Risk                      Recommendations for nursing mobility: Out of bed to chair for meals, Encourage HEP in prep for ADLs/mobility; see handout for details, Frequent repositioning to prevent skin breakdown, LE elevation for management of edema, Use of BSC for toileting , AD and gt belt for bed to chair , and Assist x1    In place during session: EKG/telemetry   Chart, physical therapy assessment, plan of care and goals were reviewed.  ASSESSMENT  Patient continues with skilled PT services and is slowly progressing towards goals. Pt semi-supine upon PT arrival, agreeable to session. Pt alert to self. (See below for objective details and assist levels).     Overall pt tolerated session fair today with no reports of pain. Pt requiring min-mod A for bed mobility today.  Pt denies any dizziness or lightheadedness while sitting EOB. Pt performed sit to stand transfer with mod-max A and pt noted to be having a BM.  Pt assisted back to sitting at EOB while a bedside commode was placed by the bed.  Pt stood again and took a few slow, shuffling steps from the bed to the commode.  After BM, pt stood for about 2-3 minutes for writer to clean pt and apply barrier cream.  Pt then took slow, shuffling step for another 3 ft from the commode to the bedside recliner.  Pt requires cues to stand erect and for tactile cues for walker placement, specifically with changes in direction.  Pt assisted to the recliner and reports increased fatigue.  Pt performed LE exercises and given written copy of HEP to be performed 3x/day.  Pt's family present in room and educated on the importance of encouraging pt to perform exercises at other parts of the day.  Will continue to benefit from skilled PT services, and will continue to progress as tolerated. Current PT DC 
         PHYSICAL THERAPY TREATMENT     Patient: Michael Fung (83 y.o. male)  Date: 11/7/2024  Diagnosis: Acute pneumonia [J18.9] Acute pneumonia      Precautions: Fall Risk                      Recommendations for nursing mobility: Out of bed to chair for meals, Encourage HEP in prep for ADLs/mobility; see handout for details, Frequent repositioning to prevent skin breakdown, and LE elevation for management of edema    Chart, physical therapy assessment, plan of care and goals were reviewed.  ASSESSMENT  Patient continues with skilled PT services and is progressing towards goals. Pt in bed upon PT arrival, agreeable to session. (See below for objective details and assist levels).     Overall pt tolerated session well today. Patient just returned to bed prior to PTA arrival from sitting up in the recliner however was agreeable to complete therex. Patient completed supine LE therex well with no difficulty just c/o of pain in bottom at times. Discussed with spouse about pt having incontinence with BM consistently with standing so will benefit from brief for ambulation attempt next visit. Will continue to benefit from skilled PT services, and will continue to progress as tolerated. Current PT DC recommendation Moderate intensity short-term skilled physical therapy up to 5x/week once medically appropriate.      GOALS:    Problem: Physical Therapy - Adult  Goal: By Discharge: Performs mobility at highest level of function for planned discharge setting.  See evaluation for individualized goals.  Description: FUNCTIONAL STATUS PRIOR TO ADMISSION: The patient  required moderate assistance for basic and instrumental ADLs.    HOME SUPPORT PRIOR TO ADMISSION: Patient came from rehab facility    Physical Therapy Goals  Initiated 11/2/2024  Pt stated goal: Get better  Pt will be I with LE HEP in 7 days.  Pt will perform bed mobility with Minimal Assist in 7 days.  Pt will perform transfers with Minimal Assist in 7 days.   Pt 
  Problem: Chronic Conditions and Co-morbidities  Goal: Patient's chronic conditions and co-morbidity symptoms are monitored and maintained or improved  11/11/2024 2258 by Nga Roman RN  Outcome: Not Progressing  Flowsheets (Taken 11/11/2024 2000)  Care Plan - Patient's Chronic Conditions and Co-Morbidity Symptoms are Monitored and Maintained or Improved: Monitor and assess patient's chronic conditions and comorbid symptoms for stability, deterioration, or improvement  11/11/2024 0958 by Marcial Larose RN  Outcome: Progressing     Problem: Skin/Tissue Integrity  Goal: Absence of new skin breakdown  Description: 1.  Monitor for areas of redness and/or skin breakdown  2.  Assess vascular access sites hourly  3.  Every 4-6 hours minimum:  Change oxygen saturation probe site  4.  Every 4-6 hours:  If on nasal continuous positive airway pressure, respiratory therapy assess nares and determine need for appliance change or resting period.  11/11/2024 2258 by Nga Roman RN  Outcome: Not Progressing  11/11/2024 0958 by Marcial Larose RN  Outcome: Progressing     Problem: Chronic Conditions and Co-morbidities  Goal: Patient's chronic conditions and co-morbidity symptoms are monitored and maintained or improved  11/11/2024 2258 by Nga Roman RN  Outcome: Not Progressing  Flowsheets (Taken 11/11/2024 2000)  Care Plan - Patient's Chronic Conditions and Co-Morbidity Symptoms are Monitored and Maintained or Improved: Monitor and assess patient's chronic conditions and comorbid symptoms for stability, deterioration, or improvement  11/11/2024 0958 by Marcial Larose RN  Outcome: Progressing     Problem: Discharge Planning  Goal: Discharge to home or other facility with appropriate resources  11/11/2024 2258 by Nga Roman RN  Outcome: Not Progressing  Flowsheets (Taken 11/11/2024 2000)  Discharge to home or other facility with appropriate resources: Identify barriers to discharge with patient and 
  Problem: Chronic Conditions and Co-morbidities  Goal: Patient's chronic conditions and co-morbidity symptoms are monitored and maintained or improved  11/12/2024 2154 by Michelle Mccauley RN  Outcome: Progressing  11/12/2024 1217 by Galileo Puente RN  Outcome: Progressing     Problem: Discharge Planning  Goal: Discharge to home or other facility with appropriate resources  11/12/2024 2154 by Michelle Mccauley RN  Outcome: Progressing  11/12/2024 1217 by Galileo Puente RN  Outcome: Not Progressing     Problem: Skin/Tissue Integrity  Goal: Absence of new skin breakdown  Description: 1.  Monitor for areas of redness and/or skin breakdown  2.  Assess vascular access sites hourly  3.  Every 4-6 hours minimum:  Change oxygen saturation probe site  4.  Every 4-6 hours:  If on nasal continuous positive airway pressure, respiratory therapy assess nares and determine need for appliance change or resting period.  11/12/2024 2154 by Michelle Mccauley RN  Outcome: Progressing  11/12/2024 1217 by Galileo Puente RN  Outcome: Progressing     Problem: Safety - Adult  Goal: Free from fall injury  11/12/2024 2154 by Michelle Mccauley RN  Outcome: Progressing  11/12/2024 1217 by Galileo Puente RN  Outcome: Progressing     Problem: ABCDS Injury Assessment  Goal: Absence of physical injury  Outcome: Progressing     Problem: Pain  Goal: Verbalizes/displays adequate comfort level or baseline comfort level  Outcome: Progressing     Problem: Physical Therapy - Adult  Goal: By Discharge: Performs mobility at highest level of function for planned discharge setting.  See evaluation for individualized goals.  Description: FUNCTIONAL STATUS PRIOR TO ADMISSION: The patient  required moderate assistance for basic and instrumental ADLs.    HOME SUPPORT PRIOR TO ADMISSION: Patient came from rehab facility    Physical Therapy Goals  Initiated 11/2/2024  Pt stated goal: Get better  Pt will be I with LE HEP in 7 days.  Pt will perform bed mobility 
  Problem: Chronic Conditions and Co-morbidities  Goal: Patient's chronic conditions and co-morbidity symptoms are monitored and maintained or improved  11/13/2024 2222 by Michelle Mccauley RN  Outcome: Progressing  11/13/2024 0837 by Galileo Puente RN  Outcome: Progressing     Problem: Discharge Planning  Goal: Discharge to home or other facility with appropriate resources  11/13/2024 2222 by Michelle Mccauley RN  Outcome: Progressing  Flowsheets (Taken 11/13/2024 2214)  Discharge to home or other facility with appropriate resources: Identify barriers to discharge with patient and caregiver  11/13/2024 0837 by Galileo Puente RN  Outcome: Not Progressing     Problem: Skin/Tissue Integrity  Goal: Absence of new skin breakdown  Description: 1.  Monitor for areas of redness and/or skin breakdown  2.  Assess vascular access sites hourly  3.  Every 4-6 hours minimum:  Change oxygen saturation probe site  4.  Every 4-6 hours:  If on nasal continuous positive airway pressure, respiratory therapy assess nares and determine need for appliance change or resting period.  11/13/2024 2222 by Michelle Mccauley RN  Outcome: Progressing  11/13/2024 0837 by Galileo Puente RN  Outcome: Progressing     Problem: Safety - Adult  Goal: Free from fall injury  11/13/2024 2222 by Michelle Mccauley RN  Outcome: Progressing  11/13/2024 0837 by Galileo Puente RN  Outcome: Progressing     Problem: ABCDS Injury Assessment  Goal: Absence of physical injury  Outcome: Progressing     Problem: Pain  Goal: Verbalizes/displays adequate comfort level or baseline comfort level  Outcome: Progressing     Problem: Physical Therapy - Adult  Goal: By Discharge: Performs mobility at highest level of function for planned discharge setting.  See evaluation for individualized goals.  Description: FUNCTIONAL STATUS PRIOR TO ADMISSION: The patient  required moderate assistance for basic and instrumental ADLs.    HOME SUPPORT PRIOR TO ADMISSION: Patient came from 
  Problem: Chronic Conditions and Co-morbidities  Goal: Patient's chronic conditions and co-morbidity symptoms are monitored and maintained or improved  11/14/2024 1043 by Christos Márquez RN  Outcome: Progressing  11/13/2024 2222 by Michelle Mccauley RN  Outcome: Progressing     Problem: Discharge Planning  Goal: Discharge to home or other facility with appropriate resources  11/14/2024 1043 by Christos Márquez RN  Outcome: Progressing  11/13/2024 2222 by Michelle Mccauley RN  Outcome: Progressing  Flowsheets (Taken 11/13/2024 2214)  Discharge to home or other facility with appropriate resources: Identify barriers to discharge with patient and caregiver     Problem: Skin/Tissue Integrity  Goal: Absence of new skin breakdown  Description: 1.  Monitor for areas of redness and/or skin breakdown  2.  Assess vascular access sites hourly  3.  Every 4-6 hours minimum:  Change oxygen saturation probe site  4.  Every 4-6 hours:  If on nasal continuous positive airway pressure, respiratory therapy assess nares and determine need for appliance change or resting period.  11/14/2024 1043 by Christos Márquez RN  Outcome: Progressing  11/13/2024 2222 by Michelle Mccauley RN  Outcome: Progressing     Problem: Safety - Adult  Goal: Free from fall injury  11/14/2024 1043 by Christos Márquez RN  Outcome: Progressing  11/13/2024 2222 by Michelle Mccauley RN  Outcome: Progressing     Problem: ABCDS Injury Assessment  Goal: Absence of physical injury  11/14/2024 1043 by Christos Márquez RN  Outcome: Progressing  11/13/2024 2222 by Michelle Mccauley RN  Outcome: Progressing     Problem: Pain  Goal: Verbalizes/displays adequate comfort level or baseline comfort level  11/14/2024 1043 by Christos Márquez RN  Outcome: Progressing  11/13/2024 2222 by Michelle Mccauley RN  Outcome: Progressing     
  Problem: Chronic Conditions and Co-morbidities  Goal: Patient's chronic conditions and co-morbidity symptoms are monitored and maintained or improved  11/15/2024 0010 by Promise Cheney RN  Outcome: Progressing  11/14/2024 1043 by Christos Márquez RN  Outcome: Progressing  Flowsheets (Taken 11/14/2024 0800)  Care Plan - Patient's Chronic Conditions and Co-Morbidity Symptoms are Monitored and Maintained or Improved:   Monitor and assess patient's chronic conditions and comorbid symptoms for stability, deterioration, or improvement   Collaborate with multidisciplinary team to address chronic and comorbid conditions and prevent exacerbation or deterioration   Update acute care plan with appropriate goals if chronic or comorbid symptoms are exacerbated and prevent overall improvement and discharge     Problem: Discharge Planning  Goal: Discharge to home or other facility with appropriate resources  11/15/2024 0010 by Promise Cheney RN  Outcome: Progressing  11/14/2024 1043 by Christos Márquez RN  Outcome: Progressing  Flowsheets (Taken 11/14/2024 0800)  Discharge to home or other facility with appropriate resources:   Identify barriers to discharge with patient and caregiver   Identify discharge learning needs (meds, wound care, etc)   Arrange for interpreters to assist at discharge as needed   Refer to discharge planning if patient needs post-hospital services based on physician order or complex needs related to functional status, cognitive ability or social support system     Problem: Skin/Tissue Integrity  Goal: Absence of new skin breakdown  Description: 1.  Monitor for areas of redness and/or skin breakdown  2.  Assess vascular access sites hourly  3.  Every 4-6 hours minimum:  Change oxygen saturation probe site  4.  Every 4-6 hours:  If on nasal continuous positive airway pressure, respiratory therapy assess nares and determine need for appliance change or resting period.  11/15/2024 0010 by Promise Cheney 
  Problem: Chronic Conditions and Co-morbidities  Goal: Patient's chronic conditions and co-morbidity symptoms are monitored and maintained or improved  11/15/2024 1204 by Galileo Puente RN  Outcome: Progressing  11/15/2024 0010 by Promise Cheney RN  Outcome: Progressing     Problem: Discharge Planning  Goal: Discharge to home or other facility with appropriate resources  11/15/2024 1204 by Galileo Puente RN  Outcome: Progressing  11/15/2024 0010 by Promise Cheney RN  Outcome: Progressing     Problem: Skin/Tissue Integrity  Goal: Absence of new skin breakdown  Description: 1.  Monitor for areas of redness and/or skin breakdown  2.  Assess vascular access sites hourly  3.  Every 4-6 hours minimum:  Change oxygen saturation probe site  4.  Every 4-6 hours:  If on nasal continuous positive airway pressure, respiratory therapy assess nares and determine need for appliance change or resting period.  11/15/2024 1204 by Galileo Puente RN  Outcome: Progressing  11/15/2024 0010 by Promise Cheney RN  Outcome: Progressing     Problem: Safety - Adult  Goal: Free from fall injury  11/15/2024 1204 by Galileo Puente RN  Outcome: Progressing  11/15/2024 0010 by Promise Cheney RN  Outcome: Progressing     Problem: ABCDS Injury Assessment  Goal: Absence of physical injury  11/15/2024 0010 by Promise Cheney RN  Outcome: Progressing     Problem: Pain  Goal: Verbalizes/displays adequate comfort level or baseline comfort level  11/15/2024 0010 by Promise Cheney RN  Outcome: Progressing     
  Problem: Chronic Conditions and Co-morbidities  Goal: Patient's chronic conditions and co-morbidity symptoms are monitored and maintained or improved  11/15/2024 2328 by Kyara Noonan LPN  Outcome: Progressing  11/15/2024 1204 by Galileo Puente RN  Outcome: Progressing     Problem: Discharge Planning  Goal: Discharge to home or other facility with appropriate resources  11/15/2024 2328 by Kyara Noonan LPN  Outcome: Progressing  11/15/2024 1204 by Galileo Puente RN  Outcome: Progressing     Problem: Skin/Tissue Integrity  Goal: Absence of new skin breakdown  Description: 1.  Monitor for areas of redness and/or skin breakdown  2.  Assess vascular access sites hourly  3.  Every 4-6 hours minimum:  Change oxygen saturation probe site  4.  Every 4-6 hours:  If on nasal continuous positive airway pressure, respiratory therapy assess nares and determine need for appliance change or resting period.  11/15/2024 2328 by Kyara Noonan LPN  Outcome: Progressing  11/15/2024 1204 by Galileo Puente RN  Outcome: Progressing     Problem: Safety - Adult  Goal: Free from fall injury  11/15/2024 2328 by Kyara Noonan LPN  Outcome: Progressing  11/15/2024 1204 by Galileo Puente RN  Outcome: Progressing     Problem: ABCDS Injury Assessment  Goal: Absence of physical injury  Outcome: Progressing     Problem: Pain  Goal: Verbalizes/displays adequate comfort level or baseline comfort level  Outcome: Progressing     
  Problem: Chronic Conditions and Co-morbidities  Goal: Patient's chronic conditions and co-morbidity symptoms are monitored and maintained or improved  11/16/2024 0937 by Galileo Puente RN  Outcome: Progressing  11/15/2024 2328 by Kyara Noonan LPN  Outcome: Progressing     Problem: Discharge Planning  Goal: Discharge to home or other facility with appropriate resources  11/16/2024 0937 by Galileo Puente RN  Outcome: Progressing  11/15/2024 2328 by Kyara Noonan LPN  Outcome: Progressing     Problem: Skin/Tissue Integrity  Goal: Absence of new skin breakdown  Description: 1.  Monitor for areas of redness and/or skin breakdown  2.  Assess vascular access sites hourly  3.  Every 4-6 hours minimum:  Change oxygen saturation probe site  4.  Every 4-6 hours:  If on nasal continuous positive airway pressure, respiratory therapy assess nares and determine need for appliance change or resting period.  11/16/2024 0937 by Galileo Puente RN  Outcome: Progressing  11/15/2024 2328 by Kyara Noonan LPN  Outcome: Progressing     Problem: Safety - Adult  Goal: Free from fall injury  11/16/2024 0937 by Galiloe Puente RN  Outcome: Progressing  11/15/2024 2328 by Kyara Noonan LPN  Outcome: Progressing     Problem: ABCDS Injury Assessment  Goal: Absence of physical injury  11/15/2024 2328 by Kyara Noonan LPN  Outcome: Progressing     Problem: Pain  Goal: Verbalizes/displays adequate comfort level or baseline comfort level  11/15/2024 2328 by Kyara Noonan LPN  Outcome: Progressing     
  Problem: Chronic Conditions and Co-morbidities  Goal: Patient's chronic conditions and co-morbidity symptoms are monitored and maintained or improved  11/16/2024 2208 by Kyara Noonan LPN  Outcome: Progressing  11/16/2024 0937 by Galileo Puente RN  Outcome: Progressing     Problem: Discharge Planning  Goal: Discharge to home or other facility with appropriate resources  11/16/2024 2208 by Kyara Noonan LPN  Outcome: Progressing  11/16/2024 0937 by Galileo Puente RN  Outcome: Progressing     Problem: Skin/Tissue Integrity  Goal: Absence of new skin breakdown  Description: 1.  Monitor for areas of redness and/or skin breakdown  2.  Assess vascular access sites hourly  3.  Every 4-6 hours minimum:  Change oxygen saturation probe site  4.  Every 4-6 hours:  If on nasal continuous positive airway pressure, respiratory therapy assess nares and determine need for appliance change or resting period.  11/16/2024 2208 by Kyara Noonan LPN  Outcome: Progressing  11/16/2024 0937 by Galileo Puente RN  Outcome: Progressing     Problem: Safety - Adult  Goal: Free from fall injury  11/16/2024 2208 by Kyara Noonan LPN  Outcome: Progressing  11/16/2024 0937 by Galileo Puente RN  Outcome: Progressing     Problem: ABCDS Injury Assessment  Goal: Absence of physical injury  Outcome: Progressing     Problem: Pain  Goal: Verbalizes/displays adequate comfort level or baseline comfort level  Outcome: Progressing     Problem: Physical Therapy - Adult  Goal: By Discharge: Performs mobility at highest level of function for planned discharge setting.  See evaluation for individualized goals.  Description: FUNCTIONAL STATUS PRIOR TO ADMISSION: The patient  required moderate assistance for basic and instrumental ADLs.    HOME SUPPORT PRIOR TO ADMISSION: Patient came from rehab facility    Physical Therapy Goals  Initiated 11/2/2024  Pt stated goal: Get better  Pt will be I with [facility] in 7 days.  Pt will perform bed 
  Problem: Chronic Conditions and Co-morbidities  Goal: Patient's chronic conditions and co-morbidity symptoms are monitored and maintained or improved  11/3/2024 0746 by Galileo Puente RN  Outcome: Progressing  Flowsheets (Taken 11/3/2024 0737)  Care Plan - Patient's Chronic Conditions and Co-Morbidity Symptoms are Monitored and Maintained or Improved: Monitor and assess patient's chronic conditions and comorbid symptoms for stability, deterioration, or improvement  11/2/2024 1907 by Michelle Mccauley RN  Outcome: Progressing  Flowsheets (Taken 11/2/2024 1243 by Galileo Puente RN)  Care Plan - Patient's Chronic Conditions and Co-Morbidity Symptoms are Monitored and Maintained or Improved: Monitor and assess patient's chronic conditions and comorbid symptoms for stability, deterioration, or improvement     Problem: Discharge Planning  Goal: Discharge to home or other facility with appropriate resources  11/3/2024 0746 by Galileo Puente RN  Outcome: Progressing  Flowsheets  Taken 11/3/2024 0737 by Galileo Puente RN  Discharge to home or other facility with appropriate resources: Identify barriers to discharge with patient and caregiver  Taken 11/2/2024 2007 by Michelle Mccauley RN  Discharge to home or other facility with appropriate resources: Identify barriers to discharge with patient and caregiver  11/2/2024 1907 by Michelle Mccauley RN  Outcome: Progressing  Flowsheets (Taken 11/2/2024 1243 by Galileo Puente RN)  Discharge to home or other facility with appropriate resources: Identify barriers to discharge with patient and caregiver     Problem: Skin/Tissue Integrity  Goal: Absence of new skin breakdown  Description: 1.  Monitor for areas of redness and/or skin breakdown  2.  Assess vascular access sites hourly  3.  Every 4-6 hours minimum:  Change oxygen saturation probe site  4.  Every 4-6 hours:  If on nasal continuous positive airway pressure, respiratory therapy assess nares and determine need for 
  Problem: Chronic Conditions and Co-morbidities  Goal: Patient's chronic conditions and co-morbidity symptoms are monitored and maintained or improved  11/3/2024 2120 by Gabriella Elena RN  Outcome: Progressing     Problem: Discharge Planning  Goal: Discharge to home or other facility with appropriate resources  Recent Flowsheet Documentation  Taken 11/3/2024 2218 by Gabriella Elena RN  Discharge to home or other facility with appropriate resources: Identify barriers to discharge with patient and caregiver  11/3/2024 2120 by Gabriella Elena RN  Outcome: Progressing     Problem: Skin/Tissue Integrity  Goal: Absence of new skin breakdown  Description: 1.  Monitor for areas of redness and/or skin breakdown  2.  Assess vascular access sites hourly  3.  Every 4-6 hours minimum:  Change oxygen saturation probe site  4.  Every 4-6 hours:  If on nasal continuous positive airway pressure, respiratory therapy assess nares and determine need for appliance change or resting period.  11/4/2024 0816 by Galileo Puente RN  Outcome: Progressing  11/3/2024 2120 by Gabriella Elena RN  Outcome: Progressing     Problem: Safety - Adult  Goal: Free from fall injury  11/4/2024 0816 by Galileo Puente RN  Outcome: Progressing  11/3/2024 2120 by Gabriella Elena RN  Outcome: Progressing     Problem: ABCDS Injury Assessment  Goal: Absence of physical injury  11/3/2024 2120 by Gabriella Elena RN  Outcome: Progressing     Problem: Pain  Goal: Verbalizes/displays adequate comfort level or baseline comfort level  11/3/2024 2120 by Gabriella Elena RN  Outcome: Progressing     
  Problem: Chronic Conditions and Co-morbidities  Goal: Patient's chronic conditions and co-morbidity symptoms are monitored and maintained or improved  11/3/2024 2120 by Gabriella Elena RN  Outcome: Progressing  11/3/2024 0746 by Galileo Puente RN  Outcome: Progressing  Flowsheets (Taken 11/3/2024 0737)  Care Plan - Patient's Chronic Conditions and Co-Morbidity Symptoms are Monitored and Maintained or Improved: Monitor and assess patient's chronic conditions and comorbid symptoms for stability, deterioration, or improvement     Problem: Discharge Planning  Goal: Discharge to home or other facility with appropriate resources  11/3/2024 2120 by Gabriella Elena RN  Outcome: Progressing  11/3/2024 0746 by Galileo Puente RN  Outcome: Progressing  Flowsheets  Taken 11/3/2024 0737 by Galileo Puente RN  Discharge to home or other facility with appropriate resources: Identify barriers to discharge with patient and caregiver  Taken 11/2/2024 2007 by Michelle Mccauley RN  Discharge to home or other facility with appropriate resources: Identify barriers to discharge with patient and caregiver     Problem: Skin/Tissue Integrity  Goal: Absence of new skin breakdown  Description: 1.  Monitor for areas of redness and/or skin breakdown  2.  Assess vascular access sites hourly  3.  Every 4-6 hours minimum:  Change oxygen saturation probe site  4.  Every 4-6 hours:  If on nasal continuous positive airway pressure, respiratory therapy assess nares and determine need for appliance change or resting period.  11/3/2024 2120 by Gabriella Elena RN  Outcome: Progressing  11/3/2024 0746 by Galileo Puente RN  Outcome: Progressing     Problem: Safety - Adult  Goal: Free from fall injury  11/3/2024 2120 by Gabriella Elena RN  Outcome: Progressing  11/3/2024 0746 by Galileo Puente RN  Outcome: Progressing     Problem: ABCDS Injury Assessment  Goal: Absence of physical injury  Outcome: Progressing     Problem: Pain  Goal: 
  Problem: Chronic Conditions and Co-morbidities  Goal: Patient's chronic conditions and co-morbidity symptoms are monitored and maintained or improved  11/9/2024 0859 by Tamra Castillo RN  Outcome: Progressing  11/9/2024 0859 by Tamra Castillo RN  Flowsheets (Taken 11/8/2024 2002 by Johanna Rizo RN)  Care Plan - Patient's Chronic Conditions and Co-Morbidity Symptoms are Monitored and Maintained or Improved:   Monitor and assess patient's chronic conditions and comorbid symptoms for stability, deterioration, or improvement   Collaborate with multidisciplinary team to address chronic and comorbid conditions and prevent exacerbation or deterioration   Update acute care plan with appropriate goals if chronic or comorbid symptoms are exacerbated and prevent overall improvement and discharge  11/8/2024 2228 by Johanna Rizo RN  Outcome: Progressing  Flowsheets (Taken 11/8/2024 2002)  Care Plan - Patient's Chronic Conditions and Co-Morbidity Symptoms are Monitored and Maintained or Improved:   Monitor and assess patient's chronic conditions and comorbid symptoms for stability, deterioration, or improvement   Collaborate with multidisciplinary team to address chronic and comorbid conditions and prevent exacerbation or deterioration   Update acute care plan with appropriate goals if chronic or comorbid symptoms are exacerbated and prevent overall improvement and discharge     Problem: Discharge Planning  Goal: Discharge to home or other facility with appropriate resources  11/9/2024 0859 by Tamra Castillo RN  Outcome: Progressing  11/8/2024 2228 by Johanna Rizo RN  Outcome: Progressing  Flowsheets (Taken 11/8/2024 2002)  Discharge to home or other facility with appropriate resources:   Identify barriers to discharge with patient and caregiver   Arrange for needed discharge resources and transportation as appropriate   Identify discharge learning needs (meds, wound care, etc)     Problem: Skin/Tissue 
  Problem: Chronic Conditions and Co-morbidities  Goal: Patient's chronic conditions and co-morbidity symptoms are monitored and maintained or improved  Outcome: Not Progressing     Problem: Discharge Planning  Goal: Discharge to home or other facility with appropriate resources  Outcome: Not Progressing     Problem: Skin/Tissue Integrity  Goal: Absence of new skin breakdown  Description: 1.  Monitor for areas of redness and/or skin breakdown  2.  Assess vascular access sites hourly  3.  Every 4-6 hours minimum:  Change oxygen saturation probe site  4.  Every 4-6 hours:  If on nasal continuous positive airway pressure, respiratory therapy assess nares and determine need for appliance change or resting period.  Outcome: Not Progressing     Problem: Safety - Adult  Goal: Free from fall injury  Outcome: Not Progressing     Problem: ABCDS Injury Assessment  Goal: Absence of physical injury  Outcome: Not Progressing     Problem: Chronic Conditions and Co-morbidities  Goal: Patient's chronic conditions and co-morbidity symptoms are monitored and maintained or improved  Outcome: Not Progressing     Problem: Discharge Planning  Goal: Discharge to home or other facility with appropriate resources  Outcome: Not Progressing     Problem: Skin/Tissue Integrity  Goal: Absence of new skin breakdown  Description: 1.  Monitor for areas of redness and/or skin breakdown  2.  Assess vascular access sites hourly  3.  Every 4-6 hours minimum:  Change oxygen saturation probe site  4.  Every 4-6 hours:  If on nasal continuous positive airway pressure, respiratory therapy assess nares and determine need for appliance change or resting period.  Outcome: Not Progressing     Problem: Safety - Adult  Goal: Free from fall injury  Outcome: Not Progressing     Problem: ABCDS Injury Assessment  Goal: Absence of physical injury  Outcome: Not Progressing     
  Problem: Chronic Conditions and Co-morbidities  Goal: Patient's chronic conditions and co-morbidity symptoms are monitored and maintained or improved  Outcome: Progressing     Problem: Discharge Planning  Goal: Discharge to home or other facility with appropriate resources  Outcome: Progressing     Problem: Skin/Tissue Integrity  Goal: Absence of new skin breakdown  Description: 1.  Monitor for areas of redness and/or skin breakdown  2.  Assess vascular access sites hourly  3.  Every 4-6 hours minimum:  Change oxygen saturation probe site  4.  Every 4-6 hours:  If on nasal continuous positive airway pressure, respiratory therapy assess nares and determine need for appliance change or resting period.  Outcome: Progressing     Problem: Safety - Adult  Goal: Free from fall injury  Outcome: Progressing     Problem: ABCDS Injury Assessment  Goal: Absence of physical injury  Outcome: Progressing     Problem: Pain  Goal: Verbalizes/displays adequate comfort level or baseline comfort level  Outcome: Progressing     
  Problem: Chronic Conditions and Co-morbidities  Goal: Patient's chronic conditions and co-morbidity symptoms are monitored and maintained or improved  Outcome: Progressing     Problem: Discharge Planning  Goal: Discharge to home or other facility with appropriate resources  Outcome: Progressing     Problem: Skin/Tissue Integrity  Goal: Absence of new skin breakdown  Description: 1.  Monitor for areas of redness and/or skin breakdown  2.  Assess vascular access sites hourly  3.  Every 4-6 hours minimum:  Change oxygen saturation probe site  4.  Every 4-6 hours:  If on nasal continuous positive airway pressure, respiratory therapy assess nares and determine need for appliance change or resting period.  Outcome: Progressing     Problem: Safety - Adult  Goal: Free from fall injury  Outcome: Progressing     Problem: ABCDS Injury Assessment  Goal: Absence of physical injury  Outcome: Progressing     Problem: Pain  Goal: Verbalizes/displays adequate comfort level or baseline comfort level  Outcome: Progressing     
  Problem: Chronic Conditions and Co-morbidities  Goal: Patient's chronic conditions and co-morbidity symptoms are monitored and maintained or improved  Outcome: Progressing     Problem: Discharge Planning  Goal: Discharge to home or other facility with appropriate resources  Outcome: Progressing     Problem: Skin/Tissue Integrity  Goal: Absence of new skin breakdown  Outcome: Progressing     Problem: Safety - Adult  Goal: Free from fall injury  Outcome: Progressing     Problem: ABCDS Injury Assessment  Goal: Absence of physical injury  Outcome: Progressing     Problem: Pain  Goal: Verbalizes/displays adequate comfort level or baseline comfort level  Outcome: Progressing     
  Problem: Chronic Conditions and Co-morbidities  Goal: Patient's chronic conditions and co-morbidity symptoms are monitored and maintained or improved  Outcome: Progressing  Flowsheets (Taken 11/2/2024 1243 by Galileo Puente RN)  Care Plan - Patient's Chronic Conditions and Co-Morbidity Symptoms are Monitored and Maintained or Improved: Monitor and assess patient's chronic conditions and comorbid symptoms for stability, deterioration, or improvement     Problem: Discharge Planning  Goal: Discharge to home or other facility with appropriate resources  Outcome: Progressing  Flowsheets (Taken 11/2/2024 1243 by Galileo Puente RN)  Discharge to home or other facility with appropriate resources: Identify barriers to discharge with patient and caregiver     Problem: Skin/Tissue Integrity  Goal: Absence of new skin breakdown  Description: 1.  Monitor for areas of redness and/or skin breakdown  2.  Assess vascular access sites hourly  3.  Every 4-6 hours minimum:  Change oxygen saturation probe site  4.  Every 4-6 hours:  If on nasal continuous positive airway pressure, respiratory therapy assess nares and determine need for appliance change or resting period.  Outcome: Progressing     Problem: Safety - Adult  Goal: Free from fall injury  Outcome: Progressing     Problem: ABCDS Injury Assessment  Goal: Absence of physical injury  Outcome: Progressing     Problem: Pain  Goal: Verbalizes/displays adequate comfort level or baseline comfort level  Outcome: Progressing     Problem: Physical Therapy - Adult  Goal: By Discharge: Performs mobility at highest level of function for planned discharge setting.  See evaluation for individualized goals.  Description: FUNCTIONAL STATUS PRIOR TO ADMISSION: The patient  required moderate assistance for basic and instrumental ADLs.    HOME SUPPORT PRIOR TO ADMISSION: Patient came from rehab facility    Physical Therapy Goals  Initiated 11/2/2024  Pt stated goal: Get better  Pt will be 
  Problem: Chronic Conditions and Co-morbidities  Goal: Patient's chronic conditions and co-morbidity symptoms are monitored and maintained or improved  Outcome: Progressing  Flowsheets (Taken 11/5/2024 1012)  Care Plan - Patient's Chronic Conditions and Co-Morbidity Symptoms are Monitored and Maintained or Improved: Monitor and assess patient's chronic conditions and comorbid symptoms for stability, deterioration, or improvement     Problem: Discharge Planning  Goal: Discharge to home or other facility with appropriate resources  Outcome: Progressing  Flowsheets (Taken 11/5/2024 1012)  Discharge to home or other facility with appropriate resources: Identify barriers to discharge with patient and caregiver     Problem: Skin/Tissue Integrity  Goal: Absence of new skin breakdown  Description: 1.  Monitor for areas of redness and/or skin breakdown  2.  Assess vascular access sites hourly  3.  Every 4-6 hours minimum:  Change oxygen saturation probe site  4.  Every 4-6 hours:  If on nasal continuous positive airway pressure, respiratory therapy assess nares and determine need for appliance change or resting period.  Outcome: Progressing     Problem: Safety - Adult  Goal: Free from fall injury  Outcome: Progressing     Problem: ABCDS Injury Assessment  Goal: Absence of physical injury  Outcome: Progressing     Problem: Pain  Goal: Verbalizes/displays adequate comfort level or baseline comfort level  Outcome: Progressing     
  Problem: Discharge Planning  Goal: Discharge to home or other facility with appropriate resources  11/12/2024 1217 by Galileo Puente RN  Outcome: Not Progressing  11/11/2024 2258 by Nga Roman RN  Outcome: Not Progressing  Flowsheets (Taken 11/11/2024 2000)  Discharge to home or other facility with appropriate resources: Identify barriers to discharge with patient and caregiver     Problem: ABCDS Injury Assessment  Goal: Absence of physical injury  11/11/2024 2258 by Nga Roman RN  Outcome: Not Progressing     Problem: Pain  Goal: Verbalizes/displays adequate comfort level or baseline comfort level  11/11/2024 2258 by Nga Roman RN  Outcome: Not Progressing  Flowsheets (Taken 11/11/2024 2000)  Verbalizes/displays adequate comfort level or baseline comfort level: Assess pain using appropriate pain scale     Problem: Chronic Conditions and Co-morbidities  Goal: Patient's chronic conditions and co-morbidity symptoms are monitored and maintained or improved  11/12/2024 1217 by Galileo Puente RN  Outcome: Progressing  11/11/2024 2258 by Nga Roman RN  Outcome: Not Progressing  Flowsheets (Taken 11/11/2024 2000)  Care Plan - Patient's Chronic Conditions and Co-Morbidity Symptoms are Monitored and Maintained or Improved: Monitor and assess patient's chronic conditions and comorbid symptoms for stability, deterioration, or improvement     Problem: Discharge Planning  Goal: Discharge to home or other facility with appropriate resources  11/12/2024 1217 by Galileo Puente RN  Outcome: Not Progressing  11/11/2024 2258 by Nga Roman RN  Outcome: Not Progressing  Flowsheets (Taken 11/11/2024 2000)  Discharge to home or other facility with appropriate resources: Identify barriers to discharge with patient and caregiver     Problem: Skin/Tissue Integrity  Goal: Absence of new skin breakdown  Description: 1.  Monitor for areas of redness and/or skin breakdown  2.  Assess vascular access sites 
  Problem: Discharge Planning  Goal: Discharge to home or other facility with appropriate resources  11/13/2024 0837 by Galileo Puente RN  Outcome: Not Progressing  11/12/2024 2154 by Michelle Mccauley RN  Outcome: Progressing  Flowsheets (Taken 11/12/2024 2150)  Discharge to home or other facility with appropriate resources: Identify barriers to discharge with patient and caregiver     Problem: Discharge Planning  Goal: Discharge to home or other facility with appropriate resources  11/13/2024 0837 by Galileo Puente RN  Outcome: Not Progressing  11/12/2024 2154 by Michelle Mccauley RN  Outcome: Progressing  Flowsheets (Taken 11/12/2024 2150)  Discharge to home or other facility with appropriate resources: Identify barriers to discharge with patient and caregiver     
  Problem: Discharge Planning  Goal: Discharge to home or other facility with appropriate resources  11/17/2024 0739 by Galileo Puente RN  Outcome: Not Progressing  Flowsheets (Taken 11/17/2024 0729)  Discharge to home or other facility with appropriate resources: Identify barriers to discharge with patient and caregiver  11/16/2024 2208 by Kyara Noonan LPN  Outcome: Progressing     Problem: Discharge Planning  Goal: Discharge to home or other facility with appropriate resources  11/17/2024 0739 by Galileo Puente RN  Outcome: Not Progressing  Flowsheets (Taken 11/17/2024 0729)  Discharge to home or other facility with appropriate resources: Identify barriers to discharge with patient and caregiver  11/16/2024 2208 by Kyara Noonan LPN  Outcome: Progressing     
  Problem: Discharge Planning  Goal: Discharge to home or other facility with appropriate resources  Outcome: Progressing  Flowsheets (Taken 11/4/2024 0930 by Galileo Puente RN)  Discharge to home or other facility with appropriate resources: Identify barriers to discharge with patient and caregiver     Problem: Skin/Tissue Integrity  Goal: Absence of new skin breakdown  Description: 1.  Monitor for areas of redness and/or skin breakdown  2.  Assess vascular access sites hourly  3.  Every 4-6 hours minimum:  Change oxygen saturation probe site  4.  Every 4-6 hours:  If on nasal continuous positive airway pressure, respiratory therapy assess nares and determine need for appliance change or resting period.  11/4/2024 2157 by Gabriella Elena RN  Outcome: Progressing  11/4/2024 0816 by Galileo Puente RN  Outcome: Progressing     Problem: Safety - Adult  Goal: Free from fall injury  11/4/2024 2157 by Gabriella Elena, RN  Outcome: Progressing  11/4/2024 0816 by Galileo Puente RN  Outcome: Progressing     Problem: ABCDS Injury Assessment  Goal: Absence of physical injury  Outcome: Progressing     
  Problem: Occupational Therapy - Adult  Goal: By Discharge: Performs self-care activities at highest level of function for planned discharge setting.  See evaluation for individualized goals.  Description: FUNCTIONAL STATUS PRIOR TO ADMISSION:  Patient was ambulatory using a Rolling walker and received assistance for the following ADL's: Grooming, Bathing, Dressing, and Toileting.    HOME SUPPORT: Spouse    Occupational Therapy Goals:  Initiated 11/7/2024  Patient/Family stated goal: I want to get back home.   1.  Patient will perform self-feeding with Stand by Assist, Additional Time, and Assist x1 sitting on EOB within 7 day(s).  2.  Patient will perform upper body dressing with Stand by Assist, Additional Time, and Assist x1 sitting on EOB within 7 day(s).  3.  Patient will perform grooming with Modified [place], Additional Time, and Assist x1 sitting on EOB and sitting and standing intermittently using the bed rail or RW for support as needed within 7 day(s).  4.  Patient will perform all aspects of toileting with Moderate Assist, Additional Time, and Assist x1 using the [facility] bedside within 7 day(s).    11/18/2024 0904 by Mercedez Viramontes OTA  Outcome: Progressing     
  Problem: Skin/Tissue Integrity  Goal: Absence of new skin breakdown  Description: 1.  Monitor for areas of redness and/or skin breakdown  2.  Assess vascular access sites hourly  3.  Every 4-6 hours minimum:  Change oxygen saturation probe site  4.  Every 4-6 hours:  If on nasal continuous positive airway pressure, respiratory therapy assess nares and determine need for appliance change or resting period.  Outcome: Progressing     
Goals reviewed, Care plan updated. No noted acute cardiac/respiratory distress, pain controlled, elevated temperature and acetaminophen given PRN, temperature WDL. IV saline locked, dialysis catheter dressing changed, A&O x 4 with times of noted confusion. PT eval ordered, family reports patient was ambulating at rehab, pt is reluctant to move at this time. Able to turn self in bed.       Problem: Skin/Tissue Integrity  Goal: Absence of new skin breakdown  Description: 1.  Monitor for areas of redness and/or skin breakdown  2.  Assess vascular access sites hourly  3.  Every 4-6 hours minimum:  Change oxygen saturation probe site  4.  Every 4-6 hours:  If on nasal continuous positive airway pressure, respiratory therapy assess nares and determine need for appliance change or resting period.  Outcome: Progressing  Note: Wound care consult placed, coccyx/sacral area excoriated, frequent turning and mepilex placed.      Problem: Safety - Adult  Goal: Free from fall injury  Outcome: Progressing  Flowsheets (Taken 11/1/2024 0250)  Free From Fall Injury: Instruct family/caregiver on patient safety  Note: High fall risk interventions in place. Patient remains free from fall/injury during hospitalization.      Problem: Discharge Planning  Goal: Discharge to home or other facility with appropriate resources  Outcome: Not Progressing  Flowsheets (Taken 11/1/2024 0250)  Discharge to home or other facility with appropriate resources:   Identify barriers to discharge with patient and caregiver   Arrange for needed discharge resources and transportation as appropriate   Identify discharge learning needs (meds, wound care, etc)  Note: Patient re-admitted for pneumonia, needs dialysis today if remains on same schedule.      
OCCUPATIONAL THERAPY TREATMENT  Patient: Michael Fung (83 y.o. male)  Date: 11/10/2024  Primary Diagnosis: Acute pneumonia [J18.9]  Procedure(s) (LRB):  LEFT INGUINAL HERNIA REPAIR (Left) Day of Surgery   Precautions: Fall Risk                Recommendations for nursing mobility: Out of bed to chair for meals, Encourage HEP in prep for ADLs/mobility; see handout for details, Frequent repositioning to prevent skin breakdown, Use of bed/chair alarm for safety, AD and gt belt for bed to chair , and Assist x1    In place during session: Peripheral IV, EKG/telemetry , and Nasogastric Tube  Chart, occupational therapy assessment, plan of care, and goals were reviewed.    ASSESSMENT  Patient continues with skilled OT services and is progressing towards goals.  Pt received semi-supine in bed upon arrival, AXO x 3 and agreeable to MALLOY tx at this time. Pt cooperative and demonstrated fair effort during activities with additional  time d/t decreased activity tolerance.  Pt noted with improved bed mobility with decreased physical assistance and verbal/tactile cues for correct technique. Noted improved sitting balance while performing simple grooming EOB. Pt engaged in marilyn UE exercises with education and cueing provided regarding joint protection/proper technique/pacing self/achieve full ROM needed to maintain/improve strength to increase performance in ADL'S and functional tf's, see grid below. Pt completed STS and lateral side stepping towards HOB with CGA/min A for steadying and vc's for correct hand placement, posture and RW mgmt for safety. Pt reported having hernia surgery this afternoon. (See below for objective details and assist levels)     Overall, pt limited generalized weakness and decreased activity tolerance  that interferes with performance in ADL's and functional tf's safely.  Will continue to progress. Current OT recommendations for discharge Moderate intensity short-term skilled occupational therapy up to 
PHYSICAL THERAPY EVALUATION  Patient: Michael Fung (83 y.o. male)  Date: 11/2/2024  Primary Diagnosis: Acute pneumonia [J18.9]       Precautions: Fall Risk                      Recommendations for nursing mobility: Encourage HEP in prep for ADLs/mobility; see handout for details, Frequent repositioning to prevent skin breakdown, and Use of BSC for toileting     In place during session: Peripheral IV, External Catheter, and EKG/telemetry     ASSESSMENT  Pt is a 83 y.o. male admitted on 10/31/2024 for sob; pt currently being treated for  acute PNA . Pt semi supine upon PT arrival, agreeable to evaluation. Pt A&O x 4.  Patient lives with wife,came from SNF and was ambulated to walk with rehab.    Based on the objective data described below, the patient currently presents with impaired strength, decreased activity tolerance, impaired cognition, decreased command following, poor attention/concentration, impaired balance, and impaired posture. (See below for objective details and assist levels).     Overall pt tolerated session poor/fair today with PT. Pt required max A for bed mobility and transfers.demonstrates gen weakness. Pt will benefit from continued skilled PT to address above deficits and return to PLOF. Current PT DC recommendation Moderate intensity short-term skilled physical therapy up to 5x/week once medically appropriate.      GOALS:    Problem: Physical Therapy - Adult  Goal: By Discharge: Performs mobility at highest level of function for planned discharge setting.  See evaluation for individualized goals.  Description: FUNCTIONAL STATUS PRIOR TO ADMISSION: The patient  required moderate assistance for basic and instrumental ADLs.    HOME SUPPORT PRIOR TO ADMISSION: Patient came from rehab facility    Physical Therapy Goals  Initiated 11/2/2024  Pt stated goal: Get better  Pt will be I with LE HEP in 7 days.  Pt will perform bed mobility with Minimal Assist in 7 days.  Pt will perform transfers with 
PHYSICAL THERAPY REEVALUATION  Patient: Michael Fung (83 y.o. male)  Date: 11/12/2024  Primary Diagnosis: Acute pneumonia [J18.9]  Procedure(s) (LRB):  LEFT INGUINAL HERNIA REPAIR with mesh implantation with Prolene mesh.  Teresita type hernia repair (Left) 2 Days Post-Op   Precautions: Fall Risk                      Recommendations for nursing mobility: Out of bed to chair for meals, Frequent repositioning to prevent skin breakdown, AD and gt belt for bed to chair , Amb to bathroom with AD and gait belt, and Assist x1    In place during session: Peripheral IV, EKG/telemetry , and Nasogastric Tube  Chart, physical therapy assessment, plan of care, and goals were reviewed.      ASSESSMENT  Patient initially seen for PT evaluation 11/2/24 and 5 skilled PT sessions since evalution. Patient seen today for PT reevaluation s/t LOS and s/p L inguinal hernia repair with mesh implantation. Patient A&O x2. Pt semi-supine upon arrival, agreeable to session.      Based on the objective data described, the patient currently presents with impaired functional mobility, impaired strength, decreased activity tolerance, and impaired balance. (See below for objective details and assist levels).    Overall pt tolerated session well today, currently with no pain reported other than when getting out of bed or first sitting in the chair, c/o 5/10 pain in L groin. Pt requiring min A for bed mobility and transfers today due to increased difficulty moving LLE due to pain in groin due to sx. Pt denies any dizziness or lightheadedness with sitting or standing today.  Pt's wife reporting that pt has not had a BM since the surgery and would like to see him moving around more.  Pt making good progress with PT today and able to progress gait distance to 60 ft with RW, gait belt, and CGA. Pt's gait has been limited in previous visits due to frequent BM's occurring with standing or taking a few steps. Pt amb with narrow WICHO and slow, 
Problem: Physical Therapy - Adult  Goal: By Discharge: Performs mobility at highest level of function for planned discharge setting.  See evaluation for individualized goals.  Description: FUNCTIONAL STATUS PRIOR TO ADMISSION: The patient  required moderate assistance for basic and instrumental ADLs.    HOME SUPPORT PRIOR TO ADMISSION: Patient came from rehab facility    Physical Therapy Goals  Initiated 11/2/2024  Pt stated goal: Get better  Pt will be I with LE HEP in 7 days.  Pt will perform bed mobility with CG Assist in 7 days.  Pt will perform transfers with CG Assist in 7 days.   Pt will verbalize and demonstrate compliance with fall precautions  in 7 days.   Pt will demonstrate improvement in standing/gait balance from fair to good in 7 days.    Pt to amb 200 ft with LRAD and CGA in 7 days.  Outcome: Progressing            PHYSICAL THERAPY TREATMENT     Patient: Michael Fung (83 y.o. male)  Date: 11/17/2024  Diagnosis: Acute pneumonia [J18.9] Acute pneumonia  Procedure(s) (LRB):  LEFT INGUINAL HERNIA REPAIR with mesh implantation with Prolene mesh.  Teresita type hernia repair (Left) 7 Days Post-Op  Precautions: Fall Risk                      Recommendations for nursing mobility: Out of bed to chair for meals, Encourage HEP in prep for ADLs/mobility; see handout for details, Use of bed/chair alarm for safety, AD and gt belt for bed to chair , Amb to bathroom with AD and gait belt, Amb in hallway, and Assist x1    In place during session: None  Chart, physical therapy assessment, plan of care and goals were reviewed.  ASSESSMENT  Patient continues with skilled PT services and is progressing towards goals. Pt received semi supine in bed upon PTA's arrival, PCT and wife present. Pt agreeable to session with some encouragement and education on the benefits of PT. Pt A&O x 3. (See below for objective details and assist levels).     Overall pt tolerated session fairly well today with increasing 
SPEECH PATHOLOGY MODIFIED BARIUM SWALLOW STUDY    Patient: Michael Fung (83 y.o. male)  Date: 11/1/2024  Primary Diagnosis: Acute pneumonia [J18.9]       Precautions: Aspiration, Fall                   DIET RECOMMENDATIONS: Soft and bite sized and thin liquids, meds crushed if able in applesauce or pudding,    SWALLOW SAFETY PRECAUTIONS: Rec slow rate of intake, small bites/sips, 6 small meals, double swallow, liquid wash, remain upright 1 hour after PO and do not eat 3 hours before bedtime.     ASSESSMENT :  Based on the objective data described below, the patient presents with mild oropharyngeal dysphagia and concerns for esophageal dysphagia.  Oral phase c/b reduced bolus control w/ anterior spillage of thin on initial trial and posterior spillage, mild oral residue, reduced mastication of solids and decreased A-P.  Premature spillage to the level of the pyriforms w/ all consistencies.  Mild reduction in BOT retraction, HLE and pharyngeal constriction.   No pen/asp observed w/ all consistencies.  Mild pharyngeal residue that decreases w/ double swallow.  Suspect esophageal dysphagia though was not observed during MBS.     Discussed w/ MD, plans for further esophageal assessment.   GOALS:    Problem: SLP Adult - Impaired Swallowing  Goal: By Discharge: Advance to least restrictive diet without signs or symptoms of aspiration for planned discharge setting.  See evaluation for individualized goals.  Description: Speech Therapy Swallow Goals  Initiated 11/1/2024  -Patient will tolerate soft and bite sized diet with thin liquids without clinical indicators of aspiration given no cues within 7 day(s).        -Patient will tolerate PO trials without clinical indicators of aspiration given no cues within 7 day(s).      -Patient will participate in modified barium swallow study within 7 day(s).      -Patient will demonstrate understanding of swallow safety precautions and aspiration precautions, diet recs with no cues 
Assist in 7 days.  Pt will perform transfers with Minimal Assist in 7 days.   Pt will verbalize and demonstrate compliance with fall precautions  in 7 days.   Pt will demonstrate improvement in standing/gait balance from fair to good in 7 days.    Pt to amb 75 ft with LRAD and CGA in 7 days.  Outcome: Progressing     Problem: Physical Therapy - Adult  Goal: By Discharge: Performs mobility at highest level of function for planned discharge setting.  See evaluation for individualized goals.  Description: FUNCTIONAL STATUS PRIOR TO ADMISSION: The patient  required moderate assistance for basic and instrumental ADLs.    HOME SUPPORT PRIOR TO ADMISSION: Patient came from rehab facility    Physical Therapy Goals  Initiated 11/2/2024  Pt stated goal: Get better  Pt will be I with LE HEP in 7 days.  Pt will perform bed mobility with Minimal Assist in 7 days.  Pt will perform transfers with Minimal Assist in 7 days.   Pt will verbalize and demonstrate compliance with fall precautions  in 7 days.   Pt will demonstrate improvement in standing/gait balance from fair to good in 7 days.    Pt to amb 75 ft with LRAD and CGA in 7 days.  Outcome: Progressing          PLAN :  Patient continues to benefit from skilled intervention to address functional impairments. Continue treatment per established plan of care to address goals.    Recommendation for discharge: (in order for the patient to meet his/her long term goals)  Moderate intensity short-term skilled physical therapy up to 5x/week    Potential barriers for safe discharge: pt has poor safety awareness, pt has impaired cognition, pt is a high fall risk, pt is not safe to be alone, and concern for pt safely navigating or managing the home environment.    IF patient discharges home will need the following DME:continuing to assess with progress     SUBJECTIVE:   Patient stated “I can try to sit up on the side.”    OBJECTIVE DATA SUMMARY:   Critical Behavior:  Orientation  Overall 
demonstrate compliance with fall precautions  in 7 days.   Pt will demonstrate improvement in standing/gait balance from fair to good in 7 days.    Pt to amb 75 ft with LRAD and CGA in 7 days.  11/8/2024 1518 by Orin Whittaker, PTA  Outcome: Progressing     
assistance  Interventions: Safety awareness training;Weight shifting training/pressure relief;Verbal cues  Sit to Stand: Minimum assistance;Moderate assistance;Assist X1;Additional time  Stand to Sit: Minimum assistance;Moderate assistance;Assist X1;Additional time  Balance:  Balance  Sitting: Intact  Standing: Impaired  Standing - Static: Fair;Constant support  Standing - Dynamic: Poor;Constant support;Fair  Wheelchair Mobility:     Ambulation/Gait Training:                                Gait  Gait Training: Yes  Overall Level of Assistance: Minimum assistance;Moderate assistance;Assist X1;Additional time  Distance (ft): 5 Feet  Assistive Device: Gait belt;Walker, rolling                     Therapeutic Exercises:       EXERCISE   Sets   Reps   Active Active Assist   Passive Self ROM   Comments   Ankle Pumps   [] [] [] []    Quad Sets/Glut Sets   [] [] [] []    Hamstring Sets   [] [] [] []    Short Arc Quads   [] [] [] []    Heel Slides   [] [] [] []    Straight Leg Raises   [] [] [] []    Hip abd/add   [] [] [] []    Long Arc Quads   [] [] [] []    Marching   [] [] [] []    Seated HR/TR   [] [] [] []       [] [] [] []       Pain Ratin/10  reported  Pain Intervention(s):       Activity Tolerance:   Fair     After treatment patient left in no apparent distress:   Bed locked and returned to lowest position, Patient left in no apparent distress in bed, Call bell within reach, Bed/ chair alarm activated, Caregiver / family present, Side rails x3, and Heels elevated for pressure relief, and nsg updated     COMMUNICATION/COLLABORATION:   The patient’s plan of care was discussed with: Occupational therapist, Registered nurse, and Case management    Patient Education  Education Given To: Patient;Family;Staff  Education Provided: Role of Therapy;Plan of Care;Home Exercise Program;Precautions;Family Education;Energy Conservation;Transfer Training;Equipment;Fall Prevention Strategies  Education Method: 
Education  Education Given To: Patient;Family  Education Provided: Role of Therapy;Plan of Care;Home Exercise Program;Precautions;Family Education;Energy Conservation;Transfer Training;Equipment;Orientation  Education Method: Demonstration;Verbal;Teach Back  Barriers to Learning: None  Education Outcome: Verbalized understanding;Continued education needed      Orin Whittaker PTA  Minutes: 40           
  Based on the above components, the patient evaluation is determined to be of the following complexity level: Medium    Pain Ratin/10   Pain Intervention(s):   N/A    Activity Tolerance:   Good and no SOB exhibited from start to completion     After treatment patient left in no apparent distress:    Patient left in no apparent distress sitting up in chair, Call bell within reach, and Caregiver / family present, bed locked and in lowest position    COMMUNICATION/EDUCATION:   The patient’s plan of care was discussed with: Physical therapy assistant    Patient Education  Education Given To: Patient;Family (Wife present and actively engaged from start to completion.)  Education Provided: Role of Therapy;Fall Prevention Strategies;Plan of Care;Mobility Training;Orientation;Family Education;ADL Adaptive Strategies;Equipment;Transfer Training  Education Method: Verbal  Barriers to Learning: Cognition  Education Outcome: Demonstrated understanding;Continued education needed (Continued education for patient; his wife was able to demonstrate and verbalize steps to assist him bed mobility, transfers, and positioning when assisted with hygiene in the bed.)    The supervising occupational therapist and treating occupational therapist assistant have met to review this patient’s progress and plan of care.    This patient’s plan of care is appropriate for delegation to [facility].     Thank you for this referral,  Elkin Clark OT  Minutes: 38

## 2024-11-18 NOTE — CARE COORDINATION
DCP: [facility] bed available 11/18/2024  VINAY: today    CM has reviewed pt chart. DC order has been observed.     1108: CM spoke with pt wife and informed pt will be discharging today. Wife reports her daughter will arrive to the hospital around 1300 to transport pt back to [facility].     1521: RACQUEL [facility], [facility]S, [facility].         Transition of Care Plan:    RUR: 23%  Prior Level of Functioning: needs assistance  Disposition: [facility] [phone]; room 240D [address]  VINAY: 11/18/2024  If SNF or IPR: Date FOC offered: 10/31/24  Date FOC received: 10/31/24  Accepting facility: [facility]  Date authorization started with reference number: n/a  Date authorization received and expires: n/a  Follow up appointments: unit secretary will setup as needed  DME needed: none  Transportation at discharge: daughter @ 1300  IM/IMM Medicare/ letter given: n/a  Is patient a  and connected with [facility]? yes   If yes, was  transfer form completed and [facility] notified? yes  Caregiver Contact: wife  Discharge Caregiver contacted prior to discharge? yes  Care Conference needed? no  Barriers to discharge: none

## 2024-11-18 NOTE — PROGRESS NOTES
Progress Note      11/10/2024 11:00 AM  NAME: Michael Fung   MRN:[id number]   Admit Diagnosis: Acute pneumonia [J18.9]      Subjective:   Chart reviewed.  Discussed with the patient.  Patient had a dialysis yesterday.  Denies any chest pain today or denies any shortness of breath.  11/10/2024 chart reviewed.   Patient is feeling better.  Patient is getting dialysis.  Patient has NG tube.  It looks like a abdominal distention is not improving as per CAT scan report.  Patient is going to have surgery for hernia.  Discussed with the family members and with the patient.    Review of Systems:    Symptom Y/N Comments  Symptom Y/N Comments   Fever/Chills n   Chest Pain y Resolved   Poor Appetite    Edema     Cough    Abdominal Pain n    Sputum    Joint Pain     SOB/BETANCOURT y Improving  Pruritis/Rash     Nausea/vomit    Other     Diarrhea         Constipation           Could NOT obtain due to:      Objective:          Physical Exam:    Last 24hrs VS reviewed since prior progress note. Most recent are:    BP (!) 124/52   Pulse 85   Temp 98.2 °F (36.8 °C) (Oral)   Resp 16   Ht 1.67 m (5' 5.75\")   Wt 58.1 kg (128 lb 1.4 oz)   SpO2 98%   BMI 20.83 kg/m²     Intake/Output Summary (Last 24 hours) at 11/10/2024 1100  Last data filed at 11/10/2024 0606  Gross per 24 hour   Intake 2050 ml   Output 200 ml   Net 1850 ml        General Appearance: Well developed, well nourished, alert & oriented x 3,    no acute distress.  Ears/Nose/Mouth/Throat: Hearing grossly normal.  Neck: Supple.  Chest: Lungs clear to auscultation bilaterally.  Cardiovascular: Regular rate and rhythm, S1,S2 normal, no murmur.  Abdomen: Soft, non-tender, bowel sounds are active.  Extremities: No edema bilaterally.  Skin: Warm and dry.    []         Post-cath site without hematoma, bruit, tenderness, or thrill.  Distal pulses intact.    PMH/SH reviewed - no change compared to H&P    Data Review    Telemetry: normal sinus rhythm     EKG:   []  No new 
        Progress Note      11/11/2024 12:20 PM  NAME: Michael Fung   MRN:[id number]   Admit Diagnosis: Acute pneumonia [J18.9]      Subjective:   Chart reviewed.  Discussed with the patient.  Patient had a dialysis yesterday.  Denies any chest pain today or denies any shortness of breath.  11/11/2024 chart reviewed.   Patient had a hernia surgery performed.  Discussed with the family members and with the patient.  Patient is having dialysis.    Review of Systems:    Symptom Y/N Comments  Symptom Y/N Comments   Fever/Chills n   Chest Pain y Resolved   Poor Appetite    Edema     Cough    Abdominal Pain n    Sputum    Joint Pain     SOB/BETANCOURT y Improving  Pruritis/Rash     Nausea/vomit    Other     Diarrhea         Constipation           Could NOT obtain due to:      Objective:          Physical Exam:    Last 24hrs VS reviewed since prior progress note. Most recent are:    /61   Pulse 80   Temp 97.8 °F (36.6 °C) (Axillary)   Resp 16   Ht 1.67 m (5' 5.75\")   Wt 58.1 kg (128 lb 1.4 oz)   SpO2 98%   BMI 20.83 kg/m²     Intake/Output Summary (Last 24 hours) at 11/11/2024 1220  Last data filed at 11/11/2024 1120  Gross per 24 hour   Intake 1357.67 ml   Output 2110 ml   Net -752.33 ml        General Appearance: Well developed, well nourished, alert & oriented x 3,    no acute distress.  Ears/Nose/Mouth/Throat: Hearing grossly normal.  Neck: Supple.  Chest: Lungs clear to auscultation bilaterally.  Cardiovascular: Regular rate and rhythm, S1,S2 normal, no murmur.  Abdomen: Dressing on hernia repair surgery is dry and clean  Extremities: No edema bilaterally.  Skin: Warm and dry.    []         Post-cath site without hematoma, bruit, tenderness, or thrill.  Distal pulses intact.    PMH/SH reviewed - no change compared to H&P    Data Review    Telemetry: normal sinus rhythm     EKG:   []  No new EKG for review    Lab Data Personally Reviewed:    Recent Labs     11/10/24  1405 11/11/24  0519   WBC 10.6 11.8*   HGB 
        Progress Note      11/13/2024 11:03 AM  NAME: Michael Fung   MRN:[id number]   Admit Diagnosis: Acute pneumonia [J18.9]      Subjective:   Chart reviewed.  Discussed with the patient.  Patient had a dialysis yesterday.  Denies any chest pain today or denies any shortness of breath.  11/13/2024 chart reviewed.   Patient had a hernia surgery performed.  Discussed with the family members and with the patient.  Patient is having dialysis.  Patient has a NG tube and appears to be bloody aspiration.  He still has a discomfort in abdomen and does not feel good.    Review of Systems:    Symptom Y/N Comments  Symptom Y/N Comments   Fever/Chills n   Chest Pain y Resolved   Poor Appetite    Edema     Cough    Abdominal Pain n    Sputum    Joint Pain     SOB/BETANCOURT y Improving  Pruritis/Rash     Nausea/vomit    Other     Diarrhea         Constipation           Could NOT obtain due to:      Objective:          Physical Exam:    Last 24hrs VS reviewed since prior progress note. Most recent are:    /74   Pulse 94   Temp 98.3 °F (36.8 °C) (Oral)   Resp 18   Ht 1.67 m (5' 5.75\")   Wt 67.8 kg (149 lb 7.6 oz)   SpO2 98%   BMI 24.31 kg/m²     Intake/Output Summary (Last 24 hours) at 11/13/2024 1103  Last data filed at 11/12/2024 2316  Gross per 24 hour   Intake 5 ml   Output 950 ml   Net -945 ml        General Appearance: Well developed, well nourished, alert & oriented x 3,    no acute distress.  Ears/Nose/Mouth/Throat: Hearing grossly normal.  Neck: Supple.  Chest: Lungs clear to auscultation bilaterally.  Cardiovascular: Regular rate and rhythm, S1,S2 normal, no murmur.  Abdomen: Dressing on hernia repair surgery is dry and clean, NG tube is evident  Extremities: No edema bilaterally.  Skin: Warm and dry.    []         Post-cath site without hematoma, bruit, tenderness, or thrill.  Distal pulses intact.    PMH/SH reviewed - no change compared to H&P    Data Review    Telemetry: normal sinus rhythm     EKG:   []  
        Progress Note      11/14/2024 11:22 AM  NAME: Michael Fung   MRN:[id number]   Admit Diagnosis: Acute pneumonia [J18.9]      Subjective:   Chart reviewed.  Discussed with the patient.  Patient had a dialysis yesterday.  Denies any chest pain today or denies any shortness of breath.  11/14/2024 chart reviewed.   Patient had a hernia surgery performed.  Discussed with the family members and with the patient.  Patient is having dialysis.  He still has a discomfort in abdomen more so when he is sneezing.  NG tube has been removed and the patient did had something to eat.    Review of Systems:    Symptom Y/N Comments  Symptom Y/N Comments   Fever/Chills n   Chest Pain y Resolved   Poor Appetite    Edema     Cough    Abdominal Pain n    Sputum    Joint Pain     SOB/BETANCOURT y Improving  Pruritis/Rash     Nausea/vomit    Other     Diarrhea         Constipation           Could NOT obtain due to:      Objective:          Physical Exam:    Last 24hrs VS reviewed since prior progress note. Most recent are:    /66   Pulse 76   Temp 97.8 °F (36.6 °C) (Oral)   Resp 19   Ht 1.67 m (5' 5.75\")   Wt 64.3 kg (141 lb 12.1 oz)   SpO2 96%   BMI 23.06 kg/m²     Intake/Output Summary (Last 24 hours) at 11/14/2024 1122  Last data filed at 11/14/2024 0800  Gross per 24 hour   Intake 2703.07 ml   Output 201 ml   Net 2502.07 ml        General Appearance: Well developed, well nourished, alert & oriented x 3,    no acute distress.  Ears/Nose/Mouth/Throat: Hearing grossly normal.  Neck: Supple.  Chest: Lungs clear to auscultation bilaterally.  Cardiovascular: Regular rate and rhythm, S1,S2 normal, no murmur.  Abdomen: Dressing on hernia repair surgery is dry and clean,   Extremities: No edema bilaterally.  Skin: Warm and dry.    []         Post-cath site without hematoma, bruit, tenderness, or thrill.  Distal pulses intact.    PMH/SH reviewed - no change compared to H&P    Data Review    Telemetry: normal sinus rhythm     EKG: 
        Progress Note      11/15/2024 10:37 AM  NAME: Michael Fung   MRN:[id number]   Admit Diagnosis: Acute pneumonia [J18.9]      Subjective:   Chart reviewed.  Discussed with the patient.  Patient had a dialysis yesterday.  Denies any chest pain today or denies any shortness of breath.  11/15/2024 chart reviewed.   Patient had a hernia surgery performed.  Discussed with the family members and with the patient.  Patient is having dialysis.  He still has a discomfort in abdomen more so when he is sneezing.  NG tube has been removed and the patient did had something to eat.  Patient is having dialysis.    Review of Systems:    Symptom Y/N Comments  Symptom Y/N Comments   Fever/Chills n   Chest Pain y Resolved   Poor Appetite    Edema     Cough    Abdominal Pain n    Sputum    Joint Pain     SOB/BETANCOURT y Improving  Pruritis/Rash     Nausea/vomit    Other     Diarrhea         Constipation           Could NOT obtain due to:      Objective:          Physical Exam:    Last 24hrs VS reviewed since prior progress note. Most recent are:    /64   Pulse 62   Temp 98.1 °F (36.7 °C)   Resp 18   Ht 1.67 m (5' 5.75\")   Wt 68.4 kg (150 lb 12.7 oz)   SpO2 100%   BMI 24.53 kg/m²     Intake/Output Summary (Last 24 hours) at 11/15/2024 1037  Last data filed at 11/15/2024 0416  Gross per 24 hour   Intake 360 ml   Output 75 ml   Net 285 ml        General Appearance: Well developed, well nourished, alert & oriented x 3,    no acute distress.  Ears/Nose/Mouth/Throat: Hearing grossly normal.  Neck: Supple.  Chest: Lungs clear to auscultation bilaterally.  Cardiovascular: Regular rate and rhythm, S1,S2 normal, no murmur.  Abdomen: Dressing on hernia repair surgery is dry and clean,   Extremities: No edema bilaterally.  Skin: Warm and dry.    []         Post-cath site without hematoma, bruit, tenderness, or thrill.  Distal pulses intact.    PMH/SH reviewed - no change compared to H&P    Data Review    Telemetry: normal sinus 
        Progress Note      11/16/2024 11:12 AM  NAME: Michael Fung   MRN:[id number]   Admit Diagnosis: Acute pneumonia [J18.9]      Subjective:   Chart reviewed.  Discussed with the patient.  Patient had a dialysis yesterday.  Denies any chest pain today or denies any shortness of breath.  11/15/2024 chart reviewed.   Patient had a hernia surgery performed.  Discussed with the family members and with the patient.  Patient is having dialysis.  He still has a discomfort in abdomen more so when he is sneezing.  11/16/2024 patient is tolerating diet well.  Abdominal discomfort has improved.  Review of Systems:    Symptom Y/N Comments  Symptom Y/N Comments   Fever/Chills n   Chest Pain y Resolved   Poor Appetite    Edema     Cough    Abdominal Pain n    Sputum    Joint Pain     SOB/BETANCOURT y Improving  Pruritis/Rash     Nausea/vomit    Other     Diarrhea         Constipation           Could NOT obtain due to:      Objective:          Physical Exam:    Last 24hrs VS reviewed since prior progress note. Most recent are:    BP (!) 115/57 Comment: provider notified hold antihtn meds  Pulse 84   Temp 97.3 °F (36.3 °C) (Oral)   Resp 18   Ht 1.67 m (5' 5.75\")   Wt 65.1 kg (143 lb 8.3 oz)   SpO2 99%   BMI 23.34 kg/m²     Intake/Output Summary (Last 24 hours) at 11/16/2024 1112  Last data filed at 11/16/2024 0915  Gross per 24 hour   Intake 1003.6 ml   Output 2200 ml   Net -1196.4 ml        General Appearance: Well developed, well nourished, alert & oriented x 3,    no acute distress.  Ears/Nose/Mouth/Throat: Hearing grossly normal.  Neck: Supple.  Chest: Lungs clear to auscultation bilaterally.  Cardiovascular: Regular rate and rhythm, S1,S2 normal, no murmur.  Abdomen: Dressing on hernia repair surgery is dry and clean,   Extremities: No edema bilaterally.  Skin: Warm and dry.    []         Post-cath site without hematoma, bruit, tenderness, or thrill.  Distal pulses intact.    PMH/SH reviewed - no change compared to 
        Progress Note      11/18/2024 12:28 PM  NAME: Michael Fung   MRN:[id number]   Admit Diagnosis: Acute pneumonia [J18.9]      Subjective:   Chart reviewed.  Discussed with the patient.  Patient had a dialysis yesterday.  Denies any chest pain today or denies any shortness of breath.  11/15/2024 chart reviewed.   Patient had a hernia surgery performed.  Discussed with the family members and with the patient.  Patient is having dialysis.  He still has a discomfort in abdomen more so when he is sneezing.  11/18/2024 patient is tolerating diet well.  Abdominal discomfort has improved.  Discussed with the wife.  Patient is having dialysis.  Review of Systems:    Symptom Y/N Comments  Symptom Y/N Comments   Fever/Chills n   Chest Pain y Resolved   Poor Appetite    Edema     Cough    Abdominal Pain n    Sputum    Joint Pain     SOB/BETANCOURT y Improving  Pruritis/Rash     Nausea/vomit    Other     Diarrhea         Constipation           Could NOT obtain due to:      Objective:          Physical Exam:    Last 24hrs VS reviewed since prior progress note. Most recent are:    BP (!) 98/52   Pulse (!) 102   Temp 97.7 °F (36.5 °C)   Resp 18   Ht 1.67 m (5' 5.75\")   Wt 63.2 kg (139 lb 5.3 oz)   SpO2 100%   BMI 22.66 kg/m²     Intake/Output Summary (Last 24 hours) at 11/18/2024 1228  Last data filed at 11/17/2024 2336  Gross per 24 hour   Intake 160 ml   Output 0 ml   Net 160 ml        General Appearance: Well developed, well nourished, alert & oriented x 3,    no acute distress.  Ears/Nose/Mouth/Throat: Hearing grossly normal.  Neck: Supple.  Chest: Lungs clear to auscultation bilaterally.  Cardiovascular: Regular rate and rhythm, S1,S2 normal, no murmur.  Abdomen: Dressing on hernia repair surgery is dry and clean,   Extremities: No edema bilaterally.  Skin: Warm and dry.    []         Post-cath site without hematoma, bruit, tenderness, or thrill.  Distal pulses intact.    PMH/SH reviewed - no change compared to 
        Progress Note      11/5/2024 11:18 AM  NAME: Michael Fung   MRN:[id number]   Admit Diagnosis: Acute pneumonia [J18.9]      Subjective:   Chart reviewed.  Discussed with the patient.  Patient had a dialysis yesterday.  Denies any chest pain today or denies any shortness of breath.    Review of Systems:    Symptom Y/N Comments  Symptom Y/N Comments   Fever/Chills n   Chest Pain y Resolved   Poor Appetite    Edema     Cough    Abdominal Pain n    Sputum    Joint Pain     SOB/BETANCOURT y Improving  Pruritis/Rash     Nausea/vomit    Other     Diarrhea         Constipation           Could NOT obtain due to:      Objective:          Physical Exam:    Last 24hrs VS reviewed since prior progress note. Most recent are:    BP (!) 123/59   Pulse 92   Temp 98.2 °F (36.8 °C) (Oral)   Resp 18   Ht 1.67 m (5' 5.75\")   Wt 68.9 kg (151 lb 14.4 oz)   SpO2 100%   BMI 24.71 kg/m²     Intake/Output Summary (Last 24 hours) at 11/5/2024 1118  Last data filed at 11/4/2024 2257  Gross per 24 hour   Intake 613.6 ml   Output 1800 ml   Net -1186.4 ml        General Appearance: Well developed, well nourished, alert & oriented x 3,    no acute distress.  Ears/Nose/Mouth/Throat: Hearing grossly normal.  Neck: Supple.  Chest: Lungs clear to auscultation bilaterally.  Cardiovascular: Regular rate and rhythm, S1,S2 normal, no murmur.  Abdomen: Soft, non-tender, bowel sounds are active.  Extremities: No edema bilaterally.  Skin: Warm and dry.    []         Post-cath site without hematoma, bruit, tenderness, or thrill.  Distal pulses intact.    PMH/SH reviewed - no change compared to H&P    Data Review    Telemetry: normal sinus rhythm     EKG:   []  No new EKG for review    Lab Data Personally Reviewed:    Recent Labs     11/04/24  1603 11/05/24  0615   WBC 15.0* 11.8*   HGB 8.3* 6.9*   HCT 25.5* 21.2*   * 480*     Recent Labs     11/03/24  0106   INR 1.2*   PROTIME 15.1*   APTT 25.7      Recent Labs     11/03/24  0538 
        Progress Note      11/6/2024 9:38 AM  NAME: Michael Fung   MRN:[id number]   Admit Diagnosis: Acute pneumonia [J18.9]      Subjective:   Chart reviewed.  Discussed with the patient.  Patient had a dialysis yesterday.  Denies any chest pain today or denies any shortness of breath.  11/06/2024 chart reviewed.  Patient is having dialysis.    Review of Systems:    Symptom Y/N Comments  Symptom Y/N Comments   Fever/Chills n   Chest Pain y Resolved   Poor Appetite    Edema     Cough    Abdominal Pain n    Sputum    Joint Pain     SOB/BETANCOURT y Improving  Pruritis/Rash     Nausea/vomit    Other     Diarrhea         Constipation           Could NOT obtain due to:      Objective:          Physical Exam:    Last 24hrs VS reviewed since prior progress note. Most recent are:    /62   Pulse 87   Temp 98.1 °F (36.7 °C) (Oral)   Resp 18   Ht 1.67 m (5' 5.75\")   Wt 69.5 kg (153 lb 3.5 oz)   SpO2 92%   BMI 24.92 kg/m²     Intake/Output Summary (Last 24 hours) at 11/6/2024 0938  Last data filed at 11/6/2024 0911  Gross per 24 hour   Intake 860.08 ml   Output --   Net 860.08 ml        General Appearance: Well developed, well nourished, alert & oriented x 3,    no acute distress.  Ears/Nose/Mouth/Throat: Hearing grossly normal.  Neck: Supple.  Chest: Lungs clear to auscultation bilaterally.  Cardiovascular: Regular rate and rhythm, S1,S2 normal, no murmur.  Abdomen: Soft, non-tender, bowel sounds are active.  Extremities: No edema bilaterally.  Skin: Warm and dry.    []         Post-cath site without hematoma, bruit, tenderness, or thrill.  Distal pulses intact.    PMH/SH reviewed - no change compared to H&P    Data Review    Telemetry: normal sinus rhythm     EKG:   []  No new EKG for review    Lab Data Personally Reviewed:    Recent Labs     11/04/24  1603 11/05/24  0615 11/05/24  2259   WBC 15.0* 11.8*  --    HGB 8.3* 6.9* 8.5*   HCT 25.5* 21.2* 25.4*   * 480*  --      No results for input(s): \"INR\", 
        Progress Note      11/7/2024 12:23 PM  NAME: Michael Fung   MRN:[id number]   Admit Diagnosis: Acute pneumonia [J18.9]      Subjective:   Chart reviewed.  Discussed with the patient.  Patient had a dialysis yesterday.  Denies any chest pain today or denies any shortness of breath.  11/07/2024 chart reviewed.  Discussed with the family members.  Patient is feeling better.  Had a dialysis yesterday.    Review of Systems:    Symptom Y/N Comments  Symptom Y/N Comments   Fever/Chills n   Chest Pain y Resolved   Poor Appetite    Edema     Cough    Abdominal Pain n    Sputum    Joint Pain     SOB/BETANCOURT y Improving  Pruritis/Rash     Nausea/vomit    Other     Diarrhea         Constipation           Could NOT obtain due to:      Objective:          Physical Exam:    Last 24hrs VS reviewed since prior progress note. Most recent are:    BP (!) 113/52 Comment: DIONICIO WAS INFORM ABOUT HIS B/P  Pulse 80   Temp 98.2 °F (36.8 °C) (Oral)   Resp 17   Ht 1.67 m (5' 5.75\")   Wt 69.5 kg (153 lb 3.5 oz)   SpO2 100%   BMI 24.92 kg/m²     Intake/Output Summary (Last 24 hours) at 11/7/2024 1223  Last data filed at 11/7/2024 0923  Gross per 24 hour   Intake 1663.6 ml   Output 1900 ml   Net -236.4 ml        General Appearance: Well developed, well nourished, alert & oriented x 3,    no acute distress.  Ears/Nose/Mouth/Throat: Hearing grossly normal.  Neck: Supple.  Chest: Lungs clear to auscultation bilaterally.  Cardiovascular: Regular rate and rhythm, S1,S2 normal, no murmur.  Abdomen: Soft, non-tender, bowel sounds are active.  Extremities: No edema bilaterally.  Skin: Warm and dry.    []         Post-cath site without hematoma, bruit, tenderness, or thrill.  Distal pulses intact.    PMH/SH reviewed - no change compared to H&P    Data Review    Telemetry: normal sinus rhythm     EKG:   []  No new EKG for review    Lab Data Personally Reviewed:    Recent Labs     11/04/24  1603 11/05/24  0615 11/05/24  2259   WBC 15.0* 11.8* 
        Progress Note      11/8/2024 11:51 AM  NAME: Michael Fung   MRN:[id number]   Admit Diagnosis: Acute pneumonia [J18.9]      Subjective:   Chart reviewed.  Discussed with the patient.  Patient had a dialysis yesterday.  Denies any chest pain today or denies any shortness of breath.  11/08/2024 chart reviewed.   Patient is feeling better.  Patient is getting dialysis.  Patient has NG tube.    Review of Systems:    Symptom Y/N Comments  Symptom Y/N Comments   Fever/Chills n   Chest Pain y Resolved   Poor Appetite    Edema     Cough    Abdominal Pain n    Sputum    Joint Pain     SOB/BETANCOURT y Improving  Pruritis/Rash     Nausea/vomit    Other     Diarrhea         Constipation           Could NOT obtain due to:      Objective:          Physical Exam:    Last 24hrs VS reviewed since prior progress note. Most recent are:    /61   Pulse 80   Temp 98 °F (36.7 °C)   Resp 18   Ht 1.67 m (5' 5.75\")   Wt 69.5 kg (153 lb 3.5 oz)   SpO2 100%   BMI 24.92 kg/m²     Intake/Output Summary (Last 24 hours) at 11/8/2024 1151  Last data filed at 11/8/2024 0633  Gross per 24 hour   Intake 350 ml   Output 900 ml   Net -550 ml        General Appearance: Well developed, well nourished, alert & oriented x 3,    no acute distress.  Ears/Nose/Mouth/Throat: Hearing grossly normal.  Neck: Supple.  Chest: Lungs clear to auscultation bilaterally.  Cardiovascular: Regular rate and rhythm, S1,S2 normal, no murmur.  Abdomen: Soft, non-tender, bowel sounds are active.  Extremities: No edema bilaterally.  Skin: Warm and dry.    []         Post-cath site without hematoma, bruit, tenderness, or thrill.  Distal pulses intact.    PMH/SH reviewed - no change compared to H&P    Data Review    Telemetry: normal sinus rhythm     EKG:   []  No new EKG for review    Lab Data Personally Reviewed:    Recent Labs     11/05/24  2259   HGB 8.5*   HCT 25.4*     No results for input(s): \"INR\", \"PROTIME\", \"APTT\" in the last 72 hours.     No results for 
        Progress Note      11/9/2024 3:52 PM  NAME: Michael Fung   MRN:[id number]   Admit Diagnosis: Acute pneumonia [J18.9]      Subjective:   Chart reviewed.  Discussed with the patient.  Patient had a dialysis yesterday.  Denies any chest pain today or denies any shortness of breath.  11/09/2024 chart reviewed.   Patient is feeling better.  Patient is getting dialysis.  Patient has NG tube.  It looks like a abdominal distention is not improving as per CAT scan report.    Review of Systems:    Symptom Y/N Comments  Symptom Y/N Comments   Fever/Chills n   Chest Pain y Resolved   Poor Appetite    Edema     Cough    Abdominal Pain n    Sputum    Joint Pain     SOB/BETANCOURT y Improving  Pruritis/Rash     Nausea/vomit    Other     Diarrhea         Constipation           Could NOT obtain due to:      Objective:          Physical Exam:    Last 24hrs VS reviewed since prior progress note. Most recent are:    BP (!) 102/57   Pulse 92   Temp 98.5 °F (36.9 °C) (Oral)   Resp 18   Ht 1.67 m (5' 5.75\")   Wt 58.1 kg (128 lb 1.4 oz)   SpO2 100%   BMI 20.83 kg/m²     Intake/Output Summary (Last 24 hours) at 11/9/2024 1552  Last data filed at 11/9/2024 0929  Gross per 24 hour   Intake 1624.14 ml   Output 1100 ml   Net 524.14 ml        General Appearance: Well developed, well nourished, alert & oriented x 3,    no acute distress.  Ears/Nose/Mouth/Throat: Hearing grossly normal.  Neck: Supple.  Chest: Lungs clear to auscultation bilaterally.  Cardiovascular: Regular rate and rhythm, S1,S2 normal, no murmur.  Abdomen: Soft, non-tender, bowel sounds are active.  Extremities: No edema bilaterally.  Skin: Warm and dry.    []         Post-cath site without hematoma, bruit, tenderness, or thrill.  Distal pulses intact.    PMH/SH reviewed - no change compared to H&P    Data Review    Telemetry: normal sinus rhythm     EKG:   []  No new EKG for review    Lab Data Personally Reviewed:    No results for input(s): \"WBC\", \"HGB\", \"HCT\", 
    Gastroenterology Progress Note        Patient: Michael Fung MRN: [id number]  SSN: [id number]    YOB: 1940  Age: 83 y.o.  Sex: male      Admit Date: 10/31/2024    LOS: 5 days     Subjective:   Patient seen, no nausea vomiting, no blood in stool, family member in the room  No past medical history on file.     Current Facility-Administered Medications:     heparin (porcine) injection 3,600 Units, 3,600 Units, IntraCATHeter, PRN, Vishnu Basilio MD, 3,600 Units at 11/04/24 1330    diclofenac sodium (VOLTAREN) 1 % gel 2 g, 2 g, Topical, BID, Lela Caicedo MD, 2 g at 11/05/24 0957    oxymetazoline (AFRIN) 0.05 % nasal spray 2 spray, 2 spray, Each Nostril, Once, Lela Caicedo MD    lidocaine (XYLOCAINE) 2 % uro-jet, , Topical, Once, Lela Caicedo MD    allopurinol (ZYLOPRIM) tablet 100 mg, 100 mg, Oral, Daily, Lela Caicedo MD, 100 mg at 11/05/24 0954    carvedilol (COREG) tablet 6.25 mg, 6.25 mg, Oral, BID WC, Lela Caicedo MD, 6.25 mg at 11/05/24 0954    vitamin B-12 (CYANOCOBALAMIN) tablet 500 mcg, 500 mcg, Oral, Daily, Lela Caicedo MD, 500 mcg at 11/05/24 0954    finasteride (PROSCAR) tablet 5 mg, 5 mg, Oral, Daily, Lela Caicedo MD, 5 mg at 11/05/24 0954    glipiZIDE (GLUCOTROL) tablet 2.5 mg, 2.5 mg, Oral, Sascha BOB Neha D, MD, 2.5 mg at 11/03/24 1035    NIFEdipine (PROCARDIA XL) extended release tablet 90 mg, 90 mg, Oral, Sascha BOB Neha D, MD, 90 mg at 11/03/24 0644    [Held by provider] Petrolatum-Zinc Oxide ointment 1 each, 1 each, Topical, BID, Lela Caicedo MD    thiamine mononitrate tablet 100 mg, 100 mg, Oral, Daily, Lela Caicedo MD, 100 mg at 11/05/24 0954    epoetin sandra-epbx (RETACRIT) 6,000 Units combo injection, 6,000 Units, SubCUTAneous, Once per day on Monday Wednesday Friday, Nicholas Gil MD, 6,000 Units at 11/04/24 1239    [COMPLETED] meropenem (MERREM) 1,000 mg in sodium chloride 0.9 % 100 mL IVPB (mini-bag), 1,000 mg, IntraVENous, Once, Stopped at 
    Gastroenterology Progress Note        Patient: Michael Fung MRN: [id number]  SSN: [id number]    YOB: 1940  Age: 83 y.o.  Sex: male      Admit Date: 10/31/2024    LOS: 13 days     Subjective:   Called to see the patient regarding drop in hemoglobin, patient was seen couple days ago.  Patient examined wife in the room,    It was dark stool, NG tube showed dark coffee-ground's  No past medical history on file.     Current Facility-Administered Medications:     diatrizoate meglumine-sodium (GASTROGRAFIN) 66-10 % solution 30 mL, 30 mL, Oral, ONCE PRN, Jad Peters MD    [START ON 11/15/2024] epoetin sandra-epbx (RETACRIT) 6,000 Units combo injection, 6,000 Units, IntraVENous, Once per day on Monday Wednesday Friday, Vishnu Basilio MD    diatrizoate meglumine-sodium (GASTROGRAFIN) 66-10 % solution 30 mL, 30 mL, Per NG tube, ONCE PRN, Jad Peters MD    potassium chloride 10 mEq/100 mL IVPB (Peripheral Line), 10 mEq, IntraVENous, Q2H, Sam Sy MD, Last Rate: 100 mL/hr at 11/13/24 1441, 10 mEq at 11/13/24 1441    pantoprazole (PROTONIX) injection 40 mg, 40 mg, IntraVENous, Daily, Sam Sy MD, 40 mg at 11/13/24 1431    labetalol (NORMODYNE;TRANDATE) injection 10 mg, 10 mg, IntraVENous, Q4H PRN, Sam Sy MD    heparin (porcine) injection 2,000 Units, 2,000 Units, IntraCATHeter, PRN, Vishnu Basilio MD    midodrine (PROAMATINE) tablet 5 mg, 5 mg, Oral, TID , Isaías Gonzalez MD    sodium chloride flush 0.9 % injection 5-40 mL, 5-40 mL, IntraVENous, 2 times per day, Jad Peters MD, 10 mL at 11/13/24 1451    sodium chloride flush 0.9 % injection 5-40 mL, 5-40 mL, IntraVENous, PRN, Jad Peters MD    0.9 % sodium chloride infusion, , IntraVENous, PRN, Jad Peters MD    ondansetron (ZOFRAN-ODT) disintegrating tablet 4 mg, 4 mg, Oral, Q8H PRN **OR** ondansetron (ZOFRAN) injection 4 mg, 4 mg, IntraVENous, Q6H PRN, Jad Peters MD, 4 mg at 11/13/24 1524    0.9 % sodium 
    Gastroenterology Progress Note        Patient: Michael Fung MRN: [id number]  SSN: [id number]    YOB: 1940  Age: 83 y.o.  Sex: male      Admit Date: 10/31/2024    LOS: 14 days     Subjective:   Called to see the patient  Patient examined wife in the room,    No stool ,   Ng removed  No past medical history on file.     Current Facility-Administered Medications:     diatrizoate meglumine-sodium (GASTROGRAFIN) 66-10 % solution 30 mL, 30 mL, Oral, ONCE PRN, Jad Peters MD    [START ON 11/15/2024] epoetin sandra-epbx (RETACRIT) 6,000 Units combo injection, 6,000 Units, IntraVENous, Once per day on Monday Wednesday Friday, Vishnu Basilio MD    diatrizoate meglumine-sodium (GASTROGRAFIN) 66-10 % solution 30 mL, 30 mL, Per NG tube, ONCE PRN, Jad Peters MD    pantoprazole (PROTONIX) injection 40 mg, 40 mg, IntraVENous, Daily, Sam Sy MD, 40 mg at 11/14/24 0936    labetalol (NORMODYNE;TRANDATE) injection 10 mg, 10 mg, IntraVENous, Q4H PRN, Sam Sy MD    heparin (porcine) injection 2,000 Units, 2,000 Units, IntraCATHeter, PRN, Vishnu Basilio MD    midodrine (PROAMATINE) tablet 5 mg, 5 mg, Oral, TID , Isaías Gonzalez MD, 5 mg at 11/14/24 1228    sodium chloride flush 0.9 % injection 5-40 mL, 5-40 mL, IntraVENous, 2 times per day, Jad Peters MD, 10 mL at 11/14/24 0932    sodium chloride flush 0.9 % injection 5-40 mL, 5-40 mL, IntraVENous, PRN, Jad Peters MD    0.9 % sodium chloride infusion, , IntraVENous, PRN, Jad Peters MD    ondansetron (ZOFRAN-ODT) disintegrating tablet 4 mg, 4 mg, Oral, Q8H PRN **OR** ondansetron (ZOFRAN) injection 4 mg, 4 mg, IntraVENous, Q6H PRN, Jad Peters MD, 4 mg at 11/13/24 1524    0.9 % sodium chloride infusion, , IntraVENous, PRN, Isaías Gonzalez MD    heparin (porcine) injection 3,600 Units, 3,600 Units, IntraCATHeter, PRN, Vishnu Basilio MD, 3,600 Units at 11/13/24 1015    diclofenac sodium (VOLTAREN) 1 % gel 2 g, 2 g, 
    Hospitalist Progress Note               Daily Progress Note: 11/1/2024      Hospital Day: 2     Chief complaint: No chief complaint on file.       Brief HPI/ Hospital course to date:  Mcihael Fung is a 83 y.o. male with multiple medical problems DVT s/p IVC filter, ESRD on HD who presented to the ED due to shortness of breath.  Patient was recently discharged on 10/25.  During the following admission he was in the hospital for acute hypoxic respiratory failure 2/2 community-acquired pneumonia in left lung.  During the stay patient received Rocephin and azithromycin.  He had a DVT for which he underwent IVC filter placement.  During his prior hospital stay he was in the ICU requiring BiPAP.  Patient was transitioned to high flow nasal cannula 50 L on 40% starting at 90%.  Had a low probability VQ mismatch on 10/1.  IV filter was placed on 10/4.  His antibiotics were changed to IV Zosyn he finished a course of 7-day IV Zosyn.  Patient received a dose of blood transfusion        11/1: Spoke to family today.  Patient states that he came in because of shortness of breath.  Denies any current chest pain, cough, shortness of breath, palpitations, nausea/vomiting, or abdominal pain.    All ROS negative otherwise mentioned above.      Assessment and Plan:  Hospital-acquired pneumonia  -Patient was recently discharged on 10/25 for acute hypoxic respiratory failure secondary to CAP and left lung.  At that time, patient had received Rocephin and azithromycin as well as 7-day course of Zosyn.  -Presents again today with shortness of breath  -CXR 10/30 reveals acute airspace opacity right perihilar region and right lower lobe concerning for acute pneumonia.   -Pro-Jonathan trending down  -Continue antibiotics: Levaquin  -Pulmonology was consulted appreciate recommendations         ESRD   -Nephro consulted and appreciate recommendations      Elevated BNP   -BNP was 3742 the patient does not seem significantly overloaded on 
    Hospitalist Progress Note               Daily Progress Note: 11/10/2024      Hospital Day: 11     Chief complaint: No chief complaint on file.       Brief HPI/ Hospital course to date:  Michael Fung is a 83 y.o. male with multiple medical problems DVT s/p IVC filter, ESRD on HD who presented to the ED due to shortness of breath.  Patient was recently discharged on 10/25.  During the following admission he was in the hospital for acute hypoxic respiratory failure 2/2 community-acquired pneumonia in left lung.  During the stay patient received Rocephin and azithromycin.  He had a DVT for which he underwent IVC filter placement.  During his prior hospital stay he was in the ICU requiring BiPAP.  Patient was transitioned to high flow nasal cannula 50 L on 40% starting at 90%.  Had a low probability VQ mismatch on 10/1.  IV filter was placed on 10/4.  His antibiotics were changed to IV Zosyn he finished a course of 7-day IV Zosyn.  Patient received a dose of blood transfusion        11/1: Spoke to family today.  Patient states that he came in because of shortness of breath.  Denies any current chest pain, cough, shortness of breath, palpitations, nausea/vomiting, or abdominal pain.      11/2: Of note, did discuss with family yesterday that patient has keep rolling back and she states that it is normal for him.  She states that he had followed up with neurology with no findings.  She states that this has been ongoing for a year.  Patient is directable with EOMI and no pain.    11/3: He does not have any complaints.  Overnight, he apparently had 1 coffee-ground emesis report and GI was consulted.  Also placed on Protonix.  Went to see patient and he just had his breakfast.  Placed him on n.p.o. for now and ordered a KUB given previous bowel obstruction.  Otherwise, we will continue to monitor closely.  Denies headache, chest pain/palpitations, shortness of breath, abdominal pain, urinary symptoms.  KUB showed 
    Hospitalist Progress Note               Daily Progress Note: 11/6/2024      Hospital Day: 7     Chief complaint: No chief complaint on file.       Brief HPI/ Hospital course to date:  Michael Fung is a 83 y.o. male with multiple medical problems DVT s/p IVC filter, ESRD on HD who presented to the ED due to shortness of breath.  Patient was recently discharged on 10/25.  During the following admission he was in the hospital for acute hypoxic respiratory failure 2/2 community-acquired pneumonia in left lung.  During the stay patient received Rocephin and azithromycin.  He had a DVT for which he underwent IVC filter placement.  During his prior hospital stay he was in the ICU requiring BiPAP.  Patient was transitioned to high flow nasal cannula 50 L on 40% starting at 90%.  Had a low probability VQ mismatch on 10/1.  IV filter was placed on 10/4.  His antibiotics were changed to IV Zosyn he finished a course of 7-day IV Zosyn.  Patient received a dose of blood transfusion        11/1: Spoke to family today.  Patient states that he came in because of shortness of breath.  Denies any current chest pain, cough, shortness of breath, palpitations, nausea/vomiting, or abdominal pain.      11/2: Of note, did discuss with family yesterday that patient has keep rolling back and she states that it is normal for him.  She states that he had followed up with neurology with no findings.  She states that this has been ongoing for a year.  Patient is directable with EOMI and no pain.    11/3: He does not have any complaints.  Overnight, he apparently had 1 coffee-ground emesis report and GI was consulted.  Also placed on Protonix.  Went to see patient and he just had his breakfast.  Placed him on n.p.o. for now and ordered a KUB given previous bowel obstruction.  Otherwise, we will continue to monitor closely.  Denies headache, chest pain/palpitations, shortness of breath, abdominal pain, urinary symptoms.  KUB showed 
    Hospitalist Progress Note               Daily Progress Note: 11/8/2024      Hospital Day: 9     Chief complaint: No chief complaint on file.       Brief HPI/ Hospital course to date:  Michael Fung is a 83 y.o. male with multiple medical problems DVT s/p IVC filter, ESRD on HD who presented to the ED due to shortness of breath.  Patient was recently discharged on 10/25.  During the following admission he was in the hospital for acute hypoxic respiratory failure 2/2 community-acquired pneumonia in left lung.  During the stay patient received Rocephin and azithromycin.  He had a DVT for which he underwent IVC filter placement.  During his prior hospital stay he was in the ICU requiring BiPAP.  Patient was transitioned to high flow nasal cannula 50 L on 40% starting at 90%.  Had a low probability VQ mismatch on 10/1.  IV filter was placed on 10/4.  His antibiotics were changed to IV Zosyn he finished a course of 7-day IV Zosyn.  Patient received a dose of blood transfusion        11/1: Spoke to family today.  Patient states that he came in because of shortness of breath.  Denies any current chest pain, cough, shortness of breath, palpitations, nausea/vomiting, or abdominal pain.      11/2: Of note, did discuss with family yesterday that patient has keep rolling back and she states that it is normal for him.  She states that he had followed up with neurology with no findings.  She states that this has been ongoing for a year.  Patient is directable with EOMI and no pain.    11/3: He does not have any complaints.  Overnight, he apparently had 1 coffee-ground emesis report and GI was consulted.  Also placed on Protonix.  Went to see patient and he just had his breakfast.  Placed him on n.p.o. for now and ordered a KUB given previous bowel obstruction.  Otherwise, we will continue to monitor closely.  Denies headache, chest pain/palpitations, shortness of breath, abdominal pain, urinary symptoms.  KUB showed 
    Hospitalist Progress Note               Daily Progress Note: 2024      Hospital Day: 15     Chief complaint: No chief complaint on file.       Brief HPI/ Hospital course to date:  Michael Fung is a 83 y.o. male with multiple medical problems DVT s/p IVC filter, ESRD on HD who presented to the ED due to shortness of breath.  Of note, he was recently admitted for pneumonia with acute respiratory failure and hypoxia.  Hospital course: Complicated by coffee-ground emesis started on 11/3.  KUB x-ray showed obstruction, NG tube was placed.  GI and general surgery consulted.  CT abdomen on  showed sigmoid colon containing left inguinal hernia without evidence of incarceration, as well as findings suggestive of ileus.  Underwent left hernia repair on . NG tube was placed to ileus. CT abdomen with oral contrast on  showed resolved ileus, NG tube removed.  Also noted to have hypokalemia, normalized with replacement.     --------  Patient is seen today for follow-up. Have few episodes of loose bowel movement yesterday, no nausea or vomiting or abdominal pain. Diet started, advance as tolerated. Potassium level normalized. Hemoglobin 8.8, stable today.         All ROS negative otherwise mentioned above.      /66   Pulse 80   Temp 97.5 °F (36.4 °C) (Oral)   Resp 17   Ht 1.67 m (5' 5.75\")   Wt 64.3 kg (141 lb 12.1 oz)   SpO2 99%   BMI 23.06 kg/m²  O2 Flow Rate (L/min): 3 L/min O2 Device: None (Room air)    Temp (24hrs), Av.6 °F (36.4 °C), Min:97.3 °F (36.3 °C), Max:98.1 °F (36.7 °C)    No intake/output data recorded.    1901 -  0700  In: 2065.7 [I.V.:1665.7]  Out: 951     Physical Exam:  General:  Awake and alert. Fatigue looking.    Lungs:   Clear to auscultation bilaterally.   Heart:  Regular rate and rhythm, S1, S2 normal, no murmur. No click, rub or gallop.   Abdomen:   Soft, non-tender. Bowel sounds hypoactive. No masses,  No organomegaly.   Extremities: no  edema. 
    Name: Michael Fung MRN: [id number]   : 1940 Hospital: [facility]   Date: 2024        IMPRESSION:   ESRD on HD. 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 Will follow recommendations     Subjective/Interval History:   I have reviewed the flowsheet and previous day’s notes.    ROS:Pertinent items are noted in HPI.    Objective:   Vital Signs:    BP (!) 128/57   Pulse 99   Temp 98.2 °F (36.8 °C)   Resp 15   Ht 1.67 m (5' 5.75\")   Wt 73.8 kg (162 lb 11.2 oz)   SpO2 96%   BMI 26.46 kg/m²             Temp (24hrs), Av °F (37.2 °C), Min:98.1 °F (36.7 °C), Max:101 °F (38.3 °C)       Intake/Output:   Last shift:      No intake/output data recorded.  Last 3 shifts: 10/30 1901 -  0700  In: 460 [P.O.:460]  Out: -     Intake/Output Summary (Last 24 hours) at 2024 1027  Last data filed at 2024 0552  Gross per 24 hour   Intake 460 ml   Output --   Net 460 ml        Current Facility-Administered Medications   Medication Dose Route Frequency    allopurinol (ZYLOPRIM) tablet 100 mg  100 mg Oral Daily    carvedilol (COREG) tablet 6.25 mg  6.25 mg Oral BID WC    vitamin B-12 (CYANOCOBALAMIN) tablet 500 mcg  500 mcg Oral Daily    finasteride (PROSCAR) tablet 5 mg  5 mg Oral Daily    glipiZIDE (GLUCOTROL) tablet 2.5 mg  2.5 mg Oral QAM AC    NIFEdipine (PROCARDIA XL) extended release tablet 90 mg  90 mg Oral QAM AC    [Held by provider] Petrolatum-Zinc Oxide ointment 1 each  1 each Topical BID    thiamine mononitrate tablet 100 mg  100 mg Oral Daily    epoetin sandra-epbx (RETACRIT) 6,000 Units combo injection  6,000 Units SubCUTAneous Once per day on     meropenem (MERREM) 1,000 mg in sodium chloride 0.9 % 100 mL IVPB (mini-bag)  
    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     Subjective/Interval History:       2024    Mr. Fung had dialysis earlier today.  The Dialysis Nurse's note was reviewed.    Objective:   Vital Signs:    BP (!) 107/51   Pulse 96   Temp 97.9 °F (36.6 °C) (Oral)   Resp 16   Ht 1.67 m (5' 5.75\")   Wt 68.9 kg (151 lb 14.4 oz)   SpO2 97%   BMI 24.71 kg/m²             Temp (24hrs), Av.4 °F (36.9 °C), Min:97.9 °F (36.6 °C), Max:98.8 °F (37.1 °C)       Intake/Output:   Last shift:      701 - 1900  In: 603.6 [I.V.:3.6]  Out: 1800   Last 3 shifts: 1901 -  0700  In: 485 [P.O.:470; I.V.:15]  Out: 160 [Urine:160]    Intake/Output Summary (Last 24 hours) at 2024 1747  Last data filed at 2024 1330  Gross per 24 hour   Intake 613.6 ml   Output 1800 ml   Net -1186.4 ml        Current Facility-Administered Medications   Medication Dose Route Frequency    heparin (porcine) injection 3,600 Units  3,600 Units IntraCATHeter PRN    pantoprazole (PROTONIX) injection 40 mg  40 mg IntraVENous BID    diclofenac sodium (VOLTAREN) 1 % gel 2 g  2 g Topical BID    oxymetazoline (AFRIN) 0.05 % nasal spray 2 spray  2 spray Each Nostril Once    lidocaine (XYLOCAINE) 2 % uro-jet   Topical Once    allopurinol (ZYLOPRIM) tablet 100 mg  100 mg Oral Daily    carvedilol (COREG) tablet 6.25 mg  6.25 mg Oral BID WC    vitamin B-12 (CYANOCOBALAMIN) tablet 500 mcg  500 mcg Oral Daily    finasteride (PROSCAR) tablet 5 mg  5 mg Oral Daily    glipiZIDE (GLUCOTROL) tablet 2.5 mg  2.5 mg Oral QAM AC    NIFEdipine (PROCARDIA XL) extended release tablet 90 mg  90 
    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 A transfusion PRBCs could be considered if the Hb remains below 7 g/dL.  Will defer to the Primary team.  Check CBC again in the AM.  Barium swallow is considered by GI.     Subjective/Interval History:       2024    Mr. Fung had dialysis earlier today.  The Dialysis Nurse's note was reviewed.      2024    Seemed to be  tolerating liquids reasonably well today  Worked with the Physical therapy team.      2024    No new complaints were offered this afternoon.      2024    Ileus remains an issue.  The NG tube is now in place.    Mr. Fung had dialysis earlier today.  The Dialysis Nurse's note was reviewed.  Hemodialysis was complicated by intradialytic hypotension.  The fluid removal goal was decreased to 0.  The dialysis temperature was decreased to 35 Celsius. Additionally albumin was on board on an as-needed basis to address hypotension as well.    Objective:   Vital Signs:    BP (!) 110/51   Pulse 84   Temp 98.1 °F (36.7 °C)   Resp 18   Ht 1.67 m (5' 5.75\")   Wt 62.3 kg (137 lb 5.6 oz)   SpO2 100%   BMI 22.34 kg/m²             Temp (24hrs), Av.9 °F (36.6 °C), Min:97.2 °F (36.2 °C), Max:98.2 °F (36.8 °C)       Intake/Output:   Last shift:      701 - 1900  In: 1203.6 [I.V.:3.6]  Out: 1200   Last 3 shifts: 1901 -  0700  In: 950 [P.O.:950]  Out: 900     Intake/Output Summary (Last 24 hours) at 2024 1519  Last data filed at 2024 1130  Gross per 24 hour   Intake 1453.6 ml   Output 2100 ml   Net -646.4 ml        Current Facility-Administered Medications   Medication Dose Route 
    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  Will use a heparin bolus followed by maintenance dose of heparin with HD on Wednesday.  Will adjust the dialysate potassium to 4K boluses treatment.  Dose meds for GFR  Epogen 6000 units with each Tx. A transfusion PRBCs could be considered if the Hb remains below 7 g/dL.  Will defer to the Primary team.    Barium swallow is considered by GI.     Subjective/Interval History:       2024    Mr. Fung had dialysis earlier today.  The Dialysis Nurse's note was reviewed.      2024    Seemed to be  tolerating liquids reasonably well today  Worked with the Physical therapy team.      2024    No new complaints were offered this afternoon.      2024    Ileus remains an issue.  The NG tube is now in place.    Mr. Fung had dialysis earlier today.  The Dialysis Nurse's note was reviewed.  Hemodialysis was complicated by intradialytic hypotension.  The fluid removal goal was decreased to 0.  The dialysis temperature was decreased to 35 Celsius. Additionally albumin was on board on an as-needed basis to address hypotension as well.    2024.  Patient feeling okay.  Has NG tube to suction.  On IV fluid at 75 mL/h.  Family at bedside.  Plan for surgery tomorrow noted .  Per family patient has diarrhea        2024    Events were noted.  Underwent left inguinal hernia repair on 11/10/2024  NGT replaced in place to address balance.    Had dialysis earlier.  Seems there may have been a clot in the circuit.  The BFR improved with a bolus dose of heparin.      2024    NGT remains in place.     seen in dialysis unit.  Patient with NG tube in place with 
    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  Dialysis again on Friday []  Dose meds for GFR  Epogen 6000 units with each Tx. A transfusion PRBCs could be considered if the Hb remains below 7 g/dL.  Will defer to the Primary team.  Check CBC again in the AM.  Barium swallow is considered by GI.     Subjective/Interval History:       2024    Mr. Fung had dialysis earlier today.  The Dialysis Nurse's note was reviewed.      2024    Seemed to be  tolerating liquids reasonably well today  Worked with the Physical therapy team.      2024    No new complaints were offered this afternoon.    Objective:   Vital Signs:    BP (!) 105/53   Pulse 94   Temp 98.1 °F (36.7 °C) (Oral)   Resp 18   Ht 1.67 m (5' 5.75\")   Wt 69.5 kg (153 lb 3.5 oz)   SpO2 92%   BMI 24.92 kg/m²             Temp (24hrs), Av.2 °F (36.8 °C), Min:97.7 °F (36.5 °C), Max:98.4 °F (36.9 °C)       Intake/Output:   Last shift:      701 - 1900  In: 1103.6 [P.O.:200; I.V.:3.6]  Out: 1900   Last 3 shifts: 1901 -  07  In: 820.1 [P.O.:510; I.V.:10]  Out: -     Intake/Output Summary (Last 24 hours) at 2024 1310  Last data filed at 2024 1250  Gross per 24 hour   Intake 1563.68 ml   Output 1900 ml   Net -336.32 ml        Current Facility-Administered Medications   Medication Dose Route Frequency    0.9 % sodium chloride infusion   IntraVENous PRN    heparin (porcine) injection 3,600 Units  3,600 Units IntraCATHeter PRN    diclofenac sodium (VOLTAREN) 1 % gel 2 g  2 g Topical BID    oxymetazoline (AFRIN) 0.05 % nasal spray 2 spray  2 spray Each Nostril Once    lidocaine (XYLOCAINE) 2 % uro-jet   Topical Once    allopurinol (ZYLOPRIM) tablet 100 mg  100 
    Name: Michael Fung MRN: [id number]   : 1940 Hospital: [facility]   Date: 2024        IMPRESSION:   ESRD on HD - M/W/F - had HD today ()  Pre HD hypokalemia-  Anemia of ESRD, Hb is bellow target  Pneumonia- the primary team is managing  SBO with inguinal hernia  Hypotension      PLAN:   Dialysis again on Wednesday [].  Will use a heparin bolus followed by maintenance dose of heparin with HD on Wednesday.  Will adjust the dialysate potassium to 4K boluses treatment.  Check lites in AM.  Dose meds for GFR  Epogen 6000 units with each Tx. A transfusion PRBCs could be considered if the Hb remains below 7 g/dL.  Will defer to the Primary team.    Barium swallow is considered by GI.     Subjective/Interval History:       2024    Mr. Fung had dialysis earlier today.  The Dialysis Nurse's note was reviewed.      2024    Seemed to be  tolerating liquids reasonably well today  Worked with the Physical therapy team.      2024    No new complaints were offered this afternoon.      2024    Ileus remains an issue.  The NG tube is now in place.    Mr. Fung had dialysis earlier today.  The Dialysis Nurse's note was reviewed.  Hemodialysis was complicated by intradialytic hypotension.  The fluid removal goal was decreased to 0.  The dialysis temperature was decreased to 35 Celsius. Additionally albumin was on board on an as-needed basis to address hypotension as well.    2024.  Patient feeling okay.  Has NG tube to suction.  On IV fluid at 75 mL/h.  Family at bedside.  Plan for surgery tomorrow noted .  Per family patient has diarrhea        2024    Events were noted.  Underwent left inguinal hernia repair on 11/10/2024  NGT replaced in place to address balance.    Had dialysis earlier.  Seems there may have been a clot in the circuit.  The BFR improved with a bolus dose of heparin.    Objective:   Vital Signs:    /61   Pulse 80   Temp 97.8 °F 
  Physician Progress Note      PATIENT:               BRANDO SZYMANSKI  CSN #:                  [id number]  :                       1940  ADMIT DATE:       10/31/2024 11:11 AM  DISCH DATE:  RESPONDING  PROVIDER #:        Lela Caicedo MD          QUERY TEXT:    Good Afternoon    This patient admitted for Pneumonia.    Currently there is conflicting documentation between the attending and the   pulmonologist regarding the type of Pneumonia, and if possible, this needs to   be clarified.    H&P/Attending notes. \"Hospital -Acquired Pneumonia\"  2024- Pulmonary notes \"Aspiration Pneumonia\".      If possible, please document in progress notes and discharge summary if you   are evaluating and /or treating any of the following:    The medical record reflects the following:  Risk Factors: Recent hospitalization, Dysphagia, Pharyngeal dysphagia minimal,   Probable esophageal dysmotility  Clinical Indicators: To ER with  SOB< CXR new right peripheral and inferior   infiltrates, Coughing while swallowing more so with liquids  episode of   vomiting  Treatment: Pulmonary consulted, IV Merrem,  CXR, MBS    Thank you  NALLELY MooreN,RN, CPHQ, CCDS, SMART  Options provided:  -- Aspiration Pneumonia is confirmed. Hospital acquired pneumonia has been   ruled out.  -- Aspiration Pneumonia is ruled out.  Hospital acquired pneumonia is   confirmed.  -- Aspiration Pneumonia superimposed on Bacterial Pneumonia.  -- Other - I will add my own diagnosis  -- Disagree - Not applicable / Not valid  -- Disagree - Clinically unable to determine / Unknown  -- Refer to Clinical Documentation Reviewer    PROVIDER RESPONSE TEXT:    After study, Aspiration pneumonia is confirmed. The hospital acquired   pneumonia is ruled out.    Query created by: Rajni Walter on 2024 12:59 PM      Electronically signed by:  Lela Caicedo MD 2024 9:05 AM          
4 Eyes Skin Assessment     NAME:  Michael Fung  YOB: 1940  MEDICAL RECORD NUMBER:  [id number]    The patient is being assessed for  Admission    I agree that at least one RN has performed a thorough Head to Toe Skin Assessment on the patient. ALL assessment sites listed below have been assessed.      Areas assessed by both nurses:    Head, Face, Ears, Shoulders, Back, Chest, Arms, Elbows, Hands, Sacrum. Buttock, Coccyx, Ischium, Legs. Feet and Heels, and Under Medical Devices         Does the Patient have a Wound? Yes wound(s) were present on assessment. LDA wound assessment was Initiated and completed by RN  Patient's coccyx, sacrum, left and right buttock is excoriated, open areas. Heels dry/flaky, elevated off bed.        Fabriizo Prevention initiated by RN: Yes  Wound Care Orders initiated by RN: Yes    Pressure Injury (Stage 3,4, Unstageable, DTI, NWPT, and Complex wounds) if present, place Wound referral order by RN under : Yes    New Ostomies, if present place, Ostomy referral order under : No     Nurse 1 eSignature: Electronically signed by KHOI PICHARDO RN on 11/1/24 at 6:37 AM EDT    **SHARE this note so that the co-signing nurse can place an eSignature**    Nurse 2 eSignature: Electronically signed by Martell Lake RN on 11/1/24 at 6:41 AM EDT    
4 Eyes Skin Assessment     NAME:  Michael Fung  YOB: 1940  MEDICAL RECORD NUMBER:  [id number]    The patient is being assessed for  Other Weekly Skin Assessment    I agree that at least one RN has performed a thorough Head to Toe Skin Assessment on the patient. ALL assessment sites listed below have been assessed.      Areas assessed by both nurses:    Head, Face, Ears, Shoulders, Back, Chest, Arms, Elbows, Hands, Sacrum. Buttock, Coccyx, Ischium, Legs. Feet and Heels, and Under Medical Devices         Does the Patient have a Wound? Yes wound(s) were present on assessment. LDA wound assessment was Initiated and completed by RN       Fabrizio Prevention initiated by RN: Yes  Wound Care Orders initiated by RN: No  Aready seen by wound care nurse; wound care orders in place  Pressure Injury (Stage 3,4, Unstageable, DTI, NWPT, and Complex wounds) if present, place Wound referral order by RN under : No    New Ostomies, if present place, Ostomy referral order under : No     Nurse 1 eSignature: Electronically signed by Narendra Jimenez RN on 11/6/24 at 12:45 AM EST    **SHARE this note so that the co-signing nurse can place an eSignature**    Nurse 2 eSignature: Electronically signed by WALE HARGROVE RN on 11/6/24 at 1:20 AM EST   
4 Eyes Skin Assessment     NAME:  Michael Fung  YOB: 1940  MEDICAL RECORD NUMBER:  [id number]    The patient is being assessed for  Other weekly assessment    I agree that at least one RN has performed a thorough Head to Toe Skin Assessment on the patient. ALL assessment sites listed below have been assessed.      Areas assessed by both nurses:    Head, Face, Ears, Shoulders, Back, Chest, Arms, Elbows, Hands, Sacrum. Buttock, Coccyx, Ischium, Legs. Feet and Heels, and Under Medical Devices         Does the Patient have a Wound? Yes wound(s) were present on assessment. LDA wound assessment was Initiated and completed by RN       Fabrizio Prevention initiated by RN: Yes  Wound Care Orders initiated by RN: Yes    Pressure Injury (Stage 3,4, Unstageable, DTI, NWPT, and Complex wounds) if present, place Wound referral order by RN under : No    New Ostomies, if present place, Ostomy referral order under : No     Nurse 1 eSignature: Electronically signed by WALE HARGROVE RN on 11/13/24 at 12:53 AM EST    **SHARE this note so that the co-signing nurse can place an eSignature**    Nurse 2 eSignature: {Esignature:[id number]}   
Attempted to see patient x 2 this PM, wife reported patient had to be cleaned up for BM.We will try later.  
Chart reviewed and history obtained. Patient refusing PT treatment today. When asked patient how he felt he stated no. Patient had dialysis this morning and surgery yesterday. Patient sleeping when enter room. Will attempt treatment at later time/date. Thank you.   
Chart reviewed. SLP attempted to see patient for swallowing intervention. However, patient with emesis bag at bedside reported have recently vomited.   Declined SLP intervention secondary to nausea/vomiting. Stated that he has not been having trouble swallowing but that he can't \"keep down\" PO intake.   Wife and son present, appeared to indicate agreement with patient report of no current difficulty swallowing. SLP to continue to follow.       Jewels Covarrubias M.Ed., CCC-SLP  
Comprehensive Nutrition Assessment    Type and Reason for Visit:  Reassess (Interim)    Nutrition Recommendations/Plan:   Continue current diet/ONS as tolerated  Monitor weight closely, please take weights daily and trend  Monitor intakes w/in I/Os, GI status, and bowel fxn      Malnutrition Assessment:  Malnutrition Status:  Severe malnutrition (11/05/24 1527)    Context:  Acute Illness       Nutrition Assessment:    P/w SOB. Had coffee ground emesis overnight 11/2, KUB w/ obstruction, NPO, NGT placed, CTAP suggesting ileus. Pt advanced to CLD overnight 11/4 w/ BM. RD consulted d/t new wounds, possible PIs per WCN. Pt's wt downtrending significantly, suspect fluid and innacuracies in weights contributing. -3.2 L via HD, down 4.9kg. Pt meeting crieteria for severe malnutrition last admission. Intakes avg 26-50% on SBS prior to NPO. (11/8) Pt had avg intakes 26-50% on CLD, advanced to FLD y'day; however, emesis x3 this AM, billious, NGT placed w/ 800ml output, distended abdomen, still havign BMs. Pt updated wt 62.3kg (-10% from 11.6 weight). ?accuracy of this vs prev wt; NGT OP/vomiting contributing, but likely still inaccuracy contributing as no fluid was removed in HD. Labs and meds reviewed.    Nutrition Related Findings:    N/v this AM, no d/c. LBM 11/7. Previously SBS per SLP. Currently FLD though NGT in place w/ billious output. Trace BLE edema, non-pitting RUE edema. Wound Type: Multiple, Moisture Associate Skin Damage, Pressure Injury (possible PIs per WCN)       Current Nutrition Intake & Therapies:    Average Meal Intake: 26-50% (prior to NPO/CLD)  Average Supplements Intake: None Ordered  ADULT DIET; Full Liquid  ADULT ORAL NUTRITION SUPPLEMENT; Breakfast, Lunch, Dinner; Renal Oral Supplement    Anthropometric Measures:  Height: 167 cm (5' 5.75\")  Ideal Body Weight (IBW): 140 lbs (64 kg)    Admission Body Weight: 73.8 kg (162 lb 11.2 oz)  Current Body Weight: 62.3 kg (137 lb 5.6 oz) (?accurate),   IBW. 
Comprehensive Nutrition Assessment    Type and Reason for Visit:  Reassess (follow up)    Nutrition Recommendations/Plan:   If pt is expected not to be medically appropriate for oral diet w/in 48-72 hours, recommend considering parenteral nutrition support  Advance diet as medically able  Obtain standing weight if able given fluctuations in bed weights   Monitor weight, GI status, and bowel fxn      Malnutrition Assessment:  Malnutrition Status:  Severe malnutrition (11/05/24 1527)    Context:  Acute Illness       Nutrition Assessment:    P/w SOB. MBS 11/1 w/ delayed swallow initiation. Had coffee ground emesis overnight 11/2, KUB w/ obstruction, NPO, NGT placed, CTAP suggesting ileus. Pt advanced to CLD overnight 11/4 w/ BM. RD consulted d/t new wounds, possible PIs per WCN. Pt's wt downtrending significantly, suspect fluid and innacuracies in weights contributing. -3.2 L via HD, down 4.9kg. Pt meeting crieteria for severe malnutrition last admission. Intakes avg 26-50% on SBS prior to NPO. (11/8) Pt had avg intakes 26-50% on CLD, advanced to FLD y'day; however, emesis x3  billious, NGT placed w/ 800ml output, distended abdomen, still having BMs. Pt updated wt 62.3kg (-10% from 11.6 weight). ?accuracy of this vs prev wt; NGT OP/vomiting contributing, but suspect inaccurate as no fluid was removed in HD. (11/12) Pt KUB 11/8 c/w ileus. Pt underwent inguinal hernia repair 11/10. Pt still NPO (day 4). Pt weighing 65.6kg today via bed scale. 1L fluid removed y'day per dailysis note. Again, suspect some of weight fluctuation is attributable to accuracy of bedscale. Pt now SOB w/ c/o HAP, on abx. Labs and meds reviewed. K 2.8, BUN 23, Cr 5.04, Phos 5.3.    Nutrition Related Findings:    No additional n/v reported, no BM x 4 days; abdominal discomfort. Prev SBS per SLP rec. NPO d/t ileus. Generalized edema, non-pitting RUE, BLE. Wound Type: Multiple, Moisture Associate Skin Damage, Pressure Injury (possible PIs per WCN)   
Comprehensive Nutrition Assessment    Type and Reason for Visit:  Reassess (interim)    Nutrition Recommendations/Plan:   Add [facility] ONS BID - monitor for need to modify to Nepro  Monitor for need to modify diet to Low Phos  Monitor diet tolerance for need to adjust to SBS as pt has received this diet rec during previous admission  Monitor weight, intakes, fluid status, labs, and GI status     Malnutrition Assessment:  Malnutrition Status:  Severe malnutrition (11/05/24 1527)    Context:  Acute Illness       Nutrition Assessment:    P/w SOB. MBS 11/1 w/ delayed swallow initiation. Had coffee ground emesis overnight 11/2, KUB w/ obstruction, NPO, NGT placed, CTAP suggesting ileus. Pt advanced to CLD overnight 11/4 w/ BM. RD consulted d/t new wounds, possible PIs per WCN. Pt's wt downtrending significantly, suspect fluid and innacuracies in weights contributing. -3.2 L via HD, down 4.9kg. Pt meeting crieteria for severe malnutrition last admission. Intakes avg 26-50% on SBS prior to NPO. (11/8) Pt had avg intakes 26-50% on CLD, advanced to FLD y'day; however, emesis x3 billious, NGT placed w/ 800ml output, distended abdomen, still having BMs. Pt updated wt 62.3kg (-10% from 11.6 weight). ?accuracy of this vs prev wt; NGT OP/vomiting contributing, but suspect inaccurate as no fluid was removed in HD. (11/12) Pt KUB 11/8 c/w ileus. Pt underwent inguinal hernia repair 11/10. Pt still NPO (day 4). Pt weighing 65.6kg today via bed scale. 1L fluid removed y'day per dailysis note. Again, suspect some of weight fluctuation is attributable to accuracy of bedscale. Pt now SOB w/ c/o HAP, on abx. (11/15) Pt on FLD 11/14 w/ % intake recorded. Today advanced to regular diet, intakes TBD.    Nutrition Related Findings:    No additional n/v reported, no BM x 4 days; abdominal discomfort. Prev SBS per SLP rec. NPO d/t ileus. Generalized edema, non-pitting RUE, BLE. Wound Type: Multiple, Moisture Associate Skin Damage, Pressure 
Consult  Pulmonary, Critical Care    Name: Michael Fung MRN: [id number]   : 1940 Hospital: [facility]   Date: 2024  Admission date: 10/31/2024 Hospital Day: 17       Subjective/Interval History:   Seen in the dialysis unit awake alert on room air oxygen saturation 96% no distress history is that prolonged hospitalization just transferred 10/26 to rehab was having dialysis in [place] noted chest discomfort fever chest x-ray showed new right perihilar and inferior infiltrates and he was transferred to our hospital for admission.  Wife and he both note that he has had some problems with coughing while swallowing perhaps more so with liquids and with solids for \"some time\"   awake alert still having some low-grade temperature Tmax 100.2 respirations nonlabored remains on room air with good oxygen saturation  11/3 episode of vomiting last night but has had breakfast this morning with no nausea or vomiting remains on room air saturation 98%.  KUB to me shows distended gastric silhouette otherwise unremarkable   no further vomiting still has some cough no significant fever WBC count improved    Patient Active Problem List   Diagnosis    Acute hypoxic respiratory failure    CATHY (acute kidney injury) (HCC)    Hemoptysis    Community acquired pneumonia    Acute deep vein thrombosis (DVT) of femoral vein of both lower extremities (HCC)    DM (diabetes mellitus), type 2 (HCC)    Decreased mobility and endurance    Acute upper GI bleed    Ileus (HCC)    Acute urethral obstruction    Incomplete bladder emptying    Small bowel obstruction (HCC)    Edema of right upper arm    Acute pneumonia       IMPRESSION:   Aspiration pneumonia right lung  Pharyngeal dysphagia minimal  Probable esophageal dysmotility  Questionable gastroparesis   Left inguinal hernia repair  End-stage renal disease  Hypokalemia and hypophosphatemia repleted  Body mass index is 23.34 kg/m².      RECOMMENDATIONS/PLAN: 
Consult  Pulmonary, Critical Care    Name: Michael Fung MRN: [id number]   : 1940 Hospital: [facility]   Date: 11/10/2024  Admission date: 10/31/2024 Hospital Day: 11       Subjective/Interval History:   Seen in the dialysis unit awake alert on room air oxygen saturation 96% no distress history is that prolonged hospitalization just transferred 10/26 to rehab was having dialysis in [place] noted chest discomfort fever chest x-ray showed new right perihilar and inferior infiltrates and he was transferred to our hospital for admission.  Wife and he both note that he has had some problems with coughing while swallowing perhaps more so with liquids and with solids for \"some time\"   awake alert still having some low-grade temperature Tmax 100.2 respirations nonlabored remains on room air with good oxygen saturation  11/3 episode of vomiting last night but has had breakfast this morning with no nausea or vomiting remains on room air saturation 98%.  KUB to me shows distended gastric silhouette otherwise unremarkable   no further vomiting still has some cough no significant fever WBC count improved    Patient Active Problem List   Diagnosis    Acute hypoxic respiratory failure    CATHY (acute kidney injury) ([facility])    Hemoptysis    Community acquired pneumonia    Acute deep vein thrombosis (DVT) of femoral vein of both lower extremities ([facility])    DM (diabetes mellitus), type 2 (HCC)    Decreased mobility and endurance    Acute upper GI bleed    Ileus (HCC)    Acute urethral obstruction    Incomplete bladder emptying    Small bowel obstruction (HCC)    Edema of right upper arm    Acute pneumonia       IMPRESSION:   Aspiration pneumonia right lung  Pharyngeal dysphagia minimal  Probable esophageal dysmotility  Questionable gastroparesis with distended stomach on x-ray and vomiting during the night  Possible small bowel obstruction versus ileus  End-stage renal disease  Hypokalemia repleted  Body 
Consult  Pulmonary, Critical Care    Name: Michael Fung MRN: [id number]   : 1940 Hospital: [facility]   Date: 2024  Admission date: 10/31/2024 Hospital Day: 13       Subjective/Interval History:   Seen in the dialysis unit awake alert on room air oxygen saturation 96% no distress history is that prolonged hospitalization just transferred 10/26 to rehab was having dialysis in [place] noted chest discomfort fever chest x-ray showed new right perihilar and inferior infiltrates and he was transferred to our hospital for admission.  Wife and he both note that he has had some problems with coughing while swallowing perhaps more so with liquids and with solids for \"some time\"   awake alert still having some low-grade temperature Tmax 100.2 respirations nonlabored remains on room air with good oxygen saturation  11/3 episode of vomiting last night but has had breakfast this morning with no nausea or vomiting remains on room air saturation 98%.  KUB to me shows distended gastric silhouette otherwise unremarkable   no further vomiting still has some cough no significant fever WBC count improved    Patient Active Problem List   Diagnosis    Acute hypoxic respiratory failure    CATHY (acute kidney injury) (HCC)    Hemoptysis    Community acquired pneumonia    Acute deep vein thrombosis (DVT) of femoral vein of both lower extremities (HCC)    DM (diabetes mellitus), type 2 (HCC)    Decreased mobility and endurance    Acute upper GI bleed    Ileus (HCC)    Acute urethral obstruction    Incomplete bladder emptying    Small bowel obstruction (HCC)    Edema of right upper arm    Acute pneumonia       IMPRESSION:   Aspiration pneumonia right lung  Pharyngeal dysphagia minimal  Probable esophageal dysmotility  Questionable gastroparesis   Left inguinal hernia repair  End-stage renal disease  Hypokalemia and hypophosphatemia  Body mass index is 20.83 kg/m².      RECOMMENDATIONS/PLAN:   Continue 
Consult  Pulmonary, Critical Care    Name: Michael Fung MRN: [id number]   : 1940 Hospital: [facility]   Date: 2024  Admission date: 10/31/2024 Hospital Day: 15       Subjective/Interval History:   Seen in the dialysis unit awake alert on room air oxygen saturation 96% no distress history is that prolonged hospitalization just transferred 10/26 to rehab was having dialysis in [place] noted chest discomfort fever chest x-ray showed new right perihilar and inferior infiltrates and he was transferred to our hospital for admission.  Wife and he both note that he has had some problems with coughing while swallowing perhaps more so with liquids and with solids for \"some time\"   awake alert still having some low-grade temperature Tmax 100.2 respirations nonlabored remains on room air with good oxygen saturation  11/3 episode of vomiting last night but has had breakfast this morning with no nausea or vomiting remains on room air saturation 98%.  KUB to me shows distended gastric silhouette otherwise unremarkable   no further vomiting still has some cough no significant fever WBC count improved    Patient Active Problem List   Diagnosis    Acute hypoxic respiratory failure    CATHY (acute kidney injury) (HCC)    Hemoptysis    Community acquired pneumonia    Acute deep vein thrombosis (DVT) of femoral vein of both lower extremities (HCC)    DM (diabetes mellitus), type 2 (HCC)    Decreased mobility and endurance    Acute upper GI bleed    Ileus (HCC)    Acute urethral obstruction    Incomplete bladder emptying    Small bowel obstruction (HCC)    Edema of right upper arm    Acute pneumonia       IMPRESSION:   Aspiration pneumonia right lung  Pharyngeal dysphagia minimal  Probable esophageal dysmotility  Questionable gastroparesis   Left inguinal hernia repair  End-stage renal disease  Hypokalemia and hypophosphatemia repleted  Body mass index is 23.06 kg/m².      RECOMMENDATIONS/PLAN: 
Discharge plan of care/case management plan validated with provider discharge order.   
Mr. Farmer is resting in bed quite comfortably.  His wife are concerned about swallowing.  This is being addressed by the Primary team.    Lab review:    No new chemistry results today.    Plan    Dialysis again tomorrow, Friday, November 8.      
OCCUPATIONAL THERAPY TREATMENT  Patient: Michael Fung (83 y.o. male)  Date: 11/12/2024  Primary Diagnosis: Acute pneumonia [J18.9]  Procedure(s) (LRB):  LEFT INGUINAL HERNIA REPAIR with mesh implantation with Prolene mesh.  Teresita type hernia repair (Left) 2 Days Post-Op   Precautions: Fall Risk                Recommendations for nursing mobility: Out of bed to chair for meals, Encourage HEP in prep for ADLs/mobility; see handout for details, Frequent repositioning to prevent skin breakdown, Use of bed/chair alarm for safety, and Assist x1    In place during session: Peripheral IV, External Catheter, EKG/telemetry , and Nasogastric Tube  Chart, occupational therapy assessment, plan of care, and goals were reviewed.  ASSESSMENT  Patient continues with skilled OT services and is progressing towards goals. Pt presented in recliner upon MALLOY arrival, agreeable to session. Pt A&O x 2. Orientation provided w/ poor carry over noted. Pt completed simple grooming and UB dressing while in recliner.  Pt given handout, vcs to review B UE exercises and encouragement to complete.  Pt verbalized understanding.  Pt left in recliner with all known needs met. (See below for objective details and assist levels).     Overall pt tolerated session fair today with grooming & UB dressing.  Current OT recommendations for discharge Moderate intensity short-term skilled occupational therapy up to 5x/week. Will continue to benefit from skilled OT services, and will continue to progress as tolerated.      Start of Session   SPO2 (%) 98   Heart Rate (BPM) 100     GOALS:    Problem: Occupational Therapy - Adult  Goal: By Discharge: Performs self-care activities at highest level of function for planned discharge setting.  See evaluation for individualized goals.  Description: FUNCTIONAL STATUS PRIOR TO ADMISSION:  Patient was ambulatory using a Rolling walker and received assistance for the following ADL's: Grooming, Bathing, Dressing, 
OCCUPATIONAL THERAPY TREATMENT: WEEKLY REASSESSMENT    Patient: Michael Fung (83 y.o. male)  Date: 11/14/2024  Primary Diagnosis: Acute pneumonia [J18.9]  Procedure(s) (LRB):  LEFT INGUINAL HERNIA REPAIR with mesh implantation with Prolene mesh.  Teresita type hernia repair (Left) 4 Days Post-Op   Precautions: Fall Risk                Recommendations for nursing mobility: Out of bed to chair for meals, Frequent repositioning to prevent skin breakdown, Use of BSC for toileting , and Assist x1    In place during session: Peripheral IV, External Catheter, and EKG/telemetry   Chart, occupational therapy assessment, plan of care, and goals were reviewed.  ASSESSMENT  Patient initially seen for OT evaluation 11/7/2024 and 2 skilled OT sessions since evalution. Patient seen today for OT reevaluation s/p Left Inguinal Hernia Repair 11/10/2024. He was A & O x  2 (Person and Place) and his wife was present and engaged from start to completion of today's session. He was semi-supine in bed upon arrival, agreeable to session.      Based on the objective data described, the patient currently presents with impaired functional mobility and decreased independence in ADLs. (See below for objective details and assist levels).    Overall he tolerated session today with extra time and currently demonstrating progress toward goals. He was able to meet goal regarding Toileting and goal was updated and he is making progress towards the following goal: Upper Body Dressing (Goal was Stand By Assistance he is now Min A). He continues to benefit from skilled OT to address the above impairments.return to PLOF, OT goals and POC reviewed on this date and updated to reflect current progress. Potential barriers for safe discharge: pt has impaired cognition, pt is a high fall risk, and pt is not safe to be alone. Current OT DC recommendation Moderate intensity short-term skilled occupational therapy up to 5x/week once medically appropriate. 
OT attempted to see pt at 11:40 am however pt declined therapy session d/t not feeling well. Pt educated on the importance of working with therapy with good understanding. Will try again at a later date.  
OT eval order received and acknowledged. OT eval attempted at 0940 however Mr. Farmer was off the unit (Dialysis). Will continue to follow patient and attempt OT eval at a later time. Thank you.   
PROGRESS NOTE      Chief Complaints:  No new complaints.  HPI and  Objective:    No nausea.  Review of Systems:  Rest of review of system reviewed personally and they are negative.    EXAM:  BP (!) 131/59   Pulse 85   Temp 97.8 °F (36.6 °C) (Axillary)   Resp 15   Ht 1.67 m (5' 5.75\")   Wt 58.1 kg (128 lb 1.4 oz)   SpO2 99%   BMI 20.83 kg/m²     Patient is awake   Head and neck atraumatic, normocephalic.  ENT: No hoarse voice, gaze appropriate.  Cardiac system regular rate rhythm.  Pulmonary no audible wheeze, no cyanosis.  Chest wall excursion normal with respiration cycle  Abdomen is soft not particularly distended.  Neurologically nonfocal.  Hematology system: No bruising noted.  Musculoskeletal system: No joint deformity noted.  Genitourinary system: No hematuria noted.  Skin is warm and moist.  Psychosocial: Cooperative.  Vascular examination as previously noted no changes.    Current Facility-Administered Medications   Medication Dose Route Frequency Provider Last Rate Last Admin    midodrine (PROAMATINE) tablet 5 mg  5 mg Oral TID  Isaías Gonzalez MD        sodium chloride flush 0.9 % injection 5-40 mL  5-40 mL IntraVENous 2 times per day Jad Peters MD        sodium chloride flush 0.9 % injection 5-40 mL  5-40 mL IntraVENous PRN Jad Peters MD        0.9 % sodium chloride infusion   IntraVENous PRN Jad Peters MD        ondansetron (ZOFRAN-ODT) disintegrating tablet 4 mg  4 mg Oral Q8H PRN Jad Peters MD        Or    ondansetron (ZOFRAN) injection 4 mg  4 mg IntraVENous Q6H PRN Jad Peters MD        0.9 % sodium chloride infusion   IntraVENous Continuous Jad Peters MD 50 mL/hr at 11/10/24 2115 New Bag at 11/10/24 2115    HYDROmorphone HCl PF (DILAUDID) injection 0.5 mg  0.5 mg IntraVENous Q4H PRN Jad Peters MD        0.9 % sodium chloride infusion   IntraVENous PRN Isaías Gonzalez MD        heparin (porcine) injection 3,600 Units  3,600 Units IntraCATHeter PRN Chetna 
PROGRESS NOTE      Chief Complaints:  No new complaints.  HPI and  Objective:    Patient was comfortable.  NG tube output noted.  Review of Systems:  Rest of review of system reviewed personally and they are negative.    EXAM:  BP (!) 126/53   Pulse 91   Temp 98.8 °F (37.1 °C) (Oral)   Resp 18   Ht 1.67 m (5' 5.75\")   Wt 58.1 kg (128 lb 1.4 oz)   SpO2 100%   BMI 20.83 kg/m²     Patient is awake   Head and neck atraumatic, normocephalic.  ENT: No hoarse voice, gaze appropriate.  Cardiac system regular rate rhythm.  Pulmonary no audible wheeze, no cyanosis.  Chest wall excursion normal with respiration cycle  Abdomen is soft not particularly distended.  Neurologically nonfocal.  Hematology system: No bruising noted.  Musculoskeletal system: No joint deformity noted.  Genitourinary system: No hematuria noted.  Skin is warm and moist.  Psychosocial: Cooperative.  Vascular examination as previously noted no changes.    Current Facility-Administered Medications   Medication Dose Route Frequency Provider Last Rate Last Admin    0.9 % sodium chloride infusion   IntraVENous PRN Isaías Gonzalez MD        heparin (porcine) injection 3,600 Units  3,600 Units IntraCATHeter PRN Vishnu Basilio MD   3,600 Units at 11/08/24 1142    diclofenac sodium (VOLTAREN) 1 % gel 2 g  2 g Topical BID Lela Caicedo MD   2 g at 11/09/24 0927    lidocaine (XYLOCAINE) 2 % uro-jet   Topical Once Lela Caicedo MD        allopurinol (ZYLOPRIM) tablet 100 mg  100 mg Oral Daily Lela Caicedo MD   100 mg at 11/09/24 0923    carvedilol (COREG) tablet 6.25 mg  6.25 mg Oral BID WC Lela Caicedo MD   6.25 mg at 11/09/24 0926    vitamin B-12 (CYANOCOBALAMIN) tablet 500 mcg  500 mcg Oral Daily Lela Caicedo MD   500 mcg at 11/09/24 0923    finasteride (PROSCAR) tablet 5 mg  5 mg Oral Daily Lela Caicedo MD   5 mg at 11/09/24 0924    glipiZIDE (GLUCOTROL) tablet 2.5 mg  2.5 mg Oral QAM AC Lela Caicedo MD   2.5 mg at 11/09/24 0923    NIFEdipine 
PROGRESS NOTE      Chief Complaints:  No new issues overnight.  HPI and  Objective:    He got dialyzed yesterday.  Patient states no bowel movement or passing gas.  Some pain noted.    Review of Systems:  Rest of review of system reviewed personally and they are negative.    EXAM:  BP (!) 140/66   Pulse 89   Temp 97.4 °F (36.3 °C) (Oral)   Resp 18   Ht 1.67 m (5' 5.75\")   Wt 58.1 kg (128 lb 1.4 oz)   SpO2 99%   BMI 20.83 kg/m²     Patient is awake   Head and neck atraumatic, normocephalic.  ENT: No hoarse voice, gaze appropriate.  Cardiac system regular rate rhythm.  Pulmonary no audible wheeze, no cyanosis.  Chest wall excursion normal with respiration cycle  Abdomen is soft not particularly distended.  Neurologically nonfocal.  Hematology system: No bruising noted.  Musculoskeletal system: No joint deformity noted.  Genitourinary system: No hematuria noted.  Skin is warm and moist.  Psychosocial: Cooperative.  Vascular examination as previously noted no changes.    Current Facility-Administered Medications   Medication Dose Route Frequency Provider Last Rate Last Admin    heparin (porcine) injection 2,000 Units  2,000 Units IntraCATHeter PRN Vishnu Basilio MD        midodrine (PROAMATINE) tablet 5 mg  5 mg Oral TID  Isaías Gonzalez MD        sodium chloride flush 0.9 % injection 5-40 mL  5-40 mL IntraVENous 2 times per day Jad Peters MD        sodium chloride flush 0.9 % injection 5-40 mL  5-40 mL IntraVENous PRN Jad Peters MD        0.9 % sodium chloride infusion   IntraVENous PRN Jad Peters MD        ondansetron (ZOFRAN-ODT) disintegrating tablet 4 mg  4 mg Oral Q8H PRN Jad Peters MD        Or    ondansetron (ZOFRAN) injection 4 mg  4 mg IntraVENous Q6H PRN Jad Peters MD        0.9 % sodium chloride infusion   IntraVENous Continuous Jad Peters MD 50 mL/hr at 11/10/24 2115 New Bag at 11/10/24 2115    HYDROmorphone HCl PF (DILAUDID) injection 0.5 mg  0.5 mg IntraVENous Q4H 
PROGRESS NOTE      Chief Complaints:  Patient is comfortable.  HPI and  Objective:    No nausea.  Review of Systems:  Rest of review of system reviewed personally and they are negative.    EXAM:  /68   Pulse 82   Temp 97.9 °F (36.6 °C) (Oral)   Resp 16   Ht 1.67 m (5' 5.75\")   Wt 63.2 kg (139 lb 5.3 oz)   SpO2 100%   BMI 22.66 kg/m²     Patient is awake   Head and neck atraumatic, normocephalic.  ENT: No hoarse voice, gaze appropriate.  Cardiac system regular rate rhythm.  Pulmonary no audible wheeze, no cyanosis.  Chest wall excursion normal with respiration cycle  Abdomen is soft not particularly distended.  Neurologically nonfocal.  Hematology system: No bruising noted.  Musculoskeletal system: No joint deformity noted.  Genitourinary system: No hematuria noted.  Skin is warm and moist.  Psychosocial: Cooperative.  Vascular examination as previously noted no changes.    Current Facility-Administered Medications   Medication Dose Route Frequency Provider Last Rate Last Admin    pantoprazole (PROTONIX) tablet 40 mg  40 mg Oral QAM AC Sam Sy MD        lactobacillus (CULTURELLE) capsule 1 capsule  1 capsule Oral Daily with breakfast Sam Sy MD   1 capsule at 11/17/24 1039    NIFEdipine (PROCARDIA XL) extended release tablet 60 mg  60 mg Oral QAM AC Carlota Tinajero MD   60 mg at 11/17/24 0543    diatrizoate meglumine-sodium (GASTROGRAFIN) 66-10 % solution 30 mL  30 mL Oral ONCE PRN Jad Peters MD        epoetin sandra-epbx (RETACRIT) 6,000 Units combo injection  6,000 Units IntraVENous Once per day on Monday Wednesday Friday Vishnu Basilio MD   6,000 Units at 11/15/24 0826    diatrizoate meglumine-sodium (GASTROGRAFIN) 66-10 % solution 30 mL  30 mL Per NG tube ONCE PRN Jad Peters MD        labetalol (NORMODYNE;TRANDATE) injection 10 mg  10 mg IntraVENous Q4H PRN Sam Sy MD        heparin (porcine) injection 2,000 Units  2,000 Units IntraCATHeter PRN Vishnu Basilio MD    
PROGRESS NOTE      Chief Complaints:  Patient was comfortable.  HPI and  Objective:    Had a bowel movement.  Tolerating liquid diet.  Review of Systems:  Rest of review of system reviewed personally and they are negative.    EXAM:  /89   Pulse 74   Temp 97.9 °F (36.6 °C) (Oral)   Resp 17   Ht 1.67 m (5' 5.75\")   Wt 68.4 kg (150 lb 12.7 oz)   SpO2 100%   BMI 24.53 kg/m²     Patient is awake   Head and neck atraumatic, normocephalic.  ENT: No hoarse voice, gaze appropriate.  Cardiac system regular rate rhythm.  Pulmonary no audible wheeze, no cyanosis.  Chest wall excursion normal with respiration cycle  Abdomen is soft not particularly distended.  Neurologically nonfocal.  Hematology system: No bruising noted.  Musculoskeletal system: No joint deformity noted.  Genitourinary system: No hematuria noted.  Skin is warm and moist.  Psychosocial: Cooperative.  Vascular examination as previously noted no changes.    Current Facility-Administered Medications   Medication Dose Route Frequency Provider Last Rate Last Admin    diatrizoate meglumine-sodium (GASTROGRAFIN) 66-10 % solution 30 mL  30 mL Oral ONCE PRN Jad Peters MD        epoetin sandra-epbx (RETACRIT) 6,000 Units combo injection  6,000 Units IntraVENous Once per day on Monday Wednesday Friday Vishnu Basilio MD        diatrizoate meglumine-sodium (GASTROGRAFIN) 66-10 % solution 30 mL  30 mL Per NG tube ONCE PRN Jad Peters MD        pantoprazole (PROTONIX) injection 40 mg  40 mg IntraVENous Daily Sam Sy MD   40 mg at 11/14/24 0936    labetalol (NORMODYNE;TRANDATE) injection 10 mg  10 mg IntraVENous Q4H PRN Sam Sy MD        heparin (porcine) injection 2,000 Units  2,000 Units IntraCATHeter PRN Vishnu Basilio MD        midodrine (PROAMATINE) tablet 5 mg  5 mg Oral TID  Isaías Gonzalez MD   5 mg at 11/14/24 1636    sodium chloride flush 0.9 % injection 5-40 mL  5-40 mL IntraVENous 2 times per day Jad Peters MD   10 mL 
PROGRESS NOTE      Chief Complaints:  Surgery follow-up.  This case was signed out to me.  Patient examined at the dialysis unit.  HPI and  Objective:    Patient appears to be comfortable.  Patient still have NG tube in place.    No fever.  Denies any chest pain shortness of breath.  Denies any worsening abdominal pain.  Patient denies having any bowel movement or passing gas.  Review of Systems:  Rest of review of system reviewed personally and they are negative.    EXAM:  BP (!) 120/57   Pulse 89   Temp 98 °F (36.7 °C)   Resp 18   Ht 1.67 m (5' 5.75\")   Wt 69.5 kg (153 lb 3.5 oz)   SpO2 100%   BMI 24.92 kg/m²     Patient is awake   Head and neck atraumatic, normocephalic.  ENT: No hoarse voice, gaze appropriate.  Cardiac system regular rate rhythm.  Pulmonary no audible wheeze, no cyanosis.  Chest wall excursion normal with respiration cycle  Abdomen is soft not particularly distended.  Neurologically nonfocal.  Hematology system: No bruising noted.  Musculoskeletal system: No joint deformity noted.  Genitourinary system: No hematuria noted.  Skin is warm and moist.  Psychosocial: Cooperative.  Vascular examination as previously noted no changes.    Current Facility-Administered Medications   Medication Dose Route Frequency Provider Last Rate Last Admin    albumin human 25% IV solution 12.5 g  12.5 g IntraVENous NOW Vishnu Basilio MD        0.9 % sodium chloride infusion   IntraVENous PRN Isaías Gonzalez MD        heparin (porcine) injection 3,600 Units  3,600 Units IntraCATHeter PRN Vishnu Basilio MD   3,600 Units at 11/06/24 1250    diclofenac sodium (VOLTAREN) 1 % gel 2 g  2 g Topical BID Lela Caicedo MD   2 g at 11/07/24 1002    lidocaine (XYLOCAINE) 2 % uro-jet   Topical Once Lela Caicedo MD        allopurinol (ZYLOPRIM) tablet 100 mg  100 mg Oral Daily Lela Caicedo MD   100 mg at 11/07/24 1001    carvedilol (COREG) tablet 6.25 mg  6.25 mg Oral BID WC Lela Caicedo MD   6.25 mg at 11/07/24 
PT attempt made and pt off the floor at dialysis, will follow up later today as time permits.  
Patient has no iv access tried iv insertion twice with no success icu informed for ultrasound guided iv insertion.  
Patient in hemodialysis. Family reports dark green stool, no nausea or vomiting. Hemoglobin stable. GI sign off. Surgery clear patient for discharge. Diet advanced to regular diet.    
Patient portable chest xray was done at 1711 post NGT insertion. Called radiology department to follow up the results, as per them they will resend the image to the radiologist.  
Patient removed his NGT, explained to the patient the importance of putting it back but the patient strongly refused. Reasoner, PA notified  
Patient s/p hernia surgery, NG in place for suction. Patient remains NPO.   MBS was completed 11/1 w/ recommendations for soft and bite size, thin. No penetration or aspiration was observed.   At this time, please defer diet advancements to general surgeon.   If there are swallowing concerns, please re consult    
Progress Note    Patient: Michael Fung MRN: [id number]  SSN: [id number]    YOB: 1940  Age: 83 y.o.  Sex: male      Admit Date: 10/31/2024    LOS: 7 days     Subjective:     Patient seen and examined earlier this afternoon. At that time, patient denied nausea or vomiting. He had been tolerating clear liquids and having multiple loose bowel movements today. Abdomen remains distended.    Objective:     Vitals:    11/07/24 1107 11/07/24 1534 11/07/24 1549 11/07/24 1958   BP: (!) 113/52  135/76 (!) 101/52   Pulse: 80 80 90 91   Resp: 17  20 15   Temp: 98.2 °F (36.8 °C)  97.2 °F (36.2 °C) 98.2 °F (36.8 °C)   TempSrc: Oral  Oral    SpO2: 100%  99% 98%   Weight:       Height:            Intake and Output:  Current Shift: No intake/output data recorded.  Last three shifts: 11/06 0701 - 11/07 1900  In: 2213.6 [P.O.:1310; I.V.:3.6]  Out: 1900     Physical Exam:   General: awake, not oriented  Neck: supple, no masses, no JVD  Cardiovascular: regular rate and rhythm  Pulmonary: unlabored breathing, equal chest rise bilaterally, no wheezing or rhonchi  Abdomen: soft, non tender, mildly distended, left inguinal hernia soft and reducible  Extremities: no swelling, pulses equal and palpable in all extremities    Lab/Data Review:  Recent Results (from the past 24 hour(s))   POCT Glucose    Collection Time: 11/06/24  8:59 PM   Result Value Ref Range    POC Glucose 131 (H) 65 - 100 mg/dL    Performed by: Gudelia HAN    POCT Glucose    Collection Time: 11/07/24  7:49 AM   Result Value Ref Range    POC Glucose 74 65 - 100 mg/dL    Performed by: José Warner    POCT Glucose    Collection Time: 11/07/24 11:06 AM   Result Value Ref Range    POC Glucose 119 (H) 65 - 100 mg/dL    Performed by: José Warner    POCT Glucose    Collection Time: 11/07/24  4:10 PM   Result Value Ref Range    POC Glucose 238 (H) 65 - 100 mg/dL    Performed by: José Warner           Assessment:     Principal Problem:    Acute 
Progress Note    Patient: Michael Fung MRN: [id number]  SSN: [id number]    YOB: 1940  Age: 83 y.o.  Sex: male      Admit Date: 10/31/2024    LOS: 5 days     Subjective:     Patient seen and examined, no acute events overnight. Denies abdominal pain. Denies nausea or vomiting. Reported BM. Tolerating clear liquids.    Objective:     Vitals:    11/05/24 0452 11/05/24 0700 11/05/24 0811 11/05/24 1111   BP: (!) 106/54  (!) 129/57 (!) 123/59   Pulse: 93 91 90 92   Resp: 18  18 18   Temp: 98.1 °F (36.7 °C)  98.4 °F (36.9 °C) 98.2 °F (36.8 °C)   TempSrc: Oral  Axillary Oral   SpO2: 99%  99% 100%   Weight:       Height:            Intake and Output:  Current Shift: No intake/output data recorded.  Last three shifts: 11/03 1901 - 11/05 0700  In: 623.6 [I.V.:23.6]  Out: 1800     Physical Exam:   General: awake, not oriented  Neck: supple, no masses, no JVD  Cardiovascular: regular rate and rhythm  Pulmonary: unlabored breathing, equal chest rise bilaterally, no wheezing or rhonchi  Abdomen: soft, non tender, mildly distended, left inguinal hernia soft and reducible  Extremities: no swelling, pulses equal and palpable in all extremities    Lab/Data Review:  Recent Results (from the past 24 hour(s))   POCT Glucose    Collection Time: 11/04/24  2:59 PM   Result Value Ref Range    POC Glucose 140 (H) 65 - 100 mg/dL    Performed by: Kwame Gurrola    CBC with Auto Differential    Collection Time: 11/04/24  4:03 PM   Result Value Ref Range    WBC 15.0 (H) 4.1 - 11.1 K/uL    RBC 2.89 (L) 4.10 - 5.70 M/uL    Hemoglobin 8.3 (L) 12.1 - 17.0 g/dL    Hematocrit 25.5 (L) 36.6 - 50.3 %    MCV 88.2 80.0 - 99.0 FL    MCH 28.7 26.0 - 34.0 PG    MCHC 32.5 30.0 - 36.5 g/dL    RDW 14.2 11.5 - 14.5 %    Platelets 434 (H) 150 - 400 K/uL    MPV 9.1 8.9 - 12.9 FL    Nucleated RBCs 0.0 0.0  WBC    nRBC 0.00 0.00 - 0.01 K/uL    Neutrophils % 78 (H) 32 - 75 %    Lymphocytes % 10 (L) 12 - 49 %    Monocytes % 8 5 - 13 %    
Received Order for Telemetry     Michael KUMAR Kenton   1940   [id number]   Acute pneumonia [J18.9]   Isaías Gonzalez MD     Tele Box # [id number] placed on patient at  2155 pm  ER Room # 4e  Admitting to Room 410  Verified with Primary Nurse Tyree at  2155 pm    
ST attempt. Patient LAY.   
Spiritual Health History and Assessment/Progress Note  [facility]    Attempted Encounter,  ,  ,      Name: Michael Fung MRN: [id number]    Age: 83 y.o.     Sex: male   Language: English   Rastafari: None   Acute pneumonia     Date: 11/6/2024            Total Time Calculated: 3 min              Spiritual Assessment began in [facility] ONCOLOGY        Referral/Consult From: Rounding   Encounter Overview/Reason: Attempted Encounter  Service Provided For: Patient not available    Diane, Belief, Meaning:   Patient unable to assess at this time  Family/Friends No family/friends present      Importance and Influence:  Patient unable to assess at this time  Family/Friends No family/friends present    Community:  Patient Other: Unknown  Family/Friends No family/friends present    Assessment and Plan of Care:     Patient Interventions include: Other: Ministry of presence  Family/Friends Interventions include: No family/friends present    Patient Plan of Care: Spiritual Care available upon further referral  Family/Friends Plan of Care: No family/friends present     attempted to visit pt; pt is LAY for medical procedure.  is available upon further referral.  Electronically signed by Chaplain Nenita on 11/6/2024 at 11:35 AM   
Spiritual Health History and Assessment/Progress Note  [facility]    Attempted Encounter,  ,  ,      Name: Michael Fung MRN: [id number]    Age: 83 y.o.     Sex: male   Language: English   Pentecostal: None   Acute pneumonia     Date: 11/4/2024            Total Time Calculated: 5 min              Spiritual Assessment began in [facility]            Encounter Overview/Reason: Attempted Encounter  Service Provided For: Patient    Diane, Belief, Meaning:   Patient unable to assess at this time  Family/Friends No family/friends present      Importance and Influence:  Patient unable to assess at this time  Family/Friends No family/friends present    Community:  Patient: Unable to access at this time  Family/Friends: Unable to acccess at this time    Assessment and Plan of Care:     Patient Interventions include: NA  Family/Friends Interventions include: No family/friends present    Patient Plan of Care: Spiritual Care available upon further referral  Family/Friends Plan of Care: Spiritual Care available upon further referral    Electronically signed by CARA Tamez on 11/4/2024 at 1:00 PM   
This procedure has been fully reviewed with the patient and written informed consent has been obtained for blood transfusion. Risk and benefits were discussed with the patient.    Isaías Gonzalez MD  Hospitalist   
    Intake/Output Summary (Last 24 hours) at 11/9/2024 1406  Last data filed at 11/9/2024 0929  Gross per 24 hour   Intake 1624.14 ml   Output 1100 ml   Net 524.14 ml        Current Facility-Administered Medications   Medication Dose Route Frequency    0.9 % sodium chloride infusion   IntraVENous PRN    heparin (porcine) injection 3,600 Units  3,600 Units IntraCATHeter PRN    diclofenac sodium (VOLTAREN) 1 % gel 2 g  2 g Topical BID    lidocaine (XYLOCAINE) 2 % uro-jet   Topical Once    allopurinol (ZYLOPRIM) tablet 100 mg  100 mg Oral Daily    carvedilol (COREG) tablet 6.25 mg  6.25 mg Oral BID WC    vitamin B-12 (CYANOCOBALAMIN) tablet 500 mcg  500 mcg Oral Daily    finasteride (PROSCAR) tablet 5 mg  5 mg Oral Daily    glipiZIDE (GLUCOTROL) tablet 2.5 mg  2.5 mg Oral QAM AC    NIFEdipine (PROCARDIA XL) extended release tablet 90 mg  90 mg Oral QAM AC    [Held by provider] Petrolatum-Zinc Oxide ointment 1 each  1 each Topical BID    thiamine mononitrate tablet 100 mg  100 mg Oral Daily    epoetin sandra-epbx (RETACRIT) 6,000 Units combo injection  6,000 Units SubCUTAneous Once per day on Monday Wednesday Friday    sodium chloride flush 0.9 % injection 5-40 mL  5-40 mL IntraVENous 2 times per day    sodium chloride flush 0.9 % injection 5-40 mL  5-40 mL IntraVENous PRN    0.9 % sodium chloride infusion   IntraVENous PRN    ondansetron (ZOFRAN-ODT) disintegrating tablet 4 mg  4 mg Oral Q8H PRN    Or    ondansetron (ZOFRAN) injection 4 mg  4 mg IntraVENous Q6H PRN    polyethylene glycol (GLYCOLAX) packet 17 g  17 g Oral Daily PRN    acetaminophen (TYLENOL) tablet 650 mg  650 mg Oral Q6H PRN    Or    acetaminophen (TYLENOL) suppository 650 mg  650 mg Rectal Q6H PRN    glucose chewable tablet 16 g  4 tablet Oral PRN    dextrose bolus 10% 125 mL  125 mL IntraVENous PRN    Or    dextrose bolus 10% 250 mL  250 mL IntraVENous PRN    glucagon injection 1 mg  1 mg SubCUTAneous PRN    dextrose 10 % infusion   IntraVENous 
20.83 kg/m².      RECOMMENDATIONS/PLAN:   Continue Merrem for aspiration pneumonia Levaquin discontinued  Modified barium swallow with no significant aspiration will check upper GI to rule out significant reflux or esophageal dysmotility  KUB shows somewhat distended stomach with vomiting last night will await upper GI but if any evidence of esophageal dysmotility can start Reglan although at his age we will have to watch carefully for side effects sedation confusion or tremor  Radiology read KUB has possible partial small bowel obstruction with dilated loops of small bowel no further vomiting passing gas now is on clear liquid diet         Subjective/Initial History:   I have reviewed the flowsheet and previous notes. .    I was asked by Isaías Gonzalez MD to see Michael Fung a 83 y.o.   male  in consultation for a chief complaint of new pneumonic infiltrate on chest x-ray with cough some symptoms of pharyngeal dysphagia              Patient PCP: No primary care provider on file.  PMH:  has no past medical history on file.  PSH:   has a past surgical history that includes IR NONTUNNELED VASCULAR CATHETER > 5 YEARS (10/4/2024); IR GUIDED IVC FILTER PLACEMENT (10/4/2024); and IR TUNNELED CVC PLACE WO SQ PORT/PUMP > 5 YEARS (10/16/2024).   FHX: family history is not on file.   SHX:  reports that he has never smoked. He has never been exposed to tobacco smoke. He has never used smokeless tobacco. Alcohol use questions deferred to the physician. Drug use questions deferred to the physician.    Systemic review:  General Weight stable no chronic fever chills or sweats  Eyes no double vision or momentary blindness  ENT no sinus congestion or drainage  Endocrinologic no polyuria polydipsia  Musculoskeletal no swollen tender joints  Neurologic no seizures or syncope  Gastrointestinal wife states that he has been complaining recently of coughing when swallowing probably more with liquids than with solids but 
50.3 %    MCV 87.7 80.0 - 99.0 FL    MCH 28.7 26.0 - 34.0 PG    MCHC 32.8 30.0 - 36.5 g/dL    RDW 13.9 11.5 - 14.5 %    Platelets 498 (H) 150 - 400 K/uL    MPV 9.1 8.9 - 12.9 FL    Nucleated RBCs 0.0 0.0  WBC    nRBC 0.00 0.00 - 0.01 K/uL    Neutrophils % 78 (H) 32 - 75 %    Lymphocytes % 11 (L) 12 - 49 %    Monocytes % 8 5 - 13 %    Eosinophils % 2 0 - 7 %    Basophils % 0 0 - 1 %    Immature Granulocytes % 1 (H) 0 - 0.5 %    Neutrophils Absolute 11.2 (H) 1.8 - 8.0 K/UL    Lymphocytes Absolute 1.5 0.8 - 3.5 K/UL    Monocytes Absolute 1.1 (H) 0.0 - 1.0 K/UL    Eosinophils Absolute 0.3 0.0 - 0.4 K/UL    Basophils Absolute 0.0 0.0 - 0.1 K/UL    Immature Granulocytes Absolute 0.2 (H) 0.00 - 0.04 K/UL    Differential Type AUTOMATED     CBC with Auto Differential    Collection Time: 11/04/24  6:14 AM   Result Value Ref Range    WBC 13.2 (H) 4.1 - 11.1 K/uL    RBC 2.57 (L) 4.10 - 5.70 M/uL    Hemoglobin 7.4 (L) 12.1 - 17.0 g/dL    Hematocrit 22.7 (L) 36.6 - 50.3 %    MCV 88.3 80.0 - 99.0 FL    MCH 28.8 26.0 - 34.0 PG    MCHC 32.6 30.0 - 36.5 g/dL    RDW 14.1 11.5 - 14.5 %    Platelets 469 (H) 150 - 400 K/uL    MPV 9.1 8.9 - 12.9 FL    Nucleated RBCs 0.0 0.0  WBC    nRBC 0.00 0.00 - 0.01 K/uL    Neutrophils % 78 (H) 32 - 75 %    Lymphocytes % 11 (L) 12 - 49 %    Monocytes % 8 5 - 13 %    Eosinophils % 2 0 - 7 %    Basophils % 0 0 - 1 %    Immature Granulocytes % 1 (H) 0 - 0.5 %    Neutrophils Absolute 10.4 (H) 1.8 - 8.0 K/UL    Lymphocytes Absolute 1.4 0.8 - 3.5 K/UL    Monocytes Absolute 1.0 0.0 - 1.0 K/UL    Eosinophils Absolute 0.3 0.0 - 0.4 K/UL    Basophils Absolute 0.0 0.0 - 0.1 K/UL    Immature Granulocytes Absolute 0.1 (H) 0.00 - 0.04 K/UL    Differential Type AUTOMATED     POCT Glucose    Collection Time: 11/04/24  7:33 AM   Result Value Ref Range    POC Glucose 197 (H) 65 - 100 mg/dL    Performed by: Fausto GRULLON 12 Lead    Collection Time: 11/04/24  9:18 AM   Result Value Ref Range    
Adjustment For: No Adjustment  BMI Categories: Normal Weight (BMI 22.0 to 24.9) age over 65    Estimated Daily Nutrient Needs:  Energy Requirements Based On: Kcal/kg  Weight Used for Energy Requirements: Current  Energy (kcal/day): 1263-9896 kcals (25-30 kcals/kg)  Weight Used for Protein Requirements: Current  Protein (g/day): 83-96 g/day (1.2-1.4 g/kg)  Method Used for Fluid Requirements: 1 ml/kcal  Fluid (ml/day): 1700 ml    Nutrition Diagnosis:   Severe malnutrition related to altered GI function as evidenced by vomiting, GI abnormality    Nutrition Interventions:   Food and/or Nutrient Delivery: Continue Current Diet (advance as able)  Nutrition Education/Counseling: No recommendation at this time  Coordination of Nutrition Care: Continue to monitor while inpatient     Goals:  Goals: PO intake 50% or greater, by next RD assessment  Type of Goal: New goal     Nutrition Monitoring and Evaluation:   Behavioral-Environmental Outcomes: None Identified  Food/Nutrient Intake Outcomes: Diet Advancement/Tolerance, Food and Nutrient Intake, Supplement Intake  Physical Signs/Symptoms Outcomes: Biochemical Data, Meal Time Behavior, Weight    Discharge Planning:    Too soon to determine     Courtney Acuña RD  Contact: [phone]    
aspiration pneumonia Levaquin discontinued  Modified barium swallow with no significant aspiration will check upper GI to rule out significant reflux or esophageal dysmotility  KUB shows somewhat distended stomach with vomiting last night will await upper GI but if any evidence of esophageal dysmotility can start Reglan although at his age we will have to watch carefully for side effects sedation confusion or tremor  Patient underwent left inguinal hernia repair on 11/10         Subjective/Initial History:   I have reviewed the flowsheet and previous notes. .    I was asked by Isaías Gonzalez MD to see Michael Fung a 83 y.o.   male  in consultation for a chief complaint of new pneumonic infiltrate on chest x-ray with cough some symptoms of pharyngeal dysphagia              Patient PCP: No primary care provider on file.  PMH:  has no past medical history on file.  PSH:   has a past surgical history that includes IR NONTUNNELED VASCULAR CATHETER > 5 YEARS (10/4/2024); IR GUIDED IVC FILTER PLACEMENT (10/4/2024); IR TUNNELED CVC PLACE WO SQ PORT/PUMP > 5 YEARS (10/16/2024); and hernia repair (Left, 11/10/2024).   FHX: family history is not on file.   SHX:  reports that he has never smoked. He has never been exposed to tobacco smoke. He has never used smokeless tobacco. Alcohol use questions deferred to the physician. Drug use questions deferred to the physician.    Systemic review:  General Weight stable no chronic fever chills or sweats  Eyes no double vision or momentary blindness  ENT no sinus congestion or drainage  Endocrinologic no polyuria polydipsia  Musculoskeletal no swollen tender joints  Neurologic no seizures or syncope  Gastrointestinal wife states that he has been complaining recently of coughing when swallowing probably more with liquids than with solids but she is unsure he is unable to differentiate states it has been going on for \"a wild his last admission CT showed residual material 
coffee-ground/black liquid.  He does not feel well.  Says he is miserable.  Has chest discomfort which is reproducible with palpation.  Still with some abdominal discomfort.  Hemoglobin 7.7, will transfuse if less than 7        2024    Mr. Mrs. Farmer quite pleased  sed.  The NGT Is out.  It Seems he had oatmeal this morning.    11/15 dialysis today without incidence.  Diet advanced, tolerated real food today.  No nausea or vomiting.  No further abdominal pain.  No chest pain.  Appears to be very comfortable.  Wife at the bedside.  He still has some diarrhea.  Blood pressure remains relatively low.  Will decrease nifedipine to 60 mg daily.      Objective:   Vital Signs:    /60   Pulse 80   Temp 97.5 °F (36.4 °C)   Resp 16   Ht 1.67 m (5' 5.75\")   Wt 68.4 kg (150 lb 12.7 oz)   SpO2 100%   BMI 24.53 kg/m²             Temp (24hrs), Av.8 °F (36.6 °C), Min:97.4 °F (36.3 °C), Max:98.2 °F (36.8 °C)       Intake/Output:   Last shift:      11/15 0701 - 11/15 1900  In: 703.6 [P.O.:100; I.V.:3.6]  Out: 2200   Last 3 shifts: 1901 - 11/15 0700  In: 3063.1 [P.O.:600; I.V.:2063.1]  Out: 275 [Urine:275]    Intake/Output Summary (Last 24 hours) at 11/15/2024 1708  Last data filed at 11/15/2024 1524  Gross per 24 hour   Intake 823.6 ml   Output 2200 ml   Net -1376.4 ml        Current Facility-Administered Medications   Medication Dose Route Frequency    lactobacillus (CULTURELLE) capsule 1 capsule  1 capsule Oral Daily with breakfast    diatrizoate meglumine-sodium (GASTROGRAFIN) 66-10 % solution 30 mL  30 mL Oral ONCE PRN    epoetin sandra-epbx (RETACRIT) 6,000 Units combo injection  6,000 Units IntraVENous Once per day on     diatrizoate meglumine-sodium (GASTROGRAFIN) 66-10 % solution 30 mL  30 mL Per NG tube ONCE PRN    pantoprazole (PROTONIX) injection 40 mg  40 mg IntraVENous Daily    labetalol (NORMODYNE;TRANDATE) injection 10 mg  10 mg IntraVENous Q4H PRN    heparin (porcine) 
following:  Risk Factors: Asp. Pneumonia, ESRD on dialysis, DM, Ileus  Clinical Indicators: Per RD assessment on 11/05/2024- Pt has had weight loss   approx. 2% in over 1 week, and only 50% or less of the estimated energy   requirements in the last 5 days or more. Intakes average 25-50%.  Treatment: RD consulted, NGT, Add Ensure Clear BID, encourage intake of > 75%,   Monitor weight, wounds, labs and GI status    Thank you  NALLELY MooreN,RN, CPHQ, CCDS, SMART    ASPEN Criteria:    https://aspenjournals.onlinelibrary.marte.com/doi/full/[email or web address]  5  Options provided:  -- Protein calorie malnutrition severe  -- Other - I will add my own diagnosis  -- Disagree - Not applicable / Not valid  -- Disagree - Clinically unable to determine / Unknown  -- Refer to Clinical Documentation Reviewer    PROVIDER RESPONSE TEXT:    This patient has severe protein calorie malnutrition.    Query created by: Rajni Walter on 11/7/2024 8:19 AM      Electronically signed by:  Isaías Gonzalez MD 11/7/2024 1:57 PM          
(PROSCAR) tablet 5 mg  5 mg Oral Daily    glipiZIDE (GLUCOTROL) tablet 2.5 mg  2.5 mg Oral QAM AC    NIFEdipine (PROCARDIA XL) extended release tablet 90 mg  90 mg Oral QAM AC    [Held by provider] Petrolatum-Zinc Oxide ointment 1 each  1 each Topical BID    thiamine mononitrate tablet 100 mg  100 mg Oral Daily    epoetin sandra-epbx (RETACRIT) 6,000 Units combo injection  6,000 Units SubCUTAneous Once per day on Monday Wednesday Friday    sodium chloride flush 0.9 % injection 5-40 mL  5-40 mL IntraVENous 2 times per day    sodium chloride flush 0.9 % injection 5-40 mL  5-40 mL IntraVENous PRN    0.9 % sodium chloride infusion   IntraVENous PRN    polyethylene glycol (GLYCOLAX) packet 17 g  17 g Oral Daily PRN    acetaminophen (TYLENOL) tablet 650 mg  650 mg Oral Q6H PRN    Or    acetaminophen (TYLENOL) suppository 650 mg  650 mg Rectal Q6H PRN    glucose chewable tablet 16 g  4 tablet Oral PRN    dextrose bolus 10% 125 mL  125 mL IntraVENous PRN    Or    dextrose bolus 10% 250 mL  250 mL IntraVENous PRN    glucagon injection 1 mg  1 mg SubCUTAneous PRN    dextrose 10 % infusion   IntraVENous Continuous PRN    insulin lispro (HUMALOG,ADMELOG) injection vial 0-8 Units  0-8 Units SubCUTAneous 4x Daily AC & HS    Virt-Caps 1 mg  1 capsule Oral Daily          Physical Exam:      Seen in room 410 this afternoon.  Mr. Farmer was in attendance.      General: Chronically ill-appearing gentleman l sitting in the chair comfortably.      Head: Normocephalic    ENT: NG tube in place with bilious drainage.    Right IJ PermCath.    Cardiovascular: S1, S2, no extra gallop, no pericardial rub    Respiratory: Breath sounds vesicular, no wheezes, rales, rhonchi    Abdomen-mild distention.    Neuro: Awake and conversing appropriately.      Psych: Appropriate affect.        DATA:  LABS:  Recent Labs     11/12/24  0606 11/11/24  0519 11/10/24  1406 11/10/24  1405 11/03/24  0538 11/02/24  0504 10/19/24  0641 10/18/24  0645    145 
(ZOFRAN-ODT) disintegrating tablet 4 mg  4 mg Oral Q8H PRN    Or    ondansetron (ZOFRAN) injection 4 mg  4 mg IntraVENous Q6H PRN    0.9 % sodium chloride infusion   IntraVENous PRN    heparin (porcine) injection 3,600 Units  3,600 Units IntraCATHeter PRN    diclofenac sodium (VOLTAREN) 1 % gel 2 g  2 g Topical BID    lidocaine (XYLOCAINE) 2 % uro-jet   Topical Once    allopurinol (ZYLOPRIM) tablet 100 mg  100 mg Oral Daily    carvedilol (COREG) tablet 6.25 mg  6.25 mg Oral BID WC    vitamin B-12 (CYANOCOBALAMIN) tablet 500 mcg  500 mcg Oral Daily    finasteride (PROSCAR) tablet 5 mg  5 mg Oral Daily    glipiZIDE (GLUCOTROL) tablet 2.5 mg  2.5 mg Oral QAM AC    Petrolatum-Zinc Oxide ointment 1 each  1 each Topical BID    thiamine mononitrate tablet 100 mg  100 mg Oral Daily    sodium chloride flush 0.9 % injection 5-40 mL  5-40 mL IntraVENous 2 times per day    sodium chloride flush 0.9 % injection 5-40 mL  5-40 mL IntraVENous PRN    0.9 % sodium chloride infusion   IntraVENous PRN    polyethylene glycol (GLYCOLAX) packet 17 g  17 g Oral Daily PRN    acetaminophen (TYLENOL) tablet 650 mg  650 mg Oral Q6H PRN    Or    acetaminophen (TYLENOL) suppository 650 mg  650 mg Rectal Q6H PRN    glucose chewable tablet 16 g  4 tablet Oral PRN    dextrose bolus 10% 125 mL  125 mL IntraVENous PRN    Or    dextrose bolus 10% 250 mL  250 mL IntraVENous PRN    glucagon injection 1 mg  1 mg SubCUTAneous PRN    dextrose 10 % infusion   IntraVENous Continuous PRN    insulin lispro (HUMALOG,ADMELOG) injection vial 0-8 Units  0-8 Units SubCUTAneous 4x Daily AC & HS    Virt-Caps 1 mg  1 capsule Oral Daily           Problem List:   Patient had a symptoms of chest pain while undergoing dialysis and has resolved.  Possible aspiration pneumonia.  History of recent DVT and patient has already received IVC filter.  End-stage renal disease and patient is on dialysis.  Diabetes mellitus type 2.  Left inguinal hernia repair surgery performed.  
2 % uro-jet   Topical Once    allopurinol (ZYLOPRIM) tablet 100 mg  100 mg Oral Daily    carvedilol (COREG) tablet 6.25 mg  6.25 mg Oral BID WC    vitamin B-12 (CYANOCOBALAMIN) tablet 500 mcg  500 mcg Oral Daily    finasteride (PROSCAR) tablet 5 mg  5 mg Oral Daily    glipiZIDE (GLUCOTROL) tablet 2.5 mg  2.5 mg Oral QAM AC    NIFEdipine (PROCARDIA XL) extended release tablet 90 mg  90 mg Oral QAM AC    [Held by provider] Petrolatum-Zinc Oxide ointment 1 each  1 each Topical BID    thiamine mononitrate tablet 100 mg  100 mg Oral Daily    epoetin sandra-epbx (RETACRIT) 6,000 Units combo injection  6,000 Units SubCUTAneous Once per day on Monday Wednesday Friday    sodium chloride flush 0.9 % injection 5-40 mL  5-40 mL IntraVENous 2 times per day    sodium chloride flush 0.9 % injection 5-40 mL  5-40 mL IntraVENous PRN    0.9 % sodium chloride infusion   IntraVENous PRN    polyethylene glycol (GLYCOLAX) packet 17 g  17 g Oral Daily PRN    acetaminophen (TYLENOL) tablet 650 mg  650 mg Oral Q6H PRN    Or    acetaminophen (TYLENOL) suppository 650 mg  650 mg Rectal Q6H PRN    glucose chewable tablet 16 g  4 tablet Oral PRN    dextrose bolus 10% 125 mL  125 mL IntraVENous PRN    Or    dextrose bolus 10% 250 mL  250 mL IntraVENous PRN    glucagon injection 1 mg  1 mg SubCUTAneous PRN    dextrose 10 % infusion   IntraVENous Continuous PRN    insulin lispro (HUMALOG,ADMELOG) injection vial 0-8 Units  0-8 Units SubCUTAneous 4x Daily AC & HS    Virt-Caps 1 mg  1 capsule Oral Daily           Problem List:   Patient had a symptoms of chest pain while undergoing dialysis and has resolved.  Possible aspiration pneumonia.  History of recent DVT and patient has already received IVC filter.  End-stage renal disease and patient is on dialysis.  Diabetes mellitus type 2.  Left inguinal hernia repair surgery performed.  Anemia and hemoglobin has improved to 8.5 after transfusion.  Patient has NG tube as per surgical recommendation due to 
MD Isaías   2 Units at 11/07/24 2112    Virt-Caps 1 mg  1 capsule Oral Daily Vishnu Basilio MD   1 mg at 11/09/24 0924           Recent Results (from the past 24 hour(s))   POCT Glucose    Collection Time: 11/08/24  4:32 PM   Result Value Ref Range    POC Glucose 110 (H) 65 - 100 mg/dL    Performed by: Radha Rowe    POCT Glucose    Collection Time: 11/08/24  8:01 PM   Result Value Ref Range    POC Glucose 123 (H) 65 - 100 mg/dL    Performed by: Gloria Jauregui    POCT Glucose    Collection Time: 11/09/24  7:52 AM   Result Value Ref Range    POC Glucose 117 (H) 65 - 100 mg/dL    Performed by: Marcos Hassan (CON)        ASSESSMENT:   Patient is 83 y.o. with diagnosis of : Principal Problem:    Acute pneumonia  Resolved Problems:    * No resolved hospital problems. *      PLAN:                 I did repeat CT scan yesterday.  It looks like bowel obstruction is not better and the left inguinal hernia is worse.    Patient will need left inguinal hernia repair.  Left inguinal hernia most likely cause of this problem.    I will talk to the family about CT scan findings and I will discuss recommendation.  
No intake/output data recorded.   11/11 1901 - 11/13 0700  In: 5 [I.V.:5]  Out: 1250     Mode Rate TV Press PEEP FiO2 PIP Min. Vent               21 %                  Data Review:     Medications reviewed  Current Facility-Administered Medications   Medication Dose Route Frequency    diatrizoate meglumine-sodium (GASTROGRAFIN) 66-10 % solution 30 mL  30 mL Oral ONCE PRN    [START ON 11/15/2024] epoetin sandra-epbx (RETACRIT) 6,000 Units combo injection  6,000 Units IntraVENous Once per day on Monday Wednesday Friday    diatrizoate meglumine-sodium (GASTROGRAFIN) 66-10 % solution 30 mL  30 mL Per NG tube ONCE PRN    labetalol (NORMODYNE;TRANDATE) injection 10 mg  10 mg IntraVENous Q4H PRN    heparin (porcine) injection 2,000 Units  2,000 Units IntraCATHeter PRN    midodrine (PROAMATINE) tablet 5 mg  5 mg Oral TID     sodium chloride flush 0.9 % injection 5-40 mL  5-40 mL IntraVENous 2 times per day    sodium chloride flush 0.9 % injection 5-40 mL  5-40 mL IntraVENous PRN    0.9 % sodium chloride infusion   IntraVENous PRN    ondansetron (ZOFRAN-ODT) disintegrating tablet 4 mg  4 mg Oral Q8H PRN    Or    ondansetron (ZOFRAN) injection 4 mg  4 mg IntraVENous Q6H PRN    0.9 % sodium chloride infusion   IntraVENous Continuous    HYDROmorphone HCl PF (DILAUDID) injection 0.5 mg  0.5 mg IntraVENous Q4H PRN    0.9 % sodium chloride infusion   IntraVENous PRN    heparin (porcine) injection 3,600 Units  3,600 Units IntraCATHeter PRN    diclofenac sodium (VOLTAREN) 1 % gel 2 g  2 g Topical BID    lidocaine (XYLOCAINE) 2 % uro-jet   Topical Once    allopurinol (ZYLOPRIM) tablet 100 mg  100 mg Oral Daily    carvedilol (COREG) tablet 6.25 mg  6.25 mg Oral BID WC    vitamin B-12 (CYANOCOBALAMIN) tablet 500 mcg  500 mcg Oral Daily    finasteride (PROSCAR) tablet 5 mg  5 mg Oral Daily    glipiZIDE (GLUCOTROL) tablet 2.5 mg  2.5 mg Oral QAM AC    NIFEdipine (PROCARDIA XL) extended release tablet 90 mg  90 mg Oral QAM AC    [Held by 
appropriately.          DATA:  LABS:  Recent Labs     11/05/24  0615 11/04/24  0057 11/03/24  0538 11/02/24  0504 11/01/24  0805 10/31/24  1640 10/22/24  0632 10/19/24  0641 10/18/24  0645 10/11/24  0836 10/10/24  0732    131* 130* 130*   < > 138 137   < > 137   < > 131*   K 4.4 5.0 4.7 4.8   < > 2.9* 3.8   < > 3.9   < > 3.1*    96* 94* 97   < > 100 104   < > 103   < > 98   CO2 24 24 28 27   < > 29 25   < > 24   < > 24   BUN 47* 71* 56* 25*   < > 27* 54*   < > 54*   < > 48*   CREATININE 5.47* 7.56* 6.19* 4.49*   < > 4.45* 6.07*   < > 7.13*   < > 6.76*   CALCIUM 8.6 8.9 8.6 8.8   < > 7.8* 8.8   < > 8.8   < > 7.6*   PHOS  --   --   --  3.0  --  4.6 4.1   < > 5.5*   < >  --    MG  --   --   --  1.8  --   --   --   --  1.9  --  1.8    < > = values in this interval not displayed.     Recent Labs     11/05/24  0615 11/04/24  1603 11/04/24  0614   WBC 11.8* 15.0* 13.2*   HGB 6.9* 8.3* 7.4*   HCT 21.2* 25.5* 22.7*   * 434* 469*     No results for input(s): \"KU\", \"CLU\" in the last 720 hours.    Invalid input(s): \"LENNY\", \"CREAU\", \"PROU\"    Radiology               Total time spent with patient:         Care Plan discussed with: Mr. and Mrs. Farmer.           Vishnu Basilio MD  
disintegrating tablet 4 mg  4 mg Oral Q8H PRN    Or    ondansetron (ZOFRAN) injection 4 mg  4 mg IntraVENous Q6H PRN    0.9 % sodium chloride infusion   IntraVENous PRN    heparin (porcine) injection 3,600 Units  3,600 Units IntraCATHeter PRN    diclofenac sodium (VOLTAREN) 1 % gel 2 g  2 g Topical BID    lidocaine (XYLOCAINE) 2 % uro-jet   Topical Once    allopurinol (ZYLOPRIM) tablet 100 mg  100 mg Oral Daily    carvedilol (COREG) tablet 6.25 mg  6.25 mg Oral BID WC    vitamin B-12 (CYANOCOBALAMIN) tablet 500 mcg  500 mcg Oral Daily    finasteride (PROSCAR) tablet 5 mg  5 mg Oral Daily    glipiZIDE (GLUCOTROL) tablet 2.5 mg  2.5 mg Oral QAM AC    NIFEdipine (PROCARDIA XL) extended release tablet 90 mg  90 mg Oral QAM AC    [Held by provider] Petrolatum-Zinc Oxide ointment 1 each  1 each Topical BID    thiamine mononitrate tablet 100 mg  100 mg Oral Daily    sodium chloride flush 0.9 % injection 5-40 mL  5-40 mL IntraVENous 2 times per day    sodium chloride flush 0.9 % injection 5-40 mL  5-40 mL IntraVENous PRN    0.9 % sodium chloride infusion   IntraVENous PRN    polyethylene glycol (GLYCOLAX) packet 17 g  17 g Oral Daily PRN    acetaminophen (TYLENOL) tablet 650 mg  650 mg Oral Q6H PRN    Or    acetaminophen (TYLENOL) suppository 650 mg  650 mg Rectal Q6H PRN    glucose chewable tablet 16 g  4 tablet Oral PRN    dextrose bolus 10% 125 mL  125 mL IntraVENous PRN    Or    dextrose bolus 10% 250 mL  250 mL IntraVENous PRN    glucagon injection 1 mg  1 mg SubCUTAneous PRN    dextrose 10 % infusion   IntraVENous Continuous PRN    insulin lispro (HUMALOG,ADMELOG) injection vial 0-8 Units  0-8 Units SubCUTAneous 4x Daily AC & HS    Virt-Caps 1 mg  1 capsule Oral Daily          Physical Exam:      Seen in room 410  Mrs. Farmer was in attendance.    General: Chronically ill-appearing gentleman   Head: Normocephalic    ENT: NG tube-absent    Right IJ PermCath.    Cardiovascular: S1, S2, no extra gallop, no pericardial 
infusion   IntraVENous PRN Isaías Gonzalez MD 75 mL/hr at 11/10/24 0606 New Bag at 11/10/24 0606    polyethylene glycol (GLYCOLAX) packet 17 g  17 g Oral Daily PRN Isaías Gonzalez MD        acetaminophen (TYLENOL) tablet 650 mg  650 mg Oral Q6H PRN Isaías Gonzalez MD   650 mg at 11/08/24 1241    Or    acetaminophen (TYLENOL) suppository 650 mg  650 mg Rectal Q6H PRN Isaías Gonzalez MD        glucose chewable tablet 16 g  4 tablet Oral PRN Isaías Gonzalez MD        dextrose bolus 10% 125 mL  125 mL IntraVENous PRN Isaías Gonzalez MD        Or    dextrose bolus 10% 250 mL  250 mL IntraVENous PRN Isaías Gonzalez MD        glucagon injection 1 mg  1 mg SubCUTAneous PRN Isaías Gonzalez MD        dextrose 10 % infusion   IntraVENous Continuous PRN Isaías Gonzalez MD        insulin lispro (HUMALOG,ADMELOG) injection vial 0-8 Units  0-8 Units SubCUTAneous 4x Daily AC & HS Isaías Gonzalez MD   2 Units at 11/07/24 2112    Virt-Caps 1 mg  1 capsule Oral Daily Vishnu Basilio MD   1 mg at 11/09/24 0924           Recent Results (from the past 24 hour(s))   POCT Glucose    Collection Time: 11/12/24  7:23 AM   Result Value Ref Range    POC Glucose 101 (H) 65 - 100 mg/dL    Performed by: Radha Rowe    POCT Glucose    Collection Time: 11/12/24 11:05 AM   Result Value Ref Range    POC Glucose 102 (H) 65 - 100 mg/dL    Performed by: Radha Rowe    POCT Glucose    Collection Time: 11/12/24  4:56 PM   Result Value Ref Range    POC Glucose 143 (H) 65 - 100 mg/dL    Performed by: Caleb Caruso    POCT Glucose    Collection Time: 11/12/24  7:59 PM   Result Value Ref Range    POC Glucose 131 (H) 65 - 100 mg/dL    Performed by: CONSTANCE MEJIA        ASSESSMENT:   Patient is 83 y.o. with diagnosis of : Principal Problem:    Acute pneumonia  Resolved Problems:    * No resolved hospital problems. *      PLAN:                 Left groin looks okay.    No GI activities yet.  I will order CT scan abdomen pelvis with oral contrast.    Adequate 
mg IntraVENous Daily    labetalol (NORMODYNE;TRANDATE) injection 10 mg  10 mg IntraVENous Q4H PRN    heparin (porcine) injection 2,000 Units  2,000 Units IntraCATHeter PRN    sodium chloride flush 0.9 % injection 5-40 mL  5-40 mL IntraVENous 2 times per day    sodium chloride flush 0.9 % injection 5-40 mL  5-40 mL IntraVENous PRN    0.9 % sodium chloride infusion   IntraVENous PRN    ondansetron (ZOFRAN-ODT) disintegrating tablet 4 mg  4 mg Oral Q8H PRN    Or    ondansetron (ZOFRAN) injection 4 mg  4 mg IntraVENous Q6H PRN    0.9 % sodium chloride infusion   IntraVENous PRN    heparin (porcine) injection 3,600 Units  3,600 Units IntraCATHeter PRN    diclofenac sodium (VOLTAREN) 1 % gel 2 g  2 g Topical BID    lidocaine (XYLOCAINE) 2 % uro-jet   Topical Once    allopurinol (ZYLOPRIM) tablet 100 mg  100 mg Oral Daily    carvedilol (COREG) tablet 6.25 mg  6.25 mg Oral BID WC    vitamin B-12 (CYANOCOBALAMIN) tablet 500 mcg  500 mcg Oral Daily    finasteride (PROSCAR) tablet 5 mg  5 mg Oral Daily    glipiZIDE (GLUCOTROL) tablet 2.5 mg  2.5 mg Oral QAM AC    Petrolatum-Zinc Oxide ointment 1 each  1 each Topical BID    thiamine mononitrate tablet 100 mg  100 mg Oral Daily    sodium chloride flush 0.9 % injection 5-40 mL  5-40 mL IntraVENous 2 times per day    sodium chloride flush 0.9 % injection 5-40 mL  5-40 mL IntraVENous PRN    0.9 % sodium chloride infusion   IntraVENous PRN    polyethylene glycol (GLYCOLAX) packet 17 g  17 g Oral Daily PRN    acetaminophen (TYLENOL) tablet 650 mg  650 mg Oral Q6H PRN    Or    acetaminophen (TYLENOL) suppository 650 mg  650 mg Rectal Q6H PRN    glucose chewable tablet 16 g  4 tablet Oral PRN    dextrose bolus 10% 125 mL  125 mL IntraVENous PRN    Or    dextrose bolus 10% 250 mL  250 mL IntraVENous PRN    glucagon injection 1 mg  1 mg SubCUTAneous PRN    dextrose 10 % infusion   IntraVENous Continuous PRN    insulin lispro (HUMALOG,ADMELOG) injection vial 0-8 Units  0-8 Units 
sandra-epbx (RETACRIT) 6,000 Units combo injection  6,000 Units IntraVENous Once per day on Monday Wednesday Friday    diatrizoate meglumine-sodium (GASTROGRAFIN) 66-10 % solution 30 mL  30 mL Per NG tube ONCE PRN    labetalol (NORMODYNE;TRANDATE) injection 10 mg  10 mg IntraVENous Q4H PRN    heparin (porcine) injection 2,000 Units  2,000 Units IntraCATHeter PRN    sodium chloride flush 0.9 % injection 5-40 mL  5-40 mL IntraVENous 2 times per day    sodium chloride flush 0.9 % injection 5-40 mL  5-40 mL IntraVENous PRN    0.9 % sodium chloride infusion   IntraVENous PRN    ondansetron (ZOFRAN-ODT) disintegrating tablet 4 mg  4 mg Oral Q8H PRN    Or    ondansetron (ZOFRAN) injection 4 mg  4 mg IntraVENous Q6H PRN    0.9 % sodium chloride infusion   IntraVENous PRN    heparin (porcine) injection 3,600 Units  3,600 Units IntraCATHeter PRN    diclofenac sodium (VOLTAREN) 1 % gel 2 g  2 g Topical BID    lidocaine (XYLOCAINE) 2 % uro-jet   Topical Once    allopurinol (ZYLOPRIM) tablet 100 mg  100 mg Oral Daily    carvedilol (COREG) tablet 6.25 mg  6.25 mg Oral BID WC    vitamin B-12 (CYANOCOBALAMIN) tablet 500 mcg  500 mcg Oral Daily    finasteride (PROSCAR) tablet 5 mg  5 mg Oral Daily    Petrolatum-Zinc Oxide ointment 1 each  1 each Topical BID    thiamine mononitrate tablet 100 mg  100 mg Oral Daily    sodium chloride flush 0.9 % injection 5-40 mL  5-40 mL IntraVENous 2 times per day    sodium chloride flush 0.9 % injection 5-40 mL  5-40 mL IntraVENous PRN    0.9 % sodium chloride infusion   IntraVENous PRN    polyethylene glycol (GLYCOLAX) packet 17 g  17 g Oral Daily PRN    acetaminophen (TYLENOL) tablet 650 mg  650 mg Oral Q6H PRN    Or    acetaminophen (TYLENOL) suppository 650 mg  650 mg Rectal Q6H PRN    glucose chewable tablet 16 g  4 tablet Oral PRN    dextrose bolus 10% 125 mL  125 mL IntraVENous PRN    Or    dextrose bolus 10% 250 mL  250 mL IntraVENous PRN    glucagon injection 1 mg  1 mg SubCUTAneous PRN    
she is unsure he is unable to differentiate states it has been going on for \"a wild his last admission CT showed residual material in the esophagus with a distended stomach although he denies any vomiting or reflux symptoms  Genitourinary no discomfort or urine Jerad pain  Cardiovascular denies any ankle edema diaphoresis or shortness of breath did have chest discomfort yesterday during dialysis noted to have a fever of 101  Respiratory some cough primarily when swallowing has not noted any purulent mucus    No Known Allergies     MEDS:        Current Facility-Administered Medications:     0.9 % sodium chloride infusion, , IntraVENous, PRN, Isaías Gonzalez MD    heparin (porcine) injection 3,600 Units, 3,600 Units, IntraCATHeter, PRN, Vishnu Basilio MD, 3,600 Units at 11/06/24 1250    diclofenac sodium (VOLTAREN) 1 % gel 2 g, 2 g, Topical, BID, Lela Caicedo MD, 2 g at 11/06/24 2125    lidocaine (XYLOCAINE) 2 % uro-jet, , Topical, Once, Lela Caicedo MD    allopurinol (ZYLOPRIM) tablet 100 mg, 100 mg, Oral, Daily, Lela Caicedo MD, 100 mg at 11/06/24 1429    carvedilol (COREG) tablet 6.25 mg, 6.25 mg, Oral, BID WC, Lela Caicedo MD, 6.25 mg at 11/06/24 1655    vitamin B-12 (CYANOCOBALAMIN) tablet 500 mcg, 500 mcg, Oral, Daily, Lela Caicedo MD, 500 mcg at 11/06/24 1429    finasteride (PROSCAR) tablet 5 mg, 5 mg, Oral, Daily, Lela Caicedo MD, 5 mg at 11/06/24 1429    glipiZIDE (GLUCOTROL) tablet 2.5 mg, 2.5 mg, Oral, Sascha BOB Neha D, MD, 2.5 mg at 11/07/24 0635    NIFEdipine (PROCARDIA XL) extended release tablet 90 mg, 90 mg, Oral, Sascha BOB Neha D, MD, 90 mg at 11/07/24 0635    [Held by provider] Petrolatum-Zinc Oxide ointment 1 each, 1 each, Topical, BID, Lela Caicedo MD    thiamine mononitrate tablet 100 mg, 100 mg, Oral, Daily, Lela Caicedo MD, 100 mg at 11/06/24 1429    epoetin sandra-epbx (RETACRIT) 6,000 Units combo injection, 6,000 Units, SubCUTAneous, Once per day on Monday Wednesday 
sodium chloride infusion   IntraVENous PRN Isaías Gonzalez MD 75 mL/hr at 11/10/24 0606 New Bag at 11/10/24 0606    polyethylene glycol (GLYCOLAX) packet 17 g  17 g Oral Daily PRN Isaías Gonzalez MD        acetaminophen (TYLENOL) tablet 650 mg  650 mg Oral Q6H PRN Isaías Gonzalez MD   650 mg at 11/08/24 1241    Or    acetaminophen (TYLENOL) suppository 650 mg  650 mg Rectal Q6H PRN Isaías Gonzalez MD        glucose chewable tablet 16 g  4 tablet Oral PRN Isaías Gonzalez MD        dextrose bolus 10% 125 mL  125 mL IntraVENous PRN Isaías Gonzalez MD        Or    dextrose bolus 10% 250 mL  250 mL IntraVENous PRN Isaías Gonzalez MD        glucagon injection 1 mg  1 mg SubCUTAneous PRN Isaías Gonzalez MD        dextrose 10 % infusion   IntraVENous Continuous PRN Isaías Gonzalez MD        insulin lispro (HUMALOG,ADMELOG) injection vial 0-8 Units  0-8 Units SubCUTAneous 4x Daily AC & HS Isaías Gonzalez MD   2 Units at 11/07/24 2112    Virt-Caps 1 mg  1 capsule Oral Daily Vishnu Basilio MD   1 mg at 11/09/24 0924           Recent Results (from the past 24 hour(s))   CBC with Auto Differential    Collection Time: 11/13/24  7:45 AM   Result Value Ref Range    WBC 9.3 4.1 - 11.1 K/uL    RBC 2.58 (L) 4.10 - 5.70 M/uL    Hemoglobin 7.7 (L) 12.1 - 17.0 g/dL    Hematocrit 24.0 (L) 36.6 - 50.3 %    MCV 93.0 80.0 - 99.0 FL    MCH 29.8 26.0 - 34.0 PG    MCHC 32.1 30.0 - 36.5 g/dL    RDW 15.7 (H) 11.5 - 14.5 %    Platelets 394 150 - 400 K/uL    MPV 8.7 (L) 8.9 - 12.9 FL    Nucleated RBCs 0.0 0.0  WBC    nRBC 0.00 0.00 - 0.01 K/uL    Neutrophils % 73 32 - 75 %    Lymphocytes % 13 12 - 49 %    Monocytes % 10 5 - 13 %    Eosinophils % 2 0 - 7 %    Basophils % 0 0 - 1 %    Immature Granulocytes % 2 (H) 0 - 0.5 %    Neutrophils Absolute 6.9 1.8 - 8.0 K/UL    Lymphocytes Absolute 1.2 0.8 - 3.5 K/UL    Monocytes Absolute 0.9 0.0 - 1.0 K/UL    Eosinophils Absolute 0.2 0.0 - 0.4 K/UL    Basophils Absolute 0.0 0.0 - 0.1 K/UL    Immature 
extended release tablet 90 mg  90 mg Oral QAM AC    [Held by provider] Petrolatum-Zinc Oxide ointment 1 each  1 each Topical BID    thiamine mononitrate tablet 100 mg  100 mg Oral Daily    epoetin sandra-epbx (RETACRIT) 6,000 Units combo injection  6,000 Units SubCUTAneous Once per day on Monday Wednesday Friday    meropenem (MERREM) 500 mg in sodium chloride 0.9 % 100 mL IVPB (mini-bag)  500 mg IntraVENous Q24H    sodium chloride flush 0.9 % injection 5-40 mL  5-40 mL IntraVENous 2 times per day    sodium chloride flush 0.9 % injection 5-40 mL  5-40 mL IntraVENous PRN    0.9 % sodium chloride infusion   IntraVENous PRN    ondansetron (ZOFRAN-ODT) disintegrating tablet 4 mg  4 mg Oral Q8H PRN    Or    ondansetron (ZOFRAN) injection 4 mg  4 mg IntraVENous Q6H PRN    polyethylene glycol (GLYCOLAX) packet 17 g  17 g Oral Daily PRN    acetaminophen (TYLENOL) tablet 650 mg  650 mg Oral Q6H PRN    Or    acetaminophen (TYLENOL) suppository 650 mg  650 mg Rectal Q6H PRN    glucose chewable tablet 16 g  4 tablet Oral PRN    dextrose bolus 10% 125 mL  125 mL IntraVENous PRN    Or    dextrose bolus 10% 250 mL  250 mL IntraVENous PRN    glucagon injection 1 mg  1 mg SubCUTAneous PRN    dextrose 10 % infusion   IntraVENous Continuous PRN    insulin lispro (HUMALOG,ADMELOG) injection vial 0-8 Units  0-8 Units SubCUTAneous 4x Daily AC & HS    levoFLOXacin (LEVAQUIN) 500 MG/100ML infusion 500 mg  500 mg IntraVENous Q48H    Virt-Caps 1 mg  1 capsule Oral Daily         All relevant laboratory and imaging results reviewed in EPIC electronic medical records.            Discussion/MDM:     [] High (any 2)    A. Problems (any 1)  [x] Acute/Chronic Illness/injury posing threat to life or bodily function:    [] Severe exacerbation of chronic illness:    ---------------------------------------------------------------------  B. Risk of Treatment (any 1)   [x] Drugs/treatments that require intensive monitoring for toxicity include:    [] IV ABX 
patient’s plan of care was discussed with: Registered nurse    Patient Education  Education Given To: Patient;Family  Education Provided: Plan of Care;Fall Prevention Strategies;Transfer Training  Education Provided Comments: importance of calling for assistance and not getting up without staff  Education Method: Verbal  Barriers to Learning: Cognition  Education Outcome: Verbalized understanding;Continued education needed    Thank you for this referral.  CARMENZA Jaffe  Minutes: 16    
options with Case Management.  [] Discussed management of the case with:    [] Telemetry personally reviewed and interpreted as documented above    [] Imaging personally reviewed and interpreted, includes:    [x] Data Review (any 3)  [x] All available Consultant notes from yesterday/today were reviewed  [x] All current labs were reviewed and interpreted for clinical significance   [x] Appropriate follow-up labs were ordered  [] Collateral history obtained from:       Time spent with patient including counseling, chart review and nursing communication: 38 minutes    Sam Leon Cha, MD  
17 g Oral Daily PRN    acetaminophen (TYLENOL) tablet 650 mg  650 mg Oral Q6H PRN    Or    acetaminophen (TYLENOL) suppository 650 mg  650 mg Rectal Q6H PRN    glucose chewable tablet 16 g  4 tablet Oral PRN    dextrose bolus 10% 125 mL  125 mL IntraVENous PRN    Or    dextrose bolus 10% 250 mL  250 mL IntraVENous PRN    glucagon injection 1 mg  1 mg SubCUTAneous PRN    dextrose 10 % infusion   IntraVENous Continuous PRN    insulin lispro (HUMALOG,ADMELOG) injection vial 0-8 Units  0-8 Units SubCUTAneous 4x Daily AC & HS    Virt-Caps 1 mg  1 capsule Oral Daily         All relevant laboratory and imaging results reviewed in EPIC electronic medical records.            Discussion/MDM:     [] High (any 2)    A. Problems (any 1)  [x] Acute/Chronic Illness/injury posing threat to life or bodily function:    [] Severe exacerbation of chronic illness:    ---------------------------------------------------------------------  B. Risk of Treatment (any 1)   [x] Drugs/treatments that require intensive monitoring for toxicity include:    [] IV ABX requiring serial renal monitoring for nephrotoxicity:     [] IV Narcotic analgesia for adverse drug reaction  [] Aggressive IV diuresis requiring serial monitoring for renal impairment and electrolyte derangements  [] Critical electrolyte abnormalities requiring IV replacement and close serial monitoring  [x] SQ Insulin SS- monitoring serial FSBS for Hypoglycemic adverse drug reaction  [] Other -   [] Change in code status:    [] Decision to escalate care:    [] Major surgery/procedure with associated risk factors:    ----------------------------------------------------------------------  C. Data (any 2)  [x] Discussed current management and discharge planning options with Case Management.  [] Discussed management of the case with:    [x] Telemetry personally reviewed and interpreted as documented above    [] Imaging personally reviewed and interpreted, includes:    [x] Data Review 
Collateral history obtained from:       Time spent with patient including counseling, chart review and nursing communication: 38 minutes    Lela Caicedo MD    
Immature Granulocytes % 2 (H) 0 - 0.5 %    Neutrophils Absolute 9.5 (H) 1.8 - 8.0 K/UL    Lymphocytes Absolute 1.3 0.8 - 3.5 K/UL    Monocytes Absolute 0.9 0.0 - 1.0 K/UL    Eosinophils Absolute 0.3 0.0 - 0.4 K/UL    Basophils Absolute 0.0 0.0 - 0.1 K/UL    Immature Granulocytes Absolute 0.3 (H) 0.00 - 0.04 K/UL    Differential Type AUTOMATED     Basic Metabolic Panel    Collection Time: 11/17/24  7:52 AM   Result Value Ref Range    Sodium 135 (L) 136 - 145 mmol/L    Potassium 3.8 3.5 - 5.1 mmol/L    Chloride 103 97 - 108 mmol/L    CO2 23 21 - 32 mmol/L    Anion Gap 9 2 - 12 mmol/L    Glucose 56 (L) 65 - 100 mg/dL    BUN 25 (H) 6 - 20 mg/dL    Creatinine 6.09 (H) 0.70 - 1.30 mg/dL    BUN/Creatinine Ratio 4 (L) 12 - 20      Est, Glom Filt Rate 8 (L) >60 ml/min/1.73m2    Calcium 7.7 (L) 8.5 - 10.1 mg/dL   POCT Glucose    Collection Time: 11/17/24  7:52 AM   Result Value Ref Range    POC Glucose 60 (L) 65 - 100 mg/dL    Performed by: José Warner    POCT Glucose    Collection Time: 11/17/24 10:34 AM   Result Value Ref Range    POC Glucose 98 65 - 100 mg/dL    Performed by: Kwame Gurrola          CT ABDOMEN PELVIS WO CONTRAST Additional Contrast? Oral   Final Result         1. No acute bowel obstruction. Interval left inguinal herniorrhaphy without   evidence for complication. Other incidental findings as described above.      Electronically signed by Laurence Cervantes      CT ABDOMEN PELVIS WO CONTRAST Additional Contrast? None   Final Result   1.  Dilated fluid-filled small bowel loops with a gradual transition to   decompressed terminal ileum consistent with ileus.   2.  Sigmoid colon containing left inguinal hernia without evidence of   incarceration.   3.  Moderate prostate enlargement      Electronically signed by CHICA INFANTE      XR CHEST PORTABLE   Final Result   NG tube satisfactory position.         Electronically signed by NANDINI BALLARD      XR CHEST 1 VIEW   Final Result   No acute abnormality.    
SQ Insulin SS- monitoring serial FSBS for Hypoglycemic adverse drug reaction  [] Other -   [] Change in code status:    [] Decision to escalate care:    [] Major surgery/procedure with associated risk factors:    ----------------------------------------------------------------------  C. Data (any 2)  [x] Discussed current management and discharge planning options with Case Management.  [] Discussed management of the case with:    [x] Telemetry personally reviewed and interpreted as documented above    [] Imaging personally reviewed and interpreted, includes:    [x] Data Review (any 3)  [x] All available Consultant notes from yesterday/today were reviewed  [x] All current labs were reviewed and interpreted for clinical significance   [x] Appropriate follow-up labs were ordered  [] Collateral history obtained from:       Time spent with patient including counseling, chart review and nursing communication: 38 minutes    Lela Caicedo MD    
bowel loops with a   decompressed terminal ileum and no transition point between dilated and   nondilated bowel loops.   Decompressed colon.   Sigmoid colon containing left inguinal hernia without obstruction.   Small volume cholelithiasis.         Electronically signed by CHICA INFANTE      XR CHEST PORTABLE   Final Result   NG tube in the stomach.      Electronically signed by CHICACALEB INFANTE      XR ABDOMEN (KUB) (SINGLE AP VIEW)   Final Result   Partial/early small bowel obstruction.         Electronically signed by Martell Bryant MD      XR CHEST PORTABLE   Final Result   No acute cardiopulmonary abnormalities.         Electronically signed by DONNA Torres      FL MODIFIED BARIUM SWALLOW W VIDEO   Final Result   Marked delay of swallow initiation.            Electronically signed by VERONIKA WEI      FL UPPER GI W BARIUM SWALLOW KUB    (Results Pending)      [unfilled]  Assessment:     Principal Problem:    Acute pneumonia  Resolved Problems:    * No resolved hospital problems. *    Coffee-ground emesis, nausea, vomiting, no severe abdominal pain possible GI bleeding,  Chronic anemia, iron replacement, Epogen     Advanced age of multiple medical issues, including DVT not on endocrine bleeding, pneumonia, hypoxia, coronary artery disease, CHF,     Some issue with swallow, monitor barium swallow test pending for result  Plan:   May start on clear liquid diet  Follow-up the x-ray study result,  Iron, epogen    Continue antibiotic treatment,  followed by pulmonologist  PPI as ordered        Will follow the patient, if there is abnormality in the monitor barium studies patient may schedule patient for EGD for evaluation, if this patient pulmonary is stable      Plan discussed with patient and family member           Signed By: Emiliano Graham MD     November 4, 2024          Thank you for allowing me to participate in this patients care    DISCLAIMER:  Please note that this report was generated to utilize Dragon Dictation 
derangements  [] Critical electrolyte abnormalities requiring IV replacement and close serial monitoring  [x] SQ Insulin SS- monitoring serial FSBS for Hypoglycemic adverse drug reaction  [] Other -   [] Change in code status:    [] Decision to escalate care:    [] Major surgery/procedure with associated risk factors:    ----------------------------------------------------------------------  C. Data (any 2)  [x] Discussed current management and discharge planning options with Case Management.  [] Discussed management of the case with:    [x] Telemetry personally reviewed and interpreted as documented above    [] Imaging personally reviewed and interpreted, includes:    [x] Data Review (any 3)  [x] All available Consultant notes from yesterday/today were reviewed  [x] All current labs were reviewed and interpreted for clinical significance   [x] Appropriate follow-up labs were ordered  [] Collateral history obtained from:       Time spent with patient including counseling, chart review and nursing communication: 38 minutes    Isaías Gonzalez MD    
no agitation, oriented to person;   Skin: warm, dry, no cyanosis;   Pulses: Brachial and radial pulses intact  Capillary: Normal capillary refill      Labs:    Recent Labs     11/10/24  1405 11/11/24  0519   WBC 10.6 11.8*   HGB 9.3* 9.7*   * 470*     Procalcitonin 4.17, 1.41, 1.79  Recent Labs     11/10/24  1405 11/10/24  1406 11/11/24  0519 11/12/24  0606    144 145 140   K 2.8* 2.8* 3.2* 2.8*    106 106 103   CO2 26 26 20* 26   GLUCOSE 75 75 129* 93   BUN 24* 24* 31* 23*   CREATININE 6.58* 6.54* 7.17* 5.04*   CALCIUM 8.6 8.5 8.2* 8.7   MG  --   --   --  1.9   PHOS 5.0*  --   --  5.3*   BILITOT  --   --  0.4  --    AST  --   --  9*  --    ALT  --   --  7*  --          Lab Results   Component Value Date/Time    PROBNP 3,742 10/31/2024 04:40 PM      Results       Procedure Component Value Units Date/Time    Clostridium Difficile Toxin/Antigen [[id number]] Collected: 11/02/24 1220    Order Status: Canceled Specimen: Stool     Clostridium Difficile Toxin/Antigen [[id number]] Collected: 11/02/24 1220    Order Status: Completed Specimen: Stool Updated: 11/03/24 0802     GDH Antigen Negative        C difficile Toxin, EIA Negative        C Diff Toxin Interpretation       Negative for toxigenic C. difficile          Enteric Bact Panel, DNA [[id number]] Collected: 11/02/24 0035    Order Status: Completed Specimen: Stool Updated: 11/03/24 1900     SHIGELLA SPECIES, DNA Negative        CAMPYLOBACTER SPECIES, DNA Negative        VIBRIO SPECIES, DNA Negative        ENTEROTOXIGEN E COLI, DNA Negative        SHIGA TOXIN PRODUCING, DNA Negative        SALMONELLA SPECIES, DNA Negative        P. SHIGELLOIDES, DNA Negative        Y. ENTEROCOLITICA, DNA Negative       Clostridium Difficile Toxin/Antigen [[id number]] Collected: 11/02/24 0035    Order Status: Canceled Specimen: Stool     Culture, Respiratory (with Gram Stain) [[id number]] Collected: 11/01/24 0830    Order Status: Canceled Specimen: Sputum Expectorated 
SS- monitoring serial FSBS for Hypoglycemic adverse drug reaction  [] Other -   [] Change in code status:    [] Decision to escalate care:    [] Major surgery/procedure with associated risk factors:    ----------------------------------------------------------------------  C. Data (any 2)  [x] Discussed current management and discharge planning options with Case Management.  [] Discussed management of the case with:    [x] Telemetry personally reviewed and interpreted as documented above    [] Imaging personally reviewed and interpreted, includes:    [x] Data Review (any 3)  [x] All available Consultant notes from yesterday/today were reviewed  [x] All current labs were reviewed and interpreted for clinical significance   [x] Appropriate follow-up labs were ordered  [] Collateral history obtained from:       Time spent with patient including counseling, chart review and nursing communication: 38 minutes    Isaías Gonzalez MD    
out esophageal dysmotility and reflux  11/3 Tmax 99.5 did have episode of vomiting last night none this morning after breakfast.  KUB to me unremarkable except for gastric distention.  Will await upper GI to see if any evidence of esophageal dysmotility or gastroparesis which would warrant starting Reglan  11/4 Tmax 99 1 WBC count improving.  No further vomiting patient removed NG tube and refused to have it replaced.  Stomach remains soft.  Will await upper GI to see if esophageal dysmotility gastroparesis present.  I have warned the family that if Reglan is started that if tremor is a side effect it would need to be discontinued  11/5 alert awake lying in the bed tolerating regimen well no tremors noted discussed with the family at bedside regarding the feeding refused the NG tube ST on the case modified barium swallow shows marked delay of swallowing initiation on clear liquid diet  11/6 on room air alert awake on clear liquid diet hemoglobin was 6.9 for dialysis today we will get chest x-ray today, no nausea vomiting  11/7 family at bedside alert awake lying in the bed, got dialyzed yesterday, chest x-ray done yesterday showed no acute infiltrate  11/8 getting dialysis NGT was placed and patient has 800 mL bilious output  11/9 on room air high risk for aspiration GI and surgeon on the case regarding ileus  11/10 alert awake NG tube in place repeat CAT scan of the abdomen shows ileus left inguinal hernia possible surgery patient is n.p.o.  11/11 Patient underwent left inguinal hernia repair yesterday now he is on dialysis, NG tube in place  11/12 alert awake on room air NG tube in place status post left inguinal hernia repair, severe hypokalemia 2.8 replace potassium, hypophosphatemia replace phosphorus  11/13 remain on room air getting dialysis, NG tube in place high risk for aspiration CT of abdomen and pelvis ordered today  11/14 room air oxygen saturation well-maintained lungs are clear.  No further vomiting 
vial 0-8 Units  0-8 Units SubCUTAneous 4x Daily AC & HS    Virt-Caps 1 mg  1 capsule Oral Daily         All relevant laboratory and imaging results reviewed in EPIC electronic medical records.            Discussion/MDM:     [] High (any 2)    A. Problems (any 1)  [x] Acute/Chronic Illness/injury posing threat to life or bodily function:    [] Severe exacerbation of chronic illness:    ---------------------------------------------------------------------  B. Risk of Treatment (any 1)   [x] Drugs/treatments that require intensive monitoring for toxicity include:    [] IV ABX requiring serial renal monitoring for nephrotoxicity:     [] IV Narcotic analgesia for adverse drug reaction  [] Aggressive IV diuresis requiring serial monitoring for renal impairment and electrolyte derangements  [] Critical electrolyte abnormalities requiring IV replacement and close serial monitoring  [x] SQ Insulin SS- monitoring serial FSBS for Hypoglycemic adverse drug reaction  [] Other -   [] Change in code status:    [] Decision to escalate care:    [] Major surgery/procedure with associated risk factors:    ----------------------------------------------------------------------  C. Data (any 2)  [x] Discussed current management and discharge planning options with Case Management.  [] Discussed management of the case with:    [x] Telemetry personally reviewed and interpreted as documented above    [] Imaging personally reviewed and interpreted, includes:    [x] Data Review (any 3)  [x] All available Consultant notes from yesterday/today were reviewed  [x] All current labs were reviewed and interpreted for clinical significance   [x] Appropriate follow-up labs were ordered  [] Collateral history obtained from:       Time spent with patient including counseling, chart review and nursing communication: 38 minutes    Isaías Gonzalez MD    
today, no nausea vomiting           Thank you for allowing us to participate in the care of this patient.  We will follow along with you     Sivakumar Henning MD  
vomiting  11/7 family at bedside alert awake lying in the bed, got dialyzed yesterday, chest x-ray done yesterday showed no acute infiltrate  11/8 getting dialysis NGT was placed and patient has 800 mL bilious output           Thank you for allowing us to participate in the care of this patient.  We will follow along with you     Sivakumar Henning MD

## 2024-12-05 ENCOUNTER — OFFICE VISIT (OUTPATIENT)
Age: 84
End: 2024-12-05

## 2024-12-05 VITALS
OXYGEN SATURATION: 96 % | SYSTOLIC BLOOD PRESSURE: 122 MMHG | HEART RATE: 67 BPM | BODY MASS INDEX: 23.19 KG/M2 | RESPIRATION RATE: 16 BRPM | DIASTOLIC BLOOD PRESSURE: 71 MMHG | HEIGHT: 65 IN | TEMPERATURE: 98.2 F

## 2024-12-05 DIAGNOSIS — Z48.89 POSTOPERATIVE VISIT: Primary | ICD-10-CM

## 2024-12-05 PROCEDURE — 99024 POSTOP FOLLOW-UP VISIT: CPT | Performed by: SURGERY

## 2024-12-05 ASSESSMENT — PATIENT HEALTH QUESTIONNAIRE - PHQ9
SUM OF ALL RESPONSES TO PHQ QUESTIONS 1-9: 0
2. FEELING DOWN, DEPRESSED OR HOPELESS: NOT AT ALL
SUM OF ALL RESPONSES TO PHQ QUESTIONS 1-9: 0
SUM OF ALL RESPONSES TO PHQ9 QUESTIONS 1 & 2: 0
1. LITTLE INTEREST OR PLEASURE IN DOING THINGS: NOT AT ALL

## 2024-12-05 NOTE — PROGRESS NOTES
Identified pt with two pt identifiers (name and ). Reviewed chart in preparation for visit and have obtained necessary documentation.    Michael Fung is a 84 y.o. male  Chief Complaint   Patient presents with    Follow-up     Hernia repair      /71 (Site: Right Upper Arm, Position: Sitting, Cuff Size: Large Adult)   Pulse 67   Temp 98.2 °F (36.8 °C) (Oral)   Resp 16   Ht 1.651 m (5' 5\")   SpO2 96%   BMI 23.19 kg/m²     1. Have you been to the ER, urgent care clinic since your last visit?  Hospitalized since your last visit?NO    2. Have you seen or consulted any other health care providers outside of the [facility] since your last visit?  Include any pap smears or colon screening. NO

## 2024-12-16 PROBLEM — Z48.89 POSTOPERATIVE VISIT: Status: ACTIVE | Noted: 2024-12-16

## 2024-12-16 NOTE — PROGRESS NOTES
Subjective:      Michael Fung is a 84 y.o. male who presents today for wound check.     Patient had a left inguinal hernia repair done.  He is doing well  Objective:     /71 (Site: Right Upper Arm, Position: Sitting, Cuff Size: Large Adult)   Pulse 67   Temp 98.2 °F (36.8 °C) (Oral)   Resp 16   Ht 1.651 m (5' 5\")   SpO2 96%   BMI 23.19 kg/m²     Wound exam:  Left groin looks okay no signs of recurrent hernia  Assessment:   Etiology of Wound:    Left inguinal hernia repair with mesh implantation  Plan:   Patient doing well postop no signs of recurrent hernia.  Patient will be reassessed in 6 months follow-up.

## (undated) DEVICE — MINOR GENERAL: Brand: MEDLINE INDUSTRIES, INC.

## (undated) DEVICE — 3M™ STERI-STRIP™ REINFORCED ADHESIVE SKIN CLOSURES, R1542, 1/4 IN X 1-1/2 IN (6 MM X 38 MM), 6 STRIPS/ENVELOPE: Brand: 3M™ STERI-STRIP™

## (undated) DEVICE — SUTURE PROL SZ 3-0 L36IN NONABSORBABLE BLU L26MM SH 1/2 CIR 8522H

## (undated) DEVICE — 3M™ SURGICAL CLIPPER WITH PIVOTING HEAD, 9660, 50/CASE: Brand: 3M™

## (undated) DEVICE — GLOVE ORANGE PI 7 1/2   MSG9075

## (undated) DEVICE — 3M™ STERI-STRIP™ REINFORCED ADHESIVE SKIN CLOSURES, R1547, 1/2 IN X 4 IN (12 MM X 100 MM), 6 STRIPS/ENVELOPE: Brand: 3M™ STERI-STRIP™

## (undated) DEVICE — SUTURE MONOCRYL + SZ 4-0 L27IN ABSRB UD L19MM PS-2 3/8 CIR MCP426H

## (undated) DEVICE — SUTURE PERMA-HAND 0 L18IN NONABSORBABLE BLK CT-1 L36MM 1/2 C021D

## (undated) DEVICE — PENROSE DRAIN 18 X .5" SILICONE: Brand: MEDLINE

## (undated) DEVICE — SYRINGE IRRIG 60ML SFT PLIABLE BLB EZ TO GRP 1 HND USE W/

## (undated) DEVICE — SOLUTION IRRIG 500ML 0.9% SOD CHLO USP POUR PLAS BTL

## (undated) DEVICE — SUTURE VICRYL + SZ 3-0 L27IN ABSRB UD L26MM SH 1/2 CIR VCP416H

## (undated) DEVICE — BLADE,CARBON-STEEL,15,STRL,DISPOSABLE,TB: Brand: MEDLINE

## (undated) DEVICE — STRIP,CLOSURE,WOUND,MEDI-STRIP,1/2X4: Brand: MEDLINE

## (undated) DEVICE — SYRINGE MED 10ML LUERLOCK TIP W/O SFTY DISP

## (undated) DEVICE — 3M™ TEGADERM™ TRANSPARENT FILM DRESSING FRAME STYLE, 1626W, 4 IN X 4-3/4 IN (10 CM X 12 CM), 50/CT 4CT/CASE: Brand: 3M™ TEGADERM™

## (undated) DEVICE — HYPODERMIC SAFETY NEEDLE: Brand: MONOJECT

## (undated) DEVICE — MASTISOL ADHESIVE LIQ 2/3ML

## (undated) DEVICE — SUTURE PROL SZ 3-0 L18IN NONABSORBABLE BLU L19MM FS-2 3/8 8665G

## (undated) DEVICE — SPONGE,PEANUT,XRAY,ST,SM,3/8",5/CARD: Brand: MEDLINE INDUSTRIES, INC.